# Patient Record
Sex: MALE | Race: BLACK OR AFRICAN AMERICAN | NOT HISPANIC OR LATINO | Employment: OTHER | URBAN - METROPOLITAN AREA
[De-identification: names, ages, dates, MRNs, and addresses within clinical notes are randomized per-mention and may not be internally consistent; named-entity substitution may affect disease eponyms.]

---

## 2024-01-01 ENCOUNTER — APPOINTMENT (OUTPATIENT)
Dept: GENERAL RADIOLOGY | Facility: HOSPITAL | Age: 69
DRG: 871 | End: 2024-01-01
Payer: MEDICARE

## 2024-01-01 ENCOUNTER — APPOINTMENT (OUTPATIENT)
Dept: CT IMAGING | Facility: HOSPITAL | Age: 69
DRG: 871 | End: 2024-01-01
Payer: MEDICARE

## 2024-01-01 ENCOUNTER — APPOINTMENT (OUTPATIENT)
Dept: NUCLEAR MEDICINE | Facility: HOSPITAL | Age: 69
DRG: 871 | End: 2024-01-01
Payer: MEDICARE

## 2024-01-01 ENCOUNTER — APPOINTMENT (OUTPATIENT)
Dept: CARDIOLOGY | Facility: HOSPITAL | Age: 69
DRG: 871 | End: 2024-01-01
Payer: MEDICARE

## 2024-01-01 ENCOUNTER — HOSPITAL ENCOUNTER (INPATIENT)
Facility: HOSPITAL | Age: 69
LOS: 8 days | DRG: 871 | End: 2024-06-03
Attending: EMERGENCY MEDICINE | Admitting: INTERNAL MEDICINE
Payer: MEDICARE

## 2024-01-01 ENCOUNTER — APPOINTMENT (OUTPATIENT)
Dept: ULTRASOUND IMAGING | Facility: HOSPITAL | Age: 69
DRG: 871 | End: 2024-01-01
Payer: MEDICARE

## 2024-01-01 VITALS
WEIGHT: 187.39 LBS | HEART RATE: 16 BPM | SYSTOLIC BLOOD PRESSURE: 135 MMHG | HEIGHT: 74 IN | TEMPERATURE: 99.9 F | OXYGEN SATURATION: 95 % | RESPIRATION RATE: 30 BRPM | DIASTOLIC BLOOD PRESSURE: 85 MMHG | BODY MASS INDEX: 24.05 KG/M2

## 2024-01-01 DIAGNOSIS — N28.9 ACUTE RENAL INSUFFICIENCY: ICD-10-CM

## 2024-01-01 DIAGNOSIS — R09.02 HYPOXIA: ICD-10-CM

## 2024-01-01 DIAGNOSIS — J18.9 PNEUMONIA OF LEFT LOWER LOBE DUE TO INFECTIOUS ORGANISM: Primary | ICD-10-CM

## 2024-01-01 LAB
ABO GROUP BLD: NORMAL
ALBUMIN SERPL-MCNC: 2.1 G/DL (ref 3.5–5.2)
ALBUMIN SERPL-MCNC: 2.1 G/DL (ref 3.5–5.2)
ALBUMIN SERPL-MCNC: 2.2 G/DL (ref 3.5–5.2)
ALBUMIN SERPL-MCNC: 2.3 G/DL (ref 3.5–5.2)
ALBUMIN SERPL-MCNC: 2.4 G/DL (ref 3.5–5.2)
ALBUMIN SERPL-MCNC: 2.4 G/DL (ref 3.5–5.2)
ALBUMIN SERPL-MCNC: 2.5 G/DL (ref 3.5–5.2)
ALBUMIN SERPL-MCNC: 2.6 G/DL (ref 3.5–5.2)
ALBUMIN SERPL-MCNC: 2.6 G/DL (ref 3.5–5.2)
ALBUMIN SERPL-MCNC: 2.8 G/DL (ref 3.5–5.2)
ALBUMIN SERPL-MCNC: 3.4 G/DL (ref 3.5–5.2)
ALBUMIN/GLOB SERPL: 0.5 G/DL
ALBUMIN/GLOB SERPL: 0.6 G/DL
ALBUMIN/GLOB SERPL: 0.7 G/DL
ALBUMIN/GLOB SERPL: 0.8 G/DL
ALP SERPL-CCNC: 113 U/L (ref 39–117)
ALP SERPL-CCNC: 114 U/L (ref 39–117)
ALP SERPL-CCNC: 115 U/L (ref 39–117)
ALP SERPL-CCNC: 116 U/L (ref 39–117)
ALP SERPL-CCNC: 117 U/L (ref 39–117)
ALP SERPL-CCNC: 122 U/L (ref 39–117)
ALP SERPL-CCNC: 66 U/L (ref 39–117)
ALP SERPL-CCNC: 70 U/L (ref 39–117)
ALP SERPL-CCNC: 87 U/L (ref 39–117)
ALP SERPL-CCNC: 92 U/L (ref 39–117)
ALPHA1 GLOB MFR UR ELPH: 282 MG/DL (ref 90–200)
ALT SERPL W P-5'-P-CCNC: 102 U/L (ref 1–41)
ALT SERPL W P-5'-P-CCNC: 123 U/L (ref 1–41)
ALT SERPL W P-5'-P-CCNC: 139 U/L (ref 1–41)
ALT SERPL W P-5'-P-CCNC: 155 U/L (ref 1–41)
ALT SERPL W P-5'-P-CCNC: 196 U/L (ref 1–41)
ALT SERPL W P-5'-P-CCNC: 219 U/L (ref 1–41)
ALT SERPL W P-5'-P-CCNC: 59 U/L (ref 1–41)
ALT SERPL W P-5'-P-CCNC: 60 U/L (ref 1–41)
ALT SERPL W P-5'-P-CCNC: 89 U/L (ref 1–41)
ALT SERPL W P-5'-P-CCNC: 99 U/L (ref 1–41)
AMMONIA BLD-SCNC: 26 UMOL/L (ref 16–60)
AMMONIA BLD-SCNC: 42 UMOL/L (ref 16–60)
AMPHET+METHAMPHET UR QL: NEGATIVE
AMPHET+METHAMPHET UR QL: NEGATIVE
ANA SER QL: NEGATIVE
ANION GAP SERPL CALCULATED.3IONS-SCNC: 10 MMOL/L (ref 5–15)
ANION GAP SERPL CALCULATED.3IONS-SCNC: 10.2 MMOL/L (ref 5–15)
ANION GAP SERPL CALCULATED.3IONS-SCNC: 12 MMOL/L (ref 5–15)
ANION GAP SERPL CALCULATED.3IONS-SCNC: 12 MMOL/L (ref 5–15)
ANION GAP SERPL CALCULATED.3IONS-SCNC: 12.1 MMOL/L (ref 5–15)
ANION GAP SERPL CALCULATED.3IONS-SCNC: 12.5 MMOL/L (ref 5–15)
ANION GAP SERPL CALCULATED.3IONS-SCNC: 13 MMOL/L (ref 5–15)
ANION GAP SERPL CALCULATED.3IONS-SCNC: 13.3 MMOL/L (ref 5–15)
ANION GAP SERPL CALCULATED.3IONS-SCNC: 14 MMOL/L (ref 5–15)
ANION GAP SERPL CALCULATED.3IONS-SCNC: 14.8 MMOL/L (ref 5–15)
ANION GAP SERPL CALCULATED.3IONS-SCNC: 15 MMOL/L (ref 5–15)
ANION GAP SERPL CALCULATED.3IONS-SCNC: 15.7 MMOL/L (ref 5–15)
ANION GAP SERPL CALCULATED.3IONS-SCNC: 16.6 MMOL/L (ref 5–15)
ANION GAP SERPL CALCULATED.3IONS-SCNC: 17 MMOL/L (ref 5–15)
ANION GAP SERPL CALCULATED.3IONS-SCNC: 18 MMOL/L (ref 5–15)
ANION GAP SERPL CALCULATED.3IONS-SCNC: 9.6 MMOL/L (ref 5–15)
APPEARANCE FLD: ABNORMAL
ARTERIAL PATENCY WRIST A: ABNORMAL
ARTERIAL PATENCY WRIST A: POSITIVE
AST SERPL-CCNC: 106 U/L (ref 1–40)
AST SERPL-CCNC: 146 U/L (ref 1–40)
AST SERPL-CCNC: 160 U/L (ref 1–40)
AST SERPL-CCNC: 167 U/L (ref 1–40)
AST SERPL-CCNC: 186 U/L (ref 1–40)
AST SERPL-CCNC: 192 U/L (ref 1–40)
AST SERPL-CCNC: 236 U/L (ref 1–40)
AST SERPL-CCNC: 329 U/L (ref 1–40)
AST SERPL-CCNC: 352 U/L (ref 1–40)
AST SERPL-CCNC: 93 U/L (ref 1–40)
ATMOSPHERIC PRESS: 747.1 MMHG
ATMOSPHERIC PRESS: 747.3 MMHG
ATMOSPHERIC PRESS: 747.9 MMHG
ATMOSPHERIC PRESS: 748.2 MMHG
ATMOSPHERIC PRESS: 748.5 MMHG
ATMOSPHERIC PRESS: 749.1 MMHG
ATMOSPHERIC PRESS: 749.6 MMHG
ATMOSPHERIC PRESS: 751.5 MMHG
ATMOSPHERIC PRESS: 751.7 MMHG
ATMOSPHERIC PRESS: 752.1 MMHG
ATMOSPHERIC PRESS: 752.4 MMHG
ATMOSPHERIC PRESS: 753.4 MMHG
B PARAPERT DNA SPEC QL NAA+PROBE: NOT DETECTED
B PARAPERT DNA SPEC QL NAA+PROBE: NOT DETECTED
B PERT DNA SPEC QL NAA+PROBE: NOT DETECTED
B PERT DNA SPEC QL NAA+PROBE: NOT DETECTED
BACTERIA SPEC AEROBE CULT: ABNORMAL
BACTERIA SPEC AEROBE CULT: ABNORMAL
BACTERIA SPEC AEROBE CULT: NORMAL
BACTERIA SPEC AEROBE CULT: NORMAL
BACTERIA SPEC RESP CULT: NORMAL
BACTERIA UR QL AUTO: ABNORMAL /HPF
BACTERIA UR QL AUTO: NORMAL /HPF
BARBITURATES UR QL SCN: NEGATIVE
BARBITURATES UR QL SCN: NEGATIVE
BASE EXCESS BLDA CALC-SCNC: -1.7 MMOL/L (ref 0–2)
BASE EXCESS BLDA CALC-SCNC: -2.7 MMOL/L (ref 0–2)
BASE EXCESS BLDA CALC-SCNC: -4.7 MMOL/L (ref 0–2)
BASE EXCESS BLDA CALC-SCNC: -5.5 MMOL/L (ref 0–2)
BASE EXCESS BLDA CALC-SCNC: -5.7 MMOL/L (ref 0–2)
BASE EXCESS BLDA CALC-SCNC: -5.9 MMOL/L (ref 0–2)
BASE EXCESS BLDA CALC-SCNC: -6.3 MMOL/L (ref 0–2)
BASE EXCESS BLDA CALC-SCNC: -6.4 MMOL/L (ref 0–2)
BASE EXCESS BLDA CALC-SCNC: -7.2 MMOL/L (ref 0–2)
BASE EXCESS BLDA CALC-SCNC: -7.9 MMOL/L (ref 0–2)
BASE EXCESS BLDA CALC-SCNC: 0.4 MMOL/L (ref 0–2)
BASE EXCESS BLDA CALC-SCNC: 0.9 MMOL/L (ref 0–2)
BASOPHILS # BLD AUTO: 0.01 10*3/MM3 (ref 0–0.2)
BASOPHILS NFR BLD AUTO: 0.1 % (ref 0–1.5)
BASOPHILS NFR BLD AUTO: 0.1 % (ref 0–1.5)
BASOPHILS NFR BLD AUTO: 0.2 % (ref 0–1.5)
BDY SITE: ABNORMAL
BENZODIAZ UR QL SCN: NEGATIVE
BENZODIAZ UR QL SCN: NEGATIVE
BH BB BLOOD EXPIRATION DATE: NORMAL
BH BB BLOOD EXPIRATION DATE: NORMAL
BH BB BLOOD TYPE BARCODE: 5100
BH BB BLOOD TYPE BARCODE: 5100
BH BB DISPENSE STATUS: NORMAL
BH BB DISPENSE STATUS: NORMAL
BH BB PRODUCT CODE: NORMAL
BH BB PRODUCT CODE: NORMAL
BH BB UNIT NUMBER: NORMAL
BH BB UNIT NUMBER: NORMAL
BH CV ECHO MEAS - EDV(CUBED): 123.3 ML
BH CV ECHO MEAS - IVS/LVPW: 0.98 CM
BH CV ECHO MEAS - IVSD: 1.17 CM
BH CV ECHO MEAS - LV MASS(C)D: 228.1 GRAMS
BH CV ECHO MEAS - LVIDD: 5 CM
BH CV ECHO MEAS - LVOT AREA: 3.8 CM2
BH CV ECHO MEAS - LVOT DIAM: 2.2 CM
BH CV ECHO MEAS - LVPWD: 1.2 CM
BH CV LOWER VASCULAR LEFT COMMON FEMORAL AUGMENT: NORMAL
BH CV LOWER VASCULAR LEFT COMMON FEMORAL COMPETENT: NORMAL
BH CV LOWER VASCULAR LEFT COMMON FEMORAL COMPRESS: NORMAL
BH CV LOWER VASCULAR LEFT COMMON FEMORAL PHASIC: NORMAL
BH CV LOWER VASCULAR LEFT COMMON FEMORAL SPONT: NORMAL
BH CV LOWER VASCULAR LEFT DISTAL FEMORAL COMPRESS: NORMAL
BH CV LOWER VASCULAR LEFT GASTRONEMIUS COMPRESS: NORMAL
BH CV LOWER VASCULAR LEFT GREATER SAPH AK COMPRESS: NORMAL
BH CV LOWER VASCULAR LEFT GREATER SAPH BK COMPRESS: NORMAL
BH CV LOWER VASCULAR LEFT LESSER SAPH COMPRESS: NORMAL
BH CV LOWER VASCULAR LEFT MID FEMORAL AUGMENT: NORMAL
BH CV LOWER VASCULAR LEFT MID FEMORAL COMPETENT: NORMAL
BH CV LOWER VASCULAR LEFT MID FEMORAL COMPRESS: NORMAL
BH CV LOWER VASCULAR LEFT MID FEMORAL PHASIC: NORMAL
BH CV LOWER VASCULAR LEFT MID FEMORAL SPONT: NORMAL
BH CV LOWER VASCULAR LEFT PERONEAL COMPRESS: NORMAL
BH CV LOWER VASCULAR LEFT POPLITEAL AUGMENT: NORMAL
BH CV LOWER VASCULAR LEFT POPLITEAL COMPETENT: NORMAL
BH CV LOWER VASCULAR LEFT POPLITEAL COMPRESS: NORMAL
BH CV LOWER VASCULAR LEFT POPLITEAL PHASIC: NORMAL
BH CV LOWER VASCULAR LEFT POPLITEAL SPONT: NORMAL
BH CV LOWER VASCULAR LEFT POSTERIOR TIBIAL COMPRESS: NORMAL
BH CV LOWER VASCULAR LEFT PROFUNDA FEMORAL COMPRESS: NORMAL
BH CV LOWER VASCULAR LEFT PROXIMAL FEMORAL COMPRESS: NORMAL
BH CV LOWER VASCULAR LEFT SAPHENOFEMORAL JUNCTION COMPRESS: NORMAL
BH CV LOWER VASCULAR RIGHT COMMON FEMORAL AUGMENT: NORMAL
BH CV LOWER VASCULAR RIGHT COMMON FEMORAL COMPETENT: NORMAL
BH CV LOWER VASCULAR RIGHT COMMON FEMORAL COMPRESS: NORMAL
BH CV LOWER VASCULAR RIGHT COMMON FEMORAL PHASIC: NORMAL
BH CV LOWER VASCULAR RIGHT COMMON FEMORAL SPONT: NORMAL
BH CV LOWER VASCULAR RIGHT DISTAL FEMORAL COMPRESS: NORMAL
BH CV LOWER VASCULAR RIGHT GASTRONEMIUS COMPRESS: NORMAL
BH CV LOWER VASCULAR RIGHT GREATER SAPH AK COMPRESS: NORMAL
BH CV LOWER VASCULAR RIGHT GREATER SAPH BK COMPRESS: NORMAL
BH CV LOWER VASCULAR RIGHT LESSER SAPH COMPRESS: NORMAL
BH CV LOWER VASCULAR RIGHT MID FEMORAL AUGMENT: NORMAL
BH CV LOWER VASCULAR RIGHT MID FEMORAL COMPETENT: NORMAL
BH CV LOWER VASCULAR RIGHT MID FEMORAL COMPRESS: NORMAL
BH CV LOWER VASCULAR RIGHT MID FEMORAL PHASIC: NORMAL
BH CV LOWER VASCULAR RIGHT MID FEMORAL SPONT: NORMAL
BH CV LOWER VASCULAR RIGHT PERONEAL COMPRESS: NORMAL
BH CV LOWER VASCULAR RIGHT POPLITEAL AUGMENT: NORMAL
BH CV LOWER VASCULAR RIGHT POPLITEAL COMPETENT: NORMAL
BH CV LOWER VASCULAR RIGHT POPLITEAL COMPRESS: NORMAL
BH CV LOWER VASCULAR RIGHT POPLITEAL PHASIC: NORMAL
BH CV LOWER VASCULAR RIGHT POPLITEAL SPONT: NORMAL
BH CV LOWER VASCULAR RIGHT POSTERIOR TIBIAL COMPRESS: NORMAL
BH CV LOWER VASCULAR RIGHT PROFUNDA FEMORAL COMPRESS: NORMAL
BH CV LOWER VASCULAR RIGHT PROXIMAL FEMORAL COMPRESS: NORMAL
BH CV LOWER VASCULAR RIGHT SAPHENOFEMORAL JUNCTION COMPRESS: NORMAL
BH CV VAS HEPATIC PORTAL VEIN DIAMETER: 1.06 CM
BH CV VAS HEPATOPORTAL HEPATIC V LT DIRECTION: NORMAL
BH CV VAS HEPATOPORTAL HEPATIC V MID DIRECTION: NORMAL
BH CV VAS HEPATOPORTAL HEPATIC V RT DIRECTION: NORMAL
BH CV VAS HEPATOPORTAL IVC SPONT: NORMAL
BH CV VAS HEPATOPORTAL PORTAL V EXTRAHEPATIC DIRECTION: NORMAL
BH CV VAS HEPATOPORTAL PORTAL V LT INTRA DIRECTION: NORMAL
BH CV VAS HEPATOPORTAL PORTAL V MAIN INTRA DIRECTION: NORMAL
BH CV VAS HEPATOPORTAL PORTAL V PSV: 36.7 CM/S
BH CV VAS HEPATOPORTAL PORTAL V RT INTRA DIRECTION: NORMAL
BH CV VAS HEPATOPORTAL SMV DIRECTION: NORMAL
BH CV VAS HEPATOPORTAL SPLENIC DIRECTION: NORMAL
BH CV VAS PRELIMINARY FINDINGS SCRIPTING: 1
BH CV VAS SMA HEPATIC A RI: 0.52
BH CV VAS SMA HEPATIC EDV: 86.8 CM/S
BH CV VAS SMA HEPATIC PSV: 180 CM/S
BH CV VAS SMA SPLENIC A RI: 0.81
BH CV VAS SMA SPLENIC EDV: 21.9 CM/S
BH CV VAS SMA SPLENIC PSV: 114 CM/S
BILIRUB SERPL-MCNC: 0.5 MG/DL (ref 0–1.2)
BILIRUB SERPL-MCNC: 0.7 MG/DL (ref 0–1.2)
BILIRUB SERPL-MCNC: 0.7 MG/DL (ref 0–1.2)
BILIRUB SERPL-MCNC: 0.9 MG/DL (ref 0–1.2)
BILIRUB SERPL-MCNC: 1.3 MG/DL (ref 0–1.2)
BILIRUB SERPL-MCNC: 1.8 MG/DL (ref 0–1.2)
BILIRUB SERPL-MCNC: 3.6 MG/DL (ref 0–1.2)
BILIRUB SERPL-MCNC: 3.6 MG/DL (ref 0–1.2)
BILIRUB SERPL-MCNC: 3.9 MG/DL (ref 0–1.2)
BILIRUB SERPL-MCNC: 4 MG/DL (ref 0–1.2)
BILIRUB UR QL STRIP: NEGATIVE
BLD GP AB SCN SERPL QL: NEGATIVE
BUN SERPL-MCNC: 15 MG/DL (ref 8–23)
BUN SERPL-MCNC: 16 MG/DL (ref 8–23)
BUN SERPL-MCNC: 18 MG/DL (ref 8–23)
BUN SERPL-MCNC: 19 MG/DL (ref 8–23)
BUN SERPL-MCNC: 20 MG/DL (ref 8–23)
BUN SERPL-MCNC: 22 MG/DL (ref 8–23)
BUN SERPL-MCNC: 25 MG/DL (ref 8–23)
BUN SERPL-MCNC: 26 MG/DL (ref 8–23)
BUN SERPL-MCNC: 27 MG/DL (ref 8–23)
BUN SERPL-MCNC: 35 MG/DL (ref 8–23)
BUN SERPL-MCNC: 36 MG/DL (ref 8–23)
BUN SERPL-MCNC: 39 MG/DL (ref 8–23)
BUN SERPL-MCNC: 43 MG/DL (ref 8–23)
BUN SERPL-MCNC: 53 MG/DL (ref 8–23)
BUN SERPL-MCNC: 56 MG/DL (ref 8–23)
BUN SERPL-MCNC: 57 MG/DL (ref 8–23)
BUN/CREAT SERPL: 10.5 (ref 7–25)
BUN/CREAT SERPL: 10.7 (ref 7–25)
BUN/CREAT SERPL: 10.8 (ref 7–25)
BUN/CREAT SERPL: 11 (ref 7–25)
BUN/CREAT SERPL: 11.1 (ref 7–25)
BUN/CREAT SERPL: 11.1 (ref 7–25)
BUN/CREAT SERPL: 11.3 (ref 7–25)
BUN/CREAT SERPL: 11.4 (ref 7–25)
BUN/CREAT SERPL: 11.4 (ref 7–25)
BUN/CREAT SERPL: 11.5 (ref 7–25)
BUN/CREAT SERPL: 11.5 (ref 7–25)
BUN/CREAT SERPL: 11.7 (ref 7–25)
BUN/CREAT SERPL: 11.7 (ref 7–25)
BUN/CREAT SERPL: 12.1 (ref 7–25)
BUN/CREAT SERPL: 13 (ref 7–25)
BUN/CREAT SERPL: 14.1 (ref 7–25)
BUN/CREAT SERPL: 16 (ref 7–25)
BUN/CREAT SERPL: 16.4 (ref 7–25)
C PNEUM DNA NPH QL NAA+NON-PROBE: NOT DETECTED
C PNEUM DNA NPH QL NAA+NON-PROBE: NOT DETECTED
C-ANCA TITR SER IF: NORMAL TITER
C3 SERPL-MCNC: 172 MG/DL (ref 82–167)
C4 SERPL-MCNC: 46 MG/DL (ref 12–38)
CA-I BLD-MCNC: 4.5 MG/DL (ref 4.6–5.4)
CA-I BLD-MCNC: 4.6 MG/DL (ref 4.6–5.4)
CA-I SERPL ISE-MCNC: 1.12 MMOL/L (ref 1.15–1.35)
CA-I SERPL ISE-MCNC: 1.12 MMOL/L (ref 1.15–1.35)
CA-I SERPL ISE-MCNC: 1.13 MMOL/L (ref 1.15–1.35)
CA-I SERPL ISE-MCNC: 1.13 MMOL/L (ref 1.15–1.35)
CA-I SERPL ISE-MCNC: 1.14 MMOL/L (ref 1.15–1.35)
CA-I SERPL ISE-MCNC: 1.14 MMOL/L (ref 1.15–1.35)
CA-I SERPL ISE-MCNC: 1.15 MMOL/L (ref 1.15–1.35)
CA-I SERPL ISE-MCNC: 1.16 MMOL/L (ref 1.15–1.35)
CALCIUM SPEC-SCNC: 7.6 MG/DL (ref 8.6–10.5)
CALCIUM SPEC-SCNC: 7.8 MG/DL (ref 8.6–10.5)
CALCIUM SPEC-SCNC: 7.9 MG/DL (ref 8.6–10.5)
CALCIUM SPEC-SCNC: 8 MG/DL (ref 8.6–10.5)
CALCIUM SPEC-SCNC: 8 MG/DL (ref 8.6–10.5)
CALCIUM SPEC-SCNC: 8.1 MG/DL (ref 8.6–10.5)
CALCIUM SPEC-SCNC: 8.1 MG/DL (ref 8.6–10.5)
CALCIUM SPEC-SCNC: 8.2 MG/DL (ref 8.6–10.5)
CALCIUM SPEC-SCNC: 8.2 MG/DL (ref 8.6–10.5)
CALCIUM SPEC-SCNC: 8.4 MG/DL (ref 8.6–10.5)
CALCIUM SPEC-SCNC: 8.6 MG/DL (ref 8.6–10.5)
CALCIUM SPEC-SCNC: 8.8 MG/DL (ref 8.6–10.5)
CANNABINOIDS SERPL QL: NEGATIVE
CANNABINOIDS SERPL QL: NEGATIVE
CERULOPLASMIN SERPL-MCNC: 25 MG/DL (ref 16–31)
CHLORIDE SERPL-SCNC: 101 MMOL/L (ref 98–107)
CHLORIDE SERPL-SCNC: 102 MMOL/L (ref 98–107)
CHLORIDE SERPL-SCNC: 103 MMOL/L (ref 98–107)
CHLORIDE SERPL-SCNC: 104 MMOL/L (ref 98–107)
CHLORIDE SERPL-SCNC: 104 MMOL/L (ref 98–107)
CHLORIDE SERPL-SCNC: 106 MMOL/L (ref 98–107)
CHLORIDE SERPL-SCNC: 107 MMOL/L (ref 98–107)
CHLORIDE SERPL-SCNC: 109 MMOL/L (ref 98–107)
CHLORIDE SERPL-SCNC: 111 MMOL/L (ref 98–107)
CHLORIDE SERPL-SCNC: 112 MMOL/L (ref 98–107)
CHLORIDE SERPL-SCNC: 114 MMOL/L (ref 98–107)
CHLORIDE SERPL-SCNC: 117 MMOL/L (ref 98–107)
CHLORIDE SERPL-SCNC: 118 MMOL/L (ref 98–107)
CHLORIDE SERPL-SCNC: 121 MMOL/L (ref 98–107)
CK SERPL-CCNC: 201 U/L (ref 20–200)
CK SERPL-CCNC: 254 U/L (ref 20–200)
CLARITY UR: ABNORMAL
CLARITY UR: CLEAR
CO2 BLDA-SCNC: 20.2 MMOL/L (ref 23–27)
CO2 BLDA-SCNC: 20.5 MMOL/L (ref 23–27)
CO2 BLDA-SCNC: 21.9 MMOL/L (ref 23–27)
CO2 BLDA-SCNC: 22.2 MMOL/L (ref 23–27)
CO2 BLDA-SCNC: 22.3 MMOL/L (ref 23–27)
CO2 BLDA-SCNC: 22.4 MMOL/L (ref 23–27)
CO2 BLDA-SCNC: 22.5 MMOL/L (ref 23–27)
CO2 BLDA-SCNC: 22.8 MMOL/L (ref 23–27)
CO2 BLDA-SCNC: 23.2 MMOL/L (ref 23–27)
CO2 BLDA-SCNC: 23.9 MMOL/L (ref 23–27)
CO2 BLDA-SCNC: 24 MMOL/L (ref 23–27)
CO2 BLDA-SCNC: 24.2 MMOL/L (ref 23–27)
CO2 SERPL-SCNC: 17.3 MMOL/L (ref 22–29)
CO2 SERPL-SCNC: 17.4 MMOL/L (ref 22–29)
CO2 SERPL-SCNC: 17.5 MMOL/L (ref 22–29)
CO2 SERPL-SCNC: 18 MMOL/L (ref 22–29)
CO2 SERPL-SCNC: 18.2 MMOL/L (ref 22–29)
CO2 SERPL-SCNC: 19.9 MMOL/L (ref 22–29)
CO2 SERPL-SCNC: 20 MMOL/L (ref 22–29)
CO2 SERPL-SCNC: 20 MMOL/L (ref 22–29)
CO2 SERPL-SCNC: 20.4 MMOL/L (ref 22–29)
CO2 SERPL-SCNC: 20.7 MMOL/L (ref 22–29)
CO2 SERPL-SCNC: 21 MMOL/L (ref 22–29)
CO2 SERPL-SCNC: 21.8 MMOL/L (ref 22–29)
CO2 SERPL-SCNC: 22 MMOL/L (ref 22–29)
CO2 SERPL-SCNC: 22 MMOL/L (ref 22–29)
CO2 SERPL-SCNC: 24 MMOL/L (ref 22–29)
CO2 SERPL-SCNC: 24 MMOL/L (ref 22–29)
COCAINE UR QL: NEGATIVE
COCAINE UR QL: NEGATIVE
COLOR FLD: ABNORMAL
COLOR UR: YELLOW
CREAT SERPL-MCNC: 1.24 MG/DL (ref 0.76–1.27)
CREAT SERPL-MCNC: 1.34 MG/DL (ref 0.76–1.27)
CREAT SERPL-MCNC: 1.44 MG/DL (ref 0.76–1.27)
CREAT SERPL-MCNC: 1.58 MG/DL (ref 0.76–1.27)
CREAT SERPL-MCNC: 1.63 MG/DL (ref 0.76–1.27)
CREAT SERPL-MCNC: 1.67 MG/DL (ref 0.76–1.27)
CREAT SERPL-MCNC: 1.71 MG/DL (ref 0.76–1.27)
CREAT SERPL-MCNC: 1.74 MG/DL (ref 0.76–1.27)
CREAT SERPL-MCNC: 2.07 MG/DL (ref 0.76–1.27)
CREAT SERPL-MCNC: 2.31 MG/DL (ref 0.76–1.27)
CREAT SERPL-MCNC: 2.36 MG/DL (ref 0.76–1.27)
CREAT SERPL-MCNC: 2.44 MG/DL (ref 0.76–1.27)
CREAT SERPL-MCNC: 3.27 MG/DL (ref 0.76–1.27)
CREAT SERPL-MCNC: 3.44 MG/DL (ref 0.76–1.27)
CREAT SERPL-MCNC: 3.74 MG/DL (ref 0.76–1.27)
CREAT SERPL-MCNC: 3.98 MG/DL (ref 0.76–1.27)
CREAT SERPL-MCNC: 4.69 MG/DL (ref 0.76–1.27)
CREAT SERPL-MCNC: 4.88 MG/DL (ref 0.76–1.27)
CREAT UR-MCNC: 79.6 MG/DL
CREAT UR-MCNC: 88.7 MG/DL
CREAT UR-MCNC: 93.2 MG/DL
CROSSMATCH INTERPRETATION: NORMAL
CROSSMATCH INTERPRETATION: NORMAL
CRYPTOC AG CSF QL: NEGATIVE
D DIMER PPP FEU-MCNC: 3.14 MCGFEU/ML (ref 0–0.7)
D-LACTATE SERPL-SCNC: 1.6 MMOL/L (ref 0.5–2)
DEPRECATED RDW RBC AUTO: 41.3 FL (ref 37–54)
DEPRECATED RDW RBC AUTO: 41.5 FL (ref 37–54)
DEPRECATED RDW RBC AUTO: 41.8 FL (ref 37–54)
DEPRECATED RDW RBC AUTO: 42.9 FL (ref 37–54)
DEPRECATED RDW RBC AUTO: 43.6 FL (ref 37–54)
DEPRECATED RDW RBC AUTO: 43.8 FL (ref 37–54)
DEPRECATED RDW RBC AUTO: 46.9 FL (ref 37–54)
DEPRECATED RDW RBC AUTO: 47.1 FL (ref 37–54)
DEPRECATED RDW RBC AUTO: 48.7 FL (ref 37–54)
DEPRECATED RDW RBC AUTO: 48.8 FL (ref 37–54)
DEPRECATED RDW RBC AUTO: 49.1 FL (ref 37–54)
DEVICE COMMENT: ABNORMAL
EGFRCR SERPLBLD CKD-EPI 2021: 12.1 ML/MIN/1.73
EGFRCR SERPLBLD CKD-EPI 2021: 12.7 ML/MIN/1.73
EGFRCR SERPLBLD CKD-EPI 2021: 15.4 ML/MIN/1.73
EGFRCR SERPLBLD CKD-EPI 2021: 16.6 ML/MIN/1.73
EGFRCR SERPLBLD CKD-EPI 2021: 18.4 ML/MIN/1.73
EGFRCR SERPLBLD CKD-EPI 2021: 19.5 ML/MIN/1.73
EGFRCR SERPLBLD CKD-EPI 2021: 27.8 ML/MIN/1.73
EGFRCR SERPLBLD CKD-EPI 2021: 28.9 ML/MIN/1.73
EGFRCR SERPLBLD CKD-EPI 2021: 29.6 ML/MIN/1.73
EGFRCR SERPLBLD CKD-EPI 2021: 33.8 ML/MIN/1.73
EGFRCR SERPLBLD CKD-EPI 2021: 41.7 ML/MIN/1.73
EGFRCR SERPLBLD CKD-EPI 2021: 42.5 ML/MIN/1.73
EGFRCR SERPLBLD CKD-EPI 2021: 43.8 ML/MIN/1.73
EGFRCR SERPLBLD CKD-EPI 2021: 45 ML/MIN/1.73
EGFRCR SERPLBLD CKD-EPI 2021: 46.8 ML/MIN/1.73
EGFRCR SERPLBLD CKD-EPI 2021: 52.3 ML/MIN/1.73
EGFRCR SERPLBLD CKD-EPI 2021: 57 ML/MIN/1.73
EGFRCR SERPLBLD CKD-EPI 2021: 62.5 ML/MIN/1.73
ENDOMYSIUM IGA SER QL: NEGATIVE
EOSINOPHIL # BLD AUTO: 0.01 10*3/MM3 (ref 0–0.4)
EOSINOPHIL # BLD AUTO: 0.03 10*3/MM3 (ref 0–0.4)
EOSINOPHIL # BLD AUTO: 0.07 10*3/MM3 (ref 0–0.4)
EOSINOPHIL NFR BLD AUTO: 0.1 % (ref 0.3–6.2)
EOSINOPHIL NFR BLD AUTO: 0.4 % (ref 0.3–6.2)
EOSINOPHIL NFR BLD AUTO: 1.2 % (ref 0.3–6.2)
EOSINOPHIL SPEC QL MICRO: 0 % EOS/100 CELLS (ref 0–0)
ERYTHROCYTE [DISTWIDTH] IN BLOOD BY AUTOMATED COUNT: 13.1 % (ref 12.3–15.4)
ERYTHROCYTE [DISTWIDTH] IN BLOOD BY AUTOMATED COUNT: 13.2 % (ref 12.3–15.4)
ERYTHROCYTE [DISTWIDTH] IN BLOOD BY AUTOMATED COUNT: 13.3 % (ref 12.3–15.4)
ERYTHROCYTE [DISTWIDTH] IN BLOOD BY AUTOMATED COUNT: 13.6 % (ref 12.3–15.4)
ERYTHROCYTE [DISTWIDTH] IN BLOOD BY AUTOMATED COUNT: 13.7 % (ref 12.3–15.4)
ERYTHROCYTE [DISTWIDTH] IN BLOOD BY AUTOMATED COUNT: 13.7 % (ref 12.3–15.4)
ERYTHROCYTE [DISTWIDTH] IN BLOOD BY AUTOMATED COUNT: 14.3 % (ref 12.3–15.4)
ERYTHROCYTE [DISTWIDTH] IN BLOOD BY AUTOMATED COUNT: 14.6 % (ref 12.3–15.4)
ERYTHROCYTE [DISTWIDTH] IN BLOOD BY AUTOMATED COUNT: 14.9 % (ref 12.3–15.4)
ERYTHROCYTE [DISTWIDTH] IN BLOOD BY AUTOMATED COUNT: 15 % (ref 12.3–15.4)
ERYTHROCYTE [DISTWIDTH] IN BLOOD BY AUTOMATED COUNT: 15.2 % (ref 12.3–15.4)
FENTANYL UR-MCNC: NEGATIVE NG/ML
FENTANYL UR-MCNC: NEGATIVE NG/ML
FLUAV SUBTYP SPEC NAA+PROBE: NOT DETECTED
FLUAV SUBTYP SPEC NAA+PROBE: NOT DETECTED
FLUBV RNA ISLT QL NAA+PROBE: NOT DETECTED
FLUBV RNA ISLT QL NAA+PROBE: NOT DETECTED
GAS FLOW AIRWAY: 12 LPM
GAS FLOW AIRWAY: 6 LPM
GAS FLOW AIRWAY: 8 LPM
GBM AB SER IA-ACNC: <0.2 UNITS (ref 0–0.9)
GEN 5 2HR TROPONIN T REFLEX: 35 NG/L
GLOBULIN UR ELPH-MCNC: 3.8 GM/DL
GLOBULIN UR ELPH-MCNC: 4.1 GM/DL
GLOBULIN UR ELPH-MCNC: 4.2 GM/DL
GLOBULIN UR ELPH-MCNC: 4.2 GM/DL
GLOBULIN UR ELPH-MCNC: 4.4 GM/DL
GLOBULIN UR ELPH-MCNC: 4.5 GM/DL
GLOBULIN UR ELPH-MCNC: 4.5 GM/DL
GLOBULIN UR ELPH-MCNC: 4.6 GM/DL
GLUCOSE BLDC GLUCOMTR-MCNC: 101 MG/DL (ref 70–130)
GLUCOSE BLDC GLUCOMTR-MCNC: 101 MG/DL (ref 70–130)
GLUCOSE BLDC GLUCOMTR-MCNC: 106 MG/DL (ref 70–130)
GLUCOSE BLDC GLUCOMTR-MCNC: 107 MG/DL (ref 70–130)
GLUCOSE BLDC GLUCOMTR-MCNC: 118 MG/DL (ref 70–130)
GLUCOSE BLDC GLUCOMTR-MCNC: 132 MG/DL (ref 70–130)
GLUCOSE BLDC GLUCOMTR-MCNC: 133 MG/DL (ref 70–130)
GLUCOSE BLDC GLUCOMTR-MCNC: 150 MG/DL (ref 70–130)
GLUCOSE BLDC GLUCOMTR-MCNC: 154 MG/DL (ref 70–130)
GLUCOSE BLDC GLUCOMTR-MCNC: 170 MG/DL (ref 70–130)
GLUCOSE BLDC GLUCOMTR-MCNC: 197 MG/DL (ref 70–130)
GLUCOSE BLDC GLUCOMTR-MCNC: 62 MG/DL (ref 70–130)
GLUCOSE BLDC GLUCOMTR-MCNC: 71 MG/DL (ref 70–130)
GLUCOSE BLDC GLUCOMTR-MCNC: 71 MG/DL (ref 70–130)
GLUCOSE BLDC GLUCOMTR-MCNC: 74 MG/DL (ref 70–130)
GLUCOSE BLDC GLUCOMTR-MCNC: 79 MG/DL (ref 70–130)
GLUCOSE BLDC GLUCOMTR-MCNC: 83 MG/DL (ref 70–130)
GLUCOSE BLDC GLUCOMTR-MCNC: 85 MG/DL (ref 70–130)
GLUCOSE BLDC GLUCOMTR-MCNC: 93 MG/DL (ref 70–130)
GLUCOSE SERPL-MCNC: 101 MG/DL (ref 65–99)
GLUCOSE SERPL-MCNC: 104 MG/DL (ref 65–99)
GLUCOSE SERPL-MCNC: 104 MG/DL (ref 65–99)
GLUCOSE SERPL-MCNC: 106 MG/DL (ref 65–99)
GLUCOSE SERPL-MCNC: 107 MG/DL (ref 65–99)
GLUCOSE SERPL-MCNC: 109 MG/DL (ref 65–99)
GLUCOSE SERPL-MCNC: 110 MG/DL (ref 65–99)
GLUCOSE SERPL-MCNC: 151 MG/DL (ref 65–99)
GLUCOSE SERPL-MCNC: 185 MG/DL (ref 65–99)
GLUCOSE SERPL-MCNC: 71 MG/DL (ref 65–99)
GLUCOSE SERPL-MCNC: 71 MG/DL (ref 65–99)
GLUCOSE SERPL-MCNC: 77 MG/DL (ref 65–99)
GLUCOSE SERPL-MCNC: 77 MG/DL (ref 65–99)
GLUCOSE SERPL-MCNC: 79 MG/DL (ref 65–99)
GLUCOSE SERPL-MCNC: 83 MG/DL (ref 65–99)
GLUCOSE SERPL-MCNC: 84 MG/DL (ref 65–99)
GLUCOSE SERPL-MCNC: 91 MG/DL (ref 65–99)
GLUCOSE SERPL-MCNC: 96 MG/DL (ref 65–99)
GLUCOSE UR STRIP-MCNC: NEGATIVE MG/DL
GRAM STN SPEC: ABNORMAL
GRAM STN SPEC: ABNORMAL
GRAM STN SPEC: NORMAL
HADV DNA SPEC NAA+PROBE: NOT DETECTED
HADV DNA SPEC NAA+PROBE: NOT DETECTED
HAV IGM SERPL QL IA: NORMAL
HBV CORE IGM SERPL QL IA: NORMAL
HBV SURFACE AG SERPL QL IA: NORMAL
HCO3 BLDA-SCNC: 19 MMOL/L (ref 22–28)
HCO3 BLDA-SCNC: 19.4 MMOL/L (ref 22–28)
HCO3 BLDA-SCNC: 20.5 MMOL/L (ref 22–28)
HCO3 BLDA-SCNC: 20.5 MMOL/L (ref 22–28)
HCO3 BLDA-SCNC: 20.8 MMOL/L (ref 22–28)
HCO3 BLDA-SCNC: 21.1 MMOL/L (ref 22–28)
HCO3 BLDA-SCNC: 21.4 MMOL/L (ref 22–28)
HCO3 BLDA-SCNC: 21.5 MMOL/L (ref 22–28)
HCO3 BLDA-SCNC: 22.2 MMOL/L (ref 22–28)
HCO3 BLDA-SCNC: 22.3 MMOL/L (ref 22–28)
HCO3 BLDA-SCNC: 22.9 MMOL/L (ref 22–28)
HCO3 BLDA-SCNC: 23.3 MMOL/L (ref 22–28)
HCOV 229E RNA SPEC QL NAA+PROBE: NOT DETECTED
HCOV 229E RNA SPEC QL NAA+PROBE: NOT DETECTED
HCOV HKU1 RNA SPEC QL NAA+PROBE: NOT DETECTED
HCOV HKU1 RNA SPEC QL NAA+PROBE: NOT DETECTED
HCOV NL63 RNA SPEC QL NAA+PROBE: NOT DETECTED
HCOV NL63 RNA SPEC QL NAA+PROBE: NOT DETECTED
HCOV OC43 RNA SPEC QL NAA+PROBE: NOT DETECTED
HCOV OC43 RNA SPEC QL NAA+PROBE: NOT DETECTED
HCT VFR BLD AUTO: 20.7 % (ref 37.5–51)
HCT VFR BLD AUTO: 23.8 % (ref 37.5–51)
HCT VFR BLD AUTO: 24 % (ref 37.5–51)
HCT VFR BLD AUTO: 24.6 % (ref 37.5–51)
HCT VFR BLD AUTO: 25.3 % (ref 37.5–51)
HCT VFR BLD AUTO: 26.9 % (ref 37.5–51)
HCT VFR BLD AUTO: 27.5 % (ref 37.5–51)
HCT VFR BLD AUTO: 27.6 % (ref 37.5–51)
HCT VFR BLD AUTO: 27.9 % (ref 37.5–51)
HCT VFR BLD AUTO: 28.2 % (ref 37.5–51)
HCT VFR BLD AUTO: 28.4 % (ref 37.5–51)
HCT VFR BLD AUTO: 28.5 % (ref 37.5–51)
HCT VFR BLD AUTO: 28.7 % (ref 37.5–51)
HCT VFR BLD AUTO: 28.8 % (ref 37.5–51)
HCT VFR BLD AUTO: 28.9 % (ref 37.5–51)
HCT VFR BLD AUTO: 29 % (ref 37.5–51)
HCT VFR BLD AUTO: 30.8 % (ref 37.5–51)
HCV AB SER QL: NORMAL
HEMOCCULT STL QL: NEGATIVE
HEMODILUTION: NO
HGB BLD-MCNC: 10.1 G/DL (ref 13–17.7)
HGB BLD-MCNC: 6.6 G/DL (ref 13–17.7)
HGB BLD-MCNC: 7.5 G/DL (ref 13–17.7)
HGB BLD-MCNC: 8 G/DL (ref 13–17.7)
HGB BLD-MCNC: 8 G/DL (ref 13–17.7)
HGB BLD-MCNC: 8.4 G/DL (ref 13–17.7)
HGB BLD-MCNC: 8.9 G/DL (ref 13–17.7)
HGB BLD-MCNC: 9.1 G/DL (ref 13–17.7)
HGB BLD-MCNC: 9.2 G/DL (ref 13–17.7)
HGB BLD-MCNC: 9.3 G/DL (ref 13–17.7)
HGB BLD-MCNC: 9.5 G/DL (ref 13–17.7)
HGB BLD-MCNC: 9.7 G/DL (ref 13–17.7)
HGB BLD-MCNC: 9.7 G/DL (ref 13–17.7)
HGB UR QL STRIP.AUTO: ABNORMAL
HIV1 RNA # SERPL NAA+PROBE: <20 COPIES/ML
HIV1 RNA SERPL NAA+PROBE-LOG#: NORMAL LOG10COPY/ML
HMPV RNA NPH QL NAA+NON-PROBE: NOT DETECTED
HMPV RNA NPH QL NAA+NON-PROBE: NOT DETECTED
HOLD SPECIMEN: NORMAL
HOLD SPECIMEN: NORMAL
HPIV1 RNA ISLT QL NAA+PROBE: NOT DETECTED
HPIV1 RNA ISLT QL NAA+PROBE: NOT DETECTED
HPIV2 RNA SPEC QL NAA+PROBE: NOT DETECTED
HPIV2 RNA SPEC QL NAA+PROBE: NOT DETECTED
HPIV3 RNA NPH QL NAA+PROBE: NOT DETECTED
HPIV3 RNA NPH QL NAA+PROBE: NOT DETECTED
HPIV4 P GENE NPH QL NAA+PROBE: NOT DETECTED
HPIV4 P GENE NPH QL NAA+PROBE: NOT DETECTED
HYALINE CASTS UR QL AUTO: ABNORMAL /LPF
HYALINE CASTS UR QL AUTO: NORMAL /LPF
IGA SERPL-MCNC: 537 MG/DL (ref 61–437)
IGA1 MFR SER: 514 MG/DL (ref 70–400)
IGG1 SER-MCNC: 1677 MG/DL (ref 700–1600)
IGM SERPL-MCNC: 116 MG/DL (ref 40–230)
IMM GRANULOCYTES # BLD AUTO: 0.03 10*3/MM3 (ref 0–0.05)
IMM GRANULOCYTES # BLD AUTO: 0.05 10*3/MM3 (ref 0–0.05)
IMM GRANULOCYTES # BLD AUTO: 0.06 10*3/MM3 (ref 0–0.05)
IMM GRANULOCYTES NFR BLD AUTO: 0.5 % (ref 0–0.5)
IMM GRANULOCYTES NFR BLD AUTO: 0.7 % (ref 0–0.5)
IMM GRANULOCYTES NFR BLD AUTO: 0.7 % (ref 0–0.5)
INHALED O2 CONCENTRATION: 100 %
INHALED O2 CONCENTRATION: 50 %
INHALED O2 CONCENTRATION: 50 %
INHALED O2 CONCENTRATION: 55 %
INHALED O2 CONCENTRATION: 65 %
INHALED O2 CONCENTRATION: 75 %
INHALED O2 CONCENTRATION: 80 %
INHALED O2 CONCENTRATION: 90 %
INR PPP: 1.15 (ref 0.9–1.1)
INR PPP: 1.26 (ref 0.9–1.1)
INR PPP: 1.27 (ref 0.9–1.1)
IRON 24H UR-MRATE: 27 MCG/DL (ref 59–158)
IRON SATN MFR SERPL: 24 % (ref 20–50)
KETONES UR QL STRIP: NEGATIVE
L PNEUMO1 AG UR QL IA: NEGATIVE
LEUKOCYTE ESTERASE UR QL STRIP.AUTO: NEGATIVE
LYMPHOCYTES # BLD AUTO: 0.54 10*3/MM3 (ref 0.7–3.1)
LYMPHOCYTES # BLD AUTO: 0.59 10*3/MM3 (ref 0.7–3.1)
LYMPHOCYTES # BLD AUTO: 0.84 10*3/MM3 (ref 0.7–3.1)
LYMPHOCYTES NFR BLD AUTO: 10 % (ref 19.6–45.3)
LYMPHOCYTES NFR BLD AUTO: 7.8 % (ref 19.6–45.3)
LYMPHOCYTES NFR BLD AUTO: 9.6 % (ref 19.6–45.3)
Lab: ABNORMAL
M PNEUMO IGG SER IA-ACNC: NOT DETECTED
M PNEUMO IGG SER IA-ACNC: NOT DETECTED
MAGNESIUM SERPL-MCNC: 2.4 MG/DL (ref 1.6–2.4)
MAGNESIUM SERPL-MCNC: 2.5 MG/DL (ref 1.6–2.4)
MAGNESIUM SERPL-MCNC: 2.6 MG/DL (ref 1.6–2.4)
MAGNESIUM SERPL-MCNC: 2.6 MG/DL (ref 1.6–2.4)
MAGNESIUM SERPL-MCNC: 2.7 MG/DL (ref 1.6–2.4)
MAGNESIUM SERPL-MCNC: 2.7 MG/DL (ref 1.6–2.4)
MAGNESIUM SERPL-MCNC: 2.8 MG/DL (ref 1.6–2.4)
MAGNESIUM SERPL-MCNC: 2.8 MG/DL (ref 1.6–2.4)
MCH RBC QN AUTO: 28.1 PG (ref 26.6–33)
MCH RBC QN AUTO: 28.4 PG (ref 26.6–33)
MCH RBC QN AUTO: 28.6 PG (ref 26.6–33)
MCH RBC QN AUTO: 28.7 PG (ref 26.6–33)
MCH RBC QN AUTO: 29 PG (ref 26.6–33)
MCH RBC QN AUTO: 29 PG (ref 26.6–33)
MCH RBC QN AUTO: 29.7 PG (ref 26.6–33)
MCH RBC QN AUTO: 30.1 PG (ref 26.6–33)
MCH RBC QN AUTO: 30.2 PG (ref 26.6–33)
MCHC RBC AUTO-ENTMCNC: 31.5 G/DL (ref 31.5–35.7)
MCHC RBC AUTO-ENTMCNC: 31.9 G/DL (ref 31.5–35.7)
MCHC RBC AUTO-ENTMCNC: 32.3 G/DL (ref 31.5–35.7)
MCHC RBC AUTO-ENTMCNC: 32.5 G/DL (ref 31.5–35.7)
MCHC RBC AUTO-ENTMCNC: 32.8 G/DL (ref 31.5–35.7)
MCHC RBC AUTO-ENTMCNC: 33.1 G/DL (ref 31.5–35.7)
MCHC RBC AUTO-ENTMCNC: 33.2 G/DL (ref 31.5–35.7)
MCHC RBC AUTO-ENTMCNC: 33.3 G/DL (ref 31.5–35.7)
MCHC RBC AUTO-ENTMCNC: 33.3 G/DL (ref 31.5–35.7)
MCHC RBC AUTO-ENTMCNC: 33.4 G/DL (ref 31.5–35.7)
MCHC RBC AUTO-ENTMCNC: 33.7 G/DL (ref 31.5–35.7)
MCV RBC AUTO: 85.1 FL (ref 79–97)
MCV RBC AUTO: 86.1 FL (ref 79–97)
MCV RBC AUTO: 87.6 FL (ref 79–97)
MCV RBC AUTO: 87.8 FL (ref 79–97)
MCV RBC AUTO: 88.1 FL (ref 79–97)
MCV RBC AUTO: 88.2 FL (ref 79–97)
MCV RBC AUTO: 88.2 FL (ref 79–97)
MCV RBC AUTO: 88.5 FL (ref 79–97)
MCV RBC AUTO: 90.2 FL (ref 79–97)
MCV RBC AUTO: 90.3 FL (ref 79–97)
MCV RBC AUTO: 90.3 FL (ref 79–97)
METHADONE UR QL SCN: NEGATIVE
METHADONE UR QL SCN: NEGATIVE
METHOD: ABNORMAL
MITOCHONDRIA M2 IGG SER-ACNC: <20 UNITS (ref 0–20)
MODALITY: ABNORMAL
MONOCYTES # BLD AUTO: 0.56 10*3/MM3 (ref 0.1–0.9)
MONOCYTES # BLD AUTO: 0.62 10*3/MM3 (ref 0.1–0.9)
MONOCYTES # BLD AUTO: 0.67 10*3/MM3 (ref 0.1–0.9)
MONOCYTES NFR BLD AUTO: 8 % (ref 5–12)
MONOCYTES NFR BLD AUTO: 8.1 % (ref 5–12)
MONOCYTES NFR BLD AUTO: 9.9 % (ref 5–12)
MRSA DNA SPEC QL NAA+PROBE: NORMAL
MYELOPEROXIDASE AB SER IA-ACNC: <0.2 UNITS (ref 0–0.9)
NEUTROPHILS NFR BLD AUTO: 4.44 10*3/MM3 (ref 1.7–7)
NEUTROPHILS NFR BLD AUTO: 6.31 10*3/MM3 (ref 1.7–7)
NEUTROPHILS NFR BLD AUTO: 6.8 10*3/MM3 (ref 1.7–7)
NEUTROPHILS NFR BLD AUTO: 78.6 % (ref 42.7–76)
NEUTROPHILS NFR BLD AUTO: 81.1 % (ref 42.7–76)
NEUTROPHILS NFR BLD AUTO: 82.9 % (ref 42.7–76)
NITRITE UR QL STRIP: NEGATIVE
NOTIFIED WHO: ABNORMAL
NRBC BLD AUTO-RTO: 0 /100 WBC (ref 0–0.2)
NRBC BLD AUTO-RTO: 0 /100 WBC (ref 0–0.2)
NRBC BLD AUTO-RTO: 0.4 /100 WBC (ref 0–0.2)
NT-PROBNP SERPL-MCNC: 375 PG/ML (ref 0–900)
NT-PROBNP SERPL-MCNC: 65.1 PG/ML (ref 0–900)
NUC CELL # FLD: 4350 /MM3
O2 A-A PPRESDIFF RESPIRATORY: 0.1 MMHG
O2 A-A PPRESDIFF RESPIRATORY: 0.2 MMHG
O2 A-A PPRESDIFF RESPIRATORY: 0.3 MMHG
OPIATES UR QL: NEGATIVE
OPIATES UR QL: POSITIVE
OXYCODONE UR QL SCN: NEGATIVE
OXYCODONE UR QL SCN: NEGATIVE
P-ANCA ATYPICAL TITR SER IF: NORMAL TITER
P-ANCA TITR SER IF: NORMAL TITER
PCO2 BLDA: 28 MM HG (ref 35–45)
PCO2 BLDA: 30.5 MM HG (ref 35–45)
PCO2 BLDA: 32.6 MM HG (ref 35–45)
PCO2 BLDA: 33.9 MM HG (ref 35–45)
PCO2 BLDA: 35.8 MM HG (ref 35–45)
PCO2 BLDA: 40.2 MM HG (ref 35–45)
PCO2 BLDA: 41.2 MM HG (ref 35–45)
PCO2 BLDA: 45.2 MM HG (ref 35–45)
PCO2 BLDA: 47.3 MM HG (ref 35–45)
PCO2 BLDA: 48.3 MM HG (ref 35–45)
PCO2 BLDA: 54.7 MM HG (ref 35–45)
PCO2 BLDA: 55.6 MM HG (ref 35–45)
PEEP RESPIRATORY: 10 CM[H2O]
PH BLDA: 7.18 PH UNITS (ref 7.35–7.45)
PH BLDA: 7.21 PH UNITS (ref 7.35–7.45)
PH BLDA: 7.24 PH UNITS (ref 7.35–7.45)
PH BLDA: 7.26 PH UNITS (ref 7.35–7.45)
PH BLDA: 7.26 PH UNITS (ref 7.35–7.45)
PH BLDA: 7.28 PH UNITS (ref 7.35–7.45)
PH BLDA: 7.32 PH UNITS (ref 7.35–7.45)
PH BLDA: 7.34 PH UNITS (ref 7.35–7.45)
PH BLDA: 7.41 PH UNITS (ref 7.35–7.45)
PH BLDA: 7.44 PH UNITS (ref 7.35–7.45)
PH BLDA: 7.49 PH UNITS (ref 7.35–7.45)
PH BLDA: 7.53 PH UNITS (ref 7.35–7.45)
PH UR STRIP.AUTO: 5.5 [PH] (ref 5–8)
PHOSPHATE SERPL-MCNC: 2.7 MG/DL (ref 2.5–4.5)
PHOSPHATE SERPL-MCNC: 4.8 MG/DL (ref 2.5–4.5)
PHOSPHATE SERPL-MCNC: 4.9 MG/DL (ref 2.5–4.5)
PHOSPHATE SERPL-MCNC: 5.1 MG/DL (ref 2.5–4.5)
PHOSPHATE SERPL-MCNC: 5.2 MG/DL (ref 2.5–4.5)
PHOSPHATE SERPL-MCNC: 5.4 MG/DL (ref 2.5–4.5)
PHOSPHATE SERPL-MCNC: 5.5 MG/DL (ref 2.5–4.5)
PHOSPHATE SERPL-MCNC: 5.7 MG/DL (ref 2.5–4.5)
PHOSPHATE SERPL-MCNC: 6.1 MG/DL (ref 2.5–4.5)
PHOSPHATE SERPL-MCNC: 6.2 MG/DL (ref 2.5–4.5)
PHOSPHATE SERPL-MCNC: 6.5 MG/DL (ref 2.5–4.5)
PLATELET # BLD AUTO: 334 10*3/MM3 (ref 140–450)
PLATELET # BLD AUTO: 346 10*3/MM3 (ref 140–450)
PLATELET # BLD AUTO: 348 10*3/MM3 (ref 140–450)
PLATELET # BLD AUTO: 353 10*3/MM3 (ref 140–450)
PLATELET # BLD AUTO: 362 10*3/MM3 (ref 140–450)
PLATELET # BLD AUTO: 363 10*3/MM3 (ref 140–450)
PLATELET # BLD AUTO: 369 10*3/MM3 (ref 140–450)
PLATELET # BLD AUTO: 371 10*3/MM3 (ref 140–450)
PLATELET # BLD AUTO: 390 10*3/MM3 (ref 140–450)
PLATELET # BLD AUTO: 393 10*3/MM3 (ref 140–450)
PLATELET # BLD AUTO: 410 10*3/MM3 (ref 140–450)
PMV BLD AUTO: 10 FL (ref 6–12)
PMV BLD AUTO: 10.1 FL (ref 6–12)
PMV BLD AUTO: 10.1 FL (ref 6–12)
PMV BLD AUTO: 9.3 FL (ref 6–12)
PMV BLD AUTO: 9.5 FL (ref 6–12)
PMV BLD AUTO: 9.5 FL (ref 6–12)
PMV BLD AUTO: 9.6 FL (ref 6–12)
PMV BLD AUTO: 9.7 FL (ref 6–12)
PMV BLD AUTO: 9.7 FL (ref 6–12)
PMV BLD AUTO: 9.8 FL (ref 6–12)
PMV BLD AUTO: 9.8 FL (ref 6–12)
PO2 BLDA: 119.6 MM HG (ref 80–100)
PO2 BLDA: 122.7 MM HG (ref 80–100)
PO2 BLDA: 138.7 MM HG (ref 80–100)
PO2 BLDA: 188.5 MM HG (ref 80–100)
PO2 BLDA: 47.8 MM HG (ref 80–100)
PO2 BLDA: 57.6 MM HG (ref 80–100)
PO2 BLDA: 73.5 MM HG (ref 80–100)
PO2 BLDA: 77.2 MM HG (ref 80–100)
PO2 BLDA: 79.8 MM HG (ref 80–100)
PO2 BLDA: 84.4 MM HG (ref 80–100)
PO2 BLDA: 90.2 MM HG (ref 80–100)
PO2 BLDA: 96.6 MM HG (ref 80–100)
POTASSIUM SERPL-SCNC: 3.7 MMOL/L (ref 3.5–5.2)
POTASSIUM SERPL-SCNC: 3.8 MMOL/L (ref 3.5–5.2)
POTASSIUM SERPL-SCNC: 3.9 MMOL/L (ref 3.5–5.2)
POTASSIUM SERPL-SCNC: 4 MMOL/L (ref 3.5–5.2)
POTASSIUM SERPL-SCNC: 4.2 MMOL/L (ref 3.5–5.2)
POTASSIUM SERPL-SCNC: 4.5 MMOL/L (ref 3.5–5.2)
POTASSIUM SERPL-SCNC: 4.6 MMOL/L (ref 3.5–5.2)
POTASSIUM SERPL-SCNC: 4.6 MMOL/L (ref 3.5–5.2)
POTASSIUM SERPL-SCNC: 4.7 MMOL/L (ref 3.5–5.2)
POTASSIUM SERPL-SCNC: 5.1 MMOL/L (ref 3.5–5.2)
POTASSIUM SERPL-SCNC: 5.2 MMOL/L (ref 3.5–5.2)
POTASSIUM SERPL-SCNC: 5.3 MMOL/L (ref 3.5–5.2)
POTASSIUM SERPL-SCNC: 5.4 MMOL/L (ref 3.5–5.2)
POTASSIUM SERPL-SCNC: 5.7 MMOL/L (ref 3.5–5.2)
PROCALCITONIN SERPL-MCNC: 0.49 NG/ML (ref 0–0.25)
PROCALCITONIN SERPL-MCNC: 1.85 NG/ML (ref 0–0.25)
PROCALCITONIN SERPL-MCNC: 146.8 NG/ML (ref 0–0.25)
PROCALCITONIN SERPL-MCNC: 2.09 NG/ML (ref 0–0.25)
PROT ?TM UR-MCNC: 233.4 MG/DL
PROT SERPL-MCNC: 6.2 G/DL (ref 6–8.5)
PROT SERPL-MCNC: 6.4 G/DL (ref 6–8.5)
PROT SERPL-MCNC: 6.6 G/DL (ref 6–8.5)
PROT SERPL-MCNC: 6.7 G/DL (ref 6–8.5)
PROT SERPL-MCNC: 6.9 G/DL (ref 6–8.5)
PROT SERPL-MCNC: 7 G/DL (ref 6–8.5)
PROT SERPL-MCNC: 7.9 G/DL (ref 6–8.5)
PROT UR QL STRIP: ABNORMAL
PROT/CREAT UR: 2932.2 MG/G CREA (ref 0–200)
PROTEINASE3 AB SER IA-ACNC: <0.2 UNITS (ref 0–0.9)
PROTHROMBIN TIME: 14.9 SECONDS (ref 11.7–14.2)
PROTHROMBIN TIME: 16 SECONDS (ref 11.7–14.2)
PROTHROMBIN TIME: 16.1 SECONDS (ref 11.7–14.2)
PSA SERPL-MCNC: 0.61 NG/ML (ref 0–4)
QT INTERVAL: 306 MS
QT INTERVAL: 309 MS
QTC INTERVAL: 429 MS
QTC INTERVAL: 437 MS
RBC # BLD AUTO: 2.35 10*6/MM3 (ref 4.14–5.8)
RBC # BLD AUTO: 2.64 10*6/MM3 (ref 4.14–5.8)
RBC # BLD AUTO: 2.79 10*6/MM3 (ref 4.14–5.8)
RBC # BLD AUTO: 2.82 10*6/MM3 (ref 4.14–5.8)
RBC # BLD AUTO: 2.94 10*6/MM3 (ref 4.14–5.8)
RBC # BLD AUTO: 3.07 10*6/MM3 (ref 4.14–5.8)
RBC # BLD AUTO: 3.09 10*6/MM3 (ref 4.14–5.8)
RBC # BLD AUTO: 3.13 10*6/MM3 (ref 4.14–5.8)
RBC # BLD AUTO: 3.21 10*6/MM3 (ref 4.14–5.8)
RBC # BLD AUTO: 3.28 10*6/MM3 (ref 4.14–5.8)
RBC # BLD AUTO: 3.48 10*6/MM3 (ref 4.14–5.8)
RBC # FLD AUTO: 14 /MM3
RBC # UR STRIP: ABNORMAL /HPF
RBC # UR STRIP: NORMAL /HPF
READ BACK: YES
REF LAB TEST METHOD: ABNORMAL
REF LAB TEST METHOD: NORMAL
RH BLD: POSITIVE
RHEUMATOID FACT SERPL-ACNC: 16.3 IU/ML
RHINOVIRUS RNA SPEC NAA+PROBE: NOT DETECTED
RHINOVIRUS RNA SPEC NAA+PROBE: NOT DETECTED
RSV RNA NPH QL NAA+NON-PROBE: NOT DETECTED
RSV RNA NPH QL NAA+NON-PROBE: NOT DETECTED
S PNEUM AG SPEC QL LA: NEGATIVE
SAO2 % BLDCOA: 88.4 % (ref 92–98.5)
SAO2 % BLDCOA: 92 % (ref 92–98.5)
SAO2 % BLDCOA: 93 % (ref 92–98.5)
SAO2 % BLDCOA: 94.1 % (ref 92–98.5)
SAO2 % BLDCOA: 94.2 % (ref 92–98.5)
SAO2 % BLDCOA: 95.3 % (ref 92–98.5)
SAO2 % BLDCOA: 95.6 % (ref 92–98.5)
SAO2 % BLDCOA: 97.2 % (ref 92–98.5)
SAO2 % BLDCOA: 97.4 % (ref 92–98.5)
SAO2 % BLDCOA: 98.1 % (ref 92–98.5)
SAO2 % BLDCOA: 98.9 % (ref 92–98.5)
SAO2 % BLDCOA: 99.6 % (ref 92–98.5)
SARS-COV-2 RNA NPH QL NAA+NON-PROBE: NOT DETECTED
SARS-COV-2 RNA NPH QL NAA+NON-PROBE: NOT DETECTED
SET MECH RESP RATE: 30
SET MECH RESP RATE: 61
SMA IGG SER-ACNC: 11 UNITS (ref 0–19)
SODIUM SERPL-SCNC: 134 MMOL/L (ref 136–145)
SODIUM SERPL-SCNC: 135 MMOL/L (ref 136–145)
SODIUM SERPL-SCNC: 137 MMOL/L (ref 136–145)
SODIUM SERPL-SCNC: 138 MMOL/L (ref 136–145)
SODIUM SERPL-SCNC: 139 MMOL/L (ref 136–145)
SODIUM SERPL-SCNC: 140 MMOL/L (ref 136–145)
SODIUM SERPL-SCNC: 140 MMOL/L (ref 136–145)
SODIUM SERPL-SCNC: 141 MMOL/L (ref 136–145)
SODIUM SERPL-SCNC: 141 MMOL/L (ref 136–145)
SODIUM SERPL-SCNC: 143 MMOL/L (ref 136–145)
SODIUM SERPL-SCNC: 146 MMOL/L (ref 136–145)
SODIUM SERPL-SCNC: 146 MMOL/L (ref 136–145)
SODIUM SERPL-SCNC: 148 MMOL/L (ref 136–145)
SODIUM SERPL-SCNC: 151 MMOL/L (ref 136–145)
SODIUM SERPL-SCNC: 151 MMOL/L (ref 136–145)
SODIUM UR-SCNC: 37 MMOL/L
SODIUM UR-SCNC: 38 MMOL/L
SP GR UR STRIP: 1.01 (ref 1–1.03)
SP GR UR STRIP: 1.01 (ref 1–1.03)
SP GR UR STRIP: 1.02 (ref 1–1.03)
SP GR UR STRIP: 1.02 (ref 1–1.03)
SQUAMOUS #/AREA URNS HPF: ABNORMAL /HPF
SQUAMOUS #/AREA URNS HPF: NORMAL /HPF
T&S EXPIRATION DATE: NORMAL
TIBC SERPL-MCNC: 115 MCG/DL (ref 298–536)
TOTAL RATE: 16 BREATHS/MINUTE
TOTAL RATE: 24 BREATHS/MINUTE
TOTAL RATE: 26 BREATHS/MINUTE
TOTAL RATE: 30 BREATHS/MINUTE
TRANSFERRIN SERPL-MCNC: 77 MG/DL (ref 200–360)
TROPONIN T DELTA: 5 NG/L
TROPONIN T SERPL HS-MCNC: 22 NG/L
TROPONIN T SERPL HS-MCNC: 30 NG/L
TROPONIN T SERPL HS-MCNC: 35 NG/L
TSH SERPL DL<=0.05 MIU/L-ACNC: 0.65 UIU/ML (ref 0.27–4.2)
TTG IGA SER-ACNC: <2 U/ML (ref 0–3)
UNIT  ABO: NORMAL
UNIT  ABO: NORMAL
UNIT  RH: NORMAL
UNIT  RH: NORMAL
URATE SERPL-MCNC: 6.4 MG/DL (ref 3.4–7)
UROBILINOGEN UR QL STRIP: ABNORMAL
VENTILATOR MODE: ABNORMAL
VENTILATOR MODE: AC
VT ON VENT VENT: 450 ML
VT ON VENT VENT: 480 ML
VT ON VENT VENT: 570 ML
VT ON VENT VENT: 570 ML
VT ON VENT VENT: 580 ML
VT ON VENT VENT: 580 ML
WBC # UR STRIP: ABNORMAL /HPF
WBC # UR STRIP: NORMAL /HPF
WBC NRBC COR # BLD AUTO: 18.29 10*3/MM3 (ref 3.4–10.8)
WBC NRBC COR # BLD AUTO: 19.27 10*3/MM3 (ref 3.4–10.8)
WBC NRBC COR # BLD AUTO: 19.47 10*3/MM3 (ref 3.4–10.8)
WBC NRBC COR # BLD AUTO: 19.93 10*3/MM3 (ref 3.4–10.8)
WBC NRBC COR # BLD AUTO: 5.38 10*3/MM3 (ref 3.4–10.8)
WBC NRBC COR # BLD AUTO: 5.65 10*3/MM3 (ref 3.4–10.8)
WBC NRBC COR # BLD AUTO: 6.3 10*3/MM3 (ref 3.4–10.8)
WBC NRBC COR # BLD AUTO: 6.49 10*3/MM3 (ref 3.4–10.8)
WBC NRBC COR # BLD AUTO: 7.61 10*3/MM3 (ref 3.4–10.8)
WBC NRBC COR # BLD AUTO: 8.35 10*3/MM3 (ref 3.4–10.8)
WBC NRBC COR # BLD AUTO: 8.39 10*3/MM3 (ref 3.4–10.8)
WHOLE BLOOD HOLD COAG: NORMAL
WHOLE BLOOD HOLD SPECIMEN: NORMAL

## 2024-01-01 PROCEDURE — 86235 NUCLEAR ANTIGEN ANTIBODY: CPT | Performed by: INTERNAL MEDICINE

## 2024-01-01 PROCEDURE — 25010000002 PIPERACILLIN SOD-TAZOBACTAM PER 1 G: Performed by: INTERNAL MEDICINE

## 2024-01-01 PROCEDURE — 86602 ANTINOMYCES ANTIBODY: CPT | Performed by: INTERNAL MEDICINE

## 2024-01-01 PROCEDURE — G0378 HOSPITAL OBSERVATION PER HR: HCPCS

## 2024-01-01 PROCEDURE — 87385 HISTOPLASMA CAPSUL AG IA: CPT | Performed by: INTERNAL MEDICINE

## 2024-01-01 PROCEDURE — 85027 COMPLETE CBC AUTOMATED: CPT | Performed by: INTERNAL MEDICINE

## 2024-01-01 PROCEDURE — 94799 UNLISTED PULMONARY SVC/PX: CPT

## 2024-01-01 PROCEDURE — 87147 CULTURE TYPE IMMUNOLOGIC: CPT | Performed by: INTERNAL MEDICINE

## 2024-01-01 PROCEDURE — 85025 COMPLETE CBC W/AUTO DIFF WBC: CPT | Performed by: EMERGENCY MEDICINE

## 2024-01-01 PROCEDURE — 86037 ANCA TITER EACH ANTIBODY: CPT | Performed by: INTERNAL MEDICINE

## 2024-01-01 PROCEDURE — 25010000002 HEPARIN (PORCINE) PER 1000 UNITS: Performed by: INTERNAL MEDICINE

## 2024-01-01 PROCEDURE — 25010000002 PHENYLEPHRINE 10 MG/ML SOLUTION

## 2024-01-01 PROCEDURE — 94761 N-INVAS EAR/PLS OXIMETRY MLT: CPT

## 2024-01-01 PROCEDURE — 25010000002 FENTANYL CITRATE (PF) 2500 MCG/50ML SOLUTION: Performed by: INTERNAL MEDICINE

## 2024-01-01 PROCEDURE — 94664 DEMO&/EVAL PT USE INHALER: CPT

## 2024-01-01 PROCEDURE — 87205 SMEAR GRAM STAIN: CPT | Performed by: INTERNAL MEDICINE

## 2024-01-01 PROCEDURE — 80307 DRUG TEST PRSMV CHEM ANLYZR: CPT | Performed by: INTERNAL MEDICINE

## 2024-01-01 PROCEDURE — 25010000002 PROPOFOL 10 MG/ML EMULSION: Performed by: INTERNAL MEDICINE

## 2024-01-01 PROCEDURE — 87641 MR-STAPH DNA AMP PROBE: CPT | Performed by: HOSPITALIST

## 2024-01-01 PROCEDURE — 63710000001 INSULIN REGULAR HUMAN PER 5 UNITS: Performed by: INTERNAL MEDICINE

## 2024-01-01 PROCEDURE — 87071 CULTURE AEROBIC QUANT OTHER: CPT | Performed by: INTERNAL MEDICINE

## 2024-01-01 PROCEDURE — 82803 BLOOD GASES ANY COMBINATION: CPT | Performed by: INTERNAL MEDICINE

## 2024-01-01 PROCEDURE — 85014 HEMATOCRIT: CPT | Performed by: INTERNAL MEDICINE

## 2024-01-01 PROCEDURE — 82784 ASSAY IGA/IGD/IGG/IGM EACH: CPT | Performed by: INTERNAL MEDICINE

## 2024-01-01 PROCEDURE — 94003 VENT MGMT INPAT SUBQ DAY: CPT

## 2024-01-01 PROCEDURE — 5A1945Z RESPIRATORY VENTILATION, 24-96 CONSECUTIVE HOURS: ICD-10-PCS | Performed by: INTERNAL MEDICINE

## 2024-01-01 PROCEDURE — 86235 NUCLEAR ANTIGEN ANTIBODY: CPT | Performed by: PHYSICIAN ASSISTANT

## 2024-01-01 PROCEDURE — 25010000002 PROCHLORPERAZINE 10 MG/2ML SOLUTION: Performed by: PHYSICIAN ASSISTANT

## 2024-01-01 PROCEDURE — 81001 URINALYSIS AUTO W/SCOPE: CPT | Performed by: INTERNAL MEDICINE

## 2024-01-01 PROCEDURE — 25010000002 MORPHINE PER 10 MG: Performed by: HOSPITALIST

## 2024-01-01 PROCEDURE — 84443 ASSAY THYROID STIM HORMONE: CPT | Performed by: INTERNAL MEDICINE

## 2024-01-01 PROCEDURE — 25010000002 FLUCONAZOLE PER 200 MG: Performed by: INTERNAL MEDICINE

## 2024-01-01 PROCEDURE — 86671 FUNGUS NES ANTIBODY: CPT | Performed by: INTERNAL MEDICINE

## 2024-01-01 PROCEDURE — 87899 AGENT NOS ASSAY W/OPTIC: CPT | Performed by: INTERNAL MEDICINE

## 2024-01-01 PROCEDURE — 87040 BLOOD CULTURE FOR BACTERIA: CPT | Performed by: INTERNAL MEDICINE

## 2024-01-01 PROCEDURE — 82803 BLOOD GASES ANY COMBINATION: CPT

## 2024-01-01 PROCEDURE — 25810000003 SODIUM CHLORIDE 0.9 % SOLUTION: Performed by: INTERNAL MEDICINE

## 2024-01-01 PROCEDURE — 99232 SBSQ HOSP IP/OBS MODERATE 35: CPT | Performed by: PHYSICIAN ASSISTANT

## 2024-01-01 PROCEDURE — 84100 ASSAY OF PHOSPHORUS: CPT | Performed by: HOSPITALIST

## 2024-01-01 PROCEDURE — 86900 BLOOD TYPING SEROLOGIC ABO: CPT | Performed by: INTERNAL MEDICINE

## 2024-01-01 PROCEDURE — 86225 DNA ANTIBODY NATIVE: CPT | Performed by: PHYSICIAN ASSISTANT

## 2024-01-01 PROCEDURE — 83735 ASSAY OF MAGNESIUM: CPT | Performed by: INTERNAL MEDICINE

## 2024-01-01 PROCEDURE — 99232 SBSQ HOSP IP/OBS MODERATE 35: CPT | Performed by: INTERNAL MEDICINE

## 2024-01-01 PROCEDURE — 5A09357 ASSISTANCE WITH RESPIRATORY VENTILATION, LESS THAN 24 CONSECUTIVE HOURS, CONTINUOUS POSITIVE AIRWAY PRESSURE: ICD-10-PCS | Performed by: INTERNAL MEDICINE

## 2024-01-01 PROCEDURE — 36415 COLL VENOUS BLD VENIPUNCTURE: CPT | Performed by: INTERNAL MEDICINE

## 2024-01-01 PROCEDURE — 86431 RHEUMATOID FACTOR QUANT: CPT | Performed by: INTERNAL MEDICINE

## 2024-01-01 PROCEDURE — 82570 ASSAY OF URINE CREATININE: CPT | Performed by: INTERNAL MEDICINE

## 2024-01-01 PROCEDURE — 83735 ASSAY OF MAGNESIUM: CPT

## 2024-01-01 PROCEDURE — 87449 NOS EACH ORGANISM AG IA: CPT | Performed by: INTERNAL MEDICINE

## 2024-01-01 PROCEDURE — 86606 ASPERGILLUS ANTIBODY: CPT | Performed by: INTERNAL MEDICINE

## 2024-01-01 PROCEDURE — 25010000002 ENOXAPARIN PER 10 MG: Performed by: INTERNAL MEDICINE

## 2024-01-01 PROCEDURE — 87305 ASPERGILLUS AG IA: CPT | Performed by: INTERNAL MEDICINE

## 2024-01-01 PROCEDURE — 93975 VASCULAR STUDY: CPT

## 2024-01-01 PROCEDURE — 36600 WITHDRAWAL OF ARTERIAL BLOOD: CPT

## 2024-01-01 PROCEDURE — 76705 ECHO EXAM OF ABDOMEN: CPT

## 2024-01-01 PROCEDURE — 85027 COMPLETE CBC AUTOMATED: CPT | Performed by: HOSPITALIST

## 2024-01-01 PROCEDURE — 84145 PROCALCITONIN (PCT): CPT | Performed by: INTERNAL MEDICINE

## 2024-01-01 PROCEDURE — 99233 SBSQ HOSP IP/OBS HIGH 50: CPT | Performed by: INTERNAL MEDICINE

## 2024-01-01 PROCEDURE — 93321 DOPPLER ECHO F-UP/LMTD STD: CPT

## 2024-01-01 PROCEDURE — 71045 X-RAY EXAM CHEST 1 VIEW: CPT

## 2024-01-01 PROCEDURE — 86160 COMPLEMENT ANTIGEN: CPT | Performed by: INTERNAL MEDICINE

## 2024-01-01 PROCEDURE — 87106 FUNGI IDENTIFICATION YEAST: CPT | Performed by: INTERNAL MEDICINE

## 2024-01-01 PROCEDURE — 25010000002 FENTANYL CITRATE (PF) 50 MCG/ML SOLUTION: Performed by: INTERNAL MEDICINE

## 2024-01-01 PROCEDURE — 93325 DOPPLER ECHO COLOR FLOW MAPG: CPT

## 2024-01-01 PROCEDURE — 87015 SPECIMEN INFECT AGNT CONCNTJ: CPT | Performed by: INTERNAL MEDICINE

## 2024-01-01 PROCEDURE — 93970 EXTREMITY STUDY: CPT | Performed by: SURGERY

## 2024-01-01 PROCEDURE — 25010000002 VASOPRESSIN 20 UNIT/ML SOLUTION: Performed by: HOSPITALIST

## 2024-01-01 PROCEDURE — 36600 WITHDRAWAL OF ARTERIAL BLOOD: CPT | Performed by: INTERNAL MEDICINE

## 2024-01-01 PROCEDURE — 86038 ANTINUCLEAR ANTIBODIES: CPT | Performed by: PHYSICIAN ASSISTANT

## 2024-01-01 PROCEDURE — 84153 ASSAY OF PSA TOTAL: CPT | Performed by: INTERNAL MEDICINE

## 2024-01-01 PROCEDURE — 80053 COMPREHEN METABOLIC PANEL: CPT | Performed by: INTERNAL MEDICINE

## 2024-01-01 PROCEDURE — 83516 IMMUNOASSAY NONANTIBODY: CPT | Performed by: INTERNAL MEDICINE

## 2024-01-01 PROCEDURE — 0202U NFCT DS 22 TRGT SARS-COV-2: CPT | Performed by: EMERGENCY MEDICINE

## 2024-01-01 PROCEDURE — 25010000002 MIDAZOLAM PER 1 MG: Performed by: INTERNAL MEDICINE

## 2024-01-01 PROCEDURE — 85018 HEMOGLOBIN: CPT | Performed by: HOSPITALIST

## 2024-01-01 PROCEDURE — 82550 ASSAY OF CK (CPK): CPT | Performed by: INTERNAL MEDICINE

## 2024-01-01 PROCEDURE — 83880 ASSAY OF NATRIURETIC PEPTIDE: CPT | Performed by: EMERGENCY MEDICINE

## 2024-01-01 PROCEDURE — 84156 ASSAY OF PROTEIN URINE: CPT | Performed by: INTERNAL MEDICINE

## 2024-01-01 PROCEDURE — 94760 N-INVAS EAR/PLS OXIMETRY 1: CPT

## 2024-01-01 PROCEDURE — 80307 DRUG TEST PRSMV CHEM ANLYZR: CPT | Performed by: EMERGENCY MEDICINE

## 2024-01-01 PROCEDURE — 84484 ASSAY OF TROPONIN QUANT: CPT | Performed by: HOSPITALIST

## 2024-01-01 PROCEDURE — 93010 ELECTROCARDIOGRAM REPORT: CPT | Performed by: INTERNAL MEDICINE

## 2024-01-01 PROCEDURE — 86609 BACTERIUM ANTIBODY: CPT | Performed by: INTERNAL MEDICINE

## 2024-01-01 PROCEDURE — 83540 ASSAY OF IRON: CPT | Performed by: INTERNAL MEDICINE

## 2024-01-01 PROCEDURE — 25010000002 ONDANSETRON PER 1 MG: Performed by: INTERNAL MEDICINE

## 2024-01-01 PROCEDURE — 93308 TTE F-UP OR LMTD: CPT

## 2024-01-01 PROCEDURE — 87102 FUNGUS ISOLATION CULTURE: CPT | Performed by: INTERNAL MEDICINE

## 2024-01-01 PROCEDURE — 82948 REAGENT STRIP/BLOOD GLUCOSE: CPT

## 2024-01-01 PROCEDURE — 78227 HEPATOBIL SYST IMAGE W/DRUG: CPT

## 2024-01-01 PROCEDURE — 86225 DNA ANTIBODY NATIVE: CPT | Performed by: INTERNAL MEDICINE

## 2024-01-01 PROCEDURE — 0 TECHNETIUM TC 99M MEBROFENIN KIT: Performed by: HOSPITALIST

## 2024-01-01 PROCEDURE — 82272 OCCULT BLD FECES 1-3 TESTS: CPT | Performed by: INTERNAL MEDICINE

## 2024-01-01 PROCEDURE — 82330 ASSAY OF CALCIUM: CPT | Performed by: INTERNAL MEDICINE

## 2024-01-01 PROCEDURE — 85379 FIBRIN DEGRADATION QUANT: CPT | Performed by: HOSPITALIST

## 2024-01-01 PROCEDURE — 82390 ASSAY OF CERULOPLASMIN: CPT | Performed by: INTERNAL MEDICINE

## 2024-01-01 PROCEDURE — 25810000003 LACTATED RINGERS SOLUTION: Performed by: EMERGENCY MEDICINE

## 2024-01-01 PROCEDURE — 93308 TTE F-UP OR LMTD: CPT | Performed by: INTERNAL MEDICINE

## 2024-01-01 PROCEDURE — 84145 PROCALCITONIN (PCT): CPT | Performed by: HOSPITALIST

## 2024-01-01 PROCEDURE — 84100 ASSAY OF PHOSPHORUS: CPT | Performed by: INTERNAL MEDICINE

## 2024-01-01 PROCEDURE — 82550 ASSAY OF CK (CPK): CPT | Performed by: HOSPITALIST

## 2024-01-01 PROCEDURE — 25810000003 SODIUM CHLORIDE 0.9 % SOLUTION

## 2024-01-01 PROCEDURE — 85025 COMPLETE CBC W/AUTO DIFF WBC: CPT | Performed by: HOSPITALIST

## 2024-01-01 PROCEDURE — 84145 PROCALCITONIN (PCT): CPT | Performed by: EMERGENCY MEDICINE

## 2024-01-01 PROCEDURE — 85014 HEMATOCRIT: CPT | Performed by: HOSPITALIST

## 2024-01-01 PROCEDURE — 0 DEXTROSE 5 % SOLUTION: Performed by: INTERNAL MEDICINE

## 2024-01-01 PROCEDURE — 84300 ASSAY OF URINE SODIUM: CPT | Performed by: INTERNAL MEDICINE

## 2024-01-01 PROCEDURE — 25010000002 CEFTRIAXONE PER 250 MG: Performed by: INTERNAL MEDICINE

## 2024-01-01 PROCEDURE — 87798 DETECT AGENT NOS DNA AMP: CPT | Performed by: INTERNAL MEDICINE

## 2024-01-01 PROCEDURE — 0B9J8ZX DRAINAGE OF LEFT LOWER LUNG LOBE, VIA NATURAL OR ARTIFICIAL OPENING ENDOSCOPIC, DIAGNOSTIC: ICD-10-PCS | Performed by: INTERNAL MEDICINE

## 2024-01-01 PROCEDURE — 82803 BLOOD GASES ANY COMBINATION: CPT | Performed by: HOSPITALIST

## 2024-01-01 PROCEDURE — 84484 ASSAY OF TROPONIN QUANT: CPT | Performed by: INTERNAL MEDICINE

## 2024-01-01 PROCEDURE — 93321 DOPPLER ECHO F-UP/LMTD STD: CPT | Performed by: INTERNAL MEDICINE

## 2024-01-01 PROCEDURE — 86038 ANTINUCLEAR ANTIBODIES: CPT | Performed by: INTERNAL MEDICINE

## 2024-01-01 PROCEDURE — 97166 OT EVAL MOD COMPLEX 45 MIN: CPT

## 2024-01-01 PROCEDURE — 82330 ASSAY OF CALCIUM: CPT

## 2024-01-01 PROCEDURE — 25010000002 EPINEPHRINE 1 MG/ML SOLUTION 30 ML VIAL: Performed by: HOSPITALIST

## 2024-01-01 PROCEDURE — 99222 1ST HOSP IP/OBS MODERATE 55: CPT | Performed by: INTERNAL MEDICINE

## 2024-01-01 PROCEDURE — P9016 RBC LEUKOCYTES REDUCED: HCPCS

## 2024-01-01 PROCEDURE — 76775 US EXAM ABDO BACK WALL LIM: CPT

## 2024-01-01 PROCEDURE — 87070 CULTURE OTHR SPECIMN AEROBIC: CPT | Performed by: INTERNAL MEDICINE

## 2024-01-01 PROCEDURE — 87186 SC STD MICRODIL/AGAR DIL: CPT | Performed by: INTERNAL MEDICINE

## 2024-01-01 PROCEDURE — 85610 PROTHROMBIN TIME: CPT | Performed by: HOSPITALIST

## 2024-01-01 PROCEDURE — 86331 IMMUNODIFFUSION OUCHTERLONY: CPT | Performed by: INTERNAL MEDICINE

## 2024-01-01 PROCEDURE — 25010000002 MEPERIDINE PER 100 MG: Performed by: INTERNAL MEDICINE

## 2024-01-01 PROCEDURE — 93005 ELECTROCARDIOGRAM TRACING: CPT | Performed by: HOSPITALIST

## 2024-01-01 PROCEDURE — 99223 1ST HOSP IP/OBS HIGH 75: CPT | Performed by: INTERNAL MEDICINE

## 2024-01-01 PROCEDURE — 89050 BODY FLUID CELL COUNT: CPT | Performed by: INTERNAL MEDICINE

## 2024-01-01 PROCEDURE — 93325 DOPPLER ECHO COLOR FLOW MAPG: CPT | Performed by: INTERNAL MEDICINE

## 2024-01-01 PROCEDURE — 80074 ACUTE HEPATITIS PANEL: CPT | Performed by: INTERNAL MEDICINE

## 2024-01-01 PROCEDURE — 25010000002 CALCIUM GLUCONATE-NACL 1-0.675 GM/50ML-% SOLUTION: Performed by: INTERNAL MEDICINE

## 2024-01-01 PROCEDURE — 80053 COMPREHEN METABOLIC PANEL: CPT | Performed by: EMERGENCY MEDICINE

## 2024-01-01 PROCEDURE — 82140 ASSAY OF AMMONIA: CPT | Performed by: EMERGENCY MEDICINE

## 2024-01-01 PROCEDURE — 86381 MITOCHONDRIAL ANTIBODY EACH: CPT | Performed by: INTERNAL MEDICINE

## 2024-01-01 PROCEDURE — 84484 ASSAY OF TROPONIN QUANT: CPT | Performed by: EMERGENCY MEDICINE

## 2024-01-01 PROCEDURE — 25010000002 LIDOCAINE 1 % SOLUTION: Performed by: INTERNAL MEDICINE

## 2024-01-01 PROCEDURE — 86850 RBC ANTIBODY SCREEN: CPT | Performed by: INTERNAL MEDICINE

## 2024-01-01 PROCEDURE — 86015 ACTIN ANTIBODY EACH: CPT | Performed by: INTERNAL MEDICINE

## 2024-01-01 PROCEDURE — 85018 HEMOGLOBIN: CPT | Performed by: INTERNAL MEDICINE

## 2024-01-01 PROCEDURE — 97530 THERAPEUTIC ACTIVITIES: CPT

## 2024-01-01 PROCEDURE — 5A1D90Z PERFORMANCE OF URINARY FILTRATION, CONTINUOUS, GREATER THAN 18 HOURS PER DAY: ICD-10-PCS | Performed by: INTERNAL MEDICINE

## 2024-01-01 PROCEDURE — 99285 EMERGENCY DEPT VISIT HI MDM: CPT

## 2024-01-01 PROCEDURE — 81001 URINALYSIS AUTO W/SCOPE: CPT | Performed by: HOSPITALIST

## 2024-01-01 PROCEDURE — 94660 CPAP INITIATION&MGMT: CPT

## 2024-01-01 PROCEDURE — 25010000002 CEFTRIAXONE PER 250 MG: Performed by: EMERGENCY MEDICINE

## 2024-01-01 PROCEDURE — 93005 ELECTROCARDIOGRAM TRACING: CPT | Performed by: EMERGENCY MEDICINE

## 2024-01-01 PROCEDURE — 85610 PROTHROMBIN TIME: CPT | Performed by: EMERGENCY MEDICINE

## 2024-01-01 PROCEDURE — 0BH17EZ INSERTION OF ENDOTRACHEAL AIRWAY INTO TRACHEA, VIA NATURAL OR ARTIFICIAL OPENING: ICD-10-PCS | Performed by: INTERNAL MEDICINE

## 2024-01-01 PROCEDURE — 86231 EMA EACH IG CLASS: CPT | Performed by: INTERNAL MEDICINE

## 2024-01-01 PROCEDURE — 93010 ELECTROCARDIOGRAM REPORT: CPT | Performed by: STUDENT IN AN ORGANIZED HEALTH CARE EDUCATION/TRAINING PROGRAM

## 2024-01-01 PROCEDURE — 97535 SELF CARE MNGMENT TRAINING: CPT

## 2024-01-01 PROCEDURE — 02HV33Z INSERTION OF INFUSION DEVICE INTO SUPERIOR VENA CAVA, PERCUTANEOUS APPROACH: ICD-10-PCS | Performed by: INTERNAL MEDICINE

## 2024-01-01 PROCEDURE — 82140 ASSAY OF AMMONIA: CPT | Performed by: INTERNAL MEDICINE

## 2024-01-01 PROCEDURE — 83880 ASSAY OF NATRIURETIC PEPTIDE: CPT | Performed by: HOSPITALIST

## 2024-01-01 PROCEDURE — 74018 RADEX ABDOMEN 1 VIEW: CPT

## 2024-01-01 PROCEDURE — 94002 VENT MGMT INPAT INIT DAY: CPT

## 2024-01-01 PROCEDURE — 93975 VASCULAR STUDY: CPT | Performed by: SURGERY

## 2024-01-01 PROCEDURE — 81001 URINALYSIS AUTO W/SCOPE: CPT | Performed by: EMERGENCY MEDICINE

## 2024-01-01 PROCEDURE — 99232 SBSQ HOSP IP/OBS MODERATE 35: CPT

## 2024-01-01 PROCEDURE — 86901 BLOOD TYPING SEROLOGIC RH(D): CPT

## 2024-01-01 PROCEDURE — 71250 CT THORAX DX C-: CPT

## 2024-01-01 PROCEDURE — 86900 BLOOD TYPING SEROLOGIC ABO: CPT

## 2024-01-01 PROCEDURE — 87040 BLOOD CULTURE FOR BACTERIA: CPT | Performed by: EMERGENCY MEDICINE

## 2024-01-01 PROCEDURE — 0B9G8ZX DRAINAGE OF LEFT UPPER LUNG LOBE, VIA NATURAL OR ARTIFICIAL OPENING ENDOSCOPIC, DIAGNOSTIC: ICD-10-PCS | Performed by: INTERNAL MEDICINE

## 2024-01-01 PROCEDURE — 86923 COMPATIBILITY TEST ELECTRIC: CPT

## 2024-01-01 PROCEDURE — 25810000003 SODIUM CHLORIDE 0.9 % SOLUTION 250 ML FLEX CONT: Performed by: HOSPITALIST

## 2024-01-01 PROCEDURE — 82103 ALPHA-1-ANTITRYPSIN TOTAL: CPT | Performed by: INTERNAL MEDICINE

## 2024-01-01 PROCEDURE — 97161 PT EVAL LOW COMPLEX 20 MIN: CPT

## 2024-01-01 PROCEDURE — A9537 TC99M MEBROFENIN: HCPCS | Performed by: HOSPITALIST

## 2024-01-01 PROCEDURE — 85610 PROTHROMBIN TIME: CPT | Performed by: INTERNAL MEDICINE

## 2024-01-01 PROCEDURE — 0202U NFCT DS 22 TRGT SARS-COV-2: CPT | Performed by: INTERNAL MEDICINE

## 2024-01-01 PROCEDURE — 93970 EXTREMITY STUDY: CPT

## 2024-01-01 PROCEDURE — 84466 ASSAY OF TRANSFERRIN: CPT | Performed by: INTERNAL MEDICINE

## 2024-01-01 PROCEDURE — 74220 X-RAY XM ESOPHAGUS 1CNTRST: CPT

## 2024-01-01 PROCEDURE — 25010000002 MIDAZOLAM PER 1 MG: Performed by: HOSPITALIST

## 2024-01-01 PROCEDURE — 87536 HIV-1 QUANT&REVRSE TRNSCRPJ: CPT | Performed by: INTERNAL MEDICINE

## 2024-01-01 PROCEDURE — 97110 THERAPEUTIC EXERCISES: CPT

## 2024-01-01 PROCEDURE — 25010000002 MIDAZOLAM PER 1 MG

## 2024-01-01 PROCEDURE — 83605 ASSAY OF LACTIC ACID: CPT | Performed by: EMERGENCY MEDICINE

## 2024-01-01 PROCEDURE — 93005 ELECTROCARDIOGRAM TRACING: CPT

## 2024-01-01 PROCEDURE — 86901 BLOOD TYPING SEROLOGIC RH(D): CPT | Performed by: INTERNAL MEDICINE

## 2024-01-01 PROCEDURE — 86364 TISS TRNSGLTMNASE EA IG CLAS: CPT | Performed by: INTERNAL MEDICINE

## 2024-01-01 PROCEDURE — 03HY32Z INSERTION OF MONITORING DEVICE INTO UPPER ARTERY, PERCUTANEOUS APPROACH: ICD-10-PCS | Performed by: HOSPITALIST

## 2024-01-01 PROCEDURE — 25010000002 PHENYLEPHRINE 10 MG/ML SOLUTION 5 ML VIAL: Performed by: HOSPITALIST

## 2024-01-01 PROCEDURE — 36415 COLL VENOUS BLD VENIPUNCTURE: CPT

## 2024-01-01 PROCEDURE — 84550 ASSAY OF BLOOD/URIC ACID: CPT | Performed by: INTERNAL MEDICINE

## 2024-01-01 RX ORDER — UREA 10 %
2.5 LOTION (ML) TOPICAL NIGHTLY PRN
Status: DISCONTINUED | OUTPATIENT
Start: 2024-01-01 | End: 2024-01-01

## 2024-01-01 RX ORDER — MEPERIDINE HYDROCHLORIDE 25 MG/ML
12.5 INJECTION INTRAMUSCULAR; INTRAVENOUS; SUBCUTANEOUS ONCE
Status: COMPLETED | OUTPATIENT
Start: 2024-01-01 | End: 2024-01-01

## 2024-01-01 RX ORDER — SODIUM CHLORIDE 0.9 % (FLUSH) 0.9 %
10 SYRINGE (ML) INJECTION AS NEEDED
Status: DISCONTINUED | OUTPATIENT
Start: 2024-01-01 | End: 2024-01-01

## 2024-01-01 RX ORDER — KETOROLAC TROMETHAMINE 30 MG/ML
15 INJECTION, SOLUTION INTRAMUSCULAR; INTRAVENOUS EVERY 6 HOURS PRN
Status: DISCONTINUED | OUTPATIENT
Start: 2024-01-01 | End: 2024-06-03 | Stop reason: HOSPADM

## 2024-01-01 RX ORDER — SODIUM CHLORIDE 0.9 % (FLUSH) 0.9 %
3-10 SYRINGE (ML) INJECTION AS NEEDED
Status: DISCONTINUED | OUTPATIENT
Start: 2024-01-01 | End: 2024-01-01

## 2024-01-01 RX ORDER — MIDAZOLAM HYDROCHLORIDE 1 MG/ML
4 INJECTION INTRAMUSCULAR; INTRAVENOUS
Status: DISCONTINUED | OUTPATIENT
Start: 2024-01-01 | End: 2024-06-03 | Stop reason: HOSPADM

## 2024-01-01 RX ORDER — SODIUM CHLORIDE 0.9 % (FLUSH) 0.9 %
3 SYRINGE (ML) INJECTION EVERY 12 HOURS SCHEDULED
Status: DISCONTINUED | OUTPATIENT
Start: 2024-01-01 | End: 2024-01-01

## 2024-01-01 RX ORDER — BISACODYL 5 MG/1
5 TABLET, DELAYED RELEASE ORAL DAILY PRN
Status: DISCONTINUED | OUTPATIENT
Start: 2024-01-01 | End: 2024-01-01

## 2024-01-01 RX ORDER — LORAZEPAM 1 MG/1
1 TABLET ORAL
Status: DISCONTINUED | OUTPATIENT
Start: 2024-01-01 | End: 2024-06-03 | Stop reason: HOSPADM

## 2024-01-01 RX ORDER — ACETAMINOPHEN 325 MG/1
650 TABLET ORAL EVERY 4 HOURS PRN
Status: DISCONTINUED | OUTPATIENT
Start: 2024-01-01 | End: 2024-01-01

## 2024-01-01 RX ORDER — NOREPINEPHRINE BITARTRATE 0.03 MG/ML
.02-.3 INJECTION, SOLUTION INTRAVENOUS
Status: DISCONTINUED | OUTPATIENT
Start: 2024-01-01 | End: 2024-01-01

## 2024-01-01 RX ORDER — SODIUM CHLORIDE 9 MG/ML
100 INJECTION, SOLUTION INTRAVENOUS CONTINUOUS
Status: DISCONTINUED | OUTPATIENT
Start: 2024-01-01 | End: 2024-01-01

## 2024-01-01 RX ORDER — SODIUM CHLORIDE 0.9 % (FLUSH) 0.9 %
10 SYRINGE (ML) INJECTION EVERY 12 HOURS SCHEDULED
Status: DISCONTINUED | OUTPATIENT
Start: 2024-01-01 | End: 2024-01-01

## 2024-01-01 RX ORDER — MULTIPLE VITAMINS W/ MINERALS TAB 9MG-400MCG
1 TAB ORAL DAILY
Status: DISCONTINUED | OUTPATIENT
Start: 2024-01-01 | End: 2024-01-01

## 2024-01-01 RX ORDER — SODIUM CHLORIDE 0.9 % (FLUSH) 0.9 %
20 SYRINGE (ML) INJECTION AS NEEDED
Status: DISCONTINUED | OUTPATIENT
Start: 2024-01-01 | End: 2024-01-01

## 2024-01-01 RX ORDER — MIDAZOLAM HYDROCHLORIDE 5 MG/ML
4 INJECTION INTRAMUSCULAR; INTRAVENOUS
Status: DISCONTINUED | OUTPATIENT
Start: 2024-01-01 | End: 2024-06-03 | Stop reason: HOSPADM

## 2024-01-01 RX ORDER — ASCORBIC ACID 500 MG
250 TABLET ORAL DAILY
Status: DISCONTINUED | OUTPATIENT
Start: 2024-01-01 | End: 2024-01-01

## 2024-01-01 RX ORDER — NITROGLYCERIN 0.4 MG/1
0.4 TABLET SUBLINGUAL
Status: DISCONTINUED | OUTPATIENT
Start: 2024-01-01 | End: 2024-01-01

## 2024-01-01 RX ORDER — MIDAZOLAM HYDROCHLORIDE 1 MG/ML
1 INJECTION INTRAMUSCULAR; INTRAVENOUS
Status: DISCONTINUED | OUTPATIENT
Start: 2024-01-01 | End: 2024-06-03 | Stop reason: HOSPADM

## 2024-01-01 RX ORDER — ALBUTEROL SULFATE 2.5 MG/3ML
2.5 SOLUTION RESPIRATORY (INHALATION)
Status: DISCONTINUED | OUTPATIENT
Start: 2024-01-01 | End: 2024-01-01

## 2024-01-01 RX ORDER — LORAZEPAM 2 MG/ML
1 CONCENTRATE ORAL
Status: DISCONTINUED | OUTPATIENT
Start: 2024-01-01 | End: 2024-06-03 | Stop reason: HOSPADM

## 2024-01-01 RX ORDER — MORPHINE SULFATE 20 MG/ML
10 SOLUTION ORAL
Status: DISCONTINUED | OUTPATIENT
Start: 2024-01-01 | End: 2024-06-03 | Stop reason: HOSPADM

## 2024-01-01 RX ORDER — LORAZEPAM 2 MG/ML
2 CONCENTRATE ORAL
Status: DISCONTINUED | OUTPATIENT
Start: 2024-01-01 | End: 2024-06-03 | Stop reason: HOSPADM

## 2024-01-01 RX ORDER — AMOXICILLIN 250 MG
2 CAPSULE ORAL 2 TIMES DAILY PRN
Status: DISCONTINUED | OUTPATIENT
Start: 2024-01-01 | End: 2024-01-01

## 2024-01-01 RX ORDER — SODIUM CHLORIDE 9 MG/ML
125 INJECTION, SOLUTION INTRAVENOUS CONTINUOUS
Status: DISCONTINUED | OUTPATIENT
Start: 2024-01-01 | End: 2024-01-01

## 2024-01-01 RX ORDER — MIDAZOLAM HYDROCHLORIDE 1 MG/ML
2 INJECTION INTRAMUSCULAR; INTRAVENOUS
Status: DISCONTINUED | OUTPATIENT
Start: 2024-01-01 | End: 2024-06-03 | Stop reason: HOSPADM

## 2024-01-01 RX ORDER — IBUPROFEN 600 MG/1
1 TABLET ORAL
Status: DISCONTINUED | OUTPATIENT
Start: 2024-01-01 | End: 2024-01-01

## 2024-01-01 RX ORDER — PROCHLORPERAZINE EDISYLATE 5 MG/ML
5 INJECTION INTRAMUSCULAR; INTRAVENOUS EVERY 6 HOURS PRN
Status: DISCONTINUED | OUTPATIENT
Start: 2024-01-01 | End: 2024-01-01

## 2024-01-01 RX ORDER — DEXTROSE MONOHYDRATE 50 MG/ML
100 INJECTION, SOLUTION INTRAVENOUS CONTINUOUS
Status: DISCONTINUED | OUTPATIENT
Start: 2024-01-01 | End: 2024-01-01

## 2024-01-01 RX ORDER — CALCIUM GLUCONATE 20 MG/ML
1000 INJECTION, SOLUTION INTRAVENOUS ONCE
Status: COMPLETED | OUTPATIENT
Start: 2024-01-01 | End: 2024-01-01

## 2024-01-01 RX ORDER — MORPHINE SULFATE 10 MG/ML
3.2 INJECTION INTRAMUSCULAR; INTRAVENOUS; SUBCUTANEOUS
Status: COMPLETED | OUTPATIENT
Start: 2024-01-01 | End: 2024-01-01

## 2024-01-01 RX ORDER — MIDAZOLAM HYDROCHLORIDE 1 MG/ML
INJECTION INTRAMUSCULAR; INTRAVENOUS
Status: COMPLETED
Start: 2024-01-01 | End: 2024-01-01

## 2024-01-01 RX ORDER — LORAZEPAM 2 MG/ML
0.5 CONCENTRATE ORAL
Status: DISCONTINUED | OUTPATIENT
Start: 2024-01-01 | End: 2024-06-03 | Stop reason: HOSPADM

## 2024-01-01 RX ORDER — HEPARIN SODIUM 5000 [USP'U]/ML
5000 INJECTION, SOLUTION INTRAVENOUS; SUBCUTANEOUS EVERY 8 HOURS SCHEDULED
Status: DISCONTINUED | OUTPATIENT
Start: 2024-01-01 | End: 2024-01-01

## 2024-01-01 RX ORDER — MIDAZOLAM HYDROCHLORIDE 1 MG/ML
2 INJECTION INTRAMUSCULAR; INTRAVENOUS ONCE
Status: COMPLETED | OUTPATIENT
Start: 2024-01-01 | End: 2024-01-01

## 2024-01-01 RX ORDER — MORPHINE SULFATE 2 MG/ML
4 INJECTION, SOLUTION INTRAMUSCULAR; INTRAVENOUS
Status: DISCONTINUED | OUTPATIENT
Start: 2024-01-01 | End: 2024-06-03 | Stop reason: HOSPADM

## 2024-01-01 RX ORDER — TRAMADOL HYDROCHLORIDE 50 MG/1
25 TABLET ORAL EVERY 6 HOURS PRN
Status: DISCONTINUED | OUTPATIENT
Start: 2024-01-01 | End: 2024-01-01

## 2024-01-01 RX ORDER — HYDROMORPHONE HYDROCHLORIDE 1 MG/ML
0.5 INJECTION, SOLUTION INTRAMUSCULAR; INTRAVENOUS; SUBCUTANEOUS
Status: DISCONTINUED | OUTPATIENT
Start: 2024-01-01 | End: 2024-06-03 | Stop reason: HOSPADM

## 2024-01-01 RX ORDER — FAMOTIDINE 20 MG/1
10 TABLET, FILM COATED ORAL 2 TIMES DAILY PRN
Status: DISCONTINUED | OUTPATIENT
Start: 2024-01-01 | End: 2024-01-01

## 2024-01-01 RX ORDER — NOREPINEPHRINE BITARTRATE 0.03 MG/ML
INJECTION, SOLUTION INTRAVENOUS
Status: COMPLETED
Start: 2024-01-01 | End: 2024-01-01

## 2024-01-01 RX ORDER — ONDANSETRON 4 MG/1
4 TABLET, ORALLY DISINTEGRATING ORAL EVERY 6 HOURS PRN
Status: DISCONTINUED | OUTPATIENT
Start: 2024-01-01 | End: 2024-01-01

## 2024-01-01 RX ORDER — MORPHINE SULFATE 20 MG/ML
5 SOLUTION ORAL
Status: DISCONTINUED | OUTPATIENT
Start: 2024-01-01 | End: 2024-06-03 | Stop reason: HOSPADM

## 2024-01-01 RX ORDER — FENTANYL CITRATE 50 UG/ML
100 INJECTION, SOLUTION INTRAMUSCULAR; INTRAVENOUS ONCE
Status: COMPLETED | OUTPATIENT
Start: 2024-01-01 | End: 2024-01-01

## 2024-01-01 RX ORDER — ALBUTEROL SULFATE 2.5 MG/3ML
2.5 SOLUTION RESPIRATORY (INHALATION) EVERY 6 HOURS PRN
Status: DISCONTINUED | OUTPATIENT
Start: 2024-01-01 | End: 2024-01-01

## 2024-01-01 RX ORDER — MORPHINE SULFATE 2 MG/ML
2 INJECTION, SOLUTION INTRAMUSCULAR; INTRAVENOUS
Status: DISCONTINUED | OUTPATIENT
Start: 2024-01-01 | End: 2024-06-03 | Stop reason: HOSPADM

## 2024-01-01 RX ORDER — NICOTINE POLACRILEX 4 MG
15 LOZENGE BUCCAL
Status: DISCONTINUED | OUTPATIENT
Start: 2024-01-01 | End: 2024-01-01

## 2024-01-01 RX ORDER — ACETAMINOPHEN 160 MG/5ML
650 SOLUTION ORAL EVERY 4 HOURS PRN
Status: DISCONTINUED | OUTPATIENT
Start: 2024-01-01 | End: 2024-06-03 | Stop reason: HOSPADM

## 2024-01-01 RX ORDER — DIPHENOXYLATE HYDROCHLORIDE AND ATROPINE SULFATE 2.5; .025 MG/1; MG/1
1 TABLET ORAL
Status: DISCONTINUED | OUTPATIENT
Start: 2024-01-01 | End: 2024-06-03 | Stop reason: HOSPADM

## 2024-01-01 RX ORDER — FENTANYL CITRATE 50 UG/ML
50 INJECTION, SOLUTION INTRAMUSCULAR; INTRAVENOUS
Status: DISCONTINUED | OUTPATIENT
Start: 2024-01-01 | End: 2024-01-01

## 2024-01-01 RX ORDER — BISACODYL 10 MG
10 SUPPOSITORY, RECTAL RECTAL DAILY PRN
Status: DISCONTINUED | OUTPATIENT
Start: 2024-01-01 | End: 2024-01-01

## 2024-01-01 RX ORDER — FENTANYL CITRATE-0.9 % NACL/PF 10 MCG/ML
50-300 PLASTIC BAG, INJECTION (ML) INTRAVENOUS
Status: DISCONTINUED | OUTPATIENT
Start: 2024-01-01 | End: 2024-01-01

## 2024-01-01 RX ORDER — CALCIUM CHLORIDE, MAGNESIUM CHLORIDE, SODIUM CHLORIDE, SODIUM BICARBONATE, POTASSIUM CHLORIDE AND SODIUM PHOSPHATE DIBASIC DIHYDRATE 3.68; 3.05; 6.34; 3.09; .314; .187 G/L; G/L; G/L; G/L; G/L; G/L
1500 INJECTION INTRAVENOUS CONTINUOUS
Status: DISCONTINUED | OUTPATIENT
Start: 2024-01-01 | End: 2024-01-01

## 2024-01-01 RX ORDER — LORAZEPAM 1 MG/1
2 TABLET ORAL
Status: DISCONTINUED | OUTPATIENT
Start: 2024-01-01 | End: 2024-06-03 | Stop reason: HOSPADM

## 2024-01-01 RX ORDER — MORPHINE SULFATE 20 MG/ML
20 SOLUTION ORAL
Status: DISCONTINUED | OUTPATIENT
Start: 2024-01-01 | End: 2024-06-03 | Stop reason: HOSPADM

## 2024-01-01 RX ORDER — SODIUM BICARBONATE 42 MG/ML
INJECTION, SOLUTION INTRAVENOUS ONCE
Status: CANCELLED | OUTPATIENT
Start: 2024-01-01 | End: 2024-01-01

## 2024-01-01 RX ORDER — CISATRACURIUM BESYLATE 2 MG/ML
0.1 INJECTION, SOLUTION INTRAVENOUS ONCE
Status: DISCONTINUED | OUTPATIENT
Start: 2024-01-01 | End: 2024-01-01 | Stop reason: SDUPTHER

## 2024-01-01 RX ORDER — HYDROMORPHONE HYDROCHLORIDE 2 MG/ML
1.5 INJECTION, SOLUTION INTRAMUSCULAR; INTRAVENOUS; SUBCUTANEOUS
Status: DISCONTINUED | OUTPATIENT
Start: 2024-01-01 | End: 2024-06-03 | Stop reason: HOSPADM

## 2024-01-01 RX ORDER — ENOXAPARIN SODIUM 100 MG/ML
40 INJECTION SUBCUTANEOUS DAILY
Status: DISCONTINUED | OUTPATIENT
Start: 2024-01-01 | End: 2024-01-01

## 2024-01-01 RX ORDER — MIDAZOLAM HYDROCHLORIDE 1 MG/ML
INJECTION INTRAMUSCULAR; INTRAVENOUS
Status: ACTIVE
Start: 2024-01-01 | End: 2024-01-01

## 2024-01-01 RX ORDER — SODIUM CHLORIDE 9 MG/ML
40 INJECTION, SOLUTION INTRAVENOUS AS NEEDED
Status: DISCONTINUED | OUTPATIENT
Start: 2024-01-01 | End: 2024-01-01

## 2024-01-01 RX ORDER — FLUCONAZOLE 2 MG/ML
400 INJECTION, SOLUTION INTRAVENOUS EVERY 24 HOURS
Qty: 1400 ML | Refills: 0 | Status: DISCONTINUED | OUTPATIENT
Start: 2024-01-01 | End: 2024-01-01

## 2024-01-01 RX ORDER — PANTOPRAZOLE SODIUM 40 MG/10ML
40 INJECTION, POWDER, LYOPHILIZED, FOR SOLUTION INTRAVENOUS EVERY 12 HOURS SCHEDULED
Status: DISCONTINUED | OUTPATIENT
Start: 2024-01-01 | End: 2024-01-01

## 2024-01-01 RX ORDER — DOCUSATE SODIUM 100 MG/1
100 CAPSULE, LIQUID FILLED ORAL DAILY
Status: DISCONTINUED | OUTPATIENT
Start: 2024-01-01 | End: 2024-01-01

## 2024-01-01 RX ORDER — DEXTROSE MONOHYDRATE 25 G/50ML
25 INJECTION, SOLUTION INTRAVENOUS ONCE
Status: COMPLETED | OUTPATIENT
Start: 2024-01-01 | End: 2024-01-01

## 2024-01-01 RX ORDER — FERROUS SULFATE 325(65) MG
325 TABLET ORAL
Status: DISCONTINUED | OUTPATIENT
Start: 2024-01-01 | End: 2024-01-01

## 2024-01-01 RX ORDER — ONDANSETRON 2 MG/ML
4 INJECTION INTRAMUSCULAR; INTRAVENOUS EVERY 6 HOURS PRN
Status: DISCONTINUED | OUTPATIENT
Start: 2024-01-01 | End: 2024-01-01

## 2024-01-01 RX ORDER — DEXMEDETOMIDINE HYDROCHLORIDE 4 UG/ML
.2-1.5 INJECTION, SOLUTION INTRAVENOUS
Status: DISCONTINUED | OUTPATIENT
Start: 2024-01-01 | End: 2024-01-01

## 2024-01-01 RX ORDER — MELATONIN
1000 DAILY
Status: DISCONTINUED | OUTPATIENT
Start: 2024-01-01 | End: 2024-01-01

## 2024-01-01 RX ORDER — ACETAMINOPHEN 325 MG/1
650 TABLET ORAL EVERY 4 HOURS PRN
Status: DISCONTINUED | OUTPATIENT
Start: 2024-01-01 | End: 2024-06-03 | Stop reason: HOSPADM

## 2024-01-01 RX ORDER — DEXMEDETOMIDINE HYDROCHLORIDE 4 UG/ML
INJECTION, SOLUTION INTRAVENOUS
Status: COMPLETED
Start: 2024-01-01 | End: 2024-01-01

## 2024-01-01 RX ORDER — ACETAMINOPHEN 500 MG
1000 TABLET ORAL ONCE
Status: COMPLETED | OUTPATIENT
Start: 2024-01-01 | End: 2024-01-01

## 2024-01-01 RX ORDER — PHENYLEPHRINE HCL IN 0.9% NACL 0.5 MG/5ML
.5-3 SYRINGE (ML) INTRAVENOUS
Status: DISCONTINUED | OUTPATIENT
Start: 2024-01-01 | End: 2024-01-01

## 2024-01-01 RX ORDER — HEPARIN SODIUM 1000 [USP'U]/ML
INJECTION, SOLUTION INTRAVENOUS; SUBCUTANEOUS AS NEEDED
Status: DISCONTINUED | OUTPATIENT
Start: 2024-01-01 | End: 2024-01-01

## 2024-01-01 RX ORDER — ACETAMINOPHEN 650 MG/1
650 SUPPOSITORY RECTAL EVERY 4 HOURS PRN
Status: DISCONTINUED | OUTPATIENT
Start: 2024-01-01 | End: 2024-01-01

## 2024-01-01 RX ORDER — ACETAMINOPHEN 650 MG/1
650 SUPPOSITORY RECTAL EVERY 4 HOURS PRN
Status: DISCONTINUED | OUTPATIENT
Start: 2024-01-01 | End: 2024-06-03 | Stop reason: HOSPADM

## 2024-01-01 RX ORDER — LIDOCAINE HYDROCHLORIDE 10 MG/ML
30 INJECTION, SOLUTION INFILTRATION; PERINEURAL ONCE
Status: COMPLETED | OUTPATIENT
Start: 2024-01-01 | End: 2024-01-01

## 2024-01-01 RX ORDER — ROCURONIUM BROMIDE 10 MG/ML
0.6 INJECTION, SOLUTION INTRAVENOUS ONCE
Status: COMPLETED | OUTPATIENT
Start: 2024-01-01 | End: 2024-01-01

## 2024-01-01 RX ORDER — ACETAMINOPHEN 160 MG/5ML
650 SOLUTION ORAL EVERY 4 HOURS PRN
Status: DISCONTINUED | OUTPATIENT
Start: 2024-01-01 | End: 2024-01-01

## 2024-01-01 RX ORDER — L.ACID,PARA/B.BIFIDUM/S.THERM 8B CELL
1 CAPSULE ORAL DAILY
Status: DISCONTINUED | OUTPATIENT
Start: 2024-01-01 | End: 2024-01-01

## 2024-01-01 RX ORDER — MIDAZOLAM HYDROCHLORIDE 5 MG/ML
1 INJECTION INTRAMUSCULAR; INTRAVENOUS
Status: DISCONTINUED | OUTPATIENT
Start: 2024-01-01 | End: 2024-06-03 | Stop reason: HOSPADM

## 2024-01-01 RX ORDER — LORAZEPAM 0.5 MG/1
0.5 TABLET ORAL
Status: DISCONTINUED | OUTPATIENT
Start: 2024-01-01 | End: 2024-06-03 | Stop reason: HOSPADM

## 2024-01-01 RX ORDER — KIT FOR THE PREPARATION OF TECHNETIUM TC 99M MEBROFENIN 45 MG/10ML
1 INJECTION, POWDER, LYOPHILIZED, FOR SOLUTION INTRAVENOUS
Status: COMPLETED | OUTPATIENT
Start: 2024-01-01 | End: 2024-01-01

## 2024-01-01 RX ORDER — CETIRIZINE HYDROCHLORIDE 10 MG/1
5 TABLET ORAL DAILY PRN
Status: DISCONTINUED | OUTPATIENT
Start: 2024-01-01 | End: 2024-01-01

## 2024-01-01 RX ORDER — MIDAZOLAM HYDROCHLORIDE 5 MG/ML
2 INJECTION INTRAMUSCULAR; INTRAVENOUS
Status: DISCONTINUED | OUTPATIENT
Start: 2024-01-01 | End: 2024-06-03 | Stop reason: HOSPADM

## 2024-01-01 RX ORDER — MORPHINE SULFATE 10 MG/ML
6 INJECTION INTRAMUSCULAR; INTRAVENOUS; SUBCUTANEOUS
Status: DISCONTINUED | OUTPATIENT
Start: 2024-01-01 | End: 2024-06-03 | Stop reason: HOSPADM

## 2024-01-01 RX ORDER — MORPHINE SULFATE 10 MG/ML
3.2 INJECTION INTRAMUSCULAR; INTRAVENOUS; SUBCUTANEOUS
Status: DISCONTINUED | OUTPATIENT
Start: 2024-01-01 | End: 2024-01-01

## 2024-01-01 RX ORDER — POLYETHYLENE GLYCOL 3350 17 G/17G
17 POWDER, FOR SOLUTION ORAL DAILY PRN
Status: DISCONTINUED | OUTPATIENT
Start: 2024-01-01 | End: 2024-01-01

## 2024-01-01 RX ORDER — MIDAZOLAM HYDROCHLORIDE 1 MG/ML
4 INJECTION INTRAMUSCULAR; INTRAVENOUS ONCE
Status: COMPLETED | OUTPATIENT
Start: 2024-01-01 | End: 2024-01-01

## 2024-01-01 RX ORDER — ALBUTEROL SULFATE 2.5 MG/3ML
2.5 SOLUTION RESPIRATORY (INHALATION) 2 TIMES DAILY
Status: DISCONTINUED | OUTPATIENT
Start: 2024-01-01 | End: 2024-01-01

## 2024-01-01 RX ORDER — DEXTROSE MONOHYDRATE 25 G/50ML
25 INJECTION, SOLUTION INTRAVENOUS
Status: DISCONTINUED | OUTPATIENT
Start: 2024-01-01 | End: 2024-01-01

## 2024-01-01 RX ADMIN — Medication 10 ML: at 20:27

## 2024-01-01 RX ADMIN — PANTOPRAZOLE SODIUM 8 MG/HR: 40 INJECTION, POWDER, FOR SOLUTION INTRAVENOUS at 10:09

## 2024-01-01 RX ADMIN — CALCIUM CHLORIDE, MAGNESIUM CHLORIDE, SODIUM CHLORIDE, SODIUM BICARBONATE, POTASSIUM CHLORIDE AND SODIUM PHOSPHATE DIBASIC DIHYDRATE 1500 ML/HR: 3.68; 3.05; 6.34; 3.09; .314; .187 INJECTION INTRAVENOUS at 07:56

## 2024-01-01 RX ADMIN — ACETAMINOPHEN 325MG 650 MG: 325 TABLET ORAL at 03:45

## 2024-01-01 RX ADMIN — Medication 10 ML: at 20:18

## 2024-01-01 RX ADMIN — Medication 10 ML: at 20:17

## 2024-01-01 RX ADMIN — VASOPRESSIN 0.03 UNITS/MIN: 20 INJECTION INTRAVENOUS at 13:55

## 2024-01-01 RX ADMIN — Medication 10 ML: at 09:46

## 2024-01-01 RX ADMIN — SODIUM CHLORIDE 125 ML/HR: 9 INJECTION, SOLUTION INTRAVENOUS at 06:00

## 2024-01-01 RX ADMIN — CALCIUM GLUCONATE 1000 MG: 20 INJECTION, SOLUTION INTRAVENOUS at 10:16

## 2024-01-01 RX ADMIN — MIDAZOLAM 4 MG: 1 INJECTION INTRAMUSCULAR; INTRAVENOUS at 15:14

## 2024-01-01 RX ADMIN — CALCIUM CHLORIDE, MAGNESIUM CHLORIDE, SODIUM CHLORIDE, SODIUM BICARBONATE, POTASSIUM CHLORIDE AND SODIUM PHOSPHATE DIBASIC DIHYDRATE 1500 ML/HR: 3.68; 3.05; 6.34; 3.09; .314; .187 INJECTION INTRAVENOUS at 22:30

## 2024-01-01 RX ADMIN — PHENYLEPHRINE HYDROCHLORIDE 0.5 MCG/KG/MIN: 50 INJECTION INTRAVENOUS at 08:45

## 2024-01-01 RX ADMIN — SODIUM CHLORIDE 100 ML/HR: 9 INJECTION, SOLUTION INTRAVENOUS at 23:08

## 2024-01-01 RX ADMIN — CALCIUM CHLORIDE, MAGNESIUM CHLORIDE, SODIUM CHLORIDE, SODIUM BICARBONATE, POTASSIUM CHLORIDE AND SODIUM PHOSPHATE DIBASIC DIHYDRATE 1500 ML/HR: 3.68; 3.05; 6.34; 3.09; .314; .187 INJECTION INTRAVENOUS at 19:00

## 2024-01-01 RX ADMIN — OXYCODONE HYDROCHLORIDE AND ACETAMINOPHEN 250 MG: 500 TABLET ORAL at 08:46

## 2024-01-01 RX ADMIN — ONDANSETRON 4 MG: 2 INJECTION INTRAMUSCULAR; INTRAVENOUS at 09:10

## 2024-01-01 RX ADMIN — DEXMEDETOMIDINE HYDROCHLORIDE IN SODIUM CHLORIDE 0.4 MCG/KG/HR: 4 INJECTION INTRAVENOUS at 03:17

## 2024-01-01 RX ADMIN — MINERAL OIL, AND WHITE PETROLATUM: 425; 573 OINTMENT OPHTHALMIC at 22:19

## 2024-01-01 RX ADMIN — CALCIUM CHLORIDE, MAGNESIUM CHLORIDE, SODIUM CHLORIDE, SODIUM BICARBONATE, POTASSIUM CHLORIDE AND SODIUM PHOSPHATE DIBASIC DIHYDRATE 1000 ML/HR: 3.68; 3.05; 6.34; 3.09; .314; .187 INJECTION INTRAVENOUS at 21:45

## 2024-01-01 RX ADMIN — CALCIUM CHLORIDE, MAGNESIUM CHLORIDE, SODIUM CHLORIDE, SODIUM BICARBONATE, POTASSIUM CHLORIDE AND SODIUM PHOSPHATE DIBASIC DIHYDRATE 1500 ML/HR: 3.68; 3.05; 6.34; 3.09; .314; .187 INJECTION INTRAVENOUS at 05:10

## 2024-01-01 RX ADMIN — MEPERIDINE HYDROCHLORIDE 12.5 MG: 25 INJECTION INTRAMUSCULAR; INTRAVENOUS; SUBCUTANEOUS at 18:08

## 2024-01-01 RX ADMIN — Medication 1000 UNITS: at 11:15

## 2024-01-01 RX ADMIN — ACETAMINOPHEN 650 MG: 650 SUPPOSITORY RECTAL at 17:58

## 2024-01-01 RX ADMIN — DOXYCYCLINE 100 MG: 100 INJECTION, POWDER, LYOPHILIZED, FOR SOLUTION INTRAVENOUS at 09:39

## 2024-01-01 RX ADMIN — ENOXAPARIN SODIUM 40 MG: 100 INJECTION SUBCUTANEOUS at 11:19

## 2024-01-01 RX ADMIN — ONDANSETRON 4 MG: 2 INJECTION INTRAMUSCULAR; INTRAVENOUS at 09:50

## 2024-01-01 RX ADMIN — CALCIUM CHLORIDE, MAGNESIUM CHLORIDE, SODIUM CHLORIDE, SODIUM BICARBONATE, POTASSIUM CHLORIDE AND SODIUM PHOSPHATE DIBASIC DIHYDRATE 1500 ML/HR: 3.68; 3.05; 6.34; 3.09; .314; .187 INJECTION INTRAVENOUS at 11:05

## 2024-01-01 RX ADMIN — Medication 1000 UNITS: at 08:49

## 2024-01-01 RX ADMIN — MINERAL OIL, AND WHITE PETROLATUM: 425; 573 OINTMENT OPHTHALMIC at 12:21

## 2024-01-01 RX ADMIN — FENTANYL CITRATE 100 MCG/HR: 0.05 INJECTION, SOLUTION INTRAMUSCULAR; INTRAVENOUS at 08:17

## 2024-01-01 RX ADMIN — MIDAZOLAM HYDROCHLORIDE 2 MG: 1 INJECTION INTRAMUSCULAR; INTRAVENOUS at 18:20

## 2024-01-01 RX ADMIN — PROPOFOL INJECTABLE EMULSION 35 MCG/KG/MIN: 10 INJECTION, EMULSION INTRAVENOUS at 01:42

## 2024-01-01 RX ADMIN — MINERAL OIL, AND WHITE PETROLATUM 1 APPLICATION: 425; 573 OINTMENT OPHTHALMIC at 12:45

## 2024-01-01 RX ADMIN — FENTANYL CITRATE 150 MCG/HR: 0.05 INJECTION, SOLUTION INTRAMUSCULAR; INTRAVENOUS at 17:08

## 2024-01-01 RX ADMIN — DOXYCYCLINE 100 MG: 100 INJECTION, POWDER, LYOPHILIZED, FOR SOLUTION INTRAVENOUS at 21:02

## 2024-01-01 RX ADMIN — DEXTROSE MONOHYDRATE 100 ML/HR: 50 INJECTION, SOLUTION INTRAVENOUS at 20:39

## 2024-01-01 RX ADMIN — MINERAL OIL, AND WHITE PETROLATUM: 425; 573 OINTMENT OPHTHALMIC at 04:56

## 2024-01-01 RX ADMIN — PROCHLORPERAZINE EDISYLATE 5 MG: 5 INJECTION INTRAMUSCULAR; INTRAVENOUS at 16:50

## 2024-01-01 RX ADMIN — CALCIUM CHLORIDE, MAGNESIUM CHLORIDE, SODIUM CHLORIDE, SODIUM BICARBONATE, POTASSIUM CHLORIDE AND SODIUM PHOSPHATE DIBASIC DIHYDRATE 1000 ML/HR: 3.68; 3.05; 6.34; 3.09; .314; .187 INJECTION INTRAVENOUS at 02:36

## 2024-01-01 RX ADMIN — SODIUM CHLORIDE 100 ML/HR: 9 INJECTION, SOLUTION INTRAVENOUS at 11:17

## 2024-01-01 RX ADMIN — CISATRACURIUM BESYLATE 6 MCG/KG/MIN: 10 INJECTION, SOLUTION INTRAVENOUS at 23:19

## 2024-01-01 RX ADMIN — CALCIUM CHLORIDE, MAGNESIUM CHLORIDE, SODIUM CHLORIDE, SODIUM BICARBONATE, POTASSIUM CHLORIDE AND SODIUM PHOSPHATE DIBASIC DIHYDRATE 1000 ML/HR: 3.68; 3.05; 6.34; 3.09; .314; .187 INJECTION INTRAVENOUS at 16:54

## 2024-01-01 RX ADMIN — PIPERACILLIN AND TAZOBACTAM 3.38 G: 3; .375 INJECTION, POWDER, FOR SOLUTION INTRAVENOUS at 23:28

## 2024-01-01 RX ADMIN — CALCIUM CHLORIDE, MAGNESIUM CHLORIDE, SODIUM CHLORIDE, SODIUM BICARBONATE, POTASSIUM CHLORIDE AND SODIUM PHOSPHATE DIBASIC DIHYDRATE 1500 ML/HR: 3.68; 3.05; 6.34; 3.09; .314; .187 INJECTION INTRAVENOUS at 14:30

## 2024-01-01 RX ADMIN — PIPERACILLIN AND TAZOBACTAM 3.38 G: 3; .375 INJECTION, POWDER, FOR SOLUTION INTRAVENOUS at 17:15

## 2024-01-01 RX ADMIN — ALBUTEROL SULFATE 2.5 MG: 2.5 SOLUTION RESPIRATORY (INHALATION) at 19:30

## 2024-01-01 RX ADMIN — PIPERACILLIN AND TAZOBACTAM 4.5 G: 4; .5 INJECTION, POWDER, FOR SOLUTION INTRAVENOUS at 16:52

## 2024-01-01 RX ADMIN — Medication 10 ML: at 08:20

## 2024-01-01 RX ADMIN — MINERAL OIL, AND WHITE PETROLATUM: 425; 573 OINTMENT OPHTHALMIC at 00:54

## 2024-01-01 RX ADMIN — DEXTROSE MONOHYDRATE 25 G: 25 INJECTION, SOLUTION INTRAVENOUS at 04:15

## 2024-01-01 RX ADMIN — Medication 10 ML: at 20:28

## 2024-01-01 RX ADMIN — ALBUTEROL SULFATE 2.5 MG: 2.5 SOLUTION RESPIRATORY (INHALATION) at 09:58

## 2024-01-01 RX ADMIN — Medication 0.3 MCG/KG/MIN: at 22:08

## 2024-01-01 RX ADMIN — EPINEPHRINE 0.02 MCG/KG/MIN: 30 INJECTION INTRAMUSCULAR; INTRAVENOUS; SUBCUTANEOUS at 16:32

## 2024-01-01 RX ADMIN — PIPERACILLIN AND TAZOBACTAM 4.5 G: 4; .5 INJECTION, POWDER, FOR SOLUTION INTRAVENOUS at 16:39

## 2024-01-01 RX ADMIN — Medication 0.3 MCG/KG/MIN: at 16:38

## 2024-01-01 RX ADMIN — INSULIN HUMAN 2 UNITS: 100 INJECTION, SOLUTION PARENTERAL at 12:42

## 2024-01-01 RX ADMIN — MINERAL OIL, AND WHITE PETROLATUM: 425; 573 OINTMENT OPHTHALMIC at 21:17

## 2024-01-01 RX ADMIN — Medication 1 CAPSULE: at 08:56

## 2024-01-01 RX ADMIN — PROPOFOL INJECTABLE EMULSION 50 MCG/KG/MIN: 10 INJECTION, EMULSION INTRAVENOUS at 08:14

## 2024-01-01 RX ADMIN — HEPARIN SODIUM 5000 UNITS: 5000 INJECTION INTRAVENOUS; SUBCUTANEOUS at 05:19

## 2024-01-01 RX ADMIN — PIPERACILLIN AND TAZOBACTAM 3.38 G: 3; .375 INJECTION, POWDER, FOR SOLUTION INTRAVENOUS at 10:11

## 2024-01-01 RX ADMIN — CISATRACURIUM BESYLATE 6 MCG/KG/MIN: 10 INJECTION, SOLUTION INTRAVENOUS at 05:16

## 2024-01-01 RX ADMIN — PROPOFOL INJECTABLE EMULSION 35 MCG/KG/MIN: 10 INJECTION, EMULSION INTRAVENOUS at 14:02

## 2024-01-01 RX ADMIN — MINERAL OIL, AND WHITE PETROLATUM: 425; 573 OINTMENT OPHTHALMIC at 16:54

## 2024-01-01 RX ADMIN — CISATRACURIUM BESYLATE 6 MCG/KG/MIN: 10 INJECTION, SOLUTION INTRAVENOUS at 16:57

## 2024-01-01 RX ADMIN — DEXMEDETOMIDINE HYDROCHLORIDE IN SODIUM CHLORIDE 0.4 MCG/KG/HR: 4 INJECTION INTRAVENOUS at 16:37

## 2024-01-01 RX ADMIN — Medication 0.1 MCG/KG/MIN: at 19:03

## 2024-01-01 RX ADMIN — HEPARIN SODIUM 5000 UNITS: 5000 INJECTION INTRAVENOUS; SUBCUTANEOUS at 16:44

## 2024-01-01 RX ADMIN — HEPARIN SODIUM 5000 UNITS: 5000 INJECTION INTRAVENOUS; SUBCUTANEOUS at 05:16

## 2024-01-01 RX ADMIN — FENTANYL CITRATE 50 MCG/HR: 0.05 INJECTION, SOLUTION INTRAMUSCULAR; INTRAVENOUS at 22:03

## 2024-01-01 RX ADMIN — DEXMEDETOMIDINE HYDROCHLORIDE IN SODIUM CHLORIDE 0.5 MCG/KG/HR: 4 INJECTION INTRAVENOUS at 02:35

## 2024-01-01 RX ADMIN — ALBUTEROL SULFATE 2.5 MG: 2.5 SOLUTION RESPIRATORY (INHALATION) at 00:08

## 2024-01-01 RX ADMIN — CISATRACURIUM BESYLATE 5.25 MCG/KG/MIN: 10 INJECTION, SOLUTION INTRAVENOUS at 12:42

## 2024-01-01 RX ADMIN — ALBUTEROL SULFATE 2.5 MG: 2.5 SOLUTION RESPIRATORY (INHALATION) at 07:11

## 2024-01-01 RX ADMIN — MINERAL OIL, AND WHITE PETROLATUM: 425; 573 OINTMENT OPHTHALMIC at 05:19

## 2024-01-01 RX ADMIN — DEXMEDETOMIDINE HYDROCHLORIDE IN SODIUM CHLORIDE 1.5 MCG/KG/HR: 4 INJECTION INTRAVENOUS at 20:37

## 2024-01-01 RX ADMIN — ALBUTEROL SULFATE 2.5 MG: 2.5 SOLUTION RESPIRATORY (INHALATION) at 20:16

## 2024-01-01 RX ADMIN — ACETAMINOPHEN 325MG 650 MG: 325 TABLET ORAL at 23:53

## 2024-01-01 RX ADMIN — FERROUS SULFATE TAB 325 MG (65 MG ELEMENTAL FE) 325 MG: 325 (65 FE) TAB at 08:49

## 2024-01-01 RX ADMIN — HEPARIN SODIUM 5000 UNITS: 5000 INJECTION INTRAVENOUS; SUBCUTANEOUS at 16:53

## 2024-01-01 RX ADMIN — CISATRACURIUM BESYLATE 6.5 MCG/KG/MIN: 10 INJECTION, SOLUTION INTRAVENOUS at 03:58

## 2024-01-01 RX ADMIN — INSULIN HUMAN 10 UNITS: 100 INJECTION, SOLUTION PARENTERAL at 17:20

## 2024-01-01 RX ADMIN — ACETAMINOPHEN 325MG 650 MG: 325 TABLET ORAL at 19:31

## 2024-01-01 RX ADMIN — MINERAL OIL, AND WHITE PETROLATUM: 425; 573 OINTMENT OPHTHALMIC at 02:00

## 2024-01-01 RX ADMIN — PANTOPRAZOLE SODIUM 40 MG: 40 INJECTION, POWDER, FOR SOLUTION INTRAVENOUS at 08:28

## 2024-01-01 RX ADMIN — PHENYLEPHRINE HYDROCHLORIDE 2 MCG/KG/MIN: 50 INJECTION INTRAVENOUS at 03:16

## 2024-01-01 RX ADMIN — CISATRACURIUM BESYLATE 6 MCG/KG/MIN: 10 INJECTION, SOLUTION INTRAVENOUS at 04:54

## 2024-01-01 RX ADMIN — ACETAMINOPHEN 325MG 650 MG: 325 TABLET ORAL at 00:03

## 2024-01-01 RX ADMIN — PHENYLEPHRINE HYDROCHLORIDE 3 MCG/KG/MIN: 50 INJECTION INTRAVENOUS at 08:16

## 2024-01-01 RX ADMIN — CEFTRIAXONE 1000 MG: 1 INJECTION, POWDER, FOR SOLUTION INTRAMUSCULAR; INTRAVENOUS at 08:46

## 2024-01-01 RX ADMIN — ALBUTEROL SULFATE 2.5 MG: 2.5 SOLUTION RESPIRATORY (INHALATION) at 06:54

## 2024-01-01 RX ADMIN — PROPOFOL INJECTABLE EMULSION 50 MCG/KG/MIN: 10 INJECTION, EMULSION INTRAVENOUS at 21:36

## 2024-01-01 RX ADMIN — PROPOFOL INJECTABLE EMULSION 45 MCG/KG/MIN: 10 INJECTION, EMULSION INTRAVENOUS at 02:57

## 2024-01-01 RX ADMIN — FENTANYL CITRATE 200 MCG/HR: 0.05 INJECTION, SOLUTION INTRAMUSCULAR; INTRAVENOUS at 23:52

## 2024-01-01 RX ADMIN — Medication 3 ML: at 21:42

## 2024-01-01 RX ADMIN — OXYCODONE HYDROCHLORIDE AND ACETAMINOPHEN 250 MG: 500 TABLET ORAL at 08:50

## 2024-01-01 RX ADMIN — DEXMEDETOMIDINE HYDROCHLORIDE IN SODIUM CHLORIDE 1 MCG/KG/HR: 4 INJECTION INTRAVENOUS at 00:37

## 2024-01-01 RX ADMIN — Medication 1 TABLET: at 08:46

## 2024-01-01 RX ADMIN — PIPERACILLIN AND TAZOBACTAM 4.5 G: 4; .5 INJECTION, POWDER, FOR SOLUTION INTRAVENOUS at 00:06

## 2024-01-01 RX ADMIN — DOXYCYCLINE 100 MG: 100 INJECTION, POWDER, LYOPHILIZED, FOR SOLUTION INTRAVENOUS at 08:46

## 2024-01-01 RX ADMIN — Medication 0.28 MCG/KG/MIN: at 05:19

## 2024-01-01 RX ADMIN — CISATRACURIUM BESYLATE 4 MCG/KG/MIN: 10 INJECTION, SOLUTION INTRAVENOUS at 20:14

## 2024-01-01 RX ADMIN — SODIUM CHLORIDE, POTASSIUM CHLORIDE, SODIUM LACTATE AND CALCIUM CHLORIDE 1000 ML: 600; 310; 30; 20 INJECTION, SOLUTION INTRAVENOUS at 07:46

## 2024-01-01 RX ADMIN — SODIUM CHLORIDE 125 ML/HR: 9 INJECTION, SOLUTION INTRAVENOUS at 15:34

## 2024-01-01 RX ADMIN — PIPERACILLIN AND TAZOBACTAM 4.5 G: 4; .5 INJECTION, POWDER, FOR SOLUTION INTRAVENOUS at 23:58

## 2024-01-01 RX ADMIN — MINERAL OIL, AND WHITE PETROLATUM: 425; 573 OINTMENT OPHTHALMIC at 09:21

## 2024-01-01 RX ADMIN — Medication 3 ML: at 20:29

## 2024-01-01 RX ADMIN — Medication 3 ML: at 20:13

## 2024-01-01 RX ADMIN — PROPOFOL INJECTABLE EMULSION 50 MCG/KG/MIN: 10 INJECTION, EMULSION INTRAVENOUS at 04:04

## 2024-01-01 RX ADMIN — ACETAMINOPHEN 650 MG: 325 LIQUID ORAL at 22:18

## 2024-01-01 RX ADMIN — Medication 10 ML: at 09:21

## 2024-01-01 RX ADMIN — PIPERACILLIN AND TAZOBACTAM 3.38 G: 3; .375 INJECTION, POWDER, FOR SOLUTION INTRAVENOUS at 10:37

## 2024-01-01 RX ADMIN — CALCIUM CHLORIDE, MAGNESIUM CHLORIDE, SODIUM CHLORIDE, SODIUM BICARBONATE, POTASSIUM CHLORIDE AND SODIUM PHOSPHATE DIBASIC DIHYDRATE 1500 ML/HR: 3.68; 3.05; 6.34; 3.09; .314; .187 INJECTION INTRAVENOUS at 01:53

## 2024-01-01 RX ADMIN — PIPERACILLIN AND TAZOBACTAM 4.5 G: 4; .5 INJECTION, POWDER, FOR SOLUTION INTRAVENOUS at 09:37

## 2024-01-01 RX ADMIN — ONDANSETRON 4 MG: 2 INJECTION INTRAMUSCULAR; INTRAVENOUS at 08:48

## 2024-01-01 RX ADMIN — PIPERACILLIN AND TAZOBACTAM 3.38 G: 3; .375 INJECTION, POWDER, FOR SOLUTION INTRAVENOUS at 17:06

## 2024-01-01 RX ADMIN — PIPERACILLIN AND TAZOBACTAM 3.38 G: 3; .375 INJECTION, POWDER, FOR SOLUTION INTRAVENOUS at 08:56

## 2024-01-01 RX ADMIN — CALCIUM CHLORIDE, MAGNESIUM CHLORIDE, SODIUM CHLORIDE, SODIUM BICARBONATE, POTASSIUM CHLORIDE AND SODIUM PHOSPHATE DIBASIC DIHYDRATE 1000 ML/HR: 3.68; 3.05; 6.34; 3.09; .314; .187 INJECTION INTRAVENOUS at 07:56

## 2024-01-01 RX ADMIN — PIPERACILLIN AND TAZOBACTAM 3.38 G: 3; .375 INJECTION, POWDER, FOR SOLUTION INTRAVENOUS at 08:15

## 2024-01-01 RX ADMIN — PROPOFOL INJECTABLE EMULSION 35 MCG/KG/MIN: 10 INJECTION, EMULSION INTRAVENOUS at 06:43

## 2024-01-01 RX ADMIN — FENTANYL CITRATE 100 MCG/HR: 0.05 INJECTION, SOLUTION INTRAMUSCULAR; INTRAVENOUS at 10:58

## 2024-01-01 RX ADMIN — OXYCODONE HYDROCHLORIDE AND ACETAMINOPHEN 250 MG: 500 TABLET ORAL at 08:56

## 2024-01-01 RX ADMIN — Medication 10 ML: at 09:47

## 2024-01-01 RX ADMIN — ACETAMINOPHEN 1000 MG: 500 TABLET ORAL at 07:51

## 2024-01-01 RX ADMIN — NOREPINEPHRINE BITARTRATE 0.28 MCG/KG/MIN: 1 INJECTION, SOLUTION, CONCENTRATE INTRAVENOUS at 10:43

## 2024-01-01 RX ADMIN — DEXMEDETOMIDINE HYDROCHLORIDE IN SODIUM CHLORIDE 0.2 MCG/KG/HR: 4 INJECTION INTRAVENOUS at 21:29

## 2024-01-01 RX ADMIN — Medication 1 CAPSULE: at 08:49

## 2024-01-01 RX ADMIN — MIDAZOLAM 2 MG: 1 INJECTION INTRAMUSCULAR; INTRAVENOUS at 18:20

## 2024-01-01 RX ADMIN — PROPOFOL INJECTABLE EMULSION 35 MCG/KG/MIN: 10 INJECTION, EMULSION INTRAVENOUS at 15:39

## 2024-01-01 RX ADMIN — Medication 1000 UNITS: at 08:46

## 2024-01-01 RX ADMIN — Medication 10 ML: at 20:29

## 2024-01-01 RX ADMIN — PHENYLEPHRINE HYDROCHLORIDE 3 MCG/KG/MIN: 50 INJECTION INTRAVENOUS at 17:09

## 2024-01-01 RX ADMIN — CALCIUM CHLORIDE, MAGNESIUM CHLORIDE, SODIUM CHLORIDE, SODIUM BICARBONATE, POTASSIUM CHLORIDE AND SODIUM PHOSPHATE DIBASIC DIHYDRATE 1000 ML/HR: 3.68; 3.05; 6.34; 3.09; .314; .187 INJECTION INTRAVENOUS at 02:35

## 2024-01-01 RX ADMIN — CISATRACURIUM BESYLATE 0.5 MCG/KG/MIN: 10 INJECTION, SOLUTION INTRAVENOUS at 21:57

## 2024-01-01 RX ADMIN — TRAMADOL HYDROCHLORIDE 25 MG: 50 TABLET ORAL at 03:45

## 2024-01-01 RX ADMIN — BARIUM SULFATE 183 ML: 960 POWDER, FOR SUSPENSION ORAL at 15:07

## 2024-01-01 RX ADMIN — Medication 3 ML: at 09:05

## 2024-01-01 RX ADMIN — MINERAL OIL, AND WHITE PETROLATUM: 425; 573 OINTMENT OPHTHALMIC at 17:26

## 2024-01-01 RX ADMIN — FENTANYL CITRATE 100 MCG: 50 INJECTION, SOLUTION INTRAMUSCULAR; INTRAVENOUS at 17:49

## 2024-01-01 RX ADMIN — ALBUTEROL SULFATE 2.5 MG: 2.5 SOLUTION RESPIRATORY (INHALATION) at 06:40

## 2024-01-01 RX ADMIN — CEFTRIAXONE SODIUM 1000 MG: 1 INJECTION, POWDER, FOR SOLUTION INTRAMUSCULAR; INTRAVENOUS at 09:01

## 2024-01-01 RX ADMIN — PIPERACILLIN AND TAZOBACTAM 3.38 G: 3; .375 INJECTION, POWDER, FOR SOLUTION INTRAVENOUS at 16:44

## 2024-01-01 RX ADMIN — PROPOFOL INJECTABLE EMULSION 35 MCG/KG/MIN: 10 INJECTION, EMULSION INTRAVENOUS at 19:22

## 2024-01-01 RX ADMIN — Medication 1 TABLET: at 08:56

## 2024-01-01 RX ADMIN — ACETAMINOPHEN 325MG 650 MG: 325 TABLET ORAL at 18:14

## 2024-01-01 RX ADMIN — Medication 3 ML: at 08:20

## 2024-01-01 RX ADMIN — Medication 10 ML: at 08:14

## 2024-01-01 RX ADMIN — ACETAMINOPHEN 650 MG: 650 SUPPOSITORY RECTAL at 22:48

## 2024-01-01 RX ADMIN — PROPOFOL INJECTABLE EMULSION 35 MCG/KG/MIN: 10 INJECTION, EMULSION INTRAVENOUS at 11:46

## 2024-01-01 RX ADMIN — Medication 0.1 MCG/KG/MIN: at 23:47

## 2024-01-01 RX ADMIN — ALBUTEROL SULFATE 2.5 MG: 2.5 SOLUTION RESPIRATORY (INHALATION) at 08:08

## 2024-01-01 RX ADMIN — FENTANYL CITRATE 100 MCG/HR: 0.05 INJECTION, SOLUTION INTRAMUSCULAR; INTRAVENOUS at 22:44

## 2024-01-01 RX ADMIN — CALCIUM CHLORIDE, MAGNESIUM CHLORIDE, SODIUM CHLORIDE, SODIUM BICARBONATE, POTASSIUM CHLORIDE AND SODIUM PHOSPHATE DIBASIC DIHYDRATE 1500 ML/HR: 3.68; 3.05; 6.34; 3.09; .314; .187 INJECTION INTRAVENOUS at 19:01

## 2024-01-01 RX ADMIN — PIPERACILLIN AND TAZOBACTAM 4.5 G: 4; .5 INJECTION, POWDER, FOR SOLUTION INTRAVENOUS at 08:16

## 2024-01-01 RX ADMIN — DEXTROSE MONOHYDRATE 100 ML/HR: 50 INJECTION, SOLUTION INTRAVENOUS at 10:37

## 2024-01-01 RX ADMIN — Medication 3 ML: at 09:47

## 2024-01-01 RX ADMIN — MINERAL OIL, AND WHITE PETROLATUM: 425; 573 OINTMENT OPHTHALMIC at 05:03

## 2024-01-01 RX ADMIN — DEXTROSE MONOHYDRATE 100 ML/HR: 50 INJECTION, SOLUTION INTRAVENOUS at 06:56

## 2024-01-01 RX ADMIN — PIPERACILLIN AND TAZOBACTAM 3.38 G: 3; .375 INJECTION, POWDER, FOR SOLUTION INTRAVENOUS at 00:03

## 2024-01-01 RX ADMIN — PANTOPRAZOLE SODIUM 8 MG/HR: 40 INJECTION, POWDER, FOR SOLUTION INTRAVENOUS at 04:56

## 2024-01-01 RX ADMIN — PHENYLEPHRINE HYDROCHLORIDE 1 MCG/KG/MIN: 50 INJECTION INTRAVENOUS at 20:17

## 2024-01-01 RX ADMIN — VASOPRESSIN 0.03 UNITS/MIN: 20 INJECTION INTRAVENOUS at 08:41

## 2024-01-01 RX ADMIN — CALCIUM CHLORIDE, MAGNESIUM CHLORIDE, SODIUM CHLORIDE, SODIUM BICARBONATE, POTASSIUM CHLORIDE AND SODIUM PHOSPHATE DIBASIC DIHYDRATE 1500 ML/HR: 3.68; 3.05; 6.34; 3.09; .314; .187 INJECTION INTRAVENOUS at 01:52

## 2024-01-01 RX ADMIN — ALBUTEROL SULFATE 2.5 MG: 2.5 SOLUTION RESPIRATORY (INHALATION) at 23:18

## 2024-01-01 RX ADMIN — DEXTROSE MONOHYDRATE 25 G: 25 INJECTION, SOLUTION INTRAVENOUS at 17:20

## 2024-01-01 RX ADMIN — Medication 3 ML: at 09:22

## 2024-01-01 RX ADMIN — PROPOFOL INJECTABLE EMULSION 50 MCG/KG/MIN: 10 INJECTION, EMULSION INTRAVENOUS at 00:36

## 2024-01-01 RX ADMIN — SODIUM CHLORIDE 125 ML/HR: 9 INJECTION, SOLUTION INTRAVENOUS at 23:10

## 2024-01-01 RX ADMIN — PIPERACILLIN AND TAZOBACTAM 3.38 G: 3; .375 INJECTION, POWDER, FOR SOLUTION INTRAVENOUS at 15:32

## 2024-01-01 RX ADMIN — FENTANYL CITRATE 50 MCG: 50 INJECTION, SOLUTION INTRAMUSCULAR; INTRAVENOUS at 19:55

## 2024-01-01 RX ADMIN — HEPARIN SODIUM 2000 UNITS: 1000 INJECTION INTRAVENOUS; SUBCUTANEOUS at 16:53

## 2024-01-01 RX ADMIN — ALBUTEROL SULFATE 2.5 MG: 2.5 SOLUTION RESPIRATORY (INHALATION) at 21:37

## 2024-01-01 RX ADMIN — Medication 0.3 MCG/KG/MIN: at 03:17

## 2024-01-01 RX ADMIN — MIDAZOLAM 2 MG: 1 INJECTION INTRAMUSCULAR; INTRAVENOUS at 18:34

## 2024-01-01 RX ADMIN — LIDOCAINE HYDROCHLORIDE 30 ML: 10 INJECTION, SOLUTION INFILTRATION; PERINEURAL at 15:14

## 2024-01-01 RX ADMIN — CISATRACURIUM BESYLATE 6 MCG/KG/MIN: 10 INJECTION, SOLUTION INTRAVENOUS at 11:01

## 2024-01-01 RX ADMIN — Medication 0.03 MCG/KG/MIN: at 05:19

## 2024-01-01 RX ADMIN — ALBUTEROL SULFATE 2.5 MG: 2.5 SOLUTION RESPIRATORY (INHALATION) at 12:40

## 2024-01-01 RX ADMIN — PROPOFOL INJECTABLE EMULSION 50 MCG/KG/MIN: 10 INJECTION, EMULSION INTRAVENOUS at 23:10

## 2024-01-01 RX ADMIN — SODIUM BICARBONATE 50 MEQ: 84 INJECTION INTRAVENOUS at 08:28

## 2024-01-01 RX ADMIN — ALBUTEROL SULFATE 2.5 MG: 2.5 SOLUTION RESPIRATORY (INHALATION) at 16:14

## 2024-01-01 RX ADMIN — Medication 3 ML: at 10:38

## 2024-01-01 RX ADMIN — Medication 1 TABLET: at 08:49

## 2024-01-01 RX ADMIN — ALBUTEROL SULFATE 2.5 MG: 2.5 SOLUTION RESPIRATORY (INHALATION) at 12:11

## 2024-01-01 RX ADMIN — Medication 3 ML: at 08:46

## 2024-01-01 RX ADMIN — ALBUTEROL SULFATE 2.5 MG: 2.5 SOLUTION RESPIRATORY (INHALATION) at 07:16

## 2024-01-01 RX ADMIN — VASOPRESSIN 0.03 UNITS/MIN: 20 INJECTION INTRAVENOUS at 16:39

## 2024-01-01 RX ADMIN — Medication 10 ML: at 09:04

## 2024-01-01 RX ADMIN — HEPARIN SODIUM 5000 UNITS: 5000 INJECTION INTRAVENOUS; SUBCUTANEOUS at 22:37

## 2024-01-01 RX ADMIN — PROPOFOL INJECTABLE EMULSION 50 MCG/KG/MIN: 10 INJECTION, EMULSION INTRAVENOUS at 19:44

## 2024-01-01 RX ADMIN — FENTANYL CITRATE 150 MCG/HR: 0.05 INJECTION, SOLUTION INTRAMUSCULAR; INTRAVENOUS at 19:01

## 2024-01-01 RX ADMIN — DEXMEDETOMIDINE HYDROCHLORIDE IN SODIUM CHLORIDE 1 MCG/KG/HR: 4 INJECTION INTRAVENOUS at 17:46

## 2024-01-01 RX ADMIN — PROPOFOL INJECTABLE EMULSION 35 MCG/KG/MIN: 10 INJECTION, EMULSION INTRAVENOUS at 19:18

## 2024-01-01 RX ADMIN — FENTANYL CITRATE 100 MCG: 50 INJECTION, SOLUTION INTRAMUSCULAR; INTRAVENOUS at 20:37

## 2024-01-01 RX ADMIN — FLUCONAZOLE 400 MG: 400 INJECTION, SOLUTION INTRAVENOUS at 15:13

## 2024-01-01 RX ADMIN — ALBUTEROL SULFATE 2.5 MG: 2.5 SOLUTION RESPIRATORY (INHALATION) at 11:40

## 2024-01-01 RX ADMIN — PANTOPRAZOLE SODIUM 8 MG/HR: 40 INJECTION, POWDER, FOR SOLUTION INTRAVENOUS at 23:30

## 2024-01-01 RX ADMIN — DOCUSATE SODIUM 100 MG: 100 CAPSULE, LIQUID FILLED ORAL at 08:56

## 2024-01-01 RX ADMIN — Medication 3 ML: at 20:16

## 2024-01-01 RX ADMIN — PIPERACILLIN AND TAZOBACTAM 3.38 G: 3; .375 INJECTION, POWDER, FOR SOLUTION INTRAVENOUS at 08:58

## 2024-01-01 RX ADMIN — SODIUM CHLORIDE 125 ML/HR: 9 INJECTION, SOLUTION INTRAVENOUS at 23:31

## 2024-01-01 RX ADMIN — PROPOFOL INJECTABLE EMULSION 50 MCG/KG/MIN: 10 INJECTION, EMULSION INTRAVENOUS at 12:30

## 2024-01-01 RX ADMIN — Medication 1000 UNITS: at 08:56

## 2024-01-01 RX ADMIN — Medication 1 TABLET: at 11:15

## 2024-01-01 RX ADMIN — PROPOFOL INJECTABLE EMULSION 50 MCG/KG/MIN: 10 INJECTION, EMULSION INTRAVENOUS at 16:34

## 2024-01-01 RX ADMIN — PHENYLEPHRINE HYDROCHLORIDE 3 MCG/KG/MIN: 50 INJECTION INTRAVENOUS at 10:55

## 2024-01-01 RX ADMIN — ROCURONIUM BROMIDE 50 MG: 10 INJECTION, SOLUTION INTRAVENOUS at 21:12

## 2024-01-01 RX ADMIN — MORPHINE SULFATE 3.2 MG: 10 INJECTION INTRAVENOUS at 14:07

## 2024-01-01 RX ADMIN — ALBUTEROL SULFATE 2.5 MG: 2.5 SOLUTION RESPIRATORY (INHALATION) at 07:44

## 2024-01-01 RX ADMIN — PROCHLORPERAZINE EDISYLATE 5 MG: 5 INJECTION INTRAMUSCULAR; INTRAVENOUS at 10:08

## 2024-01-01 RX ADMIN — Medication 0.3 MCG/KG/MIN: at 10:59

## 2024-01-01 RX ADMIN — PIPERACILLIN AND TAZOBACTAM 3.38 G: 3; .375 INJECTION, POWDER, FOR SOLUTION INTRAVENOUS at 23:53

## 2024-01-01 RX ADMIN — CISATRACURIUM BESYLATE 6 MCG/KG/MIN: 10 INJECTION, SOLUTION INTRAVENOUS at 13:11

## 2024-01-01 RX ADMIN — VASOPRESSIN 0.03 UNITS/MIN: 20 INJECTION INTRAVENOUS at 03:17

## 2024-01-01 RX ADMIN — PIPERACILLIN AND TAZOBACTAM 3.38 G: 3; .375 INJECTION, POWDER, FOR SOLUTION INTRAVENOUS at 23:50

## 2024-01-01 RX ADMIN — Medication 10 ML: at 23:26

## 2024-01-01 RX ADMIN — Medication 3 ML: at 20:18

## 2024-01-01 RX ADMIN — PROPOFOL INJECTABLE EMULSION 35 MCG/KG/MIN: 10 INJECTION, EMULSION INTRAVENOUS at 09:40

## 2024-01-01 RX ADMIN — MORPHINE SULFATE 4 MG: 2 INJECTION, SOLUTION INTRAMUSCULAR; INTRAVENOUS at 18:33

## 2024-01-01 RX ADMIN — DOCUSATE SODIUM 100 MG: 100 CAPSULE, LIQUID FILLED ORAL at 09:50

## 2024-01-01 RX ADMIN — PIPERACILLIN AND TAZOBACTAM 3.38 G: 3; .375 INJECTION, POWDER, FOR SOLUTION INTRAVENOUS at 16:50

## 2024-01-01 RX ADMIN — HEPARIN SODIUM 5000 UNITS: 5000 INJECTION INTRAVENOUS; SUBCUTANEOUS at 21:47

## 2024-01-01 RX ADMIN — Medication 3 ML: at 20:27

## 2024-01-01 RX ADMIN — Medication 10 ML: at 20:10

## 2024-01-01 RX ADMIN — ACETAMINOPHEN 325MG 650 MG: 325 TABLET ORAL at 07:51

## 2024-01-01 RX ADMIN — Medication 1 CAPSULE: at 10:11

## 2024-01-01 RX ADMIN — ALBUTEROL SULFATE 2.5 MG: 2.5 SOLUTION RESPIRATORY (INHALATION) at 10:41

## 2024-01-01 RX ADMIN — MINERAL OIL, AND WHITE PETROLATUM: 425; 573 OINTMENT OPHTHALMIC at 00:59

## 2024-01-01 RX ADMIN — PANTOPRAZOLE SODIUM 8 MG/HR: 40 INJECTION, POWDER, FOR SOLUTION INTRAVENOUS at 14:02

## 2024-01-01 RX ADMIN — HEPARIN SODIUM 5000 UNITS: 5000 INJECTION INTRAVENOUS; SUBCUTANEOUS at 05:56

## 2024-01-01 RX ADMIN — Medication 10 ML: at 09:22

## 2024-01-01 RX ADMIN — MINERAL OIL, AND WHITE PETROLATUM: 425; 573 OINTMENT OPHTHALMIC at 09:48

## 2024-01-01 RX ADMIN — FENTANYL CITRATE 100 MCG/HR: 0.05 INJECTION, SOLUTION INTRAMUSCULAR; INTRAVENOUS at 09:19

## 2024-01-01 RX ADMIN — PIPERACILLIN AND TAZOBACTAM 4.5 G: 4; .5 INJECTION, POWDER, LYOPHILIZED, FOR SOLUTION INTRAVENOUS; PARENTERAL at 09:37

## 2024-01-01 RX ADMIN — ALBUTEROL SULFATE 2.5 MG: 2.5 SOLUTION RESPIRATORY (INHALATION) at 07:47

## 2024-01-01 RX ADMIN — PANTOPRAZOLE SODIUM 8 MG/HR: 40 INJECTION, POWDER, FOR SOLUTION INTRAVENOUS at 19:21

## 2024-01-01 RX ADMIN — ACETAMINOPHEN 325MG 650 MG: 325 TABLET ORAL at 09:03

## 2024-01-01 RX ADMIN — MEBROFENIN 1 DOSE: 45 INJECTION, POWDER, LYOPHILIZED, FOR SOLUTION INTRAVENOUS at 12:41

## 2024-01-01 RX ADMIN — Medication 3 ML: at 20:10

## 2024-01-01 RX ADMIN — Medication 3 ML: at 21:02

## 2024-01-01 RX ADMIN — MINERAL OIL, AND WHITE PETROLATUM: 425; 573 OINTMENT OPHTHALMIC at 13:13

## 2024-01-01 RX ADMIN — SODIUM CHLORIDE 125 ML/HR: 9 INJECTION, SOLUTION INTRAVENOUS at 06:17

## 2024-01-01 RX ADMIN — HEPARIN SODIUM 5000 UNITS: 5000 INJECTION INTRAVENOUS; SUBCUTANEOUS at 21:34

## 2024-01-01 RX ADMIN — Medication 0.28 MCG/KG/MIN: at 08:46

## 2024-01-01 RX ADMIN — DOCUSATE SODIUM 100 MG: 100 CAPSULE, LIQUID FILLED ORAL at 08:49

## 2024-01-01 RX ADMIN — ALBUTEROL SULFATE 2.5 MG: 2.5 SOLUTION RESPIRATORY (INHALATION) at 23:49

## 2024-01-01 RX ADMIN — ACETAMINOPHEN 325MG 650 MG: 325 TABLET ORAL at 20:15

## 2024-01-01 RX ADMIN — ALBUTEROL SULFATE 2.5 MG: 2.5 SOLUTION RESPIRATORY (INHALATION) at 02:30

## 2024-05-17 ENCOUNTER — HOSPITAL ENCOUNTER (OUTPATIENT)
Facility: HOSPITAL | Age: 69
Setting detail: OBSERVATION
LOS: 1 days | Discharge: HOME | End: 2024-05-21
Attending: EMERGENCY MEDICINE | Admitting: STUDENT IN AN ORGANIZED HEALTH CARE EDUCATION/TRAINING PROGRAM
Payer: MEDICARE

## 2024-05-17 ENCOUNTER — APPOINTMENT (OUTPATIENT)
Dept: GENERAL RADIOLOGY | Facility: HOSPITAL | Age: 69
End: 2024-05-17
Payer: MEDICARE

## 2024-05-17 DIAGNOSIS — E43 SEVERE MALNUTRITION: ICD-10-CM

## 2024-05-17 DIAGNOSIS — M79.10 MYALGIA: ICD-10-CM

## 2024-05-17 DIAGNOSIS — I95.9 HYPOTENSION, UNSPECIFIED HYPOTENSION TYPE: ICD-10-CM

## 2024-05-17 DIAGNOSIS — J18.9 PNEUMONIA OF BOTH LOWER LOBES DUE TO INFECTIOUS ORGANISM: Primary | ICD-10-CM

## 2024-05-17 DIAGNOSIS — R68.84 JAW PAIN: ICD-10-CM

## 2024-05-17 LAB
BASOPHILS # BLD AUTO: 0.01 10*3/MM3 (ref 0–0.2)
BASOPHILS NFR BLD AUTO: 0.2 % (ref 0–1.5)
D-LACTATE SERPL-SCNC: 1.2 MMOL/L (ref 0.5–2)
DEPRECATED RDW RBC AUTO: 41.3 FL (ref 37–54)
EOSINOPHIL # BLD AUTO: 0 10*3/MM3 (ref 0–0.4)
EOSINOPHIL NFR BLD AUTO: 0 % (ref 0.3–6.2)
ERYTHROCYTE [DISTWIDTH] IN BLOOD BY AUTOMATED COUNT: 13 % (ref 12.3–15.4)
ERYTHROCYTE [SEDIMENTATION RATE] IN BLOOD: 97 MM/HR (ref 0–20)
HCT VFR BLD AUTO: 35.2 % (ref 37.5–51)
HGB BLD-MCNC: 12 G/DL (ref 13–17.7)
IMM GRANULOCYTES # BLD AUTO: 0.01 10*3/MM3 (ref 0–0.05)
IMM GRANULOCYTES NFR BLD AUTO: 0.2 % (ref 0–0.5)
LYMPHOCYTES # BLD AUTO: 0.78 10*3/MM3 (ref 0.7–3.1)
LYMPHOCYTES NFR BLD AUTO: 16.6 % (ref 19.6–45.3)
MCH RBC QN AUTO: 30 PG (ref 26.6–33)
MCHC RBC AUTO-ENTMCNC: 34.1 G/DL (ref 31.5–35.7)
MCV RBC AUTO: 88 FL (ref 79–97)
MONOCYTES # BLD AUTO: 0.58 10*3/MM3 (ref 0.1–0.9)
MONOCYTES NFR BLD AUTO: 12.4 % (ref 5–12)
NEUTROPHILS NFR BLD AUTO: 3.31 10*3/MM3 (ref 1.7–7)
NEUTROPHILS NFR BLD AUTO: 70.6 % (ref 42.7–76)
NRBC BLD AUTO-RTO: 0 /100 WBC (ref 0–0.2)
PLATELET # BLD AUTO: 270 10*3/MM3 (ref 140–450)
PMV BLD AUTO: 10.2 FL (ref 6–12)
RBC # BLD AUTO: 4 10*6/MM3 (ref 4.14–5.8)
WBC NRBC COR # BLD AUTO: 4.69 10*3/MM3 (ref 3.4–10.8)

## 2024-05-17 PROCEDURE — 25810000003 SODIUM CHLORIDE 0.9 % SOLUTION: Performed by: EMERGENCY MEDICINE

## 2024-05-17 PROCEDURE — 85730 THROMBOPLASTIN TIME PARTIAL: CPT | Performed by: EMERGENCY MEDICINE

## 2024-05-17 PROCEDURE — 84145 PROCALCITONIN (PCT): CPT | Performed by: EMERGENCY MEDICINE

## 2024-05-17 PROCEDURE — 0202U NFCT DS 22 TRGT SARS-COV-2: CPT | Performed by: EMERGENCY MEDICINE

## 2024-05-17 PROCEDURE — 80053 COMPREHEN METABOLIC PANEL: CPT | Performed by: EMERGENCY MEDICINE

## 2024-05-17 PROCEDURE — 85025 COMPLETE CBC W/AUTO DIFF WBC: CPT | Performed by: EMERGENCY MEDICINE

## 2024-05-17 PROCEDURE — 25010000002 ONDANSETRON PER 1 MG: Performed by: EMERGENCY MEDICINE

## 2024-05-17 PROCEDURE — 83605 ASSAY OF LACTIC ACID: CPT | Performed by: EMERGENCY MEDICINE

## 2024-05-17 PROCEDURE — 85652 RBC SED RATE AUTOMATED: CPT | Performed by: EMERGENCY MEDICINE

## 2024-05-17 PROCEDURE — 25010000002 MORPHINE PER 10 MG: Performed by: EMERGENCY MEDICINE

## 2024-05-17 PROCEDURE — 36415 COLL VENOUS BLD VENIPUNCTURE: CPT

## 2024-05-17 PROCEDURE — 84484 ASSAY OF TROPONIN QUANT: CPT | Performed by: EMERGENCY MEDICINE

## 2024-05-17 PROCEDURE — 87040 BLOOD CULTURE FOR BACTERIA: CPT | Performed by: EMERGENCY MEDICINE

## 2024-05-17 PROCEDURE — 86140 C-REACTIVE PROTEIN: CPT | Performed by: EMERGENCY MEDICINE

## 2024-05-17 PROCEDURE — 85610 PROTHROMBIN TIME: CPT | Performed by: EMERGENCY MEDICINE

## 2024-05-17 PROCEDURE — 93010 ELECTROCARDIOGRAM REPORT: CPT | Performed by: INTERNAL MEDICINE

## 2024-05-17 PROCEDURE — 96375 TX/PRO/DX INJ NEW DRUG ADDON: CPT

## 2024-05-17 PROCEDURE — 83735 ASSAY OF MAGNESIUM: CPT | Performed by: EMERGENCY MEDICINE

## 2024-05-17 PROCEDURE — 99285 EMERGENCY DEPT VISIT HI MDM: CPT

## 2024-05-17 PROCEDURE — 71045 X-RAY EXAM CHEST 1 VIEW: CPT

## 2024-05-17 PROCEDURE — 93005 ELECTROCARDIOGRAM TRACING: CPT | Performed by: EMERGENCY MEDICINE

## 2024-05-17 RX ORDER — SODIUM CHLORIDE 0.9 % (FLUSH) 0.9 %
10 SYRINGE (ML) INJECTION AS NEEDED
Status: DISCONTINUED | OUTPATIENT
Start: 2024-05-17 | End: 2024-05-21 | Stop reason: HOSPADM

## 2024-05-17 RX ORDER — MORPHINE SULFATE 2 MG/ML
2 INJECTION, SOLUTION INTRAMUSCULAR; INTRAVENOUS ONCE
Status: COMPLETED | OUTPATIENT
Start: 2024-05-17 | End: 2024-05-17

## 2024-05-17 RX ORDER — ONDANSETRON 2 MG/ML
4 INJECTION INTRAMUSCULAR; INTRAVENOUS ONCE
Status: COMPLETED | OUTPATIENT
Start: 2024-05-17 | End: 2024-05-17

## 2024-05-17 RX ADMIN — SODIUM CHLORIDE 1000 ML: 9 INJECTION, SOLUTION INTRAVENOUS at 23:21

## 2024-05-17 RX ADMIN — MORPHINE SULFATE 2 MG: 2 INJECTION, SOLUTION INTRAMUSCULAR; INTRAVENOUS at 23:30

## 2024-05-17 RX ADMIN — ONDANSETRON 4 MG: 2 INJECTION INTRAMUSCULAR; INTRAVENOUS at 23:30

## 2024-05-18 ENCOUNTER — APPOINTMENT (OUTPATIENT)
Dept: CT IMAGING | Facility: HOSPITAL | Age: 69
End: 2024-05-18
Payer: MEDICARE

## 2024-05-18 PROBLEM — R74.01 TRANSAMINITIS: Status: ACTIVE | Noted: 2024-01-01

## 2024-05-18 PROBLEM — R68.84 JAW PAIN: Status: ACTIVE | Noted: 2024-01-01

## 2024-05-18 PROBLEM — J18.9 PNEUMONIA: Status: ACTIVE | Noted: 2024-01-01

## 2024-05-18 PROBLEM — Z85.46 HISTORY OF PROSTATE CANCER: Status: ACTIVE | Noted: 2024-01-01

## 2024-05-18 LAB
ALBUMIN SERPL-MCNC: 3.2 G/DL (ref 3.5–5.2)
ALBUMIN SERPL-MCNC: 3.7 G/DL (ref 3.5–5.2)
ALBUMIN/GLOB SERPL: 0.8 G/DL
ALBUMIN/GLOB SERPL: 0.9 G/DL
ALP SERPL-CCNC: 66 U/L (ref 39–117)
ALP SERPL-CCNC: 77 U/L (ref 39–117)
ALT SERPL W P-5'-P-CCNC: 46 U/L (ref 1–41)
ALT SERPL W P-5'-P-CCNC: 52 U/L (ref 1–41)
AMPHET+METHAMPHET UR QL: NEGATIVE
ANION GAP SERPL CALCULATED.3IONS-SCNC: 13.3 MMOL/L (ref 5–15)
ANION GAP SERPL CALCULATED.3IONS-SCNC: 14 MMOL/L (ref 5–15)
APTT PPP: 32.7 SECONDS (ref 22.7–35.4)
AST SERPL-CCNC: 65 U/L (ref 1–40)
AST SERPL-CCNC: 73 U/L (ref 1–40)
B PARAPERT DNA SPEC QL NAA+PROBE: NOT DETECTED
B PERT DNA SPEC QL NAA+PROBE: NOT DETECTED
BACTERIA UR QL AUTO: NORMAL /HPF
BARBITURATES UR QL SCN: NEGATIVE
BENZODIAZ UR QL SCN: NEGATIVE
BILIRUB SERPL-MCNC: 1 MG/DL (ref 0–1.2)
BILIRUB SERPL-MCNC: 1.1 MG/DL (ref 0–1.2)
BILIRUB UR QL STRIP: NEGATIVE
BUN SERPL-MCNC: 13 MG/DL (ref 8–23)
BUN SERPL-MCNC: 15 MG/DL (ref 8–23)
BUN/CREAT SERPL: 11.1 (ref 7–25)
BUN/CREAT SERPL: 13.5 (ref 7–25)
C PNEUM DNA NPH QL NAA+NON-PROBE: NOT DETECTED
CALCIUM SPEC-SCNC: 8.5 MG/DL (ref 8.6–10.5)
CALCIUM SPEC-SCNC: 9 MG/DL (ref 8.6–10.5)
CANNABINOIDS SERPL QL: NEGATIVE
CHLORIDE SERPL-SCNC: 102 MMOL/L (ref 98–107)
CHLORIDE SERPL-SCNC: 98 MMOL/L (ref 98–107)
CLARITY UR: CLEAR
CO2 SERPL-SCNC: 17.7 MMOL/L (ref 22–29)
CO2 SERPL-SCNC: 21 MMOL/L (ref 22–29)
COCAINE UR QL: NEGATIVE
COLOR UR: ABNORMAL
CREAT SERPL-MCNC: 0.96 MG/DL (ref 0.76–1.27)
CREAT SERPL-MCNC: 1.35 MG/DL (ref 0.76–1.27)
CRP SERPL-MCNC: 7.07 MG/DL (ref 0–0.5)
DEPRECATED RDW RBC AUTO: 41.1 FL (ref 37–54)
EGFRCR SERPLBLD CKD-EPI 2021: 56.5 ML/MIN/1.73
EGFRCR SERPLBLD CKD-EPI 2021: 85 ML/MIN/1.73
ERYTHROCYTE [DISTWIDTH] IN BLOOD BY AUTOMATED COUNT: 12.7 % (ref 12.3–15.4)
FENTANYL UR-MCNC: NEGATIVE NG/ML
FLUAV SUBTYP SPEC NAA+PROBE: NOT DETECTED
FLUBV RNA ISLT QL NAA+PROBE: NOT DETECTED
GEN 5 2HR TROPONIN T REFLEX: 15 NG/L
GLOBULIN UR ELPH-MCNC: 4 GM/DL
GLOBULIN UR ELPH-MCNC: 4.3 GM/DL
GLUCOSE SERPL-MCNC: 122 MG/DL (ref 65–99)
GLUCOSE SERPL-MCNC: 97 MG/DL (ref 65–99)
GLUCOSE UR STRIP-MCNC: NEGATIVE MG/DL
HADV DNA SPEC NAA+PROBE: NOT DETECTED
HBA1C MFR BLD: 5.2 % (ref 4.8–5.6)
HCOV 229E RNA SPEC QL NAA+PROBE: NOT DETECTED
HCOV HKU1 RNA SPEC QL NAA+PROBE: NOT DETECTED
HCOV NL63 RNA SPEC QL NAA+PROBE: NOT DETECTED
HCOV OC43 RNA SPEC QL NAA+PROBE: NOT DETECTED
HCT VFR BLD AUTO: 33.2 % (ref 37.5–51)
HGB BLD-MCNC: 11 G/DL (ref 13–17.7)
HGB UR QL STRIP.AUTO: NEGATIVE
HMPV RNA NPH QL NAA+NON-PROBE: NOT DETECTED
HPIV1 RNA ISLT QL NAA+PROBE: NOT DETECTED
HPIV2 RNA SPEC QL NAA+PROBE: NOT DETECTED
HPIV3 RNA NPH QL NAA+PROBE: NOT DETECTED
HPIV4 P GENE NPH QL NAA+PROBE: NOT DETECTED
HYALINE CASTS UR QL AUTO: NORMAL /LPF
INR PPP: 1.12 (ref 0.9–1.1)
KETONES UR QL STRIP: NEGATIVE
L PNEUMO1 AG UR QL IA: NEGATIVE
LEUKOCYTE ESTERASE UR QL STRIP.AUTO: NEGATIVE
M PNEUMO IGG SER IA-ACNC: NOT DETECTED
MAGNESIUM SERPL-MCNC: 2.2 MG/DL (ref 1.6–2.4)
MAGNESIUM SERPL-MCNC: 2.4 MG/DL (ref 1.6–2.4)
MCH RBC QN AUTO: 29.6 PG (ref 26.6–33)
MCHC RBC AUTO-ENTMCNC: 33.1 G/DL (ref 31.5–35.7)
MCV RBC AUTO: 89.5 FL (ref 79–97)
METHADONE UR QL SCN: NEGATIVE
MRSA DNA SPEC QL NAA+PROBE: NORMAL
NITRITE UR QL STRIP: NEGATIVE
NT-PROBNP SERPL-MCNC: <36 PG/ML (ref 0–900)
OPIATES UR QL: POSITIVE
OXYCODONE UR QL SCN: NEGATIVE
PH UR STRIP.AUTO: 5.5 [PH] (ref 5–8)
PHOSPHATE SERPL-MCNC: 3.2 MG/DL (ref 2.5–4.5)
PLATELET # BLD AUTO: 230 10*3/MM3 (ref 140–450)
PMV BLD AUTO: 9.9 FL (ref 6–12)
POTASSIUM SERPL-SCNC: 4.3 MMOL/L (ref 3.5–5.2)
POTASSIUM SERPL-SCNC: 4.4 MMOL/L (ref 3.5–5.2)
PROCALCITONIN SERPL-MCNC: 0.39 NG/ML (ref 0–0.25)
PROT SERPL-MCNC: 7.2 G/DL (ref 6–8.5)
PROT SERPL-MCNC: 8 G/DL (ref 6–8.5)
PROT UR QL STRIP: ABNORMAL
PROTHROMBIN TIME: 14.6 SECONDS (ref 11.7–14.2)
PSA SERPL-MCNC: 0.67 NG/ML (ref 0–4)
QT INTERVAL: 351 MS
QTC INTERVAL: 439 MS
RBC # BLD AUTO: 3.71 10*6/MM3 (ref 4.14–5.8)
RBC # UR STRIP: NORMAL /HPF
REF LAB TEST METHOD: NORMAL
RHINOVIRUS RNA SPEC NAA+PROBE: NOT DETECTED
RSV RNA NPH QL NAA+NON-PROBE: NOT DETECTED
S PNEUM AG SPEC QL LA: NEGATIVE
SARS-COV-2 RNA NPH QL NAA+NON-PROBE: NOT DETECTED
SODIUM SERPL-SCNC: 133 MMOL/L (ref 136–145)
SODIUM SERPL-SCNC: 133 MMOL/L (ref 136–145)
SP GR UR STRIP: 1.01 (ref 1–1.03)
SQUAMOUS #/AREA URNS HPF: NORMAL /HPF
TROPONIN T DELTA: -4 NG/L
TROPONIN T SERPL HS-MCNC: 19 NG/L
TSH SERPL DL<=0.05 MIU/L-ACNC: 1.04 UIU/ML (ref 0.27–4.2)
UROBILINOGEN UR QL STRIP: ABNORMAL
WBC # UR STRIP: NORMAL /HPF
WBC NRBC COR # BLD AUTO: 3.79 10*3/MM3 (ref 3.4–10.8)

## 2024-05-18 PROCEDURE — 80053 COMPREHEN METABOLIC PANEL: CPT | Performed by: STUDENT IN AN ORGANIZED HEALTH CARE EDUCATION/TRAINING PROGRAM

## 2024-05-18 PROCEDURE — 80307 DRUG TEST PRSMV CHEM ANLYZR: CPT | Performed by: STUDENT IN AN ORGANIZED HEALTH CARE EDUCATION/TRAINING PROGRAM

## 2024-05-18 PROCEDURE — 87641 MR-STAPH DNA AMP PROBE: CPT | Performed by: NURSE PRACTITIONER

## 2024-05-18 PROCEDURE — 25010000002 CEFEPIME PER 500 MG: Performed by: NURSE PRACTITIONER

## 2024-05-18 PROCEDURE — 25010000002 CEFEPIME PER 500 MG: Performed by: EMERGENCY MEDICINE

## 2024-05-18 PROCEDURE — 87449 NOS EACH ORGANISM AG IA: CPT | Performed by: STUDENT IN AN ORGANIZED HEALTH CARE EDUCATION/TRAINING PROGRAM

## 2024-05-18 PROCEDURE — 36415 COLL VENOUS BLD VENIPUNCTURE: CPT

## 2024-05-18 PROCEDURE — 83036 HEMOGLOBIN GLYCOSYLATED A1C: CPT | Performed by: NURSE PRACTITIONER

## 2024-05-18 PROCEDURE — 81001 URINALYSIS AUTO W/SCOPE: CPT | Performed by: EMERGENCY MEDICINE

## 2024-05-18 PROCEDURE — 84100 ASSAY OF PHOSPHORUS: CPT | Performed by: STUDENT IN AN ORGANIZED HEALTH CARE EDUCATION/TRAINING PROGRAM

## 2024-05-18 PROCEDURE — 36415 COLL VENOUS BLD VENIPUNCTURE: CPT | Performed by: NURSE PRACTITIONER

## 2024-05-18 PROCEDURE — 85027 COMPLETE CBC AUTOMATED: CPT | Performed by: NURSE PRACTITIONER

## 2024-05-18 PROCEDURE — G0378 HOSPITAL OBSERVATION PER HR: HCPCS

## 2024-05-18 PROCEDURE — 84153 ASSAY OF PSA TOTAL: CPT | Performed by: NURSE PRACTITIONER

## 2024-05-18 PROCEDURE — 87899 AGENT NOS ASSAY W/OPTIC: CPT | Performed by: STUDENT IN AN ORGANIZED HEALTH CARE EDUCATION/TRAINING PROGRAM

## 2024-05-18 PROCEDURE — 84443 ASSAY THYROID STIM HORMONE: CPT | Performed by: NURSE PRACTITIONER

## 2024-05-18 PROCEDURE — 70450 CT HEAD/BRAIN W/O DYE: CPT

## 2024-05-18 PROCEDURE — 25810000003 SODIUM CHLORIDE 0.9 % SOLUTION: Performed by: NURSE PRACTITIONER

## 2024-05-18 PROCEDURE — 84484 ASSAY OF TROPONIN QUANT: CPT | Performed by: EMERGENCY MEDICINE

## 2024-05-18 PROCEDURE — 83735 ASSAY OF MAGNESIUM: CPT | Performed by: STUDENT IN AN ORGANIZED HEALTH CARE EDUCATION/TRAINING PROGRAM

## 2024-05-18 PROCEDURE — 96365 THER/PROPH/DIAG IV INF INIT: CPT

## 2024-05-18 PROCEDURE — 83880 ASSAY OF NATRIURETIC PEPTIDE: CPT | Performed by: STUDENT IN AN ORGANIZED HEALTH CARE EDUCATION/TRAINING PROGRAM

## 2024-05-18 PROCEDURE — 70491 CT SOFT TISSUE NECK W/DYE: CPT

## 2024-05-18 PROCEDURE — 92610 EVALUATE SWALLOWING FUNCTION: CPT

## 2024-05-18 PROCEDURE — 25510000001 IOPAMIDOL 61 % SOLUTION: Performed by: EMERGENCY MEDICINE

## 2024-05-18 RX ORDER — BISACODYL 5 MG/1
5 TABLET, DELAYED RELEASE ORAL DAILY PRN
Status: DISCONTINUED | OUTPATIENT
Start: 2024-05-18 | End: 2024-05-21 | Stop reason: HOSPADM

## 2024-05-18 RX ORDER — GUAIFENESIN 600 MG/1
1200 TABLET, EXTENDED RELEASE ORAL EVERY 12 HOURS SCHEDULED
Status: DISCONTINUED | OUTPATIENT
Start: 2024-05-18 | End: 2024-05-21 | Stop reason: HOSPADM

## 2024-05-18 RX ORDER — POLYETHYLENE GLYCOL 3350 17 G/17G
17 POWDER, FOR SOLUTION ORAL DAILY PRN
Status: DISCONTINUED | OUTPATIENT
Start: 2024-05-18 | End: 2024-05-21 | Stop reason: HOSPADM

## 2024-05-18 RX ORDER — HYDROCODONE BITARTRATE AND ACETAMINOPHEN 10; 325 MG/1; MG/1
1 TABLET ORAL EVERY 8 HOURS PRN
Status: ON HOLD | COMMUNITY
End: 2024-05-21

## 2024-05-18 RX ORDER — ERGOCALCIFEROL 1.25 MG/1
50000 CAPSULE ORAL WEEKLY
COMMUNITY

## 2024-05-18 RX ORDER — ATORVASTATIN CALCIUM 20 MG/1
20 TABLET, FILM COATED ORAL NIGHTLY
COMMUNITY
End: 2024-05-21 | Stop reason: HOSPADM

## 2024-05-18 RX ORDER — ACETAMINOPHEN 160 MG/5ML
650 SOLUTION ORAL EVERY 4 HOURS PRN
Status: DISCONTINUED | OUTPATIENT
Start: 2024-05-18 | End: 2024-05-21 | Stop reason: HOSPADM

## 2024-05-18 RX ORDER — SODIUM CHLORIDE 0.9 % (FLUSH) 0.9 %
10 SYRINGE (ML) INJECTION AS NEEDED
Status: DISCONTINUED | OUTPATIENT
Start: 2024-05-18 | End: 2024-05-21 | Stop reason: HOSPADM

## 2024-05-18 RX ORDER — BUSPIRONE HYDROCHLORIDE 15 MG/1
15 TABLET ORAL 2 TIMES DAILY
COMMUNITY
End: 2024-05-25

## 2024-05-18 RX ORDER — ATORVASTATIN CALCIUM 20 MG/1
20 TABLET, FILM COATED ORAL NIGHTLY
Status: DISCONTINUED | OUTPATIENT
Start: 2024-05-18 | End: 2024-05-21 | Stop reason: HOSPADM

## 2024-05-18 RX ORDER — SODIUM CHLORIDE 9 MG/ML
75 INJECTION, SOLUTION INTRAVENOUS CONTINUOUS
Status: DISCONTINUED | OUTPATIENT
Start: 2024-05-18 | End: 2024-05-19

## 2024-05-18 RX ORDER — BUSPIRONE HYDROCHLORIDE 15 MG/1
15 TABLET ORAL 2 TIMES DAILY
Status: DISCONTINUED | OUTPATIENT
Start: 2024-05-18 | End: 2024-05-21 | Stop reason: HOSPADM

## 2024-05-18 RX ORDER — GABAPENTIN 600 MG/1
600 TABLET ORAL 3 TIMES DAILY PRN
Status: ON HOLD | COMMUNITY
End: 2024-05-21

## 2024-05-18 RX ORDER — IBUPROFEN 600 MG/1
600 TABLET ORAL EVERY 8 HOURS PRN
COMMUNITY
Start: 2024-05-14 | End: 2024-05-21

## 2024-05-18 RX ORDER — LATANOPROST 50 UG/ML
1 SOLUTION/ DROPS OPHTHALMIC NIGHTLY
COMMUNITY
End: 2024-05-25

## 2024-05-18 RX ORDER — AMOXICILLIN 250 MG
2 CAPSULE ORAL 2 TIMES DAILY PRN
Status: DISCONTINUED | OUTPATIENT
Start: 2024-05-18 | End: 2024-05-21 | Stop reason: HOSPADM

## 2024-05-18 RX ORDER — CETIRIZINE HYDROCHLORIDE 10 MG/1
10 TABLET ORAL DAILY PRN
COMMUNITY

## 2024-05-18 RX ORDER — CYCLOBENZAPRINE HCL 10 MG
5 TABLET ORAL 2 TIMES DAILY PRN
Status: DISCONTINUED | OUTPATIENT
Start: 2024-05-18 | End: 2024-05-21 | Stop reason: HOSPADM

## 2024-05-18 RX ORDER — SODIUM CHLORIDE 0.9 % (FLUSH) 0.9 %
10 SYRINGE (ML) INJECTION EVERY 12 HOURS SCHEDULED
Status: DISCONTINUED | OUTPATIENT
Start: 2024-05-18 | End: 2024-05-21 | Stop reason: HOSPADM

## 2024-05-18 RX ORDER — DULOXETIN HYDROCHLORIDE 60 MG/1
60 CAPSULE, DELAYED RELEASE ORAL NIGHTLY
COMMUNITY
End: 2024-05-25

## 2024-05-18 RX ORDER — AMLODIPINE BESYLATE 5 MG/1
5 TABLET ORAL DAILY
COMMUNITY
End: 2024-05-25

## 2024-05-18 RX ORDER — SODIUM CHLORIDE 9 MG/ML
40 INJECTION, SOLUTION INTRAVENOUS AS NEEDED
Status: DISCONTINUED | OUTPATIENT
Start: 2024-05-18 | End: 2024-05-21 | Stop reason: HOSPADM

## 2024-05-18 RX ORDER — ACETAMINOPHEN 650 MG/1
650 SUPPOSITORY RECTAL EVERY 4 HOURS PRN
Status: DISCONTINUED | OUTPATIENT
Start: 2024-05-18 | End: 2024-05-21 | Stop reason: HOSPADM

## 2024-05-18 RX ORDER — DULOXETIN HYDROCHLORIDE 60 MG/1
60 CAPSULE, DELAYED RELEASE ORAL NIGHTLY
Status: DISCONTINUED | OUTPATIENT
Start: 2024-05-18 | End: 2024-05-21 | Stop reason: HOSPADM

## 2024-05-18 RX ORDER — GABAPENTIN 300 MG/1
300 CAPSULE ORAL 3 TIMES DAILY
Status: DISCONTINUED | OUTPATIENT
Start: 2024-05-18 | End: 2024-05-21 | Stop reason: HOSPADM

## 2024-05-18 RX ORDER — HYDROCODONE BITARTRATE AND ACETAMINOPHEN 5; 325 MG/1; MG/1
2 TABLET ORAL EVERY 8 HOURS PRN
Status: DISCONTINUED | OUTPATIENT
Start: 2024-05-18 | End: 2024-05-21 | Stop reason: HOSPADM

## 2024-05-18 RX ORDER — CYCLOBENZAPRINE HCL 5 MG
5 TABLET ORAL 2 TIMES DAILY PRN
COMMUNITY
End: 2024-05-25

## 2024-05-18 RX ORDER — ONDANSETRON 4 MG/1
4 TABLET, ORALLY DISINTEGRATING ORAL EVERY 8 HOURS PRN
COMMUNITY
End: 2024-05-25

## 2024-05-18 RX ORDER — HYDROCODONE BITARTRATE AND ACETAMINOPHEN 5; 325 MG/1; MG/1
1 TABLET ORAL EVERY 6 HOURS PRN
Status: DISCONTINUED | OUTPATIENT
Start: 2024-05-18 | End: 2024-05-18

## 2024-05-18 RX ORDER — BISACODYL 10 MG
10 SUPPOSITORY, RECTAL RECTAL DAILY PRN
Status: DISCONTINUED | OUTPATIENT
Start: 2024-05-18 | End: 2024-05-21 | Stop reason: HOSPADM

## 2024-05-18 RX ORDER — HYDROXYZINE PAMOATE 50 MG/1
50 CAPSULE ORAL NIGHTLY
COMMUNITY
End: 2024-05-25

## 2024-05-18 RX ORDER — ACETAMINOPHEN 500 MG
1000 TABLET ORAL 3 TIMES DAILY PRN
COMMUNITY
Start: 2024-05-14 | End: 2024-05-21

## 2024-05-18 RX ORDER — CALCIUM CARBONATE 500 MG/1
2 TABLET, CHEWABLE ORAL 2 TIMES DAILY PRN
Status: DISCONTINUED | OUTPATIENT
Start: 2024-05-18 | End: 2024-05-21 | Stop reason: HOSPADM

## 2024-05-18 RX ORDER — ACETAMINOPHEN 500 MG
1000 TABLET ORAL EVERY 8 HOURS PRN
Status: DISCONTINUED | OUTPATIENT
Start: 2024-05-18 | End: 2024-05-21

## 2024-05-18 RX ORDER — CETIRIZINE HYDROCHLORIDE 10 MG/1
10 TABLET ORAL DAILY PRN
Status: DISCONTINUED | OUTPATIENT
Start: 2024-05-18 | End: 2024-05-21 | Stop reason: HOSPADM

## 2024-05-18 RX ORDER — HYDROXYZINE PAMOATE 50 MG/1
50 CAPSULE ORAL NIGHTLY
Status: DISCONTINUED | OUTPATIENT
Start: 2024-05-18 | End: 2024-05-21 | Stop reason: HOSPADM

## 2024-05-18 RX ORDER — ACETAMINOPHEN 325 MG/1
650 TABLET ORAL EVERY 4 HOURS PRN
Status: DISCONTINUED | OUTPATIENT
Start: 2024-05-18 | End: 2024-05-18 | Stop reason: SDUPTHER

## 2024-05-18 RX ADMIN — ATORVASTATIN CALCIUM 20 MG: 20 TABLET, FILM COATED ORAL at 21:01

## 2024-05-18 RX ADMIN — Medication 10 ML: at 11:06

## 2024-05-18 RX ADMIN — HYDROCODONE BITARTRATE AND ACETAMINOPHEN 2 TABLET: 5; 325 TABLET ORAL at 18:43

## 2024-05-18 RX ADMIN — GABAPENTIN 300 MG: 300 CAPSULE ORAL at 21:01

## 2024-05-18 RX ADMIN — CEFEPIME 2000 MG: 2 INJECTION, POWDER, FOR SOLUTION INTRAVENOUS at 17:46

## 2024-05-18 RX ADMIN — SODIUM CHLORIDE 75 ML/HR: 9 INJECTION, SOLUTION INTRAVENOUS at 05:48

## 2024-05-18 RX ADMIN — GUAIFENESIN 1200 MG: 600 TABLET, EXTENDED RELEASE ORAL at 11:06

## 2024-05-18 RX ADMIN — Medication 10 ML: at 21:07

## 2024-05-18 RX ADMIN — BUSPIRONE HYDROCHLORIDE 15 MG: 15 TABLET ORAL at 21:01

## 2024-05-18 RX ADMIN — DULOXETINE HYDROCHLORIDE 60 MG: 60 CAPSULE, DELAYED RELEASE ORAL at 21:01

## 2024-05-18 RX ADMIN — CEFEPIME 2000 MG: 2 INJECTION, POWDER, FOR SOLUTION INTRAVENOUS at 01:08

## 2024-05-18 RX ADMIN — HYDROXYZINE PAMOATE 50 MG: 50 CAPSULE ORAL at 21:01

## 2024-05-18 RX ADMIN — CYCLOBENZAPRINE 5 MG: 10 TABLET, FILM COATED ORAL at 22:16

## 2024-05-18 RX ADMIN — CEFEPIME 2000 MG: 2 INJECTION, POWDER, FOR SOLUTION INTRAVENOUS at 11:06

## 2024-05-18 RX ADMIN — GUAIFENESIN 1200 MG: 600 TABLET, EXTENDED RELEASE ORAL at 21:00

## 2024-05-18 RX ADMIN — IOPAMIDOL 85 ML: 612 INJECTION, SOLUTION INTRAVENOUS at 00:23

## 2024-05-18 NOTE — PROGRESS NOTES
[facility] Clinical Pharmacy Services: Cefepime Consult    Pt Name: Jimmy Norman   : 1955  Weight: 86.2 kg (190 lb)  Antibiotic: Cefepime  Indication: Pneumonia    Relevant clinical data and objective history reviewed:    Vitals:    24 2257 24 0101 24 0301 24 0302   BP: (!) 82/58 108/77 111/79    BP Location: Right arm      Pulse:  82 74 70   Resp:       Temp:       TempSrc:       SpO2:  92% 94% 95%      Creatinine   Date Value Ref Range Status   2024 1.35 (H) 0.76 - 1.27 mg/dL Final     BUN   Date Value Ref Range Status   2024 15 8 - 23 mg/dL Final     Estimated Creatinine Clearance: 62.1 mL/min (A) (by C-G formula based on SCr of 1.35 mg/dL (H)).    Lab Results   Component Value Date    WBC 4.69 2024     Temp Readings from Last 3 Encounters:   24 97.9 °F (36.6 °C) (Tympanic)      Assessment/Plan    Ordered Cefepime 2000 mg IV every 8 hours for a total of 5 days. Will monitor and adjust if culture data or pertinent lab values indicate this is best for the patient.     Thank you for this consult and please contact pharmacy with any questions or concerns.     Colette Newton, Pharm.D., [facility]   Clinical Pharmacist

## 2024-05-18 NOTE — ED PROVIDER NOTES
EMERGENCY DEPARTMENT ENCOUNTER    Room Number:  N437/1  PCP: Provider, No Known  Patient Care Team:  Provider, No Known as PCP - General   Independent Historians: Patient, family    HPI:  Chief Complaint: Jaw pain, myalgias, nausea    A complete HPI/ROS/PMH/PSH/SH/FH are unobtainable due to: None    Chronic or social conditions impacting patient care (Social Determinants of Health): None  (Financial Resource Strain / Food Insecurity / Transportation Needs / Physical Activity / Stress / Social Connections / Intimate Partner Violence / Housing Stability)    Context: Jimmy Norman is a 69 y.o. male who presents to the ED c/o acute pain to the right side of his jaw.  Patient states his jaw was broken 2 years ago and he has metal in his jaw which has caused some nerve problems in the past.  States has had increasing pain and swelling to the right jaw.  Also reports over the last 2 weeks patient has developed myalgias and fatigue.  Patient has been tested for COVID and it was negative.  Patient reports that the jaw pain is worse.  Also has been experiencing some nausea and vomiting in the morning.  No fevers and chills.  Patient has pain when he rotates his neck but no pain with flexion extension.  Does not have any difficulty speaking breathing or swallowing.  Patient was hypotensive and triage this has resolved laying down on the stretcher.    Review of prior external notes (non-ED) -and- Review of prior external test results outside of this encounter: Reviewed home health visit from 1/25/2024    Prescription drug monitoring program review:         PAST MEDICAL HISTORY  Active Ambulatory Problems     Diagnosis Date Noted    No Active Ambulatory Problems     Resolved Ambulatory Problems     Diagnosis Date Noted    No Resolved Ambulatory Problems     Past Medical History:   Diagnosis Date    Arthritis     Cancer     Coronary artery disease          PAST SURGICAL HISTORY  Past Surgical History:   Procedure  Laterality Date    WOUND DEBRIDEMENT      pt states he had surgery on chest wall r/t infection         FAMILY HISTORY  No family history on file.      SOCIAL HISTORY  Social History     Socioeconomic History    Marital status: Single   Tobacco Use    Smoking status: Never    Smokeless tobacco: Never   Vaping Use    Vaping status: Never Used   Substance and Sexual Activity    Alcohol use: Yes     Alcohol/week: 2.0 standard drinks of alcohol     Types: 2 Shots of liquor per week    Drug use: Not Currently     Comment: hx pain pill use    Sexual activity: Defer         ALLERGIES  Patient has no known allergies.        REVIEW OF SYSTEMS  Review of Systems  Included in HPI  All systems reviewed and negative except for those discussed in HPI.      PHYSICAL EXAM    I have reviewed the triage vital signs and nursing notes.    ED Triage Vitals   Temp Heart Rate Resp BP SpO2   05/17/24 2250 05/17/24 2250 05/17/24 2250 05/17/24 2257 05/17/24 2250   97.9 °F (36.6 °C) 109 18 (!) 82/58 95 %      Temp src Heart Rate Source Patient Position BP Location FiO2 (%)   05/17/24 2250 -- 05/17/24 2257 05/17/24 2257 --   Tympanic  Sitting Right arm        Physical Exam  GENERAL: alert, no acute distress  SKIN: Warm, dry, swelling to skin overlying right angle of mandible  HENT: Normocephalic, atraumatic, floor of mouth is soft no significant intraoral swelling, no pain with flexion extension of neck no meningismus  EYES: no scleral icterus  CV: regular rhythm, regular rate  RESPIRATORY: normal effort, lungs clear  ABDOMEN: soft, nontender, nondistended  MUSCULOSKELETAL: no deformity  NEURO: alert, moves all extremities, follows commands                                                               LAB RESULTS  Recent Results (from the past 24 hour(s))   ECG 12 Lead Dyspnea    Collection Time: 05/17/24 11:17 PM   Result Value Ref Range    QT Interval 351 ms    QTC Interval 439 ms   Comprehensive Metabolic Panel    Collection Time: 05/17/24  11:20 PM    Specimen: Blood   Result Value Ref Range    Glucose 122 (H) 65 - 99 mg/dL    BUN 15 8 - 23 mg/dL    Creatinine 1.35 (H) 0.76 - 1.27 mg/dL    Sodium 133 (L) 136 - 145 mmol/L    Potassium 4.3 3.5 - 5.2 mmol/L    Chloride 98 98 - 107 mmol/L    CO2 21.0 (L) 22.0 - 29.0 mmol/L    Calcium 9.0 8.6 - 10.5 mg/dL    Total Protein 8.0 6.0 - 8.5 g/dL    Albumin 3.7 3.5 - 5.2 g/dL    ALT (SGPT) 52 (H) 1 - 41 U/L    AST (SGOT) 73 (H) 1 - 40 U/L    Alkaline Phosphatase 77 39 - 117 U/L    Total Bilirubin 1.1 0.0 - 1.2 mg/dL    Globulin 4.3 gm/dL    A/G Ratio 0.9 g/dL    BUN/Creatinine Ratio 11.1 7.0 - 25.0    Anion Gap 14.0 5.0 - 15.0 mmol/L    eGFR 56.5 (L) >60.0 mL/min/1.73   Protime-INR    Collection Time: 05/17/24 11:20 PM    Specimen: Blood   Result Value Ref Range    Protime 14.6 (H) 11.7 - 14.2 Seconds    INR 1.12 (H) 0.90 - 1.10   aPTT    Collection Time: 05/17/24 11:20 PM    Specimen: Blood   Result Value Ref Range    PTT 32.7 22.7 - 35.4 seconds   High Sensitivity Troponin T    Collection Time: 05/17/24 11:20 PM    Specimen: Blood   Result Value Ref Range    HS Troponin T 19 <22 ng/L   Lactic Acid, Plasma    Collection Time: 05/17/24 11:20 PM    Specimen: Blood   Result Value Ref Range    Lactate 1.2 0.5 - 2.0 mmol/L   Procalcitonin    Collection Time: 05/17/24 11:20 PM    Specimen: Blood   Result Value Ref Range    Procalcitonin 0.39 (H) 0.00 - 0.25 ng/mL   Sedimentation Rate    Collection Time: 05/17/24 11:20 PM    Specimen: Blood   Result Value Ref Range    Sed Rate 97 (H) 0 - 20 mm/hr   C-reactive Protein    Collection Time: 05/17/24 11:20 PM    Specimen: Blood   Result Value Ref Range    C-Reactive Protein 7.07 (H) 0.00 - 0.50 mg/dL   Magnesium    Collection Time: 05/17/24 11:20 PM    Specimen: Blood   Result Value Ref Range    Magnesium 2.2 1.6 - 2.4 mg/dL   CBC Auto Differential    Collection Time: 05/17/24 11:20 PM    Specimen: Blood   Result Value Ref Range    WBC 4.69 3.40 - 10.80 10*3/mm3    RBC  4.00 (L) 4.14 - 5.80 10*6/mm3    Hemoglobin 12.0 (L) 13.0 - 17.7 g/dL    Hematocrit 35.2 (L) 37.5 - 51.0 %    MCV 88.0 79.0 - 97.0 fL    MCH 30.0 26.6 - 33.0 pg    MCHC 34.1 31.5 - 35.7 g/dL    RDW 13.0 12.3 - 15.4 %    RDW-SD 41.3 37.0 - 54.0 fl    MPV 10.2 6.0 - 12.0 fL    Platelets 270 140 - 450 10*3/mm3    Neutrophil % 70.6 42.7 - 76.0 %    Lymphocyte % 16.6 (L) 19.6 - 45.3 %    Monocyte % 12.4 (H) 5.0 - 12.0 %    Eosinophil % 0.0 (L) 0.3 - 6.2 %    Basophil % 0.2 0.0 - 1.5 %    Immature Grans % 0.2 0.0 - 0.5 %    Neutrophils, Absolute 3.31 1.70 - 7.00 10*3/mm3    Lymphocytes, Absolute 0.78 0.70 - 3.10 10*3/mm3    Monocytes, Absolute 0.58 0.10 - 0.90 10*3/mm3    Eosinophils, Absolute 0.00 0.00 - 0.40 10*3/mm3    Basophils, Absolute 0.01 0.00 - 0.20 10*3/mm3    Immature Grans, Absolute 0.01 0.00 - 0.05 10*3/mm3    nRBC 0.0 0.0 - 0.2 /100 WBC   Respiratory Panel PCR w/COVID-19(SARS-CoV-2) SCARLETT/DWIGHT/EVELYN/PAD/COR/BRITT In-House, NP Swab in [facility], 2 HR TAT - Swab, Nasopharynx    Collection Time: 05/17/24 11:21 PM    Specimen: Nasopharynx; Swab   Result Value Ref Range    ADENOVIRUS, PCR Not Detected Not Detected    Coronavirus 229E Not Detected Not Detected    Coronavirus HKU1 Not Detected Not Detected    Coronavirus NL63 Not Detected Not Detected    Coronavirus OC43 Not Detected Not Detected    COVID19 Not Detected Not Detected - Ref. Range    Human Metapneumovirus Not Detected Not Detected    Human Rhinovirus/Enterovirus Not Detected Not Detected    Influenza A PCR Not Detected Not Detected    Influenza B PCR Not Detected Not Detected    Parainfluenza Virus 1 Not Detected Not Detected    Parainfluenza Virus 2 Not Detected Not Detected    Parainfluenza Virus 3 Not Detected Not Detected    Parainfluenza Virus 4 Not Detected Not Detected    RSV, PCR Not Detected Not Detected    Bordetella pertussis pcr Not Detected Not Detected    Bordetella parapertussis PCR Not Detected Not Detected    Chlamydophila pneumoniae PCR Not  Detected Not Detected    Mycoplasma pneumo by PCR Not Detected Not Detected   High Sensitivity Troponin T 2Hr    Collection Time: 05/18/24  1:02 AM    Specimen: Blood   Result Value Ref Range    HS Troponin T 15 <22 ng/L    Troponin T Delta -4 (L) >=-4 - <+4 ng/L   Urinalysis With Microscopic If Indicated (No Culture) - Urine, Clean Catch    Collection Time: 05/18/24  1:04 AM    Specimen: Urine, Clean Catch   Result Value Ref Range    Color, UA Dark Yellow (A) Yellow, Straw    Appearance, UA Clear Clear    pH, UA 5.5 5.0 - 8.0    Specific Gravity, UA 1.014 1.005 - 1.030    Glucose, UA Negative Negative    Ketones, UA Negative Negative    Bilirubin, UA Negative Negative    Blood, UA Negative Negative    Protein,  mg/dL (2+) (A) Negative    Leuk Esterase, UA Negative Negative    Nitrite, UA Negative Negative    Urobilinogen, UA 1.0 E.U./dL 0.2 - 1.0 E.U./dL   Urinalysis, Microscopic Only - Urine, Clean Catch    Collection Time: 05/18/24  1:04 AM    Specimen: Urine, Clean Catch   Result Value Ref Range    RBC, UA 0-2 None Seen, 0-2 /HPF    WBC, UA 0-2 None Seen, 0-2 /HPF    Bacteria, UA None Seen None Seen /HPF    Squamous Epithelial Cells, UA 0-2 None Seen, 0-2 /HPF    Hyaline Casts, UA 13-20 None Seen /LPF    Methodology Automated Microscopy        I ordered the above labs and independently reviewed the results.        RADIOLOGY  CT Head Without Contrast    Result Date: 5/18/2024  Patient: VALERIA MUIR  Time Out: 01:57 Exam(s): CT HEAD Without Contrast EXAM:   CT Head Without Intravenous Contrast CLINICAL HISTORY:    Reason for exam: ha. TECHNIQUE:   Axial computed tomography images of the head brain without intravenous contrast.  CTDI is 55.7 mGy and DLP is 939.9 mGy-cm.  This CT exam was performed according to the principle of ALARA (As Low As Reasonably Achievable) by using one or more of the following dose reduction techniques: automated exposure control, adjustment of the mA and or kV according to  patient size, and or use of iterative reconstruction technique. COMPARISON:   No relevant prior studies available. FINDINGS:   Brain:  Unremarkable.  No hemorrhage.  Mild nonspecific white matter changes.  No edema.   Ventricles:  Unremarkable.  No ventriculomegaly.   Bones joints:  Unremarkable.  No acute fracture.   Soft tissues:  Unremarkable.   Sinuses:  Unremarkable as visualized.  No acute sinusitis.   Mastoid air cells:  Unremarkable as visualized.  No mastoid effusion. IMPRESSION:     No evidence of acute intracranial pathology.     Electronically signed by Luz Maria Wild MD on 05-18-24 at 0157    CT Soft Tissue Neck With Contrast    Result Date: 5/18/2024  Patient: VALERIA MUIR  Time Out: 01:49 Exam(s): CT NECK With Contrast EXAM:   CT Neck With Intravenous Contrast CLINICAL HISTORY:    Reason for exam: right sided jaw swelling, pain worsening - hx of frx'ed jaw 2 years ago. TECHNIQUE:   Axial computed tomography images of the neck with intravenous contrast.  CTDI is 13.23 mGy and DLP is 412.9 mGy-cm.  This CT exam was performed according to the principle of ALARA (As Low As Reasonably Achievable) by using one or more of the following dose reduction techniques: automated exposure control, adjustment of the mA and or kV according to patient size, and or use of iterative reconstruction technique. COMPARISON:   No relevant prior studies available. FINDINGS:   Oropharynx:  Unremarkable.  No significant tonsillar enlargement.  No peritonsillar abscess.   Hypopharynx:  Unremarkable.   Larynx:  Unremarkable.  Normal epiglottis.   Trachea:  Unremarkable.   Retropharyngeal space:  Unremarkable.   Submandibular parotid glands:  Unremarkable.  Glands are normal in size.   Thyroid: Tiny left thyroid nodule.  Hypoplastic left thyroid gland.  No enlarged or calcified nodules.   Bones joints:  No acute fracture.  Status post ORIF of the mandible bilaterally.   Soft tissues:  Unremarkable.   Vasculature:  No acute  findings.  Finding is concerning for bilateral carotid endarterectomies with surgical staples in place.   Lymph nodes:  Unremarkable.  No lymphadenopathy.   Lung apices: Bronchitis with patchy opacities in the upper and visualized lower lobes. IMPRESSION:     No evidence of acute cervical soft tissue pathology. Findings concerning for bronchitis with infiltrates in the upper and lower lobes. Status post ORIF of the mandible and carotid endarterectomies with expected postsurgical changes.     Electronically signed by Luz Maria Wild MD on 05-18-24 at 0149    XR Chest 1 View    Result Date: 5/18/2024  Patient: VALERIA MUIR  Time Out: 01:21 Exam(s): XR CXR 1 VIEW EXAM:   XR Chest, 1 View CLINICAL HISTORY:    Reason for exam: cough, myalgias. TECHNIQUE:   Frontal view of the chest. COMPARISON: Comparison made to prior CT scan of the cervical soft tissues from May 18,  2024. FINDINGS:   Lungs: Moderate to heavy peribronchial thickening of the central and lower lobe bronchi with bilateral lower lobe patchy opacities.  No consolidation.   Pleural space:  Unremarkable.  No pneumothorax.   Heart:  Unremarkable.  No cardiomegaly.   Mediastinum:  Unremarkable.  Normal mediastinal contour.   Bones joints:  Unremarkable.  No acute fracture. IMPRESSION:     Findings concerning for bilateral lower lobe infiltrates.  No evidence of consolidation or pleural effusion.     Electronically signed by Luz Maria Wild MD on 05-18-24 at 0121     I ordered the above noted radiological studies. Reviewed by me and discussed with radiologist.  See dictation for official radiology interpretation.      PROCEDURES    Procedures      MEDICATIONS GIVEN IN ER    Medications   sodium chloride 0.9 % flush 10 mL (has no administration in time range)   sodium chloride 0.9 % flush 10 mL (has no administration in time range)   sodium chloride 0.9 % flush 10 mL (has no administration in time range)   sodium chloride 0.9 % infusion 40 mL (has no  administration in time range)   acetaminophen (TYLENOL) tablet 650 mg (has no administration in time range)     Or   acetaminophen (TYLENOL) 160 MG/5ML oral solution 650 mg (has no administration in time range)     Or   acetaminophen (TYLENOL) suppository 650 mg (has no administration in time range)   sennosides-docusate (PERICOLACE) 8.6-50 MG per tablet 2 tablet (has no administration in time range)     And   polyethylene glycol (MIRALAX) packet 17 g (has no administration in time range)     And   bisacodyl (DULCOLAX) EC tablet 5 mg (has no administration in time range)     And   bisacodyl (DULCOLAX) suppository 10 mg (has no administration in time range)   calcium carbonate (TUMS) chewable tablet 500 mg (200 mg elemental) (has no administration in time range)   sodium chloride 0.9 % infusion (has no administration in time range)   cefepime 2000 mg IVPB in 100 mL NS (MBP) (has no administration in time range)   sodium chloride 0.9 % bolus 1,000 mL (0 mL Intravenous Stopped 5/18/24 0008)   morphine injection 2 mg (2 mg Intravenous Given 5/17/24 2330)   ondansetron (ZOFRAN) injection 4 mg (4 mg Intravenous Given 5/17/24 2330)   cefepime 2000 mg IVPB in 100 mL NS (MBP) (0 mg Intravenous Stopped 5/18/24 0144)   iopamidol (ISOVUE-300) 61 % injection 100 mL (85 mL Intravenous Given 5/18/24 0023)         ORDERS PLACED DURING THIS VISIT:  Orders Placed This Encounter   Procedures    Respiratory Panel PCR w/COVID-19(SARS-CoV-2) SCARLETT/DWIGHT/EVELYN/PAD/COR/BRITT In-House, NP Swab in UTM/VTM, 2 HR TAT - Swab, Nasopharynx    Blood Culture - Blood,    Blood Culture - Blood,    XR Chest 1 View    CT Soft Tissue Neck With Contrast    CT Head Without Contrast    Comprehensive Metabolic Panel    Protime-INR    aPTT    Urinalysis With Microscopic If Indicated (No Culture) - Urine, Clean Catch    High Sensitivity Troponin T    Lactic Acid, Plasma    Procalcitonin    Sedimentation Rate    C-reactive Protein    Magnesium    CBC Auto Differential     High Sensitivity Troponin T 2Hr    Urinalysis, Microscopic Only - Urine, Clean Catch    Basic Metabolic Panel    CBC (No Diff)    Hemoglobin A1c    Diet: Regular/House; Fluid Consistency: Thin (IDDSI 0)    Monitor Blood Pressure    Continuous Pulse Oximetry    Vital Signs    Intake & Output    Weigh Patient    Oral Care    Saline Lock & Maintain IV Access    Place Sequential Compression Device    Maintain Sequential Compression Device    Code Status and Medical Interventions:    SAMPSON (on-call MD unless specified) Details    Inpatient Case Management  Consult    Inpatient Nutrition Consult    ECG 12 Lead Dyspnea    Insert Peripheral IV    Insert Peripheral IV    Inpatient Admission    CBC & Differential         PROGRESS, DATA ANALYSIS, CONSULTS, AND MEDICAL DECISION MAKING    All labs have been independently interpreted by me.  All radiology studies have been reviewed by me and discussed with radiologist dictating the report.   EKG's independently viewed and interpreted by me.  Discussion below represents my analysis of pertinent findings related to patient's condition, differential diagnosis, treatment plan and final disposition.    Differential diagnosis includes but is not limited to viral illness, sepsis, meningitis.    Clinical Scores:              ED Course as of 05/18/24 0523   Fri May 17, 2024   2322 EKG          EKG time: 2317  Rhythm/Rate: Sinus rhythm rate 94  P waves and IA: Normal  QRS, axis: Narrow regular  ST and T waves: Normal    Interpreted Contemporaneously by me, independently viewed [TJ]   2353 WBC: 4.69 [TJ]   2353 Hemoglobin(!): 12.0 [TJ]   2353 Platelets: 270 [TJ]   2353 Sed Rate(!): 97 [TJ]   2353 Lactate: 1.2 [TJ]   Sat May 18, 2024   0009 Procalcitonin(!): 0.39 [TJ]   0009 C-Reactive Protein(!): 7.07 [TJ]   0009 Sed Rate(!): 97 [TJ]   0009 Creatinine(!): 1.35 [TJ]      ED Course User Index  [TJ] Seb Nick MD               PPE: The patient wore a mask and I wore  an N95 mask throughout the entire patient encounter.       AS OF 05:23 EDT VITALS:    BP - 131/92  HR - 81  TEMP - 97.7 °F (36.5 °C) (Oral)  O2 SATS - 97%        DIAGNOSIS  Final diagnoses:   Pneumonia of both lower lobes due to infectious organism   Hypotension, unspecified hypotension type   Myalgia         DISPOSITION  ED Disposition       ED Disposition   Decision to Admit    Condition   --    Comment   Level of Care: Telemetry [5]   Diagnosis: Pneumonia [400770]   Admitting Physician: SANTY BOOGIE [[id number]]   Certification: I Certify That Inpatient Hospital Services Are Medically Necessary For Greater Than 2 Midnights                    Note Disclaimer: At [facility], we believe that sharing information builds trust and better relationships. You are receiving this note because you recently visited [facility]. It is possible you will see health information before a provider has talked with you about it. This kind of information can be easy to misunderstand. To help you fully understand what it means for your health, we urge you to discuss this note with your provider.         Seb Nick MD  05/18/24 [id number]

## 2024-05-18 NOTE — ED NOTES
Patient provided with warm blanket. Patient is aware of need for urine sample. Call light within reach.

## 2024-05-18 NOTE — H&P
Patient Name:  Jimmy Norman  YOB: 1955  MRN:  [id number]  Admit Date:  5/17/2024  Patient Care Team:  Provider, No Known as PCP - General      Subjective   History Present Illness     Chief Complaint   Patient presents with    Neck Pain    Generalized Body Aches       Mr. Norman is a 69 y.o. male with a history of CAD, prostate cancer that presents to [facility] complaining of jaw pain, body aches, fatigue for approximately 2 weeks.  Jaw pain has been ongoing since a previous fracture but worsened over the past 2 weeks.  He has decreased appetite and has lost about 25 pounds in 15 days.  Sore throat.  Occasional nonproductive cough.  Shortness of breath at night, waking up with trouble breathing.  Also has some chills and shivers, night sweats, waking up with a soaked bed.  He has been weak.  Nausea without vomiting.  Was worried his prostate cancer had come back.    He does not smoke or use drugs.  No alcohol.  He is a poor historian to his medical history, unsure of medications but thinks he has some kind of heart problem.      Review of Systems   Constitutional:  Positive for activity change, appetite change, chills, diaphoresis, fatigue and unexpected weight change.   HENT:  Negative for congestion and rhinorrhea.    Eyes:  Negative for visual disturbance.   Respiratory:  Positive for cough and shortness of breath. Negative for chest tightness.    Cardiovascular:  Negative for chest pain, palpitations and leg swelling.   Gastrointestinal:  Positive for nausea. Negative for abdominal pain, constipation, diarrhea and vomiting.   Genitourinary:  Negative for difficulty urinating and dysuria.   Musculoskeletal:  Positive for arthralgias and myalgias.   Neurological:  Positive for dizziness, weakness and light-headedness.        Personal History     Past Medical History:   Diagnosis Date    Arthritis     Cancer     Coronary artery disease      Past Surgical History:    Procedure Laterality Date    WOUND DEBRIDEMENT      pt states he had surgery on chest wall r/t infection     History reviewed. No pertinent family history.  Social History     Tobacco Use    Smoking status: Never     Passive exposure: Never    Smokeless tobacco: Never   Vaping Use    Vaping status: Never Used   Substance Use Topics    Alcohol use: Yes     Alcohol/week: 2.0 standard drinks of alcohol     Types: 2 Shots of liquor per week     Comment: drinks maybe once a month    Drug use: Not Currently     Comment: hx pain pill use     No current facility-administered medications on file prior to encounter.     No current outpatient medications on file prior to encounter.     No Known Allergies    Objective    Objective     Vital Signs  Temp:  [97.7 °F (36.5 °C)-97.9 °F (36.6 °C)] 97.7 °F (36.5 °C)  Heart Rate:  [] 80  Resp:  [18] 18  BP: ()/(58-92) 125/87  SpO2:  [92 %-98 %] 98 %  on   ;   Device (Oxygen Therapy): room air  Body mass index is 24.2 kg/m².    Physical Exam  Constitutional:       General: He is not in acute distress.     Appearance: He is ill-appearing. He is not toxic-appearing or diaphoretic.      Comments: Appears weak and fatigued.  Weak low-volume speech   HENT:      Head: Normocephalic and atraumatic.      Nose: Nose normal.      Mouth/Throat:      Mouth: Mucous membranes are dry.   Eyes:      Extraocular Movements: Extraocular movements intact.      Conjunctiva/sclera: Conjunctivae normal.      Pupils: Pupils are equal, round, and reactive to light.   Cardiovascular:      Rate and Rhythm: Normal rate and regular rhythm.      Pulses: Normal pulses.      Heart sounds: Normal heart sounds. No murmur heard.  Pulmonary:      Effort: Pulmonary effort is normal. No respiratory distress.      Comments: Breath sounds diminished at bases.  Respirations even, unlabored.  No distress.  On room air.  Wet nonproductive cough  Abdominal:      General: Bowel sounds are normal. There is no  distension.      Palpations: Abdomen is soft.      Tenderness: There is no abdominal tenderness.   Musculoskeletal:         General: No swelling or tenderness. Normal range of motion.   Skin:     General: Skin is warm and dry.      Coloration: Skin is not jaundiced.   Neurological:      General: No focal deficit present.      Mental Status: He is alert and oriented to person, place, and time.         Results Review:  I reviewed the patient's new clinical results.      Lab Results (last 24 hours)       Procedure Component Value Units Date/Time    CBC & Differential [[id number]]  (Abnormal) Collected: 05/17/24 2320    Specimen: Blood Updated: 05/17/24 2340    Narrative:      The following orders were created for panel order CBC & Differential.  Procedure                               Abnormality         Status                     ---------                               -----------         ------                     CBC Auto Differential[[id number]]        Abnormal            Final result                 Please view results for these tests on the individual orders.    Comprehensive Metabolic Panel [[id number]]  (Abnormal) Collected: 05/17/24 2320    Specimen: Blood Updated: 05/18/24 0001     Glucose 122 mg/dL      BUN 15 mg/dL      Creatinine 1.35 mg/dL      Sodium 133 mmol/L      Potassium 4.3 mmol/L      Chloride 98 mmol/L      CO2 21.0 mmol/L      Calcium 9.0 mg/dL      Total Protein 8.0 g/dL      Albumin 3.7 g/dL      ALT (SGPT) 52 U/L      AST (SGOT) 73 U/L      Alkaline Phosphatase 77 U/L      Total Bilirubin 1.1 mg/dL      Globulin 4.3 gm/dL      A/G Ratio 0.9 g/dL      BUN/Creatinine Ratio 11.1     Anion Gap 14.0 mmol/L      eGFR 56.5 mL/min/1.73     Narrative:      GFR Normal >60  Chronic Kidney Disease <60  Kidney Failure <15      Protime-INR [[id number]]  (Abnormal) Collected: 05/17/24 2320    Specimen: Blood Updated: 05/18/24 0008     Protime 14.6 Seconds      INR 1.12    aPTT [[id number]]  (Normal) Collected:  "05/17/24 2320    Specimen: Blood Updated: 05/18/24 0008     PTT 32.7 seconds     High Sensitivity Troponin T [[id number]]  (Normal) Collected: 05/17/24 2320    Specimen: Blood Updated: 05/18/24 0007     HS Troponin T 19 ng/L     Narrative:      High Sensitive Troponin T Reference Range:  <14.0 ng/L- Negative Female for AMI  <22.0 ng/L- Negative Male for AMI  >=14 - Abnormal Female indicating possible myocardial injury.  >=22 - Abnormal Male indicating possible myocardial injury.   Clinicians would have to utilize clinical acumen, EKG, Troponin, and serial changes to determine if it is an Acute Myocardial Infarction or myocardial injury due to an underlying chronic condition.         Lactic Acid, Plasma [[id number]]  (Normal) Collected: 05/17/24 2320    Specimen: Blood Updated: 05/17/24 2352     Lactate 1.2 mmol/L     Procalcitonin [[id number]]  (Abnormal) Collected: 05/17/24 2320    Specimen: Blood Updated: 05/18/24 0007     Procalcitonin 0.39 ng/mL     Narrative:      As a Marker for Sepsis (Non-Neonates):    1. <0.5 ng/mL represents a low risk of severe sepsis and/or septic shock.  2. >2 ng/mL represents a high risk of severe sepsis and/or septic shock.    As a Marker for Lower Respiratory Tract Infections that require antibiotic therapy:    PCT on Admission    Antibiotic Therapy       6-12 Hrs later    >0.5                Strongly Recommended  >0.25 - <0.5        Recommended   0.1 - 0.25          Discouraged              Remeasure/reassess PCT  <0.1                Strongly Discouraged     Remeasure/reassess PCT    As 28 day mortality risk marker: \"Change in Procalcitonin Result\" (>80% or <=80%) if Day 0 (or Day 1) and Day 4 values are available. Refer to http://www.[facility]-pct-calculator.com    Change in PCT <=80%  A decrease of PCT levels below or equal to 80% defines a positive change in PCT test result representing a higher risk for 28-day all-cause mortality of patients diagnosed with severe sepsis for septic " shock.    Change in PCT >80%  A decrease of PCT levels of more than 80% defines a negative change in PCT result representing a lower risk for 28-day all-cause mortality of patients diagnosed with severe sepsis or septic shock.       Sedimentation Rate [[id number]]  (Abnormal) Collected: 05/17/24 2320    Specimen: Blood Updated: 05/17/24 2350     Sed Rate 97 mm/hr     C-reactive Protein [[id number]]  (Abnormal) Collected: 05/17/24 2320    Specimen: Blood Updated: 05/18/24 0001     C-Reactive Protein 7.07 mg/dL     Magnesium [[id number]]  (Normal) Collected: 05/17/24 2320    Specimen: Blood Updated: 05/18/24 0001     Magnesium 2.2 mg/dL     CBC Auto Differential [[id number]]  (Abnormal) Collected: 05/17/24 2320    Specimen: Blood Updated: 05/17/24 2340     WBC 4.69 10*3/mm3      RBC 4.00 10*6/mm3      Hemoglobin 12.0 g/dL      Hematocrit 35.2 %      MCV 88.0 fL      MCH 30.0 pg      MCHC 34.1 g/dL      RDW 13.0 %      RDW-SD 41.3 fl      MPV 10.2 fL      Platelets 270 10*3/mm3      Neutrophil % 70.6 %      Lymphocyte % 16.6 %      Monocyte % 12.4 %      Eosinophil % 0.0 %      Basophil % 0.2 %      Immature Grans % 0.2 %      Neutrophils, Absolute 3.31 10*3/mm3      Lymphocytes, Absolute 0.78 10*3/mm3      Monocytes, Absolute 0.58 10*3/mm3      Eosinophils, Absolute 0.00 10*3/mm3      Basophils, Absolute 0.01 10*3/mm3      Immature Grans, Absolute 0.01 10*3/mm3      nRBC 0.0 /100 WBC     Respiratory Panel PCR w/COVID-19(SARS-CoV-2) SCARLETT/DWIGHT/EVELYN/PAD/COR/BRITT In-House, NP Swab in UTM/VTM, 2 HR TAT - Swab, Nasopharynx [[id number]]  (Normal) Collected: 05/17/24 2321    Specimen: Swab from Nasopharynx Updated: 05/18/24 0022     ADENOVIRUS, PCR Not Detected     Coronavirus 229E Not Detected     Coronavirus HKU1 Not Detected     Coronavirus NL63 Not Detected     Coronavirus OC43 Not Detected     COVID19 Not Detected     Human Metapneumovirus Not Detected     Human Rhinovirus/Enterovirus Not Detected     Influenza A PCR Not Detected      Influenza B PCR Not Detected     Parainfluenza Virus 1 Not Detected     Parainfluenza Virus 2 Not Detected     Parainfluenza Virus 3 Not Detected     Parainfluenza Virus 4 Not Detected     RSV, PCR Not Detected     Bordetella pertussis pcr Not Detected     Bordetella parapertussis PCR Not Detected     Chlamydophila pneumoniae PCR Not Detected     Mycoplasma pneumo by PCR Not Detected    Narrative:      In the setting of a positive respiratory panel with a viral infection PLUS a negative procalcitonin without other underlying concern for bacterial infection, consider observing off antibiotics or discontinuation of antibiotics and continue supportive care. If the respiratory panel is positive for atypical bacterial infection (Bordetella pertussis, Chlamydophila pneumoniae, or Mycoplasma pneumoniae), consider antibiotic de-escalation to target atypical bacterial infection.    Blood Culture - Blood, Arm, Right [[id number]] Collected: 05/17/24 2341    Specimen: Blood from Arm, Right Updated: 05/18/24 0012    Blood Culture - Blood, Arm, Left [[id number]] Collected: 05/17/24 2341    Specimen: Blood from Arm, Left Updated: 05/18/24 0012    High Sensitivity Troponin T 2Hr [[id number]]  (Abnormal) Collected: 05/18/24 0102    Specimen: Blood Updated: 05/18/24 0131     HS Troponin T 15 ng/L      Troponin T Delta -4 ng/L     Narrative:      High Sensitive Troponin T Reference Range:  <14.0 ng/L- Negative Female for AMI  <22.0 ng/L- Negative Male for AMI  >=14 - Abnormal Female indicating possible myocardial injury.  >=22 - Abnormal Male indicating possible myocardial injury.   Clinicians would have to utilize clinical acumen, EKG, Troponin, and serial changes to determine if it is an Acute Myocardial Infarction or myocardial injury due to an underlying chronic condition.         Urinalysis With Microscopic If Indicated (No Culture) - Urine, Clean Catch [[id number]]  (Abnormal) Collected: 05/18/24 0104    Specimen: Urine, Clean  Catch Updated: 05/18/24 0133     Color, UA Dark Yellow     Appearance, UA Clear     pH, UA 5.5     Specific Gravity, UA 1.014     Glucose, UA Negative     Ketones, UA Negative     Bilirubin, UA Negative     Blood, UA Negative     Protein,  mg/dL (2+)     Leuk Esterase, UA Negative     Nitrite, UA Negative     Urobilinogen, UA 1.0 E.U./dL    Urinalysis, Microscopic Only - Urine, Clean Catch [[id number]] Collected: 05/18/24 0104    Specimen: Urine, Clean Catch Updated: 05/18/24 0133     RBC, UA 0-2 /HPF      WBC, UA 0-2 /HPF      Bacteria, UA None Seen /HPF      Squamous Epithelial Cells, UA 0-2 /HPF      Hyaline Casts, UA 13-20 /LPF      Methodology Automated Microscopy    S. Pneumo Ag Urine or CSF - Urine, Urine, Clean Catch [[id number]]  (Normal) Collected: 05/18/24 0104    Specimen: Urine, Clean Catch Updated: 05/18/24 0849     Strep Pneumo Ag Negative    Legionella Antigen, Urine - Urine, Urine, Clean Catch [[id number]]  (Normal) Collected: 05/18/24 0104    Specimen: Urine, Clean Catch Updated: 05/18/24 0849     LEGIONELLA ANTIGEN, URINE Negative    CBC (No Diff) [[id number]]  (Abnormal) Collected: 05/18/24 0706    Specimen: Blood Updated: 05/18/24 0733     WBC 3.79 10*3/mm3      RBC 3.71 10*6/mm3      Hemoglobin 11.0 g/dL      Hematocrit 33.2 %      MCV 89.5 fL      MCH 29.6 pg      MCHC 33.1 g/dL      RDW 12.7 %      RDW-SD 41.1 fl      MPV 9.9 fL      Platelets 230 10*3/mm3     Hemoglobin A1c [[id number]]  (Normal) Collected: 05/18/24 0706    Specimen: Blood Updated: 05/18/24 0746     Hemoglobin A1C 5.20 %     Narrative:      Hemoglobin A1C Ranges:    Increased Risk for Diabetes  5.7% to 6.4%  Diabetes                     >= 6.5%  Diabetic Goal                < 7.0%    BNP [[id number]]  (Normal) Collected: 05/18/24 0706    Specimen: Blood Updated: 05/18/24 0826     proBNP <36.0 pg/mL     Narrative:      This assay is used as an aid in the diagnosis of individuals suspected of having heart failure. It can be  used as an aid in the diagnosis of acute decompensated heart failure (ADHF) in patients presenting with signs and symptoms of ADHF to the emergency department (ED). In addition, NT-proBNP of <300 pg/mL indicates ADHF is not likely.    Age Range Result Interpretation  NT-proBNP Concentration (pg/mL:      <50             Positive            >450                   Gray                 300-450                    Negative             <300    50-75           Positive            >900                  Gray                300-900                  Negative            <300      >75             Positive            >1800                  Gray                300-1800                  Negative            <300    Comprehensive Metabolic Panel [[id number]]  (Abnormal) Collected: 05/18/24 0706    Specimen: Blood Updated: 05/18/24 0826     Glucose 97 mg/dL      BUN 13 mg/dL      Creatinine 0.96 mg/dL      Sodium 133 mmol/L      Potassium 4.4 mmol/L      Comment: Slight hemolysis detected by analyzer. Result may be falsely elevated.        Chloride 102 mmol/L      CO2 17.7 mmol/L      Calcium 8.5 mg/dL      Total Protein 7.2 g/dL      Albumin 3.2 g/dL      ALT (SGPT) 46 U/L      AST (SGOT) 65 U/L      Alkaline Phosphatase 66 U/L      Total Bilirubin 1.0 mg/dL      Globulin 4.0 gm/dL      A/G Ratio 0.8 g/dL      BUN/Creatinine Ratio 13.5     Anion Gap 13.3 mmol/L      eGFR 85.0 mL/min/1.73     Narrative:      GFR Normal >60  Chronic Kidney Disease <60  Kidney Failure <15      Magnesium [[id number]]  (Normal) Collected: 05/18/24 0706    Specimen: Blood Updated: 05/18/24 0826     Magnesium 2.4 mg/dL     Phosphorus [[id number]]  (Normal) Collected: 05/18/24 0706    Specimen: Blood Updated: 05/18/24 0826     Phosphorus 3.2 mg/dL             Imaging Results (Last 24 Hours)       Procedure Component Value Units Date/Time    CT Head Without Contrast [[id number]] Collected: 05/18/24 0157     Updated: 05/18/24 0157    Narrative:        Patient:  TONI MUIRIOTT  Time Out: 01:57  Exam(s): CT HEAD Without Contrast     EXAM:    CT Head Without Intravenous Contrast    CLINICAL HISTORY:     Reason for exam: ha.    TECHNIQUE:    Axial computed tomography images of the head brain without intravenous   contrast.  CTDI is 55.7 mGy and DLP is 939.9 mGy-cm.  This CT exam was   performed according to the principle of ALARA (As Low As Reasonably   Achievable) by using one or more of the following dose reduction   techniques: automated exposure control, adjustment of the mA and or kV   according to patient size, and or use of iterative reconstruction   technique.    COMPARISON:    No relevant prior studies available.    FINDINGS:    Brain:  Unremarkable.  No hemorrhage.  Mild nonspecific white matter   changes.  No edema.    Ventricles:  Unremarkable.  No ventriculomegaly.    Bones joints:  Unremarkable.  No acute fracture.    Soft tissues:  Unremarkable.    Sinuses:  Unremarkable as visualized.  No acute sinusitis.    Mastoid air cells:  Unremarkable as visualized.  No mastoid effusion.    IMPRESSION:       No evidence of acute intracranial pathology.      Impression:          Electronically signed by Luz Maria Wild MD on 05-18-24 at 0157    CT Soft Tissue Neck With Contrast [[id number]] Collected: 05/18/24 0150     Updated: 05/18/24 0150    Narrative:        Patient: VALERIA MUIR  Time Out: 01:49  Exam(s): CT NECK With Contrast     EXAM:    CT Neck With Intravenous Contrast    CLINICAL HISTORY:     Reason for exam: right sided jaw swelling, pain worsening - hx of   frx'ed jaw 2 years ago.    TECHNIQUE:    Axial computed tomography images of the neck with intravenous contrast.    CTDI is 13.23 mGy and DLP is 412.9 mGy-cm.  This CT exam was performed   according to the principle of ALARA (As Low As Reasonably Achievable) by   using one or more of the following dose reduction techniques: automated   exposure control, adjustment of the mA and or kV according to patient    size, and or use of iterative reconstruction technique.    COMPARISON:    No relevant prior studies available.    FINDINGS:    Oropharynx:  Unremarkable.  No significant tonsillar enlargement.  No   peritonsillar abscess.    Hypopharynx:  Unremarkable.    Larynx:  Unremarkable.  Normal epiglottis.    Trachea:  Unremarkable.    Retropharyngeal space:  Unremarkable.    Submandibular parotid glands:  Unremarkable.  Glands are normal in size.      Thyroid: Tiny left thyroid nodule.  Hypoplastic left thyroid gland.  No   enlarged or calcified nodules.    Bones joints:  No acute fracture.  Status post ORIF of the mandible   bilaterally.    Soft tissues:  Unremarkable.    Vasculature:  No acute findings.  Finding is concerning for bilateral   carotid endarterectomies with surgical staples in place.    Lymph nodes:  Unremarkable.  No lymphadenopathy.    Lung apices: Bronchitis with patchy opacities in the upper and   visualized lower lobes.    IMPRESSION:       No evidence of acute cervical soft tissue pathology.    Findings concerning for bronchitis with infiltrates in the upper and   lower lobes.    Status post ORIF of the mandible and carotid endarterectomies with   expected postsurgical changes.      Impression:          Electronically signed by Luz Maria Wild MD on 05-18-24 at 0149    XR Chest 1 View [[id number]] Collected: 05/18/24 0121     Updated: 05/18/24 0121    Narrative:        Patient: VALERIA MUIR  Time Out: 01:21  Exam(s): XR CXR 1 VIEW     EXAM:    XR Chest, 1 View    CLINICAL HISTORY:     Reason for exam: cough, myalgias.    TECHNIQUE:    Frontal view of the chest.    COMPARISON:  Comparison made to prior CT scan of the cervical soft tissues from May 18,   2024.    FINDINGS:    Lungs: Moderate to heavy peribronchial thickening of the central and   lower lobe bronchi with bilateral lower lobe patchy opacities.  No   consolidation.    Pleural space:  Unremarkable.  No pneumothorax.    Heart:   Unremarkable.  No cardiomegaly.    Mediastinum:  Unremarkable.  Normal mediastinal contour.    Bones joints:  Unremarkable.  No acute fracture.    IMPRESSION:       Findings concerning for bilateral lower lobe infiltrates.  No evidence of   consolidation or pleural effusion.      Impression:          Electronically signed by Luz Maria Wild MD on 05-18-24 at 0121                ECG 12 Lead Dyspnea   Preliminary Result   HEART RATE= 94  bpm   RR Interval= 638  ms   NE Interval= 154  ms   P Horizontal Axis= 10  deg   P Front Axis= 38  deg   QRSD Interval= 88  ms   QT Interval= 351  ms   QTcB= 439  ms   QRS Axis= -76  deg   T Wave Axis= 0  deg   - ABNORMAL ECG -   Sinus rhythm   Inferior infarct, old   Electronically Signed By:    Date and Time of Study: 2024-05-17 23:17:16      Telemetry Scan   Final Result           Assessment/Plan     Active Hospital Problems    Diagnosis  POA    **Pneumonia [J18.9]  Yes      Resolved Hospital Problems   No resolved problems to display.     Bilateral lower lobe pneumonia  Normal lactate and WBC count.  Procalcitonin is elevated.  Lung sounds diminished at bases.  He does appear sick.  Strep and Legionella negative.  RVP negative.  Check sputum culture, MRSA screen.  Start incentive spirometry.  Mucinex.  Cepacol lozenges.  Continue cefepime 2 g IV every 8 hours.  Follow blood cultures.  Normal saline at 75 mL an hour.  Consult speech therapy.  Consult PT/OT.  Acetaminophen for discomfort.  Weight loss  25 pound weight loss in 15 days obviously concerning.  He has a history of prostate cancer.  Consult nutrition.   Jaw pain  CT of the head is negative acute CT of neck noting previous ORIF of mandible   Coronary disease  History of endarterectomies noted on CT of the neck.  Unsure of his medication list.  Will ask nursing to contact family or his outpatient pharmacy to obtain list.  Hx prostate cancer s/p surgical and radiation tx  Patient worried his cancer has come back. Check  PSA    I discussed the patient's findings and my recommendations with patient.    VTE Prophylaxis - SCDs.  Code Status - Full code.       MEÑO Leiva  [facility]  05/18/24  10:12 EDT

## 2024-05-18 NOTE — PLAN OF CARE
Problem: Adult Inpatient Plan of Care  Goal: Plan of Care Review  Outcome: Ongoing, Progressing   Goal Outcome Evaluation:                 Bedside swallow evaluation performed this date. Pt seen at lunch time and complained that the meatloaf was hard and difficult to eat.diffixcult to eat. Pt has a hx of previous ORIF of Mandible. Pt exhibited decreased range oif motion o the right Decreased rotary chew also noted on the right. No difficulty with mechanical soft or puree. SLP recommends a soft to chew diet with thin liquids.    Oral care before/after meal. Pt upright at 90 degrees for all meals plus 30 minutes after. VFSS to r/o silent aspiration.

## 2024-05-18 NOTE — PLAN OF CARE
Goal Outcome Evaluation:              Outcome Evaluation: pt c/o jaw pain from old injury. red warm area on outer right jaw, area of red ?cellulitis on lower abd. pt states he's been week and with aches for several weeks. states he has lost 20+ pounds. orders noted. pt states he will have niece bring med list, he doesnt know his meds. cont to laura

## 2024-05-18 NOTE — ED TRIAGE NOTES
"Pt to ED via PV w/ daughter.   Pt appears unwell in triage. Pt is diaphoretic and was placed in wheel chair.   Pt states he has \"neck pain and body aches for 1 week.\"  "

## 2024-05-18 NOTE — THERAPY EVALUATION
Acute Care - Speech Language Pathology   Swallow Initial Evaluation [facility]     Patient Name: Jimmy Norman  : 1955  MRN: [id number]  Today's Date: 2024               Admit Date: 2024    Visit Dx:     ICD-10-CM ICD-9-CM   1. Pneumonia of both lower lobes due to infectious organism  J18.9 486   2. Hypotension, unspecified hypotension type  I95.9 458.9   3. Myalgia  M79.10 729.1     Patient Active Problem List   Diagnosis    Pneumonia     Past Medical History:   Diagnosis Date    Arthritis     Cancer     Coronary artery disease      Past Surgical History:   Procedure Laterality Date    WOUND DEBRIDEMENT      pt states he had surgery on chest wall r/t infection       SLP Recommendation and Plan     SLP Diet Recommendation: soft to chew textures, thin liquids (24 1300)  Recommended Precautions and Strategies: upright posture during/after eating, small bites of food and sips of liquid (24 1300)  SLP Rec. for Method of Medication Administration: meds crushed, with puree (24 1300)     Monitor for Signs of Aspiration: yes, notify SLP if any concerns (24 1300)  Recommended Diagnostics: VFSS (MBS) (24 1300)           Therapy Frequency (Swallow): PRN (24 1300)  Predicted Duration Therapy Intervention (Days): until discharge (24 1300)  Oral Care Recommendations: Oral Care BID/PRN (24 1300)                                               SWALLOW EVALUATION (Last 72 Hours)       SLP Adult Swallow Evaluation       Row Name 24                   Rehab Evaluation    Document Type evaluation  -LS        Patient Effort good  -LS           General Information    Patient Profile Reviewed yes  -LS        Pertinent History Of Current Problem Pt admitted with pneumonia.  -LS        Current Method of Nutrition soft to chew textures  RN changed just before eval.  -LS        Precautions/Limitations, Vision WFL;for purposes of eval  -LS         Precautions/Limitations, Hearing WFL;for purposes of eval  -LS        Prior Level of Function-Communication WFL  -LS        Prior Level of Function-Swallowing safe, efficient swallowing in all situations  -LS        Plans/Goals Discussed with patient and family  -LS        Barriers to Rehab medically complex  -LS        Patient's Goals for Discharge take care of myself at home  -LS           Oral Motor Structure and Function    Dentition Assessment natural, present and adequate  -LS        Secretion Management WNL/WFL  -LS           General Eating/Swallowing Observations    Eating/Swallowing Skills self-fed  -LS        Positioning During Eating upright 90 degree;upright in bed  -LS        Utensils Used spoon;cup  -LS        Consistencies Trialed regular textures;soft to chew textures;thin liquids  -LS           Respiratory    Respiratory Status room air  -LS           Clinical Swallow Eval    Oral Prep Phase impaired  -LS        Oral Transit WFL  -LS        Oral Residue WFL  -LS        Pharyngeal Phase no overt signs/symptoms of pharyngeal impairment  -LS        Clinical Swallow Evaluation Summary Bedside swallow evaluation performed this date. Pt seen at lunch time and complained that the meatloaf was  hard and difficult to eat.diffixcult to eat. Pt has a hx of previous ORIF of Mand[place]. Pt exhibited decreased range oif motion o the right Decreased rotary chew also noted on the right.  No difficulty with mechanical soft  or puree.  SLP recommends a soft to chew diet with thin liquids. Oral care before/after meal.  Pt upright at 90 degrees for all meals plus 30 minutes after. VFSS to r/o silent aspiration.  -LS           Oral Prep Concerns    Oral Prep Concerns inefficient mastication;poor rotary chew  -LS        Inefficient Mastication regular consistencies  -LS        Poor Rotary Chew regular consistencies  -LS           Recommendations    Therapy Frequency (Swallow) PRN  -LS        Predicted Duration Therapy  Intervention (Days) until discharge  -LS        SLP Diet Recommendation soft to chew textures;thin liquids  -LS        Recommended Diagnostics VFSS (MBS)  -LS        Recommended Precautions and Strategies upright posture during/after eating;small bites of food and sips of liquid  -LS        Oral Care Recommendations Oral Care BID/PRN  -LS        SLP Rec. for Method of Medication Administration meds crushed;with puree  -LS        Monitor for Signs of Aspiration yes;notify SLP if any concerns  -LS                  User Key  (r) = Recorded By, (t) = Taken By, (c) = Cosigned By      Initials Name Effective Dates    LS Rin Santamaria SLP 01/05/24 -                     EDUCATION  The patient has been educated in the following areas:   Dysphagia (Swallowing Impairment).                Time Calculation:       Therapy Charges for Today       Code Description Service Date Service Provider Modifiers Qty    [id number]  ST EVAL ORAL PHARYNG SWALLOW 5 5/18/2024 Rin Santamaria SLP GN 1                 EMILIE Peoples  5/18/2024

## 2024-05-18 NOTE — PLAN OF CARE
Goal Outcome Evaluation:  Plan of Care Reviewed With: patient        Progress: no change  Outcome Evaluation: Pt tolerated antibiotics today and ivf's. Diet changed to soft to chew and will monitor. Aisha just sent the medication list from home and will be inserted into pt's record now.

## 2024-05-19 PROBLEM — E43 SEVERE MALNUTRITION: Status: ACTIVE | Noted: 2024-01-01

## 2024-05-19 LAB
ALBUMIN SERPL-MCNC: 3.4 G/DL (ref 3.5–5.2)
ALBUMIN/GLOB SERPL: 0.9 G/DL
ALP SERPL-CCNC: 64 U/L (ref 39–117)
ALT SERPL W P-5'-P-CCNC: 45 U/L (ref 1–41)
ANION GAP SERPL CALCULATED.3IONS-SCNC: 11 MMOL/L (ref 5–15)
AST SERPL-CCNC: 64 U/L (ref 1–40)
BILIRUB SERPL-MCNC: 0.7 MG/DL (ref 0–1.2)
BUN SERPL-MCNC: 12 MG/DL (ref 8–23)
BUN/CREAT SERPL: 12.2 (ref 7–25)
CALCIUM SPEC-SCNC: 8.3 MG/DL (ref 8.6–10.5)
CHLORIDE SERPL-SCNC: 103 MMOL/L (ref 98–107)
CO2 SERPL-SCNC: 22 MMOL/L (ref 22–29)
CREAT SERPL-MCNC: 0.98 MG/DL (ref 0.76–1.27)
DEPRECATED RDW RBC AUTO: 41.2 FL (ref 37–54)
EGFRCR SERPLBLD CKD-EPI 2021: 83 ML/MIN/1.73
ERYTHROCYTE [DISTWIDTH] IN BLOOD BY AUTOMATED COUNT: 12.9 % (ref 12.3–15.4)
FERRITIN SERPL-MCNC: 5954 NG/ML (ref 30–400)
GLOBULIN UR ELPH-MCNC: 3.9 GM/DL
GLUCOSE SERPL-MCNC: 102 MG/DL (ref 65–99)
HAV IGM SERPL QL IA: NORMAL
HBV CORE IGM SERPL QL IA: NORMAL
HBV SURFACE AG SERPL QL IA: NORMAL
HCT VFR BLD AUTO: 30.7 % (ref 37.5–51)
HCT VFR BLD AUTO: 30.8 % (ref 37.5–51)
HCV AB SER QL: NORMAL
HGB BLD-MCNC: 10.3 G/DL (ref 13–17.7)
HGB BLD-MCNC: 10.3 G/DL (ref 13–17.7)
HIV 1+2 AB+HIV1 P24 AG SERPL QL IA: NORMAL
IRON 24H UR-MRATE: 30 MCG/DL (ref 59–158)
IRON SATN MFR SERPL: 17 % (ref 20–50)
MAGNESIUM SERPL-MCNC: 2 MG/DL (ref 1.6–2.4)
MCH RBC QN AUTO: 29.7 PG (ref 26.6–33)
MCHC RBC AUTO-ENTMCNC: 33.6 G/DL (ref 31.5–35.7)
MCV RBC AUTO: 88.5 FL (ref 79–97)
PHOSPHATE SERPL-MCNC: 2.1 MG/DL (ref 2.5–4.5)
PLATELET # BLD AUTO: 240 10*3/MM3 (ref 140–450)
PMV BLD AUTO: 10 FL (ref 6–12)
POTASSIUM SERPL-SCNC: 4.2 MMOL/L (ref 3.5–5.2)
PROT SERPL-MCNC: 7.3 G/DL (ref 6–8.5)
RBC # BLD AUTO: 3.47 10*6/MM3 (ref 4.14–5.8)
SODIUM SERPL-SCNC: 136 MMOL/L (ref 136–145)
TIBC SERPL-MCNC: 176 MCG/DL (ref 298–536)
TRANSFERRIN SERPL-MCNC: 118 MG/DL (ref 200–360)
VIT B12 BLD-MCNC: 642 PG/ML (ref 211–946)
WBC NRBC COR # BLD AUTO: 4.71 10*3/MM3 (ref 3.4–10.8)

## 2024-05-19 PROCEDURE — 80074 ACUTE HEPATITIS PANEL: CPT | Performed by: STUDENT IN AN ORGANIZED HEALTH CARE EDUCATION/TRAINING PROGRAM

## 2024-05-19 PROCEDURE — 84100 ASSAY OF PHOSPHORUS: CPT | Performed by: STUDENT IN AN ORGANIZED HEALTH CARE EDUCATION/TRAINING PROGRAM

## 2024-05-19 PROCEDURE — 82607 VITAMIN B-12: CPT | Performed by: NURSE PRACTITIONER

## 2024-05-19 PROCEDURE — 83540 ASSAY OF IRON: CPT | Performed by: STUDENT IN AN ORGANIZED HEALTH CARE EDUCATION/TRAINING PROGRAM

## 2024-05-19 PROCEDURE — 25010000002 CEFTRIAXONE PER 250 MG: Performed by: STUDENT IN AN ORGANIZED HEALTH CARE EDUCATION/TRAINING PROGRAM

## 2024-05-19 PROCEDURE — 97165 OT EVAL LOW COMPLEX 30 MIN: CPT

## 2024-05-19 PROCEDURE — 83735 ASSAY OF MAGNESIUM: CPT | Performed by: STUDENT IN AN ORGANIZED HEALTH CARE EDUCATION/TRAINING PROGRAM

## 2024-05-19 PROCEDURE — 82728 ASSAY OF FERRITIN: CPT | Performed by: STUDENT IN AN ORGANIZED HEALTH CARE EDUCATION/TRAINING PROGRAM

## 2024-05-19 PROCEDURE — 85027 COMPLETE CBC AUTOMATED: CPT | Performed by: NURSE PRACTITIONER

## 2024-05-19 PROCEDURE — 85014 HEMATOCRIT: CPT | Performed by: STUDENT IN AN ORGANIZED HEALTH CARE EDUCATION/TRAINING PROGRAM

## 2024-05-19 PROCEDURE — 25010000002 CEFEPIME PER 500 MG: Performed by: NURSE PRACTITIONER

## 2024-05-19 PROCEDURE — 84466 ASSAY OF TRANSFERRIN: CPT | Performed by: STUDENT IN AN ORGANIZED HEALTH CARE EDUCATION/TRAINING PROGRAM

## 2024-05-19 PROCEDURE — 85018 HEMOGLOBIN: CPT | Performed by: STUDENT IN AN ORGANIZED HEALTH CARE EDUCATION/TRAINING PROGRAM

## 2024-05-19 PROCEDURE — 96366 THER/PROPH/DIAG IV INF ADDON: CPT

## 2024-05-19 PROCEDURE — G0432 EIA HIV-1/HIV-2 SCREEN: HCPCS | Performed by: STUDENT IN AN ORGANIZED HEALTH CARE EDUCATION/TRAINING PROGRAM

## 2024-05-19 PROCEDURE — G0378 HOSPITAL OBSERVATION PER HR: HCPCS

## 2024-05-19 PROCEDURE — 80053 COMPREHEN METABOLIC PANEL: CPT | Performed by: STUDENT IN AN ORGANIZED HEALTH CARE EDUCATION/TRAINING PROGRAM

## 2024-05-19 RX ORDER — L.ACID,PARA/B.BIFIDUM/S.THERM 8B CELL
1 CAPSULE ORAL DAILY
Status: DISCONTINUED | OUTPATIENT
Start: 2024-05-19 | End: 2024-05-21 | Stop reason: HOSPADM

## 2024-05-19 RX ADMIN — Medication 10 ML: at 20:51

## 2024-05-19 RX ADMIN — DULOXETINE HYDROCHLORIDE 60 MG: 60 CAPSULE, DELAYED RELEASE ORAL at 20:51

## 2024-05-19 RX ADMIN — BUSPIRONE HYDROCHLORIDE 15 MG: 15 TABLET ORAL at 20:48

## 2024-05-19 RX ADMIN — CEFEPIME 2000 MG: 2 INJECTION, POWDER, FOR SOLUTION INTRAVENOUS at 00:30

## 2024-05-19 RX ADMIN — DIBASIC SODIUM PHOSPHATE, MONOBASIC POTASSIUM PHOSPHATE AND MONOBASIC SODIUM PHOSPHATE 2 TABLET: 852; 155; 130 TABLET ORAL at 20:47

## 2024-05-19 RX ADMIN — Medication 10 ML: at 10:45

## 2024-05-19 RX ADMIN — GUAIFENESIN 1200 MG: 600 TABLET, EXTENDED RELEASE ORAL at 10:45

## 2024-05-19 RX ADMIN — HYDROCODONE BITARTRATE AND ACETAMINOPHEN 2 TABLET: 5; 325 TABLET ORAL at 10:45

## 2024-05-19 RX ADMIN — GUAIFENESIN 1200 MG: 600 TABLET, EXTENDED RELEASE ORAL at 20:47

## 2024-05-19 RX ADMIN — CEFEPIME 2000 MG: 2 INJECTION, POWDER, FOR SOLUTION INTRAVENOUS at 10:44

## 2024-05-19 RX ADMIN — BUSPIRONE HYDROCHLORIDE 15 MG: 15 TABLET ORAL at 10:45

## 2024-05-19 RX ADMIN — HYDROXYZINE PAMOATE 50 MG: 50 CAPSULE ORAL at 20:48

## 2024-05-19 RX ADMIN — Medication 1 CAPSULE: at 20:47

## 2024-05-19 RX ADMIN — GABAPENTIN 300 MG: 300 CAPSULE ORAL at 20:48

## 2024-05-19 RX ADMIN — DIBASIC SODIUM PHOSPHATE, MONOBASIC POTASSIUM PHOSPHATE AND MONOBASIC SODIUM PHOSPHATE 2 TABLET: 852; 155; 130 TABLET ORAL at 10:45

## 2024-05-19 RX ADMIN — GABAPENTIN 300 MG: 300 CAPSULE ORAL at 12:41

## 2024-05-19 RX ADMIN — CEFTRIAXONE SODIUM 1000 MG: 1 INJECTION, POWDER, FOR SOLUTION INTRAMUSCULAR; INTRAVENOUS at 17:29

## 2024-05-19 RX ADMIN — ATORVASTATIN CALCIUM 20 MG: 20 TABLET, FILM COATED ORAL at 20:47

## 2024-05-19 NOTE — THERAPY DISCHARGE NOTE
Acute Care - Occupational Therapy Discharge  [facility]    Patient Name: Jimmy Norman  : 1955    MRN: [id number]                              Today's Date: 2024       Admit Date: 2024    Visit Dx:     ICD-10-CM ICD-9-CM   1. Pneumonia of both lower lobes due to infectious organism  J18.9 486   2. Hypotension, unspecified hypotension type  I95.9 458.9   3. Myalgia  M79.10 729.1     Patient Active Problem List   Diagnosis    Pneumonia    Jaw pain    Transaminitis    History of prostate cancer    Severe malnutrition     Past Medical History:   Diagnosis Date    Arthritis     Cancer     Coronary artery disease      Past Surgical History:   Procedure Laterality Date    WOUND DEBRIDEMENT      pt states he had surgery on chest wall r/t infection      General Information       Row Name 24 1327          OT Time and Intention    Document Type discharge evaluation/summary  -RD     Mode of Treatment occupational therapy  -RD       Row Name 24 1327          General Information    Patient Profile Reviewed yes  -RD     Prior Level of Function independent:;ADL's  -RD     Existing Precautions/Restrictions no known precautions/restrictions  -RD       Row Name 24 1327          Living Environment    People in Home significant other  -RD       Row Name 24 1327          Cognition    Orientation Status (Cognition) oriented x 4  -RD               User Key  (r) = Recorded By, (t) = Taken By, (c) = Cosigned By      Initials Name Provider Type    RD Malena Quezada, OT Occupational Therapist                   Mobility/ADL's       Row Name 24 1328          Bed Mobility    Bed Mobility supine-sit;sit-supine  -RD     Supine-Sit [place] (Bed Mobility) modified independence  -RD     Sit-Supine [place] (Bed Mobility) modified independence  -RD     Assistive Device (Bed Mobility) bed rails  -RD       Row Name 24 1328          Transfers    Transfers sit-stand transfer  -RD        Row Name 05/19/24 1328          Sit-Stand Transfer    Sit-Stand [place] (Transfers) modified independence  -RD       Row Name 05/19/24 1328          Functional Mobility    Functional Mobility- Ind. Level conditional independence  -RD     Functional Mobility- Comment pt able to ambulate in room w/ mod I- no unsteadiness noted  -RD       Row Name 05/19/24 1328          Activities of Daily Living    BADL Assessment/Intervention lower body dressing;grooming  -RD       Row Name 05/19/24 1328          Lower Body Dressing Assessment/Training    [place] Level (Lower Body Dressing) lower body dressing skills;doff;don;socks;independent  -RD     Position (Lower Body Dressing) edge of bed sitting  -RD       Row Name 05/19/24 1328          Grooming Assessment/Training    [place] Level (Grooming) grooming skills;independent  -RD     Position (Grooming) edge of bed sitting  -RD               User Key  (r) = Recorded By, (t) = Taken By, (c) = Cosigned By      Initials Name Provider Type    Malena Marcelino OT Occupational Therapist                   Obj/Interventions       Row Name 05/19/24 1329          Sensory Assessment (Somatosensory)    Sensory Assessment (Somatosensory) UE sensation intact  -RD       [facility] Name 05/19/24 1329          Range of Motion Comprehensive    General Range of Motion bilateral upper extremity ROM WFL  -RD       [facility] Name 05/19/24 1329          Strength Comprehensive (MMT)    General Manual Muscle Testing (MMT) Assessment no strength deficits identified  -RD       [facility] Name 05/19/24 1329          Balance    Balance Assessment sitting static balance;standing static balance  -RD     Static Sitting Balance independent  -RD     Position, Sitting Balance unsupported;sitting edge of bed  -RD     Static Standing Balance modified independence  -RD     Position/Device Used, Standing Balance unsupported  -RD               User Key  (r) = Recorded By, (t) = Taken By, (c) = Cosigned By       Initials Name Provider Type    Malena Marcelino, OT Occupational Therapist                   Goals/Plan       Row Name 05/19/24 1320          Problem Specific Goal 1 (OT)    Problem Specific Goal 1 (OT) Pt educ on safety techniques during ADLs for increased safety at home.  -RD     Time Frame (Problem Specific Goal 1, OT) short term goal (STG);1 day  -RD     Progress/Outcome (Problem Specific Goal 1, OT) goal met  -RD               User Key  (r) = Recorded By, (t) = Taken By, (c) = Cosigned By      Initials Name Provider Type    Malena Marcelino, OT Occupational Therapist                   Clinical Impression       Row Name 05/19/24 1331          Pain Assessment    Pre/Posttreatment Pain Comment pt c/o headache but does not rate it; RN present to administer meds to help  -RD       Row Name 05/19/24 1333          Plan of Care Review    Plan of Care Reviewed With patient  -RD     Progress improving  -RD     Outcome Evaluation Pt is a 69 year old male admitted w/ c/o jaw pain, body aches, fatigue for past 2 weeks. Pt presents to OT eval doing pretty well. Pt able to complete bed mobility and transfers independently. Pt able to complete LB dressing and grooming w/ Indep and performs functional ambulation in room independently w/ no unsteadiness noted. OT educ on safety precautions during ADLs for increased safety at home. No further skilled OT services warranted as pt appears to be at his baseline. OT to sign off. Please reconult if change in functional status.  -RD       Row Name 05/19/24 1336          Therapy Assessment/Plan (OT)    Criteria for Skilled Therapeutic Interventions Met (OT) no problems identified which require skilled intervention  -RD       Row Name 05/19/24 1332          Therapy Plan Review/Discharge Plan (OT)    Anticipated Discharge Disposition (OT) home  -RD       Row Name 05/19/24 1332          Vital Signs    O2 Delivery Pre Treatment room air  -RD     O2 Delivery Intra Treatment room  air  -RD     O2 Delivery Post Treatment room air  -RD       Row Name 05/19/24 1332          Positioning and Restraints    Pre-Treatment Position in bed  -RD     Post Treatment Position bed  -RD     In Bed fowlers;call light within reach;encouraged to call for assist;with nsg  no alarm on upon OT entry; RN present in room when OT left  -RD               User Key  (r) = Recorded By, (t) = Taken By, (c) = Cosigned By      Initials Name Provider Type    Malena Marcelino OT Occupational Therapist                   Outcome Measures       Row Name 05/19/24 1330          How much help from another is currently needed...    Putting on and taking off regular lower body clothing? 4  -RD     Bathing (including washing, rinsing, and drying) 4  -RD     Toileting (which includes using toilet bed pan or urinal) 4  -RD     Putting on and taking off regular upper body clothing 4  -RD     Taking care of personal grooming (such as brushing teeth) 4  -RD     Eating meals 4  -RD     AM-PAC 6 Clicks Score (OT) 24  -RD       Row Name 05/19/24 1330          Functional Assessment    Outcome Measure Options AM-PAC 6 Clicks Daily Activity (OT)  -RD               User Key  (r) = Recorded By, (t) = Taken By, (c) = Cosigned By      Initials Name Provider Type    Malena Marcelino OT Occupational Therapist                  Occupational Therapy Education       Title: PT OT SLP Therapies (Resolved)       Topic: Occupational Therapy (Resolved)       Point: ADL training (Resolved)       Description:   Instruct learner(s) on proper safety adaptation and remediation techniques during self care or transfers.   Instruct in proper use of assistive devices.                  Learning Progress Summary             Patient Acceptance, E, VU by CHICO at 5/19/2024 1330    Comment: OT educ on OT role in therapeutic process and pt's POC. OT also educ on safety techniques during ADLs for increased safety at home.                         Point: Precautions  (Resolved)       Description:   Instruct learner(s) on prescribed precautions during self-care and functional transfers.                  Learning Progress Summary             Patient Acceptance, E, VU by RD at 5/19/2024 1330    Comment: OT educ on OT role in therapeutic process and pt's POC. OT also educ on safety techniques during ADLs for increased safety at home.                         Point: Body mechanics (Resolved)       Description:   Instruct learner(s) on proper positioning and spine alignment during self-care, functional mobility activities and/or exercises.                  Learning Progress Summary             Patient Acceptance, E, VU by RD at 5/19/2024 1330    Comment: OT educ on OT role in therapeutic process and pt's POC. OT also educ on safety techniques during ADLs for increased safety at home.                                         User Key       Initials Effective Dates Name Provider Type Discipline    [facility] 06/16/21 -  Malena Quezada, OT Occupational Therapist OT                  OT Recommendation and Plan     Plan of Care Review  Plan of Care Reviewed With: patient  Progress: improving  Outcome Evaluation: Pt is a 69 year old male admitted w/ c/o jaw pain, body aches, fatigue for past 2 weeks. Pt presents to OT eval doing pretty well. Pt able to complete bed mobility and transfers independently. Pt able to complete LB dressing and grooming w/ Indep and performs functional ambulation in room independently w/ no unsteadiness noted. OT educ on safety precautions during ADLs for increased safety at home. No further skilled OT services warranted as pt appears to be at his baseline. OT to sign off. Please reconult if change in functional status.  Plan of Care Reviewed With: patient  Outcome Evaluation: Pt is a 69 year old male admitted w/ c/o jaw pain, body aches, fatigue for past 2 weeks. Pt presents to OT eval doing pretty well. Pt able to complete bed mobility and transfers independently. Pt  able to complete LB dressing and grooming w/ Indep and performs functional ambulation in room independently w/ no unsteadiness noted. OT educ on safety precautions during ADLs for increased safety at home. No further skilled OT services warranted as pt appears to be at his baseline. OT to sign off. Please reconult if change in functional status.     Time Calculation:   Evaluation Complexity (OT)  Review Occupational Profile/Medical/Therapy History Complexity: brief/low complexity  Assessment, Occupational Performance/Identification of Deficit Complexity: 1-3 performance deficits  Clinical Decision Making Complexity (OT): problem focused assessment/low complexity  Overall Complexity of Evaluation (OT): low complexity     Time Calculation- OT       Row Name 05/19/24 1337             Time Calculation- OT    OT Start Time 1036  -RD      OT Stop Time 1045  -RD      OT Time Calculation (min) 9 min  -RD      OT Received On 05/19/24  -RD         Untimed Charges    OT Eval/Re-eval Minutes 9  -RD         Total Minutes    Untimed Charges Total Minutes 9  -RD       Total Minutes 9  -RD                User Key  (r) = Recorded By, (t) = Taken By, (c) = Cosigned By      Initials Name Provider Type    Malena Marcelino OT Occupational Therapist                  Therapy Charges for Today       Code Description Service Date Service Provider Modifiers Qty    38299557154 HC OT EVAL LOW COMPLEXITY 2 5/19/2024 Malena Quezada OT GO 1               OT Discharge Summary  Anticipated Discharge Disposition (OT): home    Malena Quezada OT  5/19/2024

## 2024-05-19 NOTE — PLAN OF CARE
Goal Outcome Evaluation:              Outcome Evaluation: pt states he still isn't feeling well. up to BR and states he felt dizzy and lightheaded. assisted back to bed. ivf's as ordered. cont to monitor

## 2024-05-19 NOTE — CONSULTS
"Nutrition Services    Patient Name:  Jimmy Norman  YOB: 1955  MRN: [id number]  Admit Date:  5/17/2024    Assessment Date:  05/19/24    Summary: APRN consult for unintentional wt loss    Pt is a 69 y.o. male adm for PNA. H/o CAD, prostate cancer. Nutrition assessment completed. Pt has been experiencing weakness/fatigue, jaw pain, decreased appetite, nausea. Reportedly lost 25 lbs in 15 days. No wt hx available per EMR. Pt endorsed he had one large episode of diarrhea. Pt is hopeful his appetite will improve soon. Pt was drinking a Boost at time of visit and was agreeable to oral nutrition supplements. Pt meets ASPEN/AND criteria for acute malnutrition based on wt loss and energy intake.   Labs: Glu 102, Phos 2.1, ALT 45  Meds: atorvastatin, buspar, cefepime, cymbalta, gabapentin, mucinex, phosphorus    Pt meets ASPEN/AND criteria for nutrition dx of severe malnutrition of acute disease r/t energy intake and wt loss.    REC:  Continue soft to chew diet with whole meats   Boost Plus strawberry BID for additional calories/protein and to support PO intake  Will continue to monitor PO/oral nutrition supplement intake and wt   Monitor and replace electrolytes PRN    RD to follow.    CLINICAL NUTRITION ASSESSMENT      Reason for Assessment Nurse or Nurse Practitioner Consult, Unintentional Weight Loss     Diagnosis/Problem   PNA   Medical/Surgical History Past Medical History:   Diagnosis Date    Arthritis     Cancer     Coronary artery disease        Past Surgical History:   Procedure Laterality Date    WOUND DEBRIDEMENT      pt states he had surgery on chest wall r/t infection        Anthropometrics        Current Height  Current Weight  BMI kg/m2 Height: 188 cm (74\")  Weight: 85.5 kg (188 lb 8 oz) (05/18/24 0428)  Body mass index is 24.2 kg/m².   Adjusted BMI (if applicable)    BMI Category Normal/Healthy (18.4 - 24.9)   Ideal Body Weight (IBW) 82.2 kg    Usual Body Weight (UBW) FRANCA   Weight Trend " Unknown but reportedly 25 lbs in past 15 days    Weight History Wt Readings from Last 30 Encounters:   05/18/24 0428 85.5 kg (188 lb 8 oz)   05/17/24 2250 86.2 kg (190 lb)        Estimated Requirements         Weight used  85.5 kg     Calories  1621-9067 (25-30 kcal/kg)    Protein  103 (1.2 gm/kg)    Fluid   (1 mL/kcal)     Labs       Pertinent Labs    Results from last 7 days   Lab Units 05/19/24 0440 05/18/24 0706 05/17/24  2320   SODIUM mmol/L 136 133* 133*   POTASSIUM mmol/L 4.2 4.4 4.3   CHLORIDE mmol/L 103 102 98   CO2 mmol/L 22.0 17.7* 21.0*   BUN mg/dL 12 13 15   CREATININE mg/dL 0.98 0.96 1.35*   CALCIUM mg/dL 8.3* 8.5* 9.0   BILIRUBIN mg/dL 0.7 1.0 1.1   ALK PHOS U/L 64 66 77   ALT (SGPT) U/L 45* 46* 52*   AST (SGOT) U/L 64* 65* 73*   GLUCOSE mg/dL 102* 97 122*     Results from last 7 days   Lab Units 05/19/24 0440 05/18/24 0706 05/17/24 2320 05/17/24  2320   MAGNESIUM mg/dL 2.0 2.4  --  2.2   PHOSPHORUS mg/dL 2.1* 3.2   < >  --    HEMOGLOBIN g/dL 10.3* 11.0*  --  12.0*   HEMATOCRIT % 30.7* 33.2*  --  35.2*   WBC 10*3/mm3 4.71 3.79  --  4.69   ALBUMIN g/dL 3.4* 3.2*  --  3.7    < > = values in this interval not displayed.     Results from last 7 days   Lab Units 05/19/24 0440 05/18/24 0706 05/17/24 2320   INR   --   --  1.12*   APTT seconds  --   --  32.7   PLATELETS 10*3/mm3 240 230 270     COVID19   Date Value Ref Range Status   05/17/2024 Not Detected Not Detected - Ref. Range Final     Lab Results   Component Value Date    HGBA1C 5.20 05/18/2024          Medications           Scheduled Medications atorvastatin, 20 mg, Oral, Nightly  busPIRone, 15 mg, Oral, BID  cefepime, 2,000 mg, Intravenous, Q8H  DULoxetine, 60 mg, Oral, Nightly  gabapentin, 300 mg, Oral, TID  guaiFENesin, 1,200 mg, Oral, Q12H  hydrOXYzine pamoate, 50 mg, Oral, Nightly  phosphorus, 500 mg, Oral, BID  sodium chloride, 10 mL, Intravenous, Q12H       Infusions     PRN Medications   [DISCONTINUED] acetaminophen **OR**  acetaminophen **OR** acetaminophen    acetaminophen    benzocaine-menthol    senna-docusate sodium **AND** polyethylene glycol **AND** bisacodyl **AND** bisacodyl    calcium carbonate    cetirizine    cyclobenzaprine    HYDROcodone-acetaminophen    [COMPLETED] Insert Peripheral IV **AND** sodium chloride    sodium chloride    sodium chloride     Physical Findings          General Findings room air   Oral/Mouth Cavity WDL   Edema  no edema   Gastrointestinal WDL, last bowel movement: 5/17   Skin  skin intact   Tubes/Drains/Lines none   NFPE See Malnutrition Severity Assessment, Date Completed: 5/19     Malnutrition Severity Assessment      Patient meets criteria for : Severe Malnutrition (Pt meets ASPEN/AND criteria for nutrition dx of severe malnutrition of acute disease r/t energy intake and wt loss.)  Malnutrition Type (Last 8 Hours)       Malnutrition Severity Assessment       Row Name 05/19/24 1102       Malnutrition Severity Assessment    Malnutrition Type Acute Disease or Injury - Related Malnutrition      Row Name 05/19/24 1102       Insufficient Energy Intake     Insufficient Energy Intake Findings Severe    Insufficient Energy Intake  < or equal to 50% of est. energy requirement for > or equal to 5d)      Row Name 05/19/24 1102       Unintentional Weight Loss     Unintentional Weight Loss Findings Severe  25 lbs in past 15 days per pt report    Unintentional Weight Loss  Weight loss greater than 2% in one week      Row Name 05/19/24 1102       Criteria Met (Must meet criteria for severity in at least 2 of these categories: M Wasting, Fat Loss, Fluid, Secondary Signs, Wt. Status, Intake)    Patient meets criteria for  Severe Malnutrition  Pt meets ASPEN/AND criteria for nutrition dx of severe malnutrition of acute disease r/t energy intake and wt loss.                     Current Nutrition Orders & Evaluation of Intake       Oral Nutrition     Food Allergies NKFA   Current PO Diet Diet: Regular/House;  Texture: Soft to Chew (NDD 3); Soft to Chew: Whole Meat; Fluid Consistency: Thin (IDDSI 0)   Supplement n/a   PO Evaluation     % PO Intake 25%     Factors Affecting Intake: decreased appetite, fatigue, nausea, weakness, jaw pain   --  PES STATEMENT / NUTRITION DIAGNOSIS      Nutrition Dx Problem  Problem: Malnutrition (severe)  Etiology: Medical Diagnosis - PNA and Factors Affecting Nutrition - decreased appetite, fatigue, nausea, weakness, jaw pain    Signs/Symptoms: Report of Minimal PO Intake and Unintended Weight Change     NUTRITION INTERVENTION / PLAN OF CARE      Intervention Goal(s) Maintain nutrition status, Improved nutrition related labs, Reduce/improve symptoms, Meet estimated needs, Disease management/therapy, Increase intake, Accepts oral nutrition supplement, No significant weight loss, and PO intake goal %: 75%         RD Intervention/Action Encourage intake, Continue to monitor, Care plan reviewed, and Recommend/order: oral nutrition supplement    --      Prescription/Orders:       PO Diet Soft to chew with whole meats      Supplements Boost Plus BID       Enteral Nutrition       Parenteral Nutrition    New Prescription Ordered? Yes   --      Monitor/Evaluation Per protocol, PO intake, Supplement intake, Pertinent labs, Weight, Symptoms   Discharge Plan/Needs Pending clinical course   --    RD to follow per protocol.      Electronically signed by:  Pati Agrawal RDN, CHANEL  05/19/24 09:56 EDT

## 2024-05-19 NOTE — PLAN OF CARE
Problem: Malnutrition  Goal: Improved Nutritional Intake  Outcome: Ongoing, Progressing   Goal Outcome Evaluation:    Severe acute malnutrition based on energy intake and wt loss. Adding Boost Plus strawberry BID.     RD to follow.

## 2024-05-19 NOTE — SIGNIFICANT NOTE
Patient resting in bed with c/o 10/10 pain at his jaw. He reports having walked in hallway with nsg; denies PT needs at this time. Will sign off.

## 2024-05-19 NOTE — PLAN OF CARE
Goal Outcome Evaluation:  Plan of Care Reviewed With: patient        Progress: improving  Outcome Evaluation: Pt is a 69 year old male admitted w/ c/o jaw pain, body aches, fatigue for past 2 weeks. Pt presents to OT eval doing pretty well. Pt able to complete bed mobility and transfers independently. Pt able to complete LB dressing and grooming w/ Indep and performs functional ambulation in room independently w/ no unsteadiness noted. OT educ on safety precautions during ADLs for increased safety at home. No further skilled OT services warranted as pt appears to be at his baseline. OT to sign off. Please reconult if change in functional status.      Anticipated Discharge Disposition (OT): home

## 2024-05-19 NOTE — PROGRESS NOTES
Name: Jimmy Norman ADMIT: 2024   : 1955  PCP: Provider, No Known    MRN: [id number] LOS: 1 days   AGE/SEX: 69 y.o. male  ROOM: [facility]     Subjective   Subjective   Patient seen this morning, no changes, continues to complain of jaw pain, primarily on the right side.  Denies shortness of breath, fevers, had some chills.  No chest pain or palpitations.    Review of Systems  As above     Objective   Objective   Vital Signs  Temp:  [98.6 °F (37 °C)-99.5 °F (37.5 °C)] 99.1 °F (37.3 °C)  Heart Rate:  [87-99] 91  Resp:  [18] 18  BP: (109-124)/(70-87) 124/83  SpO2:  [92 %-95 %] 94 %  on   ;   Device (Oxygen Therapy): room air  Body mass index is 24.2 kg/m².  Physical Exam    General: Alert and oriented x3, no acute distress  HEENT: Normocephalic, atraumatic  CV: Regular rate and rhythm, no murmurs rubs or gallops  Lungs: Clear to auscultation bilaterally, no crackles or wheezes  Abdomen: Soft, nontender, nondistended  Extremities: No significant peripheral edema , no cyanosis     Results Review     I reviewed the patient's new clinical results.  Results from last 7 days   Lab Units 24  1254 24  0440 24  0706 24  2320   WBC 10*3/mm3  --  4.71 3.79 4.69   HEMOGLOBIN g/dL 10.3* 10.3* 11.0* 12.0*   PLATELETS 10*3/mm3  --  240 230 270     Results from last 7 days   Lab Units 24  0440 24  0706 24  2320   SODIUM mmol/L 136 133* 133*   POTASSIUM mmol/L 4.2 4.4 4.3   CHLORIDE mmol/L 103 102 98   CO2 mmol/L 22.0 17.7* 21.0*   BUN mg/dL 12 13 15   CREATININE mg/dL 0.98 0.96 1.35*   GLUCOSE mg/dL 102* 97 122*   Estimated Creatinine Clearance: 84.8 mL/min (by C-G formula based on SCr of 0.98 mg/dL).  Results from last 7 days   Lab Units 24  0440 24  0706 24  2320   ALBUMIN g/dL 3.4* 3.2* 3.7   BILIRUBIN mg/dL 0.7 1.0 1.1   ALK PHOS U/L 64 66 77   AST (SGOT) U/L 64* 65* 73*   ALT (SGPT) U/L 45* 46* 52*     Results from last 7 days   Lab Units  05/19/24  0440 05/18/24  0706 05/17/24  2320   CALCIUM mg/dL 8.3* 8.5* 9.0   ALBUMIN g/dL 3.4* 3.2* 3.7   MAGNESIUM mg/dL 2.0 2.4 2.2   PHOSPHORUS mg/dL 2.1* 3.2  --      Results from last 7 days   Lab Units 05/17/24  2320   PROCALCITONIN ng/mL 0.39*   LACTATE mmol/L 1.2     COVID19   Date Value Ref Range Status   05/17/2024 Not Detected Not Detected - Ref. Range Final     SARS-CoV-2, DOMINGO   Date Value Ref Range Status   05/14/2024 NEGATIVE Negative Final     Comment:     The 2019-CoV rRT-PCR Assay is only for use under a Food and Drug Administration Emergency Use Authorization. The performance characteristics of the assay were verified by the Clinical Microbiology Laboratory at the [facility].   Results should be used in conjunction with the patient's clinical symptoms, medical history, and other clinical/laboratory findings to determine an overall clinical diagnosis. Negative results do not preclude infection with SARS-CoV-2 (COVID-19).    Test parameters have not been validated for screening asymptomatic patients.     Hemoglobin A1C   Date/Time Value Ref Range Status   05/18/2024 0706 5.20 4.80 - 5.60 % Final           CT Head Without Contrast  Narrative: Patient: VALERIA MUIR  Time Out: 01:57  Exam(s): CT HEAD Without Contrast     EXAM:    CT Head Without Intravenous Contrast    CLINICAL HISTORY:     Reason for exam: ha.    TECHNIQUE:    Axial computed tomography images of the head brain without intravenous   contrast.  CTDI is 55.7 mGy and DLP is 939.9 mGy-cm.  This CT exam was   performed according to the principle of ALARA (As Low As Reasonably   Achievable) by using one or more of the following dose reduction   techniques: automated exposure control, adjustment of the mA and or kV   according to patient size, and or use of iterative reconstruction   technique.    COMPARISON:    No relevant prior studies available.    FINDINGS:    Brain:  Unremarkable.  No hemorrhage.  Mild nonspecific  white matter   changes.  No edema.    Ventricles:  Unremarkable.  No ventriculomegaly.    Bones joints:  Unremarkable.  No acute fracture.    Soft tissues:  Unremarkable.    Sinuses:  Unremarkable as visualized.  No acute sinusitis.    Mastoid air cells:  Unremarkable as visualized.  No mastoid effusion.    IMPRESSION:       No evidence of acute intracranial pathology.  Impression: Electronically signed by Luz Maria Wild MD on 05-18-24 at 0157  CT Soft Tissue Neck With Contrast  Narrative: Patient: VALERIA MUIR  Time Out: 01:49  Exam(s): CT NECK With Contrast     EXAM:    CT Neck With Intravenous Contrast    CLINICAL HISTORY:     Reason for exam: right sided jaw swelling, pain worsening - hx of   frx'ed jaw 2 years ago.    TECHNIQUE:    Axial computed tomography images of the neck with intravenous contrast.    CTDI is 13.23 mGy and DLP is 412.9 mGy-cm.  This CT exam was performed   according to the principle of ALARA (As Low As Reasonably Achievable) by   using one or more of the following dose reduction techniques: automated   exposure control, adjustment of the mA and or kV according to patient   size, and or use of iterative reconstruction technique.    COMPARISON:    No relevant prior studies available.    FINDINGS:    Oropharynx:  Unremarkable.  No significant tonsillar enlargement.  No   peritonsillar abscess.    Hypopharynx:  Unremarkable.    Larynx:  Unremarkable.  Normal epiglottis.    Trachea:  Unremarkable.    Retropharyngeal space:  Unremarkable.    Submandibular parotid glands:  Unremarkable.  Glands are normal in size.      Thyroid: Tiny left thyroid nodule.  Hypoplastic left thyroid gland.  No   enlarged or calcified nodules.    Bones joints:  No acute fracture.  Status post ORIF of the mandible   bilaterally.    Soft tissues:  Unremarkable.    Vasculature:  No acute findings.  Finding is concerning for bilateral   carotid endarterectomies with surgical staples in place.    Lymph nodes:   Unremarkable.  No lymphadenopathy.    Lung apices: Bronchitis with patchy opacities in the upper and   visualized lower lobes.    IMPRESSION:       No evidence of acute cervical soft tissue pathology.    Findings concerning for bronchitis with infiltrates in the upper and   lower lobes.    Status post ORIF of the mandible and carotid endarterectomies with   expected postsurgical changes.  Impression: Electronically signed by Luz Maria Wild MD on 05-18-24 at 0149  XR Chest 1 View  Narrative: Patient: VALERIA MUIR  Time Out: 01:21  Exam(s): XR CXR 1 VIEW     EXAM:    XR Chest, 1 View    CLINICAL HISTORY:     Reason for exam: cough, myalgias.    TECHNIQUE:    Frontal view of the chest.    COMPARISON:  Comparison made to prior CT scan of the cervical soft tissues from May 18,   2024.    FINDINGS:    Lungs: Moderate to heavy peribronchial thickening of the central and   lower lobe bronchi with bilateral lower lobe patchy opacities.  No   consolidation.    Pleural space:  Unremarkable.  No pneumothorax.    Heart:  Unremarkable.  No cardiomegaly.    Mediastinum:  Unremarkable.  Normal mediastinal contour.    Bones joints:  Unremarkable.  No acute fracture.    IMPRESSION:       Findings concerning for bilateral lower lobe infiltrates.  No evidence of   consolidation or pleural effusion.  Impression: Electronically signed by Luz Maria Wild MD on 05-18-24 at 0121    Scheduled Medications  atorvastatin, 20 mg, Oral, Nightly  busPIRone, 15 mg, Oral, BID  cefTRIAXone, 1,000 mg, Intravenous, Q24H  DULoxetine, 60 mg, Oral, Nightly  gabapentin, 300 mg, Oral, TID  guaiFENesin, 1,200 mg, Oral, Q12H  hydrOXYzine pamoate, 50 mg, Oral, Nightly  phosphorus, 500 mg, Oral, BID  sodium chloride, 10 mL, Intravenous, Q12H    Infusions   Diet  Diet: Regular/House; Texture: Soft to Chew (NDD 3); Soft to Chew: Whole Meat; Fluid Consistency: Thin (IDDSI 0)    I have personally reviewed     [x]  Laboratory   [x]  Microbiology   [x]  Radiology    []  EKG/Telemetry  []  Cardiology/Vascular   []  Pathology    []  Records       Assessment/Plan     Active Hospital Problems    Diagnosis  POA    **Pneumonia [J18.9]  Yes    Severe malnutrition [E43]  Yes    Jaw pain [R68.84]  Unknown    Transaminitis [R74.01]  Unknown    History of prostate cancer [Z85.46]  Not Applicable      Resolved Hospital Problems   No resolved problems to display.       Mr. Norman is a 69 y.o. male with a history of CAD, prostate cancer that presents to [facility] complaining of jaw pain, body aches, fatigue for approximately 2 weeks.         Pneumonia  -x-ray chest -Findings concerning for bilateral lower lobe infiltrates.  No evidence of   consolidation or pleural effusion.  -WBC normal, procalcitonin mildly elevated at 0.39, lactate was normal, afebrile  -MRSA nares, strep and Legionella antigen were negative  -Respiratory panel was negative  -Blood cultures negative to date  -On IV cefepime, transition to IV ceftriaxone 0 5/19  -Remains on room air        Chronic jaw pain   H/o of bilateral mandible fractures and post ORIF via risdon approach.  Chart reviewed in [facility] everywhere, patient has been complaining persistent pain and swelling.   He has been referred to pain management but refused per chart review.   CT of soft tissue on admission of the neck on 05/18/2024 no acute findings  Recent CT maxillofacial area with contrast at [facility] on 05/07/2024 also with no acute findings  Patient reports he is on as needed Norco 10 mg outpatient, however per chart review last time he was prescribed Norco 11/24/2023 for 30  day supply  -When discussed with patient but he reported taking Norco outpatient however there was no prescription refilled December 2023, he reported that physician stopped prescribing it because he ran out early he took fentanyl pill but was not prescribed.  Reported did buy pain medication from the streets because of severe pain in his jaw.  -Previously refused  pain management referral, now agreeable.  Will refer to pain management after discharge.  -Continue Norco 10 mg every 8 hours as needed, continue gabapentin    Hx prostate cancer s/p surgical and radiation tx  Patient worried his cancer has come back.  PSA was normal 05/18  Nuclear whole-body scan 11/2022  with  no evidence of skeletal metastatic disease.   Discussed to follow-up with primary urologist        Weight loss  -Patient reported 25 pound weight loss in 15 days to , Debora Larsen, to me he reported that he lost 25 ponds in the last 2-3 months.  Also notes that he has not been eating much because does not have appetite and not feeling well due to pain.  PSA as above normal indicating low likelihood of recurrent prostate cancer.  -Chart reviewed, last weight available in the system on 02/05/2024 with a weight of  97.1 Kg (214 lbs), weight on admission 85.5 kg (188 lbs), which is 26 pounds weight loss in the last 3 months and 13 days.  -Chest x-ray with no findings concerning for malignancy  -TSH is normal 05/18  -HIV antibody 05/19 was negative  -PSA normal as above  -Order CT abdomen  chest and pelvis to rule out underlying malignancy         Transaminitis  -Hepatitis panel negative  -LFTs stable  -CT abdomen/pelvis pending as above    Anemia  -Hemoglobin 10.3, down from 11.0 yesterday,  -Suspect primarily dilutional due to IV fluids, denies any signs of bleeding  -Iron panel consistent with anemia of chronic disease    Significantly elevated ferritin  -Ferritin elevated at 5954  Differential include infectious, chronic inflammatory disorders such as RA, infection, malignancies, hemochromatosis,  liver disease  -CT abdomen chest and pelvis pending as above to rule out underlying malignancy in the setting of weight loss.      Discussed with patient  Discussed with family  Discussed with RN    Disposition, to be determined pending CT abdomen chest and pelvis      Copied text in this note has been reviewed  and is accurate as of 05/19/24.         Dictated utilizing Dragon dictation        Acosta Candelario MD  [facility]  05/19/24  16:27 EDT

## 2024-05-20 ENCOUNTER — APPOINTMENT (OUTPATIENT)
Dept: CT IMAGING | Facility: HOSPITAL | Age: 69
End: 2024-05-20
Payer: MEDICARE

## 2024-05-20 LAB
ALBUMIN SERPL-MCNC: 3.4 G/DL (ref 3.5–5.2)
ALBUMIN/GLOB SERPL: 0.9 G/DL
ALP SERPL-CCNC: 64 U/L (ref 39–117)
ALT SERPL W P-5'-P-CCNC: 46 U/L (ref 1–41)
ANION GAP SERPL CALCULATED.3IONS-SCNC: 10 MMOL/L (ref 5–15)
AST SERPL-CCNC: 69 U/L (ref 1–40)
BILIRUB SERPL-MCNC: 0.6 MG/DL (ref 0–1.2)
BUN SERPL-MCNC: 11 MG/DL (ref 8–23)
BUN/CREAT SERPL: 12.2 (ref 7–25)
CALCIUM SPEC-SCNC: 8.4 MG/DL (ref 8.6–10.5)
CHLORIDE SERPL-SCNC: 106 MMOL/L (ref 98–107)
CO2 SERPL-SCNC: 22 MMOL/L (ref 22–29)
CREAT SERPL-MCNC: 0.9 MG/DL (ref 0.76–1.27)
DEPRECATED RDW RBC AUTO: 42.5 FL (ref 37–54)
EGFRCR SERPLBLD CKD-EPI 2021: 91.9 ML/MIN/1.73
ERYTHROCYTE [DISTWIDTH] IN BLOOD BY AUTOMATED COUNT: 13.1 % (ref 12.3–15.4)
FOLATE SERPL-MCNC: 14.3 NG/ML (ref 4.78–24.2)
GLOBULIN UR ELPH-MCNC: 3.9 GM/DL
GLUCOSE SERPL-MCNC: 95 MG/DL (ref 65–99)
HCT VFR BLD AUTO: 31.2 % (ref 37.5–51)
HGB BLD-MCNC: 10.2 G/DL (ref 13–17.7)
MAGNESIUM SERPL-MCNC: 2.3 MG/DL (ref 1.6–2.4)
MCH RBC QN AUTO: 29.1 PG (ref 26.6–33)
MCHC RBC AUTO-ENTMCNC: 32.7 G/DL (ref 31.5–35.7)
MCV RBC AUTO: 89.1 FL (ref 79–97)
PHOSPHATE SERPL-MCNC: 2.6 MG/DL (ref 2.5–4.5)
PLATELET # BLD AUTO: 263 10*3/MM3 (ref 140–450)
PMV BLD AUTO: 10.1 FL (ref 6–12)
POTASSIUM SERPL-SCNC: 4.1 MMOL/L (ref 3.5–5.2)
PROT SERPL-MCNC: 7.3 G/DL (ref 6–8.5)
RBC # BLD AUTO: 3.5 10*6/MM3 (ref 4.14–5.8)
SODIUM SERPL-SCNC: 138 MMOL/L (ref 136–145)
WBC NRBC COR # BLD AUTO: 4.57 10*3/MM3 (ref 3.4–10.8)

## 2024-05-20 PROCEDURE — 25510000001 IOPAMIDOL 61 % SOLUTION: Performed by: STUDENT IN AN ORGANIZED HEALTH CARE EDUCATION/TRAINING PROGRAM

## 2024-05-20 PROCEDURE — 85027 COMPLETE CBC AUTOMATED: CPT | Performed by: STUDENT IN AN ORGANIZED HEALTH CARE EDUCATION/TRAINING PROGRAM

## 2024-05-20 PROCEDURE — 83735 ASSAY OF MAGNESIUM: CPT | Performed by: STUDENT IN AN ORGANIZED HEALTH CARE EDUCATION/TRAINING PROGRAM

## 2024-05-20 PROCEDURE — G0378 HOSPITAL OBSERVATION PER HR: HCPCS

## 2024-05-20 PROCEDURE — 84100 ASSAY OF PHOSPHORUS: CPT | Performed by: STUDENT IN AN ORGANIZED HEALTH CARE EDUCATION/TRAINING PROGRAM

## 2024-05-20 PROCEDURE — 25010000002 CEFTRIAXONE PER 250 MG: Performed by: STUDENT IN AN ORGANIZED HEALTH CARE EDUCATION/TRAINING PROGRAM

## 2024-05-20 PROCEDURE — 74177 CT ABD & PELVIS W/CONTRAST: CPT

## 2024-05-20 PROCEDURE — 80053 COMPREHEN METABOLIC PANEL: CPT | Performed by: STUDENT IN AN ORGANIZED HEALTH CARE EDUCATION/TRAINING PROGRAM

## 2024-05-20 PROCEDURE — 82746 ASSAY OF FOLIC ACID SERUM: CPT | Performed by: STUDENT IN AN ORGANIZED HEALTH CARE EDUCATION/TRAINING PROGRAM

## 2024-05-20 PROCEDURE — 71260 CT THORAX DX C+: CPT

## 2024-05-20 RX ADMIN — GABAPENTIN 300 MG: 300 CAPSULE ORAL at 21:36

## 2024-05-20 RX ADMIN — DIBASIC SODIUM PHOSPHATE, MONOBASIC POTASSIUM PHOSPHATE AND MONOBASIC SODIUM PHOSPHATE 2 TABLET: 852; 155; 130 TABLET ORAL at 10:03

## 2024-05-20 RX ADMIN — ATORVASTATIN CALCIUM 20 MG: 20 TABLET, FILM COATED ORAL at 21:36

## 2024-05-20 RX ADMIN — HYDROXYZINE PAMOATE 50 MG: 50 CAPSULE ORAL at 21:36

## 2024-05-20 RX ADMIN — HYDROCODONE BITARTRATE AND ACETAMINOPHEN 2 TABLET: 5; 325 TABLET ORAL at 10:28

## 2024-05-20 RX ADMIN — GABAPENTIN 300 MG: 300 CAPSULE ORAL at 15:36

## 2024-05-20 RX ADMIN — GUAIFENESIN 1200 MG: 600 TABLET, EXTENDED RELEASE ORAL at 21:36

## 2024-05-20 RX ADMIN — BUSPIRONE HYDROCHLORIDE 15 MG: 15 TABLET ORAL at 10:08

## 2024-05-20 RX ADMIN — CYCLOBENZAPRINE 5 MG: 10 TABLET, FILM COATED ORAL at 21:36

## 2024-05-20 RX ADMIN — Medication 10 ML: at 21:38

## 2024-05-20 RX ADMIN — Medication 10 ML: at 15:37

## 2024-05-20 RX ADMIN — BUSPIRONE HYDROCHLORIDE 15 MG: 15 TABLET ORAL at 21:36

## 2024-05-20 RX ADMIN — GUAIFENESIN 1200 MG: 600 TABLET, EXTENDED RELEASE ORAL at 15:36

## 2024-05-20 RX ADMIN — CEFTRIAXONE SODIUM 1000 MG: 1 INJECTION, POWDER, FOR SOLUTION INTRAMUSCULAR; INTRAVENOUS at 16:28

## 2024-05-20 RX ADMIN — GABAPENTIN 300 MG: 300 CAPSULE ORAL at 10:04

## 2024-05-20 RX ADMIN — IOPAMIDOL 100 ML: 612 INJECTION, SOLUTION INTRAVENOUS at 11:15

## 2024-05-20 RX ADMIN — DULOXETINE HYDROCHLORIDE 60 MG: 60 CAPSULE, DELAYED RELEASE ORAL at 21:36

## 2024-05-20 RX ADMIN — HYDROCODONE BITARTRATE AND ACETAMINOPHEN 2 TABLET: 5; 325 TABLET ORAL at 18:20

## 2024-05-20 NOTE — CASE MANAGEMENT/SOCIAL WORK
Discharge Planning Assessment  [facility]     Patient Name: Jimmy Norman  MRN: [id number]  Today's Date: 5/20/2024    Admit Date: 5/17/2024    Plan: Home, family to transport home   Discharge Needs Assessment       Row Name 05/20/24 1242       Living Environment    People in Home alone    Current Living Arrangements apartment    Potentially Unsafe Housing Conditions none    In the past 12 months has the electric, gas, oil, or water company threatened to shut off services in your home? No    Primary Care Provided by self    Provides Primary Care For no one    Family Caregiver if Needed child(lois), adult    Family Caregiver Names Valeria Mcmahon  [phone]    Quality of Family Relationships unable to assess    Able to Return to Prior Arrangements yes       Resource/Environmental Concerns    Resource/Environmental Concerns none    Transportation Concerns none       Transportation Needs    In the past 12 months, has lack of transportation kept you from medical appointments or from getting medications? no    In the past 12 months, has lack of transportation kept you from meetings, work, or from getting things needed for daily living? No       Food Insecurity    Within the past 12 months, you worried that your food would run out before you got the money to buy more. Never true    Within the past 12 months, the food you bought just didn't last and you didn't have money to get more. Never true       Transition Planning    Patient/Family Anticipates Transition to home    Patient/Family Anticipated Services at Transition none    Transportation Anticipated family or friend will provide       Discharge Needs Assessment    Readmission Within the Last 30 Days no previous admission in last 30 days    Equipment Currently Used at Home none    Concerns to be Addressed no discharge needs identified;denies needs/concerns at this time    Anticipated Changes Related to Illness none    Equipment Needed After Discharge none                    Discharge Plan       Row Name 05/20/24 1243       Plan    Plan Home, family to transport home    Patient/Family in Agreement with Plan yes    Provided Post Acute Provider List? N/A    Provided Post Acute Provider Quality & Resource List? N/A    Plan Comments Met with pt at bedside. Permission obtained to speak to pt in front of daughter. Face sheet verified, pt has no PCP , and declined CCP's assistiance in obtaining a PCP, refused clinic list. Pt denies any difficulty paying for medications, and he obtains his medications from [facility]. Pt lives alone, daughter stated that he has a lot of people in and out of his apartment, but there was no one available ot assist with any care needs. Pt is independent in ADL's and he does not use, nor require any  assistive devices. Pt has never had home  health nor been to rehab and he does not anticipate requiring those services upon discharge. Upon discharge, pt stated that family would transport home. Explained that CCP would follow to assess for discharge needs.                  Continued Care and Services - Admitted Since 5/17/2024    No active coordination exists for this encounter.       Expected Discharge Date and Time       Expected Discharge Date Expected Discharge Time    May 21, 2024            Demographic Summary    No documentation.                  Functional Status    No documentation.                  Psychosocial    No documentation.                  Abuse/Neglect    No documentation.                  Legal    No documentation.                  Substance Abuse    No documentation.                  Patient Forms    No documentation.                     Ashley Darling RN

## 2024-05-20 NOTE — PLAN OF CARE
Goal Outcome Evaluation:  VS as noted  Pt very weak -c/o pain in bilateral lower jaws-verbalized it hurts to try to eat and move his mouth  Medicated x 1 with good relief  Patient has slept majority of afternoon after he came back from CT chest  Family at side part of shift  No progress this shift  Safety maintained this shift

## 2024-05-20 NOTE — PROGRESS NOTES
Name: Jimmy Norman ADMIT: 2024   : 1955  PCP: Provider, No Known    MRN: [id number] LOS: 1 days   AGE/SEX: 69 y.o. male  ROOM: [facility]     Subjective   Subjective   Continues overnight, reports he is not feeling well.  Continues to complain of jaw pain.  Denies shortness of breath, fevers or chills.  Remains on room air.      Review of Systems  As above     Objective   Objective   Vital Signs  Temp:  [96 °F (35.6 °C)-99.5 °F (37.5 °C)] 99.5 °F (37.5 °C)  Heart Rate:  [] 90  Resp:  [20] 20  BP: (135-159)/(85-93) 135/85  SpO2:  [93 %-94 %] 93 %  on   ;   Device (Oxygen Therapy): room air  Body mass index is 24.2 kg/m².  Physical Exam    General: Alert, laying in bed, not in distress, clinical appearing  HEENT: Normocephalic, atraumatic  CV: Regular rate and rhythm, no murmurs rubs or gallops  Lungs: Clear to auscultation bilaterally, no crackles or wheezes  Abdomen: Soft, nontender, nondistended  Extremities: No significant peripheral edema , no cyanosis     Results Review     I reviewed the patient's new clinical results.  Results from last 7 days   Lab Units 24  0358 24  1254 24  0440 24  0706 24  2320   WBC 10*3/mm3 4.57  --  4.71 3.79 4.69   HEMOGLOBIN g/dL 10.2* 10.3* 10.3* 11.0* 12.0*   PLATELETS 10*3/mm3 263  --  240 230 270     Results from last 7 days   Lab Units 24  0357 24  0440 24  0706 24  2320   SODIUM mmol/L 138 136 133* 133*   POTASSIUM mmol/L 4.1 4.2 4.4 4.3   CHLORIDE mmol/L 106 103 102 98   CO2 mmol/L 22.0 22.0 17.7* 21.0*   BUN mg/dL 11 12 13 15   CREATININE mg/dL 0.90 0.98 0.96 1.35*   GLUCOSE mg/dL 95 102* 97 122*   Estimated Creatinine Clearance: 92.4 mL/min (by C-G formula based on SCr of 0.9 mg/dL).  Results from last 7 days   Lab Units 24  0357 24  0440 24  0706 24  2320   ALBUMIN g/dL 3.4* 3.4* 3.2* 3.7   BILIRUBIN mg/dL 0.6 0.7 1.0 1.1   ALK PHOS U/L 64 64 66 77   AST (SGOT) U/L 69*  64* 65* 73*   ALT (SGPT) U/L 46* 45* 46* 52*     Results from last 7 days   Lab Units 05/20/24  0357 05/19/24  0440 05/18/24  0706 05/17/24  2320   CALCIUM mg/dL 8.4* 8.3* 8.5* 9.0   ALBUMIN g/dL 3.4* 3.4* 3.2* 3.7   MAGNESIUM mg/dL 2.3 2.0 2.4 2.2   PHOSPHORUS mg/dL 2.6 2.1* 3.2  --      Results from last 7 days   Lab Units 05/17/24  2320   PROCALCITONIN ng/mL 0.39*   LACTATE mmol/L 1.2     COVID19   Date Value Ref Range Status   05/17/2024 Not Detected Not Detected - Ref. Range Final     SARS-CoV-2, DOMINGO   Date Value Ref Range Status   05/14/2024 NEGATIVE Negative Final     Comment:     The 2019-CoV rRT-PCR Assay is only for use under a Food and Drug Administration Emergency Use Authorization. The performance characteristics of the assay were verified by the Clinical Microbiology Laboratory at the [facility].   Results should be used in conjunction with the patient's clinical symptoms, medical history, and other clinical/laboratory findings to determine an overall clinical diagnosis. Negative results do not preclude infection with SARS-CoV-2 (COVID-19).    Test parameters have not been validated for screening asymptomatic patients.     Hemoglobin A1C   Date/Time Value Ref Range Status   05/18/2024 0706 5.20 4.80 - 5.60 % Final           CT Abdomen Pelvis With Contrast, CT Chest With Contrast Diagnostic  Narrative: CT CHEST W CONTRAST DIAGNOSTIC-, CT ABDOMEN PELVIS W CONTRAST-     DATE OF EXAM: 5/20/2024 10:54 AM     INDICATION: Abnormal weight loss. History of prostate cancer.     COMPARISON: CT neck 5/18/2024. Chest radiograph 5/17/2024.     TECHNIQUE: Multiple contiguous axial images were acquired through the  chest, abdomen, and pelvis following the intravenous administration of  100 mL of Isovue-300. Reformatted coronal and sagittal sequences were  also reviewed. Radiation dose reduction techniques were utilized,  including automated exposure control and exposure modulation based on  body  size.     FINDINGS:  CT chest:  Low lung volumes with bilateral dependent atelectasis and multifocal  bibasilar subsegmental atelectasis and/or scarring. Multifocal patchy  and nodular consolidation and patchy groundglass opacities with  interstitial thickening in both lungs, greatest posteriorly and greatest  in the left lower lobe, likely multifocal pneumonia. A partially  obscured subpleural nodule in the posterior left lower lobe measures 7  mm in average diameter (axial series 3 image 72). Additional sub-6 mm  pulmonary nodules in both lungs. An index sub-6 mm subpleural nodule is  seen in the anterior right upper lobe (axial series 3 image 54). An  index mixed solid and groundglass sub-6 mm nodule is seen in the right  lower lobe (axial series 3 image 67). The central airways are widely  patent. No pneumothorax or pleural effusion.     The heart is normal in size. Moderate to severe calcified coronary  artery disease. Trace pericardial fluid. Mild fusiform 4 cm ectasia of  the ascending thoracic aorta. Normal variant arch anatomy with the left  vertebral artery takes its origin directly from the aortic arch.  Enlarged right lobe of the thyroid gland and similar-appearing small  low-attenuation nodule in the small left lobe of the thyroid gland.     Mild multilevel thoracic spondylosis. Mild chronic degenerative changes  in both shoulders. Nonspecific small sclerotic foci in the posterior  right third rib (axial series 3 image 27) and the posterior left fifth  rib (axial series 3 image 45). No acute osseous abnormality.     CT abdomen and pelvis:  Incompletely evaluated small 1 cm low-attenuation lesion in the  posterior right lobe of the liver, statistically representing a hepatic  cyst or hemangioma. The gallbladder, spleen, pancreas, and adrenal  glands are unremarkable. Small low-density lesions in both kidneys  measuring up to 1.5 cm in the interpolar left kidney, probably benign  cysts. Enlarged prostate  gland, measuring 5.2 cm in transverse diameter,  with mild mass effect on the floor of the urinary bladder. The urinary  bladder is otherwise unremarkable.     Mild colonic stool. No bowel obstruction or significant bowel wall  thickening. The appendix is normal.     No free fluid in the abdomen or pelvis. No free intraperitoneal air. No  pathologically enlarged lymph nodes in the abdomen or pelvis. Mild  calcified and noncalcified atherosclerotic disease in the abdominal  aorta and its distal branches with a small fusiform 3.1 cm infrarenal  abdominal aortic aneurysm and a small 2.5 cm right common iliac artery  aneurysm.     Tiny fat-containing umbilical hernia. Mild to moderate multilevel lumbar  spondylosis. Moderate bilateral hip joint DJD. Old healed fracture  deformity of the left inferior pubic ramus. No acute osseous abnormality  or concerning osseous lesion.     Impression:    1. Multifocal patchy and nodular consolidation and patchy groundglass  opacities with interstitial thickening in both lungs, likely multifocal  pneumonia.  2. Multiple pulmonary nodules in both lungs measuring up to 7 mm in  diameter, which may also be infectious/inflammatory. Could consider  follow-up CT chest in 3 months after treatment.  3. Small sclerotic foci in the posterior right third rib and the  posterior left fifth rib, possible benign bone islands. Given the  history of prostate cancer, recommend correlating with PSA. Could  consider further evaluation with nuclear medicine bone scan if  clinically indicated.  4. Moderate to severe calcified coronary artery disease.  5. Mild fusiform 4 cm ectasia of the ascending thoracic aorta, small 3.1  cm fusiform infrarenal abdominal aortic aneurysm, and small 2.5 cm right  common iliac artery aneurysm.     This report was finalized on 5/20/2024 12:38 PM by Akash Alarcon MD on  Workstation: [id number]       Scheduled Medications  atorvastatin, 20 mg, Oral, Nightly  busPIRone, 15  mg, Oral, BID  cefTRIAXone, 1,000 mg, Intravenous, Q24H  DULoxetine, 60 mg, Oral, Nightly  gabapentin, 300 mg, Oral, TID  guaiFENesin, 1,200 mg, Oral, Q12H  hydrOXYzine pamoate, 50 mg, Oral, Nightly  lactobacillus acidophilus, 1 capsule, Oral, Daily  sodium chloride, 10 mL, Intravenous, Q12H    Infusions   Diet  Diet: Regular/House; Texture: Soft to Chew (NDD 3); Soft to Chew: Whole Meat; Fluid Consistency: Thin (IDDSI 0)    I have personally reviewed     [x]  Laboratory   [x]  Microbiology   [x]  Radiology   []  EKG/Telemetry  []  Cardiology/Vascular   []  Pathology    []  Records       Assessment/Plan     Active Hospital Problems    Diagnosis  POA    **Pneumonia [J18.9]  Yes    Severe malnutrition [E43]  Yes    Jaw pain [R68.84]  Unknown    Transaminitis [R74.01]  Unknown    History of prostate cancer [Z85.46]  Not Applicable      Resolved Hospital Problems   No resolved problems to display.       Mr. Norman is a 69 y.o. male with a history of CAD, prostate cancer that presents to [facility] complaining of jaw pain, body aches, fatigue for approximately 2 weeks.         Pneumonia  -x-ray chest -Findings concerning for bilateral lower lobe infiltrates.  No evidence of   consolidation or pleural effusion.  -WBC normal, procalcitonin mildly elevated at 0.39, lactate was normal, afebrile  -MRSA nares, strep and Legionella antigen were negative  -Respiratory panel was negative  -CT scan 05/20/2024 showed -multifocal patchy and nodular consolidation and patchy groundglass  opacities with interstitial thickening in both lungs, likely multifocal pneumonia.  -Blood cultures negative to date  -Patient has been afebrile since admission, WBC normal, on room air.  But CT again shows multifocal pneumonia.  Will consult ID for evaluation.        Chronic jaw pain  H/o of bilateral mandible fractures and post ORIF via risdon approach.  -Chart reviewed in [facility] everywhere, patient has been complaining persistent pain  and swelling since surgery.  - He has been referred to pain management but refused per chart review.   -CT of soft tissue on admission of the neck on 05/18/2024 no acute findings Recent CT maxillofacial area with contrast at [facility] on 05/07/2024 also with no acute findings  P-atient reports he is on as needed Norco 10 mg outpatient, however per chart review last time he was prescribed Norco 11/24/2023 for 30  day supply  -When discussed with patient but he reported taking Norco outpatient however there was no prescription refilled December 2023, he reported that physician stopped prescribing it because he ran out early he took fentanyl pill but was not prescribed.  Reported did buy pain medication from the streets because of severe pain in his jaw.  -Previously refused pain management referral, now agreeable.  Will refer to pain management after discharge.  -Continue Norco 10 mg every 8 hours as needed, continue gabapentin    Hx prostate cancer s/p surgical and radiation tx  Patient worried his cancer has come back.  PSA was normal 05/18  Nuclear whole-body scan 11/2022  with  no evidence of skeletal metastatic disease.   Discussed to follow-up with primary urologist          Weight loss  -Patient reported 25 pound weight loss in 15 days to , Debora Larsen, to me he reported that he lost 25 ponds in the last 2-3 months.  Also notes that he has not been eating much because does not have appetite and not feeling well due to pain.  PSA as above normal indicating low likelihood of recurrent prostate cancer.  -Chart reviewed, last weight available in the system on 02/05/2024 with a weight of  97.1 Kg (214 lbs), weight on admission 85.5 kg (188 lbs), which is 26 pounds weight loss in the last 3 months and 13 days.  -Chest x-ray with no findings concerning for malignancy  -TSH is normal 05/18  -HIV antibody 05/19 was negative  -PSA normal as above  -CT abdomen pelvis 05/24-.. Multiple pulmonary nodules in both lungs  measuring up to 7 mm in  diameter, which may also be infectious/inflammatory. Could considerollow-up CT chest in 3 months after treatment.3. Small sclerotic foci in the posterior right third rib and the posterior left fifth rib, possible benign bone islands.   -Will need outpatient bone nuclear scan    Mild fusiform 4 cm ectasia of the ascending thoracic aorta, small 3.1  cm fusiform infrarenal abdominal aortic aneurysm, and small 2.5 cm right  common iliac artery aneurysm.  Seen on CT, with outpatient surveillance         Transaminitis  -Hepatitis panel negative  -LFTs stable  -CT abdomen/pelvis pending as above    Anemia  -Hemoglobin 10.3, down from 11.0 yesterday,  -Suspect primarily dilutional due to IV fluids, denies any signs of bleeding  -Iron panel consistent with anemia of chronic disease    Significantly elevated ferritin  -Ferritin elevated at 5954  Differential include infectious, chronic inflammatory disorders such as RA, infection, malignancies, hemochromatosis,  liver disease  -CT abdomen chest and pelvis pending as above to rule out underlying malignancy in the setting of weight loss.      Discussed with patient  Discussed in multidisciplinary rounds    Disposition, to be determined pending CT abdomen chest and pelvis      Copied text in this note has been reviewed and is accurate as of 05/20/24.         Dictated utilizing Dragon dictation        Acosta Candelario MD  [facility]  05/20/24  14:02 EDT

## 2024-05-20 NOTE — PLAN OF CARE
"Goal Outcome Evaluation:  Plan of Care Reviewed With: patient        Progress: no change  Outcome Evaluation: Pt reports soft to chew diet is fine today. Pt didn't eat much today. His daughter was going to bring him a restaurant's food at shift change after he refused the hospital meatloaf dinner tray. Pt reported loose \"diarrhea\" stools twice today which he said was from eating the smoothie this am. Notified MD and probiotics ordered.                               "

## 2024-05-21 ENCOUNTER — APPOINTMENT (OUTPATIENT)
Dept: GENERAL RADIOLOGY | Facility: HOSPITAL | Age: 69
End: 2024-05-21
Payer: MEDICARE

## 2024-05-21 VITALS
RESPIRATION RATE: 20 BRPM | HEART RATE: 89 BPM | WEIGHT: 188.5 LBS | HEIGHT: 74 IN | OXYGEN SATURATION: 94 % | DIASTOLIC BLOOD PRESSURE: 82 MMHG | BODY MASS INDEX: 24.19 KG/M2 | TEMPERATURE: 98.1 F | SYSTOLIC BLOOD PRESSURE: 116 MMHG

## 2024-05-21 LAB
ALBUMIN SERPL-MCNC: 3.1 G/DL (ref 3.5–5.2)
ALBUMIN/GLOB SERPL: 0.8 G/DL
ALP SERPL-CCNC: 66 U/L (ref 39–117)
ALT SERPL W P-5'-P-CCNC: 57 U/L (ref 1–41)
ANION GAP SERPL CALCULATED.3IONS-SCNC: 11.9 MMOL/L (ref 5–15)
AST SERPL-CCNC: 89 U/L (ref 1–40)
BILIRUB SERPL-MCNC: 0.6 MG/DL (ref 0–1.2)
BUN SERPL-MCNC: 11 MG/DL (ref 8–23)
BUN/CREAT SERPL: 12.5 (ref 7–25)
CALCIUM SPEC-SCNC: 8.6 MG/DL (ref 8.6–10.5)
CHLORIDE SERPL-SCNC: 105 MMOL/L (ref 98–107)
CO2 SERPL-SCNC: 21.1 MMOL/L (ref 22–29)
CREAT SERPL-MCNC: 0.88 MG/DL (ref 0.76–1.27)
DEPRECATED RDW RBC AUTO: 41 FL (ref 37–54)
EGFRCR SERPLBLD CKD-EPI 2021: 92.5 ML/MIN/1.73
ERYTHROCYTE [DISTWIDTH] IN BLOOD BY AUTOMATED COUNT: 12.8 % (ref 12.3–15.4)
GLOBULIN UR ELPH-MCNC: 4 GM/DL
GLUCOSE SERPL-MCNC: 101 MG/DL (ref 65–99)
HCT VFR BLD AUTO: 31.2 % (ref 37.5–51)
HGB BLD-MCNC: 10.4 G/DL (ref 13–17.7)
MAGNESIUM SERPL-MCNC: 2.1 MG/DL (ref 1.6–2.4)
MCH RBC QN AUTO: 29.2 PG (ref 26.6–33)
MCHC RBC AUTO-ENTMCNC: 33.3 G/DL (ref 31.5–35.7)
MCV RBC AUTO: 87.6 FL (ref 79–97)
PHOSPHATE SERPL-MCNC: 2.5 MG/DL (ref 2.5–4.5)
PLATELET # BLD AUTO: 294 10*3/MM3 (ref 140–450)
PMV BLD AUTO: 10 FL (ref 6–12)
POTASSIUM SERPL-SCNC: 3.9 MMOL/L (ref 3.5–5.2)
PROT SERPL-MCNC: 7.1 G/DL (ref 6–8.5)
RBC # BLD AUTO: 3.56 10*6/MM3 (ref 4.14–5.8)
SODIUM SERPL-SCNC: 138 MMOL/L (ref 136–145)
WBC NRBC COR # BLD AUTO: 5.11 10*3/MM3 (ref 3.4–10.8)

## 2024-05-21 PROCEDURE — 25010000002 CEFTRIAXONE PER 250 MG: Performed by: STUDENT IN AN ORGANIZED HEALTH CARE EDUCATION/TRAINING PROGRAM

## 2024-05-21 PROCEDURE — 74230 X-RAY XM SWLNG FUNCJ C+: CPT

## 2024-05-21 PROCEDURE — 80053 COMPREHEN METABOLIC PANEL: CPT | Performed by: STUDENT IN AN ORGANIZED HEALTH CARE EDUCATION/TRAINING PROGRAM

## 2024-05-21 PROCEDURE — 85027 COMPLETE CBC AUTOMATED: CPT | Performed by: STUDENT IN AN ORGANIZED HEALTH CARE EDUCATION/TRAINING PROGRAM

## 2024-05-21 PROCEDURE — G0378 HOSPITAL OBSERVATION PER HR: HCPCS

## 2024-05-21 PROCEDURE — 92611 MOTION FLUOROSCOPY/SWALLOW: CPT

## 2024-05-21 PROCEDURE — 83735 ASSAY OF MAGNESIUM: CPT | Performed by: STUDENT IN AN ORGANIZED HEALTH CARE EDUCATION/TRAINING PROGRAM

## 2024-05-21 PROCEDURE — 84100 ASSAY OF PHOSPHORUS: CPT | Performed by: STUDENT IN AN ORGANIZED HEALTH CARE EDUCATION/TRAINING PROGRAM

## 2024-05-21 RX ORDER — GABAPENTIN 600 MG/1
600 TABLET ORAL 3 TIMES DAILY
Qty: 9 TABLET | Refills: 0 | Status: SHIPPED | OUTPATIENT
Start: 2024-05-21 | End: 2024-05-25

## 2024-05-21 RX ORDER — NALOXONE HYDROCHLORIDE 4 MG/.1ML
SPRAY NASAL
Qty: 2 EACH | Refills: 0 | Status: SHIPPED | OUTPATIENT
Start: 2024-05-21 | End: 2024-05-25

## 2024-05-21 RX ORDER — HYDROCODONE BITARTRATE AND ACETAMINOPHEN 10; 325 MG/1; MG/1
1 TABLET ORAL EVERY 8 HOURS PRN
Qty: 9 TABLET | Refills: 0 | Status: SHIPPED | OUTPATIENT
Start: 2024-05-21 | End: 2024-05-25

## 2024-05-21 RX ORDER — AMOXICILLIN AND CLAVULANATE POTASSIUM 875; 125 MG/1; MG/1
1 TABLET, FILM COATED ORAL 2 TIMES DAILY
Qty: 2 TABLET | Refills: 0 | Status: SHIPPED | OUTPATIENT
Start: 2024-05-22 | End: 2024-05-23

## 2024-05-21 RX ADMIN — Medication 10 ML: at 09:35

## 2024-05-21 RX ADMIN — CEFTRIAXONE SODIUM 1000 MG: 1 INJECTION, POWDER, FOR SOLUTION INTRAMUSCULAR; INTRAVENOUS at 15:09

## 2024-05-21 RX ADMIN — GABAPENTIN 300 MG: 300 CAPSULE ORAL at 09:35

## 2024-05-21 RX ADMIN — BARIUM SULFATE 1 TEASPOON(S): 0.6 CREAM ORAL at 08:48

## 2024-05-21 RX ADMIN — BARIUM SULFATE 4 ML: 980 POWDER, FOR SUSPENSION ORAL at 08:48

## 2024-05-21 RX ADMIN — GUAIFENESIN 1200 MG: 600 TABLET, EXTENDED RELEASE ORAL at 09:35

## 2024-05-21 RX ADMIN — BUSPIRONE HYDROCHLORIDE 15 MG: 15 TABLET ORAL at 09:35

## 2024-05-21 RX ADMIN — BARIUM SULFATE 50 ML: 400 SUSPENSION ORAL at 08:49

## 2024-05-21 RX ADMIN — HYDROCODONE BITARTRATE AND ACETAMINOPHEN 2 TABLET: 5; 325 TABLET ORAL at 09:35

## 2024-05-21 RX ADMIN — GABAPENTIN 300 MG: 300 CAPSULE ORAL at 15:09

## 2024-05-21 RX ADMIN — Medication 1 CAPSULE: at 09:35

## 2024-05-21 RX ADMIN — BARIUM SULFATE 55 ML: 0.81 POWDER, FOR SUSPENSION ORAL at 08:48

## 2024-05-21 NOTE — DISCHARGE INSTRUCTIONS
Notify your primary care provider if you experience chest pain, difficulty breathing, fevers/chills, nausea or vomiting, bleeding in stool, excessive diarrhea, numbness or weakness or tingling in any part of your body.        Follow-up as primary care and urology.  Will need nuclear whole-body scan to evaluate for cancer.

## 2024-05-21 NOTE — MBS/VFSS/FEES
Acute Care - Speech Language Pathology   Swallow Re-Evaluation [facility]     Patient Name: Jimmy Norman  : 1955  MRN: [id number]  Today's Date: 2024               Admit Date: 2024    Visit Dx:     ICD-10-CM ICD-9-CM   1. Pneumonia of both lower lobes due to infectious organism  J18.9 486   2. Hypotension, unspecified hypotension type  I95.9 458.9   3. Myalgia  M79.10 729.1     Patient Active Problem List   Diagnosis    Pneumonia    Jaw pain    Transaminitis    History of prostate cancer    Severe malnutrition     Past Medical History:   Diagnosis Date    Arthritis     Cancer     Coronary artery disease      Past Surgical History:   Procedure Laterality Date    WOUND DEBRIDEMENT      pt states he had surgery on chest wall r/t infection       SLP Recommendation and Plan  SLP Swallowing Diagnosis: mild, oral dysphagia, suspected esophageal dysphagia (24)  SLP Diet Recommendation: soft to chew textures, thin liquids, other (see comments) (advance as tolerated) (24)  Recommended Precautions and Strategies: upright posture during/after eating, small bites of food and sips of liquid, multiple swallows per bite of food, alternate between small bites of food and sips of liquid, general aspiration precautions (24)  SLP Rec. for Method of Medication Administration: meds whole, with thin liquids, with puree, as tolerated (24)     Monitor for Signs of Aspiration: yes, notify SLP if any concerns (24)  Recommended Diagnostics: reassess via clinical swallow evaluation (24)  Swallow Criteria for Skilled Therapeutic Interventions Met: demonstrates skilled criteria (24)  Anticipated Discharge Disposition (SLP): unknown (24)  Rehab Potential/Prognosis, Swallowing: good, to achieve stated therapy goals (24)  Therapy Frequency (Swallow): PRN (24)  Predicted Duration Therapy Intervention (Days): until  discharge (05/21/24 0830)  Oral Care Recommendations: Oral Care BID/PRN (05/21/24 0830)  Demonstrates Need for Referral to Another Service: dedicated esophageal assessment, other (see comments) (with further concern of aspiration) (05/21/24 0830)                                     Outcome Evaluation: SLP recommends soft (whole meats) and thin liquids. Advanced to regular solids as tolerated by patient. Medication whole with water or applesauce. Precautions: upright during meals and 20-30 minutes after, small bites/sips, double swallow, alternate solids and liquids. With further concerns of aspiration, suggest dedicated esophageal study. SLP to follow for diet tolerance and review of strategies.      SWALLOW EVALUATION (Last 72 Hours)       SLP Adult Swallow Evaluation       Row Name 05/21/24 0830 05/18/24 1300       Rehab Evaluation    Document Type re-evaluation  -SR evaluation  -LS    Subjective Information no complaints  -SR --    Patient Observations alert;cooperative;agree to therapy  -SR --    Patient Effort good  -SR good  -LS    Symptoms Noted During/After Treatment none  -SR --       General Information    Patient Profile Reviewed yes  -SR yes  -LS    Pertinent History Of Current Problem -- Pt admitted with pneumonia.  -LS    Current Method of Nutrition soft to chew textures;thin liquids  -SR soft to chew textures  RN changed just before eval.  -LS    Precautions/Limitations, Vision WFL;for purposes of eval  -SR WFL;for purposes of eval  -LS    Precautions/Limitations, Hearing WFL;for purposes of eval  -SR WFL;for purposes of eval  -LS    Prior Level of Function-Communication WFL  -SR WFL  -LS    Prior Level of Function-Swallowing safe, efficient swallowing in all situations  -SR safe, efficient swallowing in all situations  -LS    Plans/Goals Discussed with patient;agreed upon  -SR patient and family  -LS    Barriers to Rehab -- medically complex  -LS    Patient's Goals for Discharge -- take care of  myself at home  -LS       Pain    Additional Documentation Pain Scale: Numbers Pre/Post-Treatment (Group)  -SR --       Pain Scale: Numbers Pre/Post-Treatment    Pretreatment Pain Rating 0/10 - no pain  -SR --    Posttreatment Pain Rating 0/10 - no pain  -SR --    Pre/Posttreatment Pain Comment Patient voiced no complaints of pain  -SR --       Oral Motor Structure and Function    Dentition Assessment natural, present and adequate  -SR natural, present and adequate  -LS    Secretion Management WNL/WFL  -SR WNL/WFL  -LS    Mucosal Quality moist, healthy  -SR --       Oral Musculature and Cranial Nerve Assessment    Oral Motor General Assessment mandibular impairment  -SR --    Mandibular Impairment Detail, Cranial Nerve V (Trigeminal) reduced mandibular ROM  -SR --       General Eating/Swallowing Observations    Respiratory Support Currently in Use room air  -SR --    Eating/Swallowing Skills self-fed  -SR self-fed  -LS    Positioning During Eating upright 90 degree;upright in bed  -SR upright 90 degree;upright in bed  -LS    Utensils Used -- spoon;cup  -LS    Consistencies Trialed -- regular textures;soft to chew textures;nectar/syrup-thick liquids;thin liquids  -LS       Respiratory    Respiratory Status -- room air  -LS       Clinical Swallow Eval    Oral Prep Phase -- impaired  -LS    Oral Transit -- WFL  -LS    Oral Residue -- WFL  -LS    Pharyngeal Phase -- no overt signs/symptoms of pharyngeal impairment  -LS    Clinical Swallow Evaluation Summary -- Bedside swallow evaluation performed this date. Pt seen at lunch time and complained that the meatloaf was  hard and difficult to eat.diffixcult to eat. Pt has a hx of previous ORIF of Mandible. Pt exhibited decreased range oif motion o the right Decreased rotary chew also noted on the right.  No difficulty with mechanical soft  or puree.  SLP recommends a soft to chew diet with thin liquids. Oral care before/after meal.  Pt upright at 90 degrees for all meals  plus 30 minutes after. VFSS to r/o silent aspiration.  -LS       Oral Prep Concerns    Oral Prep Concerns -- inefficient mastication;poor rotary chew  -LS    Inefficient Mastication -- regular consistencies  -LS    Poor Rotary Chew -- regular consistencies  -LS       MBS/VFSS    Utensils Used spoon;cup;straw  -SR --    Consistencies Trialed thin liquids;nectar/syrup-thick liquids;pureed;mechanical ground textures;mixed consistency;regular textures  -SR --       MBS/VFSS Interpretation    VFSS Summary VFSS completed. MEÑO Kirby present during study. Patient presents with mild oropharyngeal dysphagia secondary to decreased range of motion and retrograde movement in upper esophagus with trace-mild stasis across all consistencies. Mandible hardware visualized prior to study. No penetration or aspiration visualized with thin, nectar, puree, soft, or regular solids. Patient demonstrated piecemeal deglutition with soft and regular solids. Utilized thin liquid wash to aid in bolus formation and clearance of base of tongue residue with regular (dry) cracker. Limited trials accepted by patient due to disinterest in barium. SLP recommends soft (whole meats) and thin liquids. Advanced to regular solids as tolerated by patient. Medication whole with water or applesauce. Precautions: upright during meals and 20-30 minutes after, small bites/sips, double swallow, alternate solids and liquids. With further concerns of aspiration, suggest dedicated esophageal study. SLP to follow for diet tolerance and review of strategies.  -SR --       SLP Communication to Radiology    Summary Statement VFSS completed. MEÑO Kirby present during study. Patient presents with mild oropharyngeal dysphagia secondary to decreased range of motion and retrograde movement in upper esophagus with trace-mild stasis across all consistencies. Mandible hardware visualized prior to study. No penetration or aspiration visualized with thin, nectar,  puree, soft, or regular solids. Patient demonstrated piecemeal deglutition with soft and regular solids. Utilized thin liquid wash to aid in bolus formation and clearance of base of tongue residue with regular (dry) cracker. Limited trials accepted by patient due to disinterest in barium.  -SR --       SLP Evaluation Clinical Impression    SLP Swallowing Diagnosis mild;oral dysphagia;suspected esophageal dysphagia  -SR --    Functional Impact risk of aspiration/pneumonia  -SR --    Rehab Potential/Prognosis, Swallowing good, to achieve stated therapy goals  -SR --    Swallow Criteria for Skilled Therapeutic Interventions Met demonstrates skilled criteria  -SR --       Recommendations    Therapy Frequency (Swallow) PRN  -SR PRN  -LS    Predicted Duration Therapy Intervention (Days) until discharge  -SR until discharge  -LS    SLP Diet Recommendation soft to chew textures;thin liquids;other (see comments)  advance as tolerated  -SR soft to chew textures;thin liquids  -LS    Recommended Diagnostics reassess via clinical swallow evaluation  -SR VFSS (MBS)  -LS    Recommended Precautions and Strategies upright posture during/after eating;small bites of food and sips of liquid;multiple swallows per bite of food;alternate between small bites of food and sips of liquid;general aspiration precautions  -SR upright posture during/after eating;small bites of food and sips of liquid  -LS    Oral Care Recommendations Oral Care BID/PRN  -SR Oral Care BID/PRN  -LS    SLP Rec. for Method of Medication Administration meds whole;with thin liquids;with puree;as tolerated  -SR meds crushed;with puree  -LS    Monitor for Signs of Aspiration yes;notify SLP if any concerns  -SR yes;notify SLP if any concerns  -LS    Anticipated Discharge Disposition (SLP) unknown  -SR --    Demonstrates Need for Referral to Another Service dedicated esophageal assessment;other (see comments)  with further concern of aspiration  -SR --       Swallow Goals  (SLP)    Swallow LTGs Patient will demonstrate functional swallow for  -SR --       (LTG) Patient will demonstrate functional swallow for    Diet Texture (Demonstrate functional swallow) soft to chew (whole) textures  -SR --    Liquid viscosity (Demonstrate functional swallow) thin liquids  -SR --    [place] (Demonstrate functional swallow) independently (over 90% accuracy)  -SR --    Time Frame (Demonstrate functional swallow) 1 week  -SR --              User Key  (r) = Recorded By, (t) = Taken By, (c) = Cosigned By      Initials Name Effective Dates    LS Rin Santamaria, EMILIE 01/05/24 -     SR Kiley Hurtado SLP 01/05/24 -                     EDUCATION  The patient has been educated in the following areas:   Dysphagia (Swallowing Impairment) Oral Care/Hydration.        SLP GOALS       Row Name 05/21/24 0830             (LTG) Patient will demonstrate functional swallow for    Diet Texture (Demonstrate functional swallow) soft to chew (whole) textures  -SR      Liquid viscosity (Demonstrate functional swallow) thin liquids  -SR      [place] (Demonstrate functional swallow) independently (over 90% accuracy)  -SR      Time Frame (Demonstrate functional swallow) 1 week  -SR                User Key  (r) = Recorded By, (t) = Taken By, (c) = Cosigned By      Initials Name Provider Type    SR Kiley Hurtado, SLP Speech and Language Pathologist                         Time Calculation:    Time Calculation- SLP       Row Name 05/21/24 1107             Time Calculation- SLP    SLP Start Time 0835  -SR      SLP Received On 05/21/24  -SR         Untimed Charges    SLP Eval/Re-eval  [facility] Fluoro Eval Swallow - 49896  -SR      [id number] [employer] Fluoro Eval Swallow Minutes 75  -SR         Total Minutes    Untimed Charges Total Minutes 75  -SR       Total Minutes 75  -SR                User Key  (r) = Recorded By, (t) = Taken By, (c) = Cosigned By      Initials Name Provider Type    SR Kiley Hurtado, EMILIE Speech and  Language Pathologist                    Therapy Charges for Today       Code Description Service Date Service Provider Modifiers Qty    [id number] HC ST MOTION FLUORO EVAL SWALLOW 5 5/21/2024 Kiley Hurtado, SLP GN 1                 EMILIE Pfeiffer  5/21/2024

## 2024-05-21 NOTE — PLAN OF CARE
Goal Outcome Evaluation:              Outcome Evaluation: VFSS completed. See full report for details. SLP recommends soft (whole meats) and thin liquids. Advanced to regular solids as tolerated by patient. Medication whole with water or applesauce. Precautions: upright during meals and 20-30 minutes after, small bites/sips, double swallow, alternate solids and liquids. With further concerns of aspiration, suggest dedicated esophageal study. SLP to follow for diet tolerance and review of strategies.

## 2024-05-21 NOTE — PLAN OF CARE
Goal Outcome Evaluation:   Vss. Pt c/o 10/10 pain after norco administration. Refused pain medicine this morning. Updated daughter Valeria on the phone who expressed concerns. Using urinal at the bedside, urine is dark. Pt did not eat his dinner tray last night. Refused non pharm pain treatments as well.

## 2024-05-21 NOTE — DISCHARGE SUMMARY
Patient Name: Jimmy Norman  : 1955  MRN: [id number]    Date of Admission: 2024  Date of Discharge:  2024  Primary Care Physician: Provider, No Known      Chief Complaint:   Neck Pain and Generalized Body Aches      Discharge Diagnoses     Active Hospital Problems    Diagnosis  POA    **Pneumonia [J18.9]  Yes    Severe malnutrition [E43]  Yes    Jaw pain [R68.84]  Unknown    Transaminitis [R74.01]  Unknown    History of prostate cancer [Z85.46]  Not Applicable      Resolved Hospital Problems   No resolved problems to display.        Hospital Course     Patient is a 69 y.o. male with a history of CAD, prostate cancer that presents to [facility] complaining of jaw pain, body aches, fatigue for approximately 2 weeks.      Chronic jaw pain  H/o of bilateral mandible fractures and post ORIF via risdon approach.  -Chart reviewed in [facility] everywhere, patient has been complaining persistent pain and swelling since surgery.He has been referred to pain management but refused per chart review.-CT of soft tissue on admission of the neck on 2024 no acute findings .Recent CT maxillofacial area with contrast at [facility] on 2024 also with no acute findings  Patient reported that  he is on as needed Norco 10 mg outpatient, however per chart review last time he was prescribed Norco 2023 for 30  day supply, When discussed with patient but he reported taking Norco outpatient however there was no prescription refilled  since 2023, he reported that physician stopped prescribing it because he ran out early he took fentanyl pill but was not prescribed.  Reported  he did buy pain medication from the streets because of severe pain in his jaw.  Discussed with patient that he will likely have lifelong chronic pain but needs long-term management of his jaw pain and his to be compliant with pain management so pain medications can be prescribed continuously.  In addition encourage  patient to follow-up with pain management doctor as recommended and patient agreed, referral placed to pain management.  Prescribed 3 days of gabapentin and Norco at discharge.    Pneumonia  -x-ray chest -Findings concerning for bilateral lower lobe infiltrates.  No evidence of   consolidation or pleural effusion. WBC normal, procalcitonin mildly elevated at 0.39, lactate was normal, afebrile nares, strep and Legionella antigen were negative.  Blood cultures remain negative to date.  CT scan 05/20/2024 showed -multifocal patchy and nodular consolidation and patchy ground glass opacities with interstitial thickening in both lungs, likely multifocal pneumonia.  Patient received 4-day course of IV antibiotics, discharged on Augmentin for 1 more day to complete 5-day course of antibiotics.  Will need follow-up imaging, if persistent infiltrates will need further evaluation for noninfectious etiology including possible metastatic cancer.  Discussed with patient.                  Chronic jaw pain  H/o of bilateral mandible fractures and post ORIF via risdon approach.  -Chart reviewed in [facility] everywhere, patient has been complaining persistent pain and swelling since surgery.  - He has been referred to pain management but refused per chart review.   -CT of soft tissue on admission of the neck on 05/18/2024 no acute findings Recent CT maxillofacial area with contrast at [facility] on 05/07/2024 also with no acute findings  P-atient reports he is on as needed Norco 10 mg outpatient, however per chart review last time he was prescribed Norco 11/24/2023 for 30  day supply  -When discussed with patient but he reported taking Norco outpatient however there was no prescription refilled December 2023, he reported that physician stopped prescribing it because he ran out early he took fentanyl pill but was not prescribed.  Reported did buy pain medication from the streets because of severe pain in his jaw.  -Previously refused pain  management referral, now agreeable.  Will refer to pain management after discharge.  -Continue Norco 10 mg every 8 hours as needed, continue gabapentin     Hx prostate cancer s/p surgical and radiation tx/pulmonary nodules  Patient worried his cancer has come back. PSA was normal 05/18 Nuclear whole-body scan 11/2022  with  no evidence of skeletal metastatic disease.  CT abdomen pelvis 05/24-.. Multiple pulmonary nodules in both lungs measuring up to 7 mm in diameter, which may also be infectious/inflammatory. Could considerollow-up CT chest in 3 months after treatment.3. Small sclerotic foci in the posterior right third rib and the posterior left fifth rib, possible benign bone islands. Discussed to follow-up with primary urologist/PCP and he will need outpatient repeat nuclear whole-body scan to evaluate for abnormal findings seen on CT including pulmonary nodules.  Patient reported she will make an appointment with neurologist as soon as possible.           Weight loss  -Patient reported 25 pound weight loss in 15 days to , Debora Larsen, to me he reported that he lost 25 ponds in the last 2-3 months.  Also notes that he has not been eating much because does not have appetite and not feeling well due to pain.  PSA as above normal indicating low likelihood of recurrent prostate cancer.Chart reviewed, last weight available in the system on 02/05/2024 with a weight of  97.1 Kg (214 lbs), weight on admission 85.5 kg (188 lbs), which is 26 pounds weight loss in the last 3 months and 13 days.TSH is normal 05/18HIV antibody 05/19 was negative.-PSA normal as above.-Nutrition evaluated.  Suspect weight loss primarily due to poor p.o. intake in the setting of significant pain.  Again encourage patient to follow-up with PCP/pain management pain can be better controlled.  Malignancy workup as above.       Mild fusiform 4 cm ectasia of the ascending thoracic aorta, small 3.1  cm fusiform infrarenal abdominal aortic  aneurysm, and small 2.5 cm right  common iliac artery aneurysm. Seen on CT, with need outpatient surveillance imaging, follow-up with PCP.           Transaminitis  Hepatitis panel negative.  CT abdomen pelvis with no significant liver findings.  Held simvastatin at discharge, will need repeat CMP to monitor liver enzymes.  If persistently elevated recommended GI consult, if improve can resume simvastatin.     Anemia-iron panel was consistent with anemia of chronic disease, hemoglobin remained stable around 10     Significantly elevated ferritin  -Ferritin elevated at 5954.  Differential include infectious, chronic inflammatory disorders such as RA, infection, malignancies, hemochromatosis,  liver disease.  Workup for malignancy as above.  Will need repeat outpatient ferritin level, if persistently elevated despite treatment for pneumonia and if malignancy workup negative, will need further evaluation.  Discussed with patient.      At the time of discharge patient was told to take all medications as prescribed, keep all follow-up appointments, and call their doctor or return to the hospital with any worsening or concerning symptoms.              Day of Discharge     Subjective:  Patient seen this morning, no changes, continues to complain of jaw pain.  Reports he is going to follow-up with pain management, and was asking whether he can follow-up with pain management continues to feel well.  Discussed with patient that I will place referral and he can follow-up with any pain management as he prefers and that is available and that sooner than later would be better.  Denies respiratory symptoms, remains on room air.    Physical Exam:  Temp:  [98.1 °F (36.7 °C)-99 °F (37.2 °C)] 98.1 °F (36.7 °C)  Heart Rate:  [89-98] 89  Resp:  [20] 20  BP: (116-125)/(82-91) 116/82  Body mass index is 24.2 kg/m².  Physical Exam      General: Alert alert, laying in bed, not in distress,  HEENT: Normocephalic, atraumatic  CV: Regular rate  and rhythm, no murmurs rubs or gallops  Lungs: Clear to auscultation bilaterally, no crackles or wheezes  Abdomen: Soft, nontender, nondistended  Extremities: No significant peripheral edema , no cyanosis       Consultants     Consult Orders (all) (From admission, onward)       Start     Ordered    05/20/24 1343  Inpatient Infectious Diseases Consult  Once        Specialty:  Infectious Diseases  Provider:  Indu Blair MD    05/20/24 1342    05/18/24 1003  Inpatient Nutrition Consult  Once        Provider:  (Not yet assigned)    05/18/24 1002    05/18/24 0512  Inpatient Case Management  Consult  Once        Provider:  (Not yet assigned)    05/18/24 0511    05/18/24 0512  Inpatient Nutrition Consult  Once        Provider:  (Not yet assigned)    05/18/24 0511    05/18/24 0158  [facility] (on-call MD unless specified) Details  Once        Specialty:  Hospitalist  Provider:  (Not yet assigned)    05/18/24 0157                  Procedures     * Surgery not found *      Imaging Results (All)       Procedure Component Value Units Date/Time    FL Video Swallow Single Contrast [[id number]] Collected: 05/21/24 1523     Updated: 05/21/24 1523    Narrative:      VIDEO SWALLOWING EXAMINATION BY SPEECH PATHOLOGY     Clinical: Dysphasia     Reference Air Kerma: 6.20 mGy     Video swallowing examination performed under the direction of speech  pathology. Imaging reviewed by radiologist who concurs with the  findings.     Speech pathology summary: VFSS completed. MEÑO Kirby present  during study. Patient presents with mild oropharyngeal dysphagia  secondary to decreased range of motion and retrograde movement in upper  esophagus with trace-mild stasis across all consistencies. Mandible  hardware visualized prior to study. No penetration or aspiration  visualized with thin, nectar, puree, soft, or regular solids. Patient  demonstrated piecemeal deglutition with soft and regular solids.  Utilized thin liquid wash to aid  in bolus formation and clearance of  base of tongue residue with regular (dry) cracker. Limited trials  accepted by patient due to disinterest in barium.            CT Abdomen Pelvis With Contrast [[id number]] Collected: 05/20/24 1217     Updated: 05/20/24 1241    Narrative:      CT CHEST W CONTRAST DIAGNOSTIC-, CT ABDOMEN PELVIS W CONTRAST-     DATE OF EXAM: 5/20/2024 10:54 AM     INDICATION: Abnormal weight loss. History of prostate cancer.     COMPARISON: CT neck 5/18/2024. Chest radiograph 5/17/2024.     TECHNIQUE: Multiple contiguous axial images were acquired through the  chest, abdomen, and pelvis following the intravenous administration of  100 mL of Isovue-300. Reformatted coronal and sagittal sequences were  also reviewed. Radiation dose reduction techniques were utilized,  including automated exposure control and exposure modulation based on  body size.     FINDINGS:  CT chest:  Low lung volumes with bilateral dependent atelectasis and multifocal  bibasilar subsegmental atelectasis and/or scarring. Multifocal patchy  and nodular consolidation and patchy groundglass opacities with  interstitial thickening in both lungs, greatest posteriorly and greatest  in the left lower lobe, likely multifocal pneumonia. A partially  obscured subpleural nodule in the posterior left lower lobe measures 7  mm in average diameter (axial series 3 image 72). Additional sub-6 mm  pulmonary nodules in both lungs. An index sub-6 mm subpleural nodule is  seen in the anterior right upper lobe (axial series 3 image 54). An  index mixed solid and groundglass sub-6 mm nodule is seen in the right  lower lobe (axial series 3 image 67). The central airways are widely  patent. No pneumothorax or pleural effusion.     The heart is normal in size. Moderate to severe calcified coronary  artery disease. Trace pericardial fluid. Mild fusiform 4 cm ectasia of  the ascending thoracic aorta. Normal variant arch anatomy with the left  vertebral  artery takes its origin directly from the aortic arch.  Enlarged right lobe of the thyroid gland and similar-appearing small  low-attenuation nodule in the small left lobe of the thyroid gland.     Mild multilevel thoracic spondylosis. Mild chronic degenerative changes  in both shoulders. Nonspecific small sclerotic foci in the posterior  right third rib (axial series 3 image 27) and the posterior left fifth  rib (axial series 3 image 45). No acute osseous abnormality.     CT abdomen and pelvis:  Incompletely evaluated small 1 cm low-attenuation lesion in the  posterior right lobe of the liver, statistically representing a hepatic  cyst or hemangioma. The gallbladder, spleen, pancreas, and adrenal  glands are unremarkable. Small low-density lesions in both kidneys  measuring up to 1.5 cm in the interpolar left kidney, probably benign  cysts. Enlarged prostate gland, measuring 5.2 cm in transverse diameter,  with mild mass effect on the floor of the urinary bladder. The urinary  bladder is otherwise unremarkable.     Mild colonic stool. No bowel obstruction or significant bowel wall  thickening. The appendix is normal.     No free fluid in the abdomen or pelvis. No free intraperitoneal air. No  pathologically enlarged lymph nodes in the abdomen or pelvis. Mild  calcified and noncalcified atherosclerotic disease in the abdominal  aorta and its distal branches with a small fusiform 3.1 cm infrarenal  abdominal aortic aneurysm and a small 2.5 cm right common iliac artery  aneurysm.     Tiny fat-containing umbilical hernia. Mild to moderate multilevel lumbar  spondylosis. Moderate bilateral hip joint DJD. Old healed fracture  deformity of the left inferior pubic ramus. No acute osseous abnormality  or concerning osseous lesion.       Impression:         1. Multifocal patchy and nodular consolidation and patchy groundglass  opacities with interstitial thickening in both lungs, likely multifocal  pneumonia.  2. Multiple  pulmonary nodules in both lungs measuring up to 7 mm in  diameter, which may also be infectious/inflammatory. Could consider  follow-up CT chest in 3 months after treatment.  3. Small sclerotic foci in the posterior right third rib and the  posterior left fifth rib, possible benign bone islands. Given the  history of prostate cancer, recommend correlating with PSA. Could  consider further evaluation with nuclear medicine bone scan if  clinically indicated.  4. Moderate to severe calcified coronary artery disease.  5. Mild fusiform 4 cm ectasia of the ascending thoracic aorta, small 3.1  cm fusiform infrarenal abdominal aortic aneurysm, and small 2.5 cm right  common iliac artery aneurysm.     This report was finalized on 5/20/2024 12:38 PM by Akash Alarcon MD on  Workstation: [id number]       CT Chest With Contrast Diagnostic [[id number]] Collected: 05/20/24 1217     Updated: 05/20/24 1241    Narrative:      CT CHEST W CONTRAST DIAGNOSTIC-, CT ABDOMEN PELVIS W CONTRAST-     DATE OF EXAM: 5/20/2024 10:54 AM     INDICATION: Abnormal weight loss. History of prostate cancer.     COMPARISON: CT neck 5/18/2024. Chest radiograph 5/17/2024.     TECHNIQUE: Multiple contiguous axial images were acquired through the  chest, abdomen, and pelvis following the intravenous administration of  100 mL of Isovue-300. Reformatted coronal and sagittal sequences were  also reviewed. Radiation dose reduction techniques were utilized,  including automated exposure control and exposure modulation based on  body size.     FINDINGS:  CT chest:  Low lung volumes with bilateral dependent atelectasis and multifocal  bibasilar subsegmental atelectasis and/or scarring. Multifocal patchy  and nodular consolidation and patchy groundglass opacities with  interstitial thickening in both lungs, greatest posteriorly and greatest  in the left lower lobe, likely multifocal pneumonia. A partially  obscured subpleural nodule in the posterior left lower  lobe measures 7  mm in average diameter (axial series 3 image 72). Additional sub-6 mm  pulmonary nodules in both lungs. An index sub-6 mm subpleural nodule is  seen in the anterior right upper lobe (axial series 3 image 54). An  index mixed solid and groundglass sub-6 mm nodule is seen in the right  lower lobe (axial series 3 image 67). The central airways are widely  patent. No pneumothorax or pleural effusion.     The heart is normal in size. Moderate to severe calcified coronary  artery disease. Trace pericardial fluid. Mild fusiform 4 cm ectasia of  the ascending thoracic aorta. Normal variant arch anatomy with the left  vertebral artery takes its origin directly from the aortic arch.  Enlarged right lobe of the thyroid gland and similar-appearing small  low-attenuation nodule in the small left lobe of the thyroid gland.     Mild multilevel thoracic spondylosis. Mild chronic degenerative changes  in both shoulders. Nonspecific small sclerotic foci in the posterior  right third rib (axial series 3 image 27) and the posterior left fifth  rib (axial series 3 image 45). No acute osseous abnormality.     CT abdomen and pelvis:  Incompletely evaluated small 1 cm low-attenuation lesion in the  posterior right lobe of the liver, statistically representing a hepatic  cyst or hemangioma. The gallbladder, spleen, pancreas, and adrenal  glands are unremarkable. Small low-density lesions in both kidneys  measuring up to 1.5 cm in the interpolar left kidney, probably benign  cysts. Enlarged prostate gland, measuring 5.2 cm in transverse diameter,  with mild mass effect on the floor of the urinary bladder. The urinary  bladder is otherwise unremarkable.     Mild colonic stool. No bowel obstruction or significant bowel wall  thickening. The appendix is normal.     No free fluid in the abdomen or pelvis. No free intraperitoneal air. No  pathologically enlarged lymph nodes in the abdomen or pelvis. Mild  calcified and  noncalcified atherosclerotic disease in the abdominal  aorta and its distal branches with a small fusiform 3.1 cm infrarenal  abdominal aortic aneurysm and a small 2.5 cm right common iliac artery  aneurysm.     Tiny fat-containing umbilical hernia. Mild to moderate multilevel lumbar  spondylosis. Moderate bilateral hip joint DJD. Old healed fracture  deformity of the left inferior pubic ramus. No acute osseous abnormality  or concerning osseous lesion.       Impression:         1. Multifocal patchy and nodular consolidation and patchy groundglass  opacities with interstitial thickening in both lungs, likely multifocal  pneumonia.  2. Multiple pulmonary nodules in both lungs measuring up to 7 mm in  diameter, which may also be infectious/inflammatory. Could consider  follow-up CT chest in 3 months after treatment.  3. Small sclerotic foci in the posterior right third rib and the  posterior left fifth rib, possible benign bone islands. Given the  history of prostate cancer, recommend correlating with PSA. Could  consider further evaluation with nuclear medicine bone scan if  clinically indicated.  4. Moderate to severe calcified coronary artery disease.  5. Mild fusiform 4 cm ectasia of the ascending thoracic aorta, small 3.1  cm fusiform infrarenal abdominal aortic aneurysm, and small 2.5 cm right  common iliac artery aneurysm.     This report was finalized on 5/20/2024 12:38 PM by Akash Alarcon MD on  Workstation: [id number]       CT Head Without Contrast [[id number]] Collected: 05/18/24 0157     Updated: 05/18/24 0157    Narrative:        Patient: VALERIA MUIR  Time Out: 01:57  Exam(s): CT HEAD Without Contrast     EXAM:    CT Head Without Intravenous Contrast    CLINICAL HISTORY:     Reason for exam: ha.    TECHNIQUE:    Axial computed tomography images of the head brain without intravenous   contrast.  CTDI is 55.7 mGy and DLP is 939.9 mGy-cm.  This CT exam was   performed according to the principle of NEETA  (As Low As Reasonably   Achievable) by using one or more of the following dose reduction   techniques: automated exposure control, adjustment of the mA and or kV   according to patient size, and or use of iterative reconstruction   technique.    COMPARISON:    No relevant prior studies available.    FINDINGS:    Brain:  Unremarkable.  No hemorrhage.  Mild nonspecific white matter   changes.  No edema.    Ventricles:  Unremarkable.  No ventriculomegaly.    Bones joints:  Unremarkable.  No acute fracture.    Soft tissues:  Unremarkable.    Sinuses:  Unremarkable as visualized.  No acute sinusitis.    Mastoid air cells:  Unremarkable as visualized.  No mastoid effusion.    IMPRESSION:       No evidence of acute intracranial pathology.      Impression:          Electronically signed by Luz Maria Wild MD on 05-18-24 at 0157    CT Soft Tissue Neck With Contrast [[id number]] Collected: 05/18/24 0150     Updated: 05/18/24 0150    Narrative:        Patient: VALERIA MUIR  Time Out: 01:49  Exam(s): CT NECK With Contrast     EXAM:    CT Neck With Intravenous Contrast    CLINICAL HISTORY:     Reason for exam: right sided jaw swelling, pain worsening - hx of   frx'ed jaw 2 years ago.    TECHNIQUE:    Axial computed tomography images of the neck with intravenous contrast.    CTDI is 13.23 mGy and DLP is 412.9 mGy-cm.  This CT exam was performed   according to the principle of ALARA (As Low As Reasonably Achievable) by   using one or more of the following dose reduction techniques: automated   exposure control, adjustment of the mA and or kV according to patient   size, and or use of iterative reconstruction technique.    COMPARISON:    No relevant prior studies available.    FINDINGS:    Oropharynx:  Unremarkable.  No significant tonsillar enlargement.  No   peritonsillar abscess.    Hypopharynx:  Unremarkable.    Larynx:  Unremarkable.  Normal epiglottis.    Trachea:  Unremarkable.    Retropharyngeal space:  Unremarkable.     Submandibular parotid glands:  Unremarkable.  Glands are normal in size.      Thyroid: Tiny left thyroid nodule.  Hypoplastic left thyroid gland.  No   enlarged or calcified nodules.    Bones joints:  No acute fracture.  Status post ORIF of the mandible   bilaterally.    Soft tissues:  Unremarkable.    Vasculature:  No acute findings.  Finding is concerning for bilateral   carotid endarterectomies with surgical staples in place.    Lymph nodes:  Unremarkable.  No lymphadenopathy.    Lung apices: Bronchitis with patchy opacities in the upper and   visualized lower lobes.    IMPRESSION:       No evidence of acute cervical soft tissue pathology.    Findings concerning for bronchitis with infiltrates in the upper and   lower lobes.    Status post ORIF of the mandible and carotid endarterectomies with   expected postsurgical changes.      Impression:          Electronically signed by Luz Maria Wild MD on 05-18-24 at 0149    XR Chest 1 View [[id number]] Collected: 05/18/24 0121     Updated: 05/18/24 0121    Narrative:        Patient: VALERIA MUIR  Time Out: 01:21  Exam(s): XR CXR 1 VIEW     EXAM:    XR Chest, 1 View    CLINICAL HISTORY:     Reason for exam: cough, myalgias.    TECHNIQUE:    Frontal view of the chest.    COMPARISON:  Comparison made to prior CT scan of the cervical soft tissues from May 18,   2024.    FINDINGS:    Lungs: Moderate to heavy peribronchial thickening of the central and   lower lobe bronchi with bilateral lower lobe patchy opacities.  No   consolidation.    Pleural space:  Unremarkable.  No pneumothorax.    Heart:  Unremarkable.  No cardiomegaly.    Mediastinum:  Unremarkable.  Normal mediastinal contour.    Bones joints:  Unremarkable.  No acute fracture.    IMPRESSION:       Findings concerning for bilateral lower lobe infiltrates.  No evidence of   consolidation or pleural effusion.      Impression:          Electronically signed by Luz Maria Wild MD on 05-18-24 at 0121       "        Pertinent Labs     Results from last 7 days   Lab Units 05/21/24 0448 05/20/24  0358 05/19/24  1254 05/19/24  0440 05/18/24  0706   WBC 10*3/mm3 5.11 4.57  --  4.71 3.79   HEMOGLOBIN g/dL 10.4* 10.2* 10.3* 10.3* 11.0*   PLATELETS 10*3/mm3 294 263  --  240 230     Results from last 7 days   Lab Units 05/21/24 0448 05/20/24  0357 05/19/24  0440 05/18/24  0706   SODIUM mmol/L 138 138 136 133*   POTASSIUM mmol/L 3.9 4.1 4.2 4.4   CHLORIDE mmol/L 105 106 103 102   CO2 mmol/L 21.1* 22.0 22.0 17.7*   BUN mg/dL 11 11 12 13   CREATININE mg/dL 0.88 0.90 0.98 0.96   GLUCOSE mg/dL 101* 95 102* 97   Estimated Creatinine Clearance: 94.5 mL/min (by C-G formula based on SCr of 0.88 mg/dL).  Results from last 7 days   Lab Units 05/21/24 0448 05/20/24  0357 05/19/24 0440 05/18/24  0706   ALBUMIN g/dL 3.1* 3.4* 3.4* 3.2*   BILIRUBIN mg/dL 0.6 0.6 0.7 1.0   ALK PHOS U/L 66 64 64 66   AST (SGOT) U/L 89* 69* 64* 65*   ALT (SGPT) U/L 57* 46* 45* 46*     Results from last 7 days   Lab Units 05/21/24 0448 05/20/24 0357 05/19/24  0440 05/18/24  0706   CALCIUM mg/dL 8.6 8.4* 8.3* 8.5*   ALBUMIN g/dL 3.1* 3.4* 3.4* 3.2*   MAGNESIUM mg/dL 2.1 2.3 2.0 2.4   PHOSPHORUS mg/dL 2.5 2.6 2.1* 3.2       Results from last 7 days   Lab Units 05/18/24  0706 05/18/24  0102 05/17/24  2320   HSTROP T ng/L  --  15 19   PROBNP pg/mL <36.0  --   --            Invalid input(s): \"LDLCALC\"  Results from last 7 days   Lab Units 05/18/24  1305 05/17/24  2341   BLOODCX   --  No growth at 3 days  No growth at 3 days   MRSAPCR  No MRSA Detected  --      Results from last 7 days   Lab Units 05/17/24  2321   COVID19  Not Detected       Test Results Pending at Discharge     Pending Labs       Order Current Status    Blood Culture - Blood, Arm, Left Preliminary result    Blood Culture - Blood, Arm, Right Preliminary result            Discharge Details        Discharge Medications        New Medications        Instructions Start Date   amoxicillin-clavulanate " 875-125 MG per tablet  Commonly known as: AUGMENTIN   1 tablet, Oral, 2 Times Daily   Start Date: May 22, 2024     naloxone 4 MG/0.1ML nasal spray  Commonly known as: NARCAN   Call 911. Don't prime. [place] in 1 nostril for overdose. Repeat in 2-3 minutes in other nostril if no or minimal breathing/responsiveness.             Changes to Medications        Instructions Start Date   gabapentin 600 MG tablet  Commonly known as: NEURONTIN  What changed:   when to take this  reasons to take this   600 mg, Oral, 3 Times Daily             Continue These Medications        Instructions Start Date   acetaminophen 500 MG tablet  Commonly known as: TYLENOL   1,000 mg, Oral, 3 Times Daily PRN      amLODIPine 5 MG tablet  Commonly known as: NORVASC   5 mg, Oral, Daily      busPIRone 15 MG tablet  Commonly known as: BUSPAR   15 mg, Oral, 2 Times Daily      cetirizine 10 MG tablet  Commonly known as: zyrTEC   10 mg, Oral, Daily PRN      cyclobenzaprine 5 MG tablet  Commonly known as: FLEXERIL   5 mg, Oral, 2 Times Daily PRN      DULoxetine 60 MG capsule  Commonly known as: CYMBALTA   60 mg, Oral, Nightly      HYDROcodone-acetaminophen  MG per tablet  Commonly known as: NORCO   1 tablet, Oral, Every 8 Hours PRN      hydrOXYzine pamoate 50 MG capsule  Commonly known as: VISTARIL   50 mg, Oral, Nightly      ibuprofen 600 MG tablet  Commonly known as: ADVIL,MOTRIN   600 mg, Oral, Every 8 Hours PRN      latanoprost 0.005 % ophthalmic solution  Commonly known as: XALATAN   1 drop, Both Eyes, Nightly      ondansetron ODT 4 MG disintegrating tablet  Commonly known as: ZOFRAN-ODT   4 mg, Translingual, Every 8 Hours PRN      vitamin D 1.25 MG (45921 UT) capsule capsule  Commonly known as: ERGOCALCIFEROL   50,000 Units, Oral, Weekly             Stop These Medications      atorvastatin 20 MG tablet  Commonly known as: LIPITOR              No Known Allergies    Discharge Disposition:  Home or Self Care      Discharge Diet:  Diet Order    Procedures    Diet: Regular/House; Texture: Soft to Chew (NDD 3); Soft to Chew: Whole Meat; Fluid Consistency: Thin (IDDSI 0)       Discharge Activity:       CODE STATUS:    Code Status and Medical Interventions:   Ordered at: 05/18/24 0308     Code Status (Patient has no pulse and is not breathing):    CPR (Attempt to Resuscitate)     Medical Interventions (Patient has pulse or is breathing):    Full Support       No future appointments.   Follow-up Information       Provider, No Known. Schedule an appointment as soon as possible for a visit in 1 week(s).    Why: CMP in 1 week to monitor liver enzymes.  Contact information:  [facility] [place] [address] [phone]                             Time Spent on Discharge:  Greater than 30 minutes      Acosta Candelario MD  [facility]  05/21/24  16:49 EDT

## 2024-05-22 NOTE — CASE MANAGEMENT/SOCIAL WORK
Case Management Discharge Note      Final Note: DC'd home. Sandor BARON RN    Provided Post Acute Provider List?: N/A  Provided Post Acute Provider Quality & Resource List?: N/A    Selected Continued Care - Discharged on 5/21/2024 Admission date: 5/17/2024 - Discharge disposition: Home or Self Care      Destination    No services have been selected for the patient.                Durable Medical Equipment    No services have been selected for the patient.                Dialysis/Infusion    No services have been selected for the patient.                Home Medical Care    No services have been selected for the patient.                Therapy    No services have been selected for the patient.                Community Resources    No services have been selected for the patient.                Community & DME    No services have been selected for the patient.                    Transportation Services  Private: Car    Final Discharge Disposition Code: 01 - home or self-care

## 2024-05-23 LAB
BACTERIA SPEC AEROBE CULT: NORMAL
BACTERIA SPEC AEROBE CULT: NORMAL

## 2024-05-25 PROBLEM — R09.02 HYPOXIA: Status: ACTIVE | Noted: 2024-01-01

## 2024-05-25 PROBLEM — A41.9 SEPSIS: Status: ACTIVE | Noted: 2024-01-01

## 2024-05-25 PROBLEM — E86.0 DEHYDRATION: Status: ACTIVE | Noted: 2024-05-25

## 2024-05-25 PROBLEM — N17.9 AKI (ACUTE KIDNEY INJURY): Status: ACTIVE | Noted: 2024-05-25

## 2024-05-25 PROBLEM — J69.0 ASPIRATION PNEUMONIA DUE TO VOMITUS: Status: ACTIVE | Noted: 2024-05-25

## 2024-05-25 NOTE — PLAN OF CARE
"Goal Outcome Evaluation:                 New orders received d/t \"recurrent PNA\". Patient with recent VFSS completed 5/21/24 during previous hospitalization. Results as follows: \"SLP recommends soft (whole meats) and thin liquids. Advanced to regular solids as tolerated by patient. Medication whole with water or applesauce. Precautions: upright during meals and 20-30 minutes after, small bites/sips, double swallow, alternate solids and liquids. With further concerns of aspiration, suggest dedicated esophageal study. SLP to follow for diet tolerance and review of strategies.\"     SLP to modify diet to soft/whole with thins at this time. Spoke to ED RN who is in agreement for speech to follow once dedicated esophageal assessment completed.                              "

## 2024-05-25 NOTE — ED PROVIDER NOTES
EMERGENCY DEPARTMENT ENCOUNTER    CHIEF COMPLAINT  Chief Complaint: Generalized weakness  History given by: Patient and family  History limited by: Patient status  Room Number: 23/23  PMD: Provider, No Known      HPI:  Pt is a 69 y.o. male presents with daughter at bedside complaining of generalized weakness, poor p.o. intake worsening at home over the past 2 days.  Daughter reports mild cough, denies known fever, patient and daughter report no complaints of chest pain, shortness of air, abdominal pain, vomiting, changes in urinary or bowel habits, patient and daughter report of rash to patient's abdomen and face at prior admission, now much improved, denies any wounds.  Daughter reports patient finished antibiotics he was sent home on from the hospital.  Patient has been using walker at home, patient and family deny falls.    Patient and family report he is not eating or drinking well at home.        Aggravating Factors: Recent admission with pneumonia, taking antibiotics  Alleviating Factors: Nothing  Treatment before arrival: Antibiotics at home  Chronic or social conditions impacting care: None known    Additional sources:  Discussed/ obtained information from independent historians: Daughter at bedside    External (non-ED) record review:   Patient admitted 5/17 through 5/21/2024 with pneumonia, noted to have severe malnutrition, transaminitis hepatitis panel negative, simvastatin held at discharge, complains of jaw pain with history of bilateral mandibular fractures post ORIF, history of prostate cancer at that time  Patient with CT neck 5/18 with no acute findings, CT maxillofacial area with contrast 5/7 at [facility] with no acute findings.  Patient encouraged to follow-up with pain management with compliance with pain medicine regimen and discouraged from buying drugs such as fentanyl off the street as he has done previously.  Patient given prescription for 3 days of gabapentin and Norco 10 at  discharge        PAST MEDICAL HISTORY  Active Ambulatory Problems     Diagnosis Date Noted    Jaw pain 05/18/2024    Transaminitis 05/18/2024    History of prostate cancer 05/18/2024    Severe malnutrition 05/19/2024     Resolved Ambulatory Problems     Diagnosis Date Noted    No Resolved Ambulatory Problems     Past Medical History:   Diagnosis Date    Arthritis     Cancer     Coronary artery disease        PAST SURGICAL HISTORY  Past Surgical History:   Procedure Laterality Date    WOUND DEBRIDEMENT      pt states he had surgery on chest wall r/t infection       FAMILY HISTORY  History reviewed. No pertinent family history.    SOCIAL HISTORY  Social History     Socioeconomic History    Marital status: Single   Tobacco Use    Smoking status: Never     Passive exposure: Never    Smokeless tobacco: Never   Vaping Use    Vaping status: Never Used   Substance and Sexual Activity    Alcohol use: Yes     Alcohol/week: 2.0 standard drinks of alcohol     Types: 2 Shots of liquor per week     Comment: drinks maybe once a month    Drug use: Not Currently     Comment: hx pain pill use    Sexual activity: Defer       ALLERGIES  Patient has no known allergies.    REVIEW OF SYSTEMS  Review of Systems    PHYSICAL EXAM  ED Triage Vitals [05/25/24 0713]   Temp Heart Rate Resp BP SpO2   (!) 101.9 °F (38.8 °C) (!) 121 16 132/93 95 %      Temp src Heart Rate Source Patient Position BP Location FiO2 (%)   Tympanic -- -- -- --       Physical Exam  Vitals and nursing note reviewed.   Constitutional:       General: He is in acute distress.      Comments: Listless, eyes closed, will open eyes to verbal request and immediately closes them again, follows commands, moves all extremities, answers questions appropriately   HENT:      Head: Normocephalic and atraumatic.      Mouth/Throat:      Mouth: Mucous membranes are dry.   Eyes:      Pupils: Pupils are equal, round, and reactive to light.   Cardiovascular:      Rate and Rhythm: Normal rate  and regular rhythm.      Pulses:           Posterior tibial pulses are 2+ on the right side and 2+ on the left side.      Heart sounds: Normal heart sounds. No murmur heard.  Pulmonary:      Effort: Pulmonary effort is normal. No respiratory distress.      Breath sounds: Decreased air movement (Bilateral bases) present. Stridor: Vital basis.No wheezing.   Abdominal:      General: Bowel sounds are normal.      Palpations: Abdomen is soft.      Tenderness: There is no abdominal tenderness. There is no guarding or rebound.   Musculoskeletal:         General: Normal range of motion.      Cervical back: Normal range of motion.   Skin:     General: Skin is warm and dry.   Neurological:      Mental Status: He is oriented to person, place, and time.   Psychiatric:         Mood and Affect: Affect normal.         LAB RESULTS  Recent Results (from the past 24 hour(s))   ECG 12 Lead Dyspnea    Collection Time: 05/25/24  7:40 AM   Result Value Ref Range    QT Interval 306 ms    QTC Interval 429 ms   Comprehensive Metabolic Panel    Collection Time: 05/25/24  7:43 AM    Specimen: Blood   Result Value Ref Range    Glucose 109 (H) 65 - 99 mg/dL    BUN 22 8 - 23 mg/dL    Creatinine 1.34 (H) 0.76 - 1.27 mg/dL    Sodium 140 136 - 145 mmol/L    Potassium 4.0 3.5 - 5.2 mmol/L    Chloride 104 98 - 107 mmol/L    CO2 24.0 22.0 - 29.0 mmol/L    Calcium 8.8 8.6 - 10.5 mg/dL    Total Protein 7.9 6.0 - 8.5 g/dL    Albumin 3.4 (L) 3.5 - 5.2 g/dL    ALT (SGPT) 60 (H) 1 - 41 U/L    AST (SGOT) 93 (H) 1 - 40 U/L    Alkaline Phosphatase 70 39 - 117 U/L    Total Bilirubin 0.7 0.0 - 1.2 mg/dL    Globulin 4.5 gm/dL    A/G Ratio 0.8 g/dL    BUN/Creatinine Ratio 16.4 7.0 - 25.0    Anion Gap 12.0 5.0 - 15.0 mmol/L    eGFR 57.0 (L) >60.0 mL/min/1.73   BNP    Collection Time: 05/25/24  7:43 AM    Specimen: Blood   Result Value Ref Range    proBNP 65.1 0.0 - 900.0 pg/mL   Single High Sensitivity Troponin T    Collection Time: 05/25/24  7:43 AM    Specimen:  Blood   Result Value Ref Range    HS Troponin T 22 (H) <22 ng/L   Protime-INR    Collection Time: 05/25/24  7:43 AM    Specimen: Blood   Result Value Ref Range    Protime 14.9 (H) 11.7 - 14.2 Seconds    INR 1.15 (H) 0.90 - 1.10   Urinalysis With Microscopic If Indicated (No Culture) - Urine, Clean Catch    Collection Time: 05/25/24  7:43 AM    Specimen: Urine, Clean Catch   Result Value Ref Range    Color, UA Yellow Yellow, Straw    Appearance, UA Clear Clear    pH, UA 5.5 5.0 - 8.0    Specific Gravity, UA 1.013 1.005 - 1.030    Glucose, UA Negative Negative    Ketones, UA Negative Negative    Bilirubin, UA Negative Negative    Blood, UA Trace (A) Negative    Protein,  mg/dL (2+) (A) Negative    Leuk Esterase, UA Negative Negative    Nitrite, UA Negative Negative    Urobilinogen, UA 1.0 E.U./dL 0.2 - 1.0 E.U./dL   Lactic Acid, Plasma    Collection Time: 05/25/24  7:43 AM    Specimen: Blood   Result Value Ref Range    Lactate 1.6 0.5 - 2.0 mmol/L   Procalcitonin    Collection Time: 05/25/24  7:43 AM    Specimen: Blood   Result Value Ref Range    Procalcitonin 0.49 (H) 0.00 - 0.25 ng/mL   Ammonia    Collection Time: 05/25/24  7:43 AM    Specimen: Blood   Result Value Ref Range    Ammonia 26 16 - 60 umol/L   Urine Drug Screen - Urine, Clean Catch    Collection Time: 05/25/24  7:43 AM    Specimen: Urine, Clean Catch   Result Value Ref Range    Amphet/Methamphet, Screen Negative Negative    Barbiturates Screen, Urine Negative Negative    Benzodiazepine Screen, Urine Negative Negative    Cocaine Screen, Urine Negative Negative    Opiate Screen Positive (A) Negative    THC, Screen, Urine Negative Negative    Methadone Screen, Urine Negative Negative    Oxycodone Screen, Urine Negative Negative    Fentanyl, Urine Negative Negative   Green Top (Gel)    Collection Time: 05/25/24  7:43 AM   Result Value Ref Range    Extra Tube Hold for add-ons.    Lavender Top    Collection Time: 05/25/24  7:43 AM   Result Value Ref  Range    Extra Tube hold for add-on    Gold Top - SST    Collection Time: 05/25/24  7:43 AM   Result Value Ref Range    Extra Tube Hold for add-ons.    Light Blue Top    Collection Time: 05/25/24  7:43 AM   Result Value Ref Range    Extra Tube Hold for add-ons.    CBC Auto Differential    Collection Time: 05/25/24  7:43 AM    Specimen: Blood   Result Value Ref Range    WBC 5.65 3.40 - 10.80 10*3/mm3    RBC 3.48 (L) 4.14 - 5.80 10*6/mm3    Hemoglobin 10.1 (L) 13.0 - 17.7 g/dL    Hematocrit 30.8 (L) 37.5 - 51.0 %    MCV 88.5 79.0 - 97.0 fL    MCH 29.0 26.6 - 33.0 pg    MCHC 32.8 31.5 - 35.7 g/dL    RDW 13.6 12.3 - 15.4 %    RDW-SD 43.8 37.0 - 54.0 fl    MPV 10.1 6.0 - 12.0 fL    Platelets 348 140 - 450 10*3/mm3    Neutrophil % 78.6 (H) 42.7 - 76.0 %    Lymphocyte % 9.6 (L) 19.6 - 45.3 %    Monocyte % 9.9 5.0 - 12.0 %    Eosinophil % 1.2 0.3 - 6.2 %    Basophil % 0.2 0.0 - 1.5 %    Immature Grans % 0.5 0.0 - 0.5 %    Neutrophils, Absolute 4.44 1.70 - 7.00 10*3/mm3    Lymphocytes, Absolute 0.54 (L) 0.70 - 3.10 10*3/mm3    Monocytes, Absolute 0.56 0.10 - 0.90 10*3/mm3    Eosinophils, Absolute 0.07 0.00 - 0.40 10*3/mm3    Basophils, Absolute 0.01 0.00 - 0.20 10*3/mm3    Immature Grans, Absolute 0.03 0.00 - 0.05 10*3/mm3    nRBC 0.4 (H) 0.0 - 0.2 /100 WBC   Urinalysis, Microscopic Only - Urine, Clean Catch    Collection Time: 05/25/24  7:43 AM    Specimen: Urine, Clean Catch   Result Value Ref Range    RBC, UA 0-2 None Seen, 0-2 /HPF    WBC, UA None Seen None Seen, 0-2 /HPF    Bacteria, UA 1+ (A) None Seen /HPF    Squamous Epithelial Cells, UA 0-2 None Seen, 0-2 /HPF    Hyaline Casts, UA 0-2 None Seen /LPF    Methodology Manual Light Microscopy    Respiratory Panel PCR w/COVID-19(SARS-CoV-2) SCARLETT/DWIGHT/EVELYN/PAD/COR/BRITT In-House, NP Swab in UTM/VTM, 2 HR TAT - Swab, Nasopharynx    Collection Time: 05/25/24  7:45 AM    Specimen: Nasopharynx; Swab   Result Value Ref Range    ADENOVIRUS, PCR Not Detected Not Detected     Coronavirus 229E Not Detected Not Detected    Coronavirus HKU1 Not Detected Not Detected    Coronavirus NL63 Not Detected Not Detected    Coronavirus OC43 Not Detected Not Detected    COVID19 Not Detected Not Detected - Ref. Range    Human Metapneumovirus Not Detected Not Detected    Human Rhinovirus/Enterovirus Not Detected Not Detected    Influenza A PCR Not Detected Not Detected    Influenza B PCR Not Detected Not Detected    Parainfluenza Virus 1 Not Detected Not Detected    Parainfluenza Virus 2 Not Detected Not Detected    Parainfluenza Virus 3 Not Detected Not Detected    Parainfluenza Virus 4 Not Detected Not Detected    RSV, PCR Not Detected Not Detected    Bordetella pertussis pcr Not Detected Not Detected    Bordetella parapertussis PCR Not Detected Not Detected    Chlamydophila pneumoniae PCR Not Detected Not Detected    Mycoplasma pneumo by PCR Not Detected Not Detected       I ordered the above labs and reviewed the results    RADIOLOGY  XR Chest 1 View    Result Date: 5/25/2024  XR CHEST 1 VW-   INDICATION: CHF/COPD  COMPARISON: Chest radiograph May 17, 2024  TECHNIQUE: 1 view chest  FINDINGS:  Heterogeneous multifocal left greater than right lung opacities. No effusions. Stable mediastinum. Heart is normal in size. Intraluminal contrast seen in the colon.      Heterogeneous multifocal left greater than right lung opacities, increased in the interval. Suspect multifocal pneumonia. Hemorrhage in the appropriate clinical setting.  This report was finalized on 5/25/2024 7:58 AM by Dr. Carlito Chand M.D on Workstation: [id number]       I ordered the above noted radiological studies. Interpreted by radiologist. Viewed and interpreted by me in PACS -no large pneumothorax      PROCEDURES  Procedures      MEDICATIONS GIVEN IN THE EMERGENCY DEPARTMENT  Medications   sodium chloride 0.9 % flush 10 mL (has no administration in time range)   doxycycline (VIBRAMYCIN) 100 mg in sodium chloride 0.9 % 100 mL MBP  (has no administration in time range)                                                                                                      lactated ringers bolus 1,000 mL (1,000 mL Intravenous New Bag 5/25/24 0746)   acetaminophen (TYLENOL) tablet 1,000 mg (1,000 mg Oral Given 5/25/24 0751)   cefTRIAXone (ROCEPHIN) 1,000 mg in sodium chloride 0.9 % 100 mL MBP (1,000 mg Intravenous New Bag 5/25/24 0901)       EKG          EKG time: 07 40  Rhythm/Rate: Sinus tachycardia, rate in the 110s  P waves and OR: Normal P waves, normal SARAH's  QRS, axis: Q waves in the inferior leads  ST and T waves: Nonspecific ST/T wave findings diffuse    Interpreted Contemporaneously by me, independently viewed  Mildly changed compared to prior 5/17/2024, heart rate mildly increased, otherwise similar to previous      MEDICAL DECISION MAKING  Results were reviewed/discussed with the patient and they were also made aware of online access. Pt also made aware that some labs, such as cultures, will not be resulted during ER visit and followup with PMD is necessary.   EKGs and labs independently viewed and interpreted by me.  Discussion below represents my analysis of pertinent findings related to patient's condition, differential diagnosis, treatment plan and final disposition.    Differential diagnosis includes but is not limited to urinary tract infection, dehydration, anemia, upper respiratory tract infection, pneumonia, pneumothorax, hepatic encephalopathy.    Independent interpretation of labs, radiology studies, and discussions with consultants:  ED Course as of 05/25/24 0938   Sat May 25, 2024   0744 Patient's oxygen saturations 85% on room air upon arrival, satting 92% on 2 L at time of exam [TO]   0921 Discussed with Dr. Romario Hinojosa, aware of patient's presentation, recent admission, agrees to admit for further testing, treatment as needed.  He request observation telemetry at this time. [TO]      ED Course User Index  [TO] Rosa Piña  MD ALYSSA         Shared decision making: Discussed worsening signs of pneumonia with family who report no diet restrictions/accommodations such as thickened liquids or change in consistency of solids advised at home.  Daughter denies any choking with eating or drinking at home      MDM         DIAGNOSIS  Final diagnoses:   Pneumonia of left lower lobe due to infectious organism   Hypoxia   Acute renal insufficiency       DISPOSITION  ADMISSION    Discussed treatment plan and reason for admission with pt/family and admitting physician.  Pt/family voiced understanding of the plan for admission for further testing/treatment as needed.       Latest Documented Vital Signs:  As of 09:38 EDT  BP- 123/83 HR- 109 Temp- (!) 100.6 °F (38.1 °C) (Tympanic) O2 sat- 95%    --      Please note that portions of this note were completed with a voice recognition program.       Note Disclaimer: At [facility], we believe that sharing information builds trust and better relationships. You are receiving this note because you are receiving care at [facility] or recently visited. It is possible you will see health information before a provider has talked with you about it. This kind of information can be easy to misunderstand. To help you fully understand what it means for your health, we urge you to discuss this note with your provider.     Rosa Piña MD  05/25/24 6826

## 2024-05-25 NOTE — H&P
[facility] Medicine Services  HISTORY AND PHYSICAL    Patient Name: Jimmy Norman  : 1955  MRN: [id number]  Primary Care Physician: Jad, No Known  Date of admission: 2024    Subjective   Subjective   Chief Complaint:   weakness    HPI:  Jimmy Norman is a 69 y.o. male with past medical history as listed below presents to the hospital after recent hospitalization.  Patient reports he has had progressive 2-day history of severe weakness that is worse with exertion and improved with rest.  Patient notes cough and chills and some generalized shortness of breath.  Patient reports he has been sick to his stomach and reports to me that he vomited.  He thinks he choked on his vomit.  He is a very poor historian and does reports he finished recent antibiotics.  He was found to be hypoxic at 85% on arrival and was placed on 2 L nasal cannula at 92%.  He is being hospitalized for further medical management.      Review of Systems   As per above  No current headache or lightheadedness  No current chest pain or palpitations    All other systems reviewed and are negative.     Personal History     Past Medical History:   Diagnosis Date    Arthritis     Cancer     Coronary artery disease        Past Surgical History:   Procedure Laterality Date    WOUND DEBRIDEMENT      pt states he had surgery on chest wall r/t infection       Family History: family history is not on file. Other pertinent FHx was reviewed and unremarkable.     Social History:  reports that he has never smoked. He has never been exposed to tobacco smoke. He has never used smokeless tobacco. He reports current alcohol use of about 2.0 standard drinks of alcohol per week. He reports that he does not currently use drugs.        Medications:  Available home medication information reviewed.    No Known Allergies    Objective   Objective   Vital Signs:   Temp:  [100.6 °F (38.1 °C)-101.9 °F (38.8 °C)] 100.6 °F (38.1 °C)  Heart Rate:   [109-136] 109  Resp:  [16] 16  BP: (123-132)/(83-93) 123/83        Physical Exam   Constitutional:Awake, alert, elderly in appearance  HENT: NCAT, mucous membranes moist, neck supple  Respiratory: Cough and coarse sounds, moderate inspiration, currently on nasal cannula oxygen  Cardiovascular: Pulse rate is tachycardic, palpable radial pulses  Gastrointestinal:  soft, nontender, nondistended  Musculoskeletal: Thin with muscle wasting noted, chronically debilitated in appearance, BMI is 23, no lower extremity edema  Psychiatric: Appropriate affect, cooperative, conversational  Neurologic: No slurred speech or facial droop, follows commands  Skin: No rashes or jaundice, warm      Results from last 7 days   Lab Units 05/25/24  0743   WBC 10*3/mm3 5.65   HEMOGLOBIN g/dL 10.1*   HEMATOCRIT % 30.8*   PLATELETS 10*3/mm3 348   INR  1.15*     Results from last 7 days   Lab Units 05/25/24  0743   SODIUM mmol/L 140   POTASSIUM mmol/L 4.0   CHLORIDE mmol/L 104   CO2 mmol/L 24.0   BUN mg/dL 22   CREATININE mg/dL 1.34*   GLUCOSE mg/dL 109*   CALCIUM mg/dL 8.8   ALK PHOS U/L 70   ALT (SGPT) U/L 60*   AST (SGOT) U/L 93*   HSTROP T ng/L 22*   PROBNP pg/mL 65.1   LACTATE mmol/L 1.6   PROCALCITONIN ng/mL 0.49*     Estimated Creatinine Clearance: 60.1 mL/min (A) (by C-G formula based on SCr of 1.34 mg/dL (H)).  Brief Urine Lab Results  (Last result in the past 365 days)        Color   Clarity   Blood   Leuk Est   Nitrite   Protein   CREAT   Urine HCG        05/25/24 0743 Yellow   Clear   Trace   Negative   Negative   100 mg/dL (2+)                 Imaging Results (Last 24 Hours)       Procedure Component Value Units Date/Time    XR Chest 1 View [[id number]] Collected: 05/25/24 0756     Updated: 05/25/24 0802    Narrative:      XR CHEST 1 VW-        INDICATION: CHF/COPD     COMPARISON: Chest radiograph May 17, 2024     TECHNIQUE: 1 view chest     FINDINGS:      Heterogeneous multifocal left greater than right lung opacities.  No  effusions. Stable mediastinum. Heart is normal in size. Intraluminal  contrast seen in the colon.       Impression:      Heterogeneous multifocal left greater than right lung opacities,  increased in the interval. Suspect multifocal pneumonia. Hemorrhage in  the appropriate clinical setting.     This report was finalized on 5/25/2024 7:58 AM by Dr. Carlito Chand M.D on Workstation: [id number]                 Assessment & Plan   Assessment & Plan     Active Hospital Problems    Diagnosis  POA    **Aspiration pneumonia due to vomitus [J69.0]  Yes    MARTÍNEZ (acute kidney injury) [N17.9]  Yes    Sepsis [A41.9]  Yes    Hypoxia [R09.02]  Yes    Dehydration [E86.0]  Yes    Severe malnutrition [E43]  Yes    Jaw pain [R68.84]  Yes    History of prostate cancer [Z85.46]  Not Applicable     69-year-old male presents to the hospital with sepsis due to aspiration pneumonia from emesis.    Discussion/plan for today: IV fluids, IV antibiotics, broad-spectrum's, check urine antigens, supportive care and symptom treatment.  Titrate supplemental oxygen as needed.  Trend renal function.  Consult speech therapy due to concern for recurrent aspiration.  Chest x-ray images reviewed and multifocal pneumonia noted and appears suspicious for aspiration.  PT for debility.  Nutritional supplements to maximize nutrition.  Recent PSA reviewed and was negative.  Treatment plan discussed with the patient at length who is in agreement.    Sepsis due to aspiration pneumonia: Broad-spectrum treatment with ceftriaxone and doxycycline.  Check urine antigen.  Supplemental oxygen as needed for hypoxia.  Speech therapy.  IV fluid    Acute kidney injury, prerenal due to dehydration:  Patient given IV bolus.  IV fluid initially.  Trend renal function.  Previous baseline reviewed and records 0.8    Malnutrition: Nutritional supplements.    Chronic jaw pain: Patient reports he is fallen out with his pain management team and is no longer receiving  prescriptions for gabapentin.  With recurrent aspiration I would recommend he stay off gabapentin for now.  Tylenol would be the preferable outpatient mainstay of treatment    History of prostate cancer: Noted.  Most recent PSA is negative.    Debility: PT eval    DVT prophylaxis: Lovenox    CODE STATUS:    Code Status and Medical Interventions:   Ordered at: 05/25/24 0924     Level Of Support Discussed With:    Patient     Code Status (Patient has no pulse and is not breathing):    CPR (Attempt to Resuscitate)     Medical Interventions (Patient has pulse or is breathing):    Full Support         Juan Ramon Hinojosa MD  05/25/24

## 2024-05-25 NOTE — ED NOTES
Nursing report ED to floor  Jimmy Norman  69 y.o.  male    HPI :  HPI (Adult)  Stated Reason for Visit: from home with c/o weakness, fever. dx with pna recently  History Obtained From: EMS, patient    Chief Complaint  Chief Complaint   Patient presents with    Weakness - Generalized       Admitting doctor:   Juan Ramon Hinojosa MD    Admitting diagnosis:   The primary encounter diagnosis was Pneumonia of left lower lobe due to infectious organism. Diagnoses of Hypoxia and Acute renal insufficiency were also pertinent to this visit.    Code status:   Current Code Status       Date Active Code Status Order ID Comments User Context       5/25/2024 0924 CPR (Attempt to Resuscitate) [id number]  Juan Ramon Hinojosa MD ED        Question Answer    Code Status (Patient has no pulse and is not breathing) CPR (Attempt to Resuscitate)    Medical Interventions (Patient has pulse or is breathing) Full Support    Level Of Support Discussed With Patient                    Allergies:   Patient has no known allergies.    Isolation:   No active isolations    Intake and Output    Intake/Output Summary (Last 24 hours) at 5/25/2024 1126  Last data filed at 5/25/2024 1048  Gross per 24 hour   Intake 1200 ml   Output --   Net 1200 ml       Weight:       05/25/24  0729   Weight: 82.9 kg (182 lb 12.2 oz)       Most recent vitals:   Vitals:    05/25/24 0831 05/25/24 0901 05/25/24 0935 05/25/24 1033   BP: 123/83   118/88   Pulse: 109   90   Resp:       Temp:  (!) 100.6 °F (38.1 °C)     TempSrc:  Tympanic     SpO2: 93%  95% 94%   Weight:           Active LDAs/IV Access:   Lines, Drains & Airways       Active LDAs       Name Placement date Placement time Site Days    Peripheral IV 05/25/24 0727 Right Antecubital 05/25/24  0727  Antecubital  less than 1                    Labs (abnormal labs have a star):   Labs Reviewed   COMPREHENSIVE METABOLIC PANEL - Abnormal; Notable for the following components:       Result Value    Glucose  109 (*)     Creatinine 1.34 (*)     Albumin 3.4 (*)     ALT (SGPT) 60 (*)     AST (SGOT) 93 (*)     eGFR 57.0 (*)     All other components within normal limits    Narrative:     GFR Normal >60  Chronic Kidney Disease <60  Kidney Failure <15     SINGLE HS TROPONIN T - Abnormal; Notable for the following components:    HS Troponin T 22 (*)     All other components within normal limits    Narrative:     High Sensitive Troponin T Reference Range:  <14.0 ng/L- Negative Female for AMI  <22.0 ng/L- Negative Male for AMI  >=14 - Abnormal Female indicating possible myocardial injury.  >=22 - Abnormal Male indicating possible myocardial injury.   Clinicians would have to utilize clinical acumen, EKG, Troponin, and serial changes to determine if it is an Acute Myocardial Infarction or myocardial injury due to an underlying chronic condition.        PROTIME-INR - Abnormal; Notable for the following components:    Protime 14.9 (*)     INR 1.15 (*)     All other components within normal limits   URINALYSIS W/ MICROSCOPIC IF INDICATED (NO CULTURE) - Abnormal; Notable for the following components:    Blood, UA Trace (*)     Protein,  mg/dL (2+) (*)     All other components within normal limits   PROCALCITONIN - Abnormal; Notable for the following components:    Procalcitonin 0.49 (*)     All other components within normal limits    Narrative:     As a Marker for Sepsis (Non-Neonates):    1. <0.5 ng/mL represents a low risk of severe sepsis and/or septic shock.  2. >2 ng/mL represents a high risk of severe sepsis and/or septic shock.    As a Marker for Lower Respiratory Tract Infections that require antibiotic therapy:    PCT on Admission    Antibiotic Therapy       6-12 Hrs later    >0.5                Strongly Recommended  >0.25 - <0.5        Recommended   0.1 - 0.25          Discouraged              Remeasure/reassess PCT  <0.1                Strongly Discouraged     Remeasure/reassess PCT    As 28 day mortality risk  "marker: \"Change in Procalcitonin Result\" (>80% or <=80%) if Day 0 (or Day 1) and Day 4 values are available. Refer to http://www.[facility]-pct-calculator.com    Change in PCT <=80%  A decrease of PCT levels below or equal to 80% defines a positive change in PCT test result representing a higher risk for 28-day all-cause mortality of patients diagnosed with severe sepsis for septic shock.    Change in PCT >80%  A decrease of PCT levels of more than 80% defines a negative change in PCT result representing a lower risk for 28-day all-cause mortality of patients diagnosed with severe sepsis or septic shock.      URINE DRUG SCREEN - Abnormal; Notable for the following components:    Opiate Screen Positive (*)     All other components within normal limits    Narrative:     Negative Thresholds Per Drugs Screened:    Amphetamines                 500 ng/ml  Barbiturates                 200 ng/ml  Benzodiazepines              100 ng/ml  Cocaine                      300 ng/ml  Methadone                    300 ng/ml  Opiates                      300 ng/ml  Oxycodone                    100 ng/ml  THC                           50 ng/ml  Fentanyl                       5 ng/ml      The Normal Value for all drugs tested is negative. This report includes final unconfirmed screening results to be used for medical treatment purposes only. Unconfirmed results must not be used for non-medical purposes such as employment or legal testing. Clinical consideration should be applied to any drug of abuse test, particularly when unconfirmed results are used.           CBC WITH AUTO DIFFERENTIAL - Abnormal; Notable for the following components:    RBC 3.48 (*)     Hemoglobin 10.1 (*)     Hematocrit 30.8 (*)     Neutrophil % 78.6 (*)     Lymphocyte % 9.6 (*)     Lymphocytes, Absolute 0.54 (*)     nRBC 0.4 (*)     All other components within normal limits   URINALYSIS, MICROSCOPIC ONLY - Abnormal; Notable for the following components:    Bacteria, UA " 1+ (*)     All other components within normal limits   RESPIRATORY PANEL PCR W/ COVID-19 (SARS-COV-2), NP SWAB IN UTM/VTP, 2 HR TAT - Normal    Narrative:     In the setting of a positive respiratory panel with a viral infection PLUS a negative procalcitonin without other underlying concern for bacterial infection, consider observing off antibiotics or discontinuation of antibiotics and continue supportive care. If the respiratory panel is positive for atypical bacterial infection (Bordetella pertussis, Chlamydophila pneumoniae, or Mycoplasma pneumoniae), consider antibiotic de-escalation to target atypical bacterial infection.   LEGIONELLA ANTIGEN, URINE - Normal   STREP PNEUMO AG, URINE OR CSF - Normal   BNP (IN-HOUSE) - Normal    Narrative:     This assay is used as an aid in the diagnosis of individuals suspected of having heart failure. It can be used as an aid in the diagnosis of acute decompensated heart failure (ADHF) in patients presenting with signs and symptoms of ADHF to the emergency department (ED). In addition, NT-proBNP of <300 pg/mL indicates ADHF is not likely.    Age Range Result Interpretation  NT-proBNP Concentration (pg/mL:      <50             Positive            >450                   Gray                 300-450                    Negative             <300    50-75           Positive            >900                  Gray                300-900                  Negative            <300      >75             Positive            >1800                  Gray                300-1800                  Negative            <300   LACTIC ACID, PLASMA - Normal   AMMONIA - Normal   PSA DIAGNOSTIC - Normal    Narrative:     Results may be falsely decreased if patient taking Biotin.    Testing Method: [employer] Electrochemiluminescence Immunoassay(ECLIA)  Values obtained with different assay methods or kits cannot be used interchangeably.   BLOOD CULTURE   BLOOD CULTURE   RAINBOW DRAW    Narrative:      The following orders were created for panel order [place] Draw.  Procedure                               Abnormality         Status                     ---------                               -----------         ------                     Green Top (Gel)[[id number]]                                  Final result               Lavender Top[[id number]]                                     Final result               Gold Top - SST[[id number]]                                   Final result               Light Blue Top[[id number]]                                   Final result                 Please view results for these tests on the individual orders.   CBC AND DIFFERENTIAL    Narrative:     The following orders were created for panel order CBC & Differential.  Procedure                               Abnormality         Status                     ---------                               -----------         ------                     CBC Auto Differential[[id number]]        Abnormal            Final result                 Please view results for these tests on the individual orders.   GREEN TOP   LAVENDER TOP   GOLD TOP - SST   LIGHT BLUE TOP       EKG:   ECG 12 Lead Dyspnea   Preliminary Result   HEART RATE= 118  bpm   RR Interval= 508  ms   VT Interval= 142  ms   P Horizontal Axis= 12  deg   P Front Axis= 35  deg   QRSD Interval= 83  ms   QT Interval= 306  ms   QTcB= 429  ms   QRS Axis= 257  deg   T Wave Axis= -2  deg   - ABNORMAL ECG -   Sinus tachycardia   Abnormal R-wave progression, late transition   Inferior infarct, old   Baseline wander in lead(s) V1   Electronically Signed By:    Date and Time of Study: 2024-05-25 07:40:01      Telemetry Scan   Final Result      Telemetry Scan   Final Result      ECG 12 Lead Tachycardia    (Results Pending)       Meds given in ED:   Medications   sodium chloride 0.9 % flush 10 mL (has no administration in time range)   sodium chloride 0.9 % flush 3 mL (has no administration in time  range)   sodium chloride 0.9 % flush 3-10 mL (has no administration in time range)   sodium chloride 0.9 % infusion 40 mL (has no administration in time range)   acetaminophen (TYLENOL) tablet 650 mg (has no administration in time range)     Or   acetaminophen (TYLENOL) 160 MG/5ML oral solution 650 mg (has no administration in time range)     Or   acetaminophen (TYLENOL) suppository 650 mg (has no administration in time range)   famotidine (PEPCID) tablet 10 mg (has no administration in time range)   ondansetron ODT (ZOFRAN-ODT) disintegrating tablet 4 mg (has no administration in time range)     Or   ondansetron (ZOFRAN) injection 4 mg (has no administration in time range)   melatonin tablet 2.5 mg (has no administration in time range)   Enoxaparin Sodium (LOVENOX) syringe 40 mg (40 mg Subcutaneous Given 5/25/24 1119)   Potassium Replacement - Follow Nurse / BPA Driven Protocol (has no administration in time range)   Magnesium Low Dose Replacement - Follow Nurse / BPA Driven Protocol (has no administration in time range)   Phosphorus Replacement - Follow Nurse / BPA Driven Protocol (has no administration in time range)   Calcium Replacement - Follow Nurse / BPA Driven Protocol (has no administration in time range)   sennosides-docusate (PERICOLACE) 8.6-50 MG per tablet 2 tablet (has no administration in time range)     And   polyethylene glycol (MIRALAX) packet 17 g (has no administration in time range)     And   bisacodyl (DULCOLAX) EC tablet 5 mg (has no administration in time range)     And   bisacodyl (DULCOLAX) suppository 10 mg (has no administration in time range)   cefTRIAXone (ROCEPHIN) 1,000 mg in sodium chloride 0.9 % 100 mL MBP (has no administration in time range)   doxycycline (VIBRAMYCIN) 100 mg in sodium chloride 0.9 % 100 mL MBP (has no administration in time range)   ascorbic acid (VITAMIN C) tablet 250 mg (has no administration in time range)   cetirizine (zyrTEC) tablet 5 mg (has no  administration in time range)   multivitamin with minerals 1 tablet (1 tablet Oral Given 5/25/24 1115)   cholecalciferol (VITAMIN D3) tablet 1,000 Units (1,000 Units Oral Given 5/25/24 1115)   sodium chloride 0.9 % infusion (100 mL/hr Intravenous New Bag 5/25/24 1117)   traMADol (ULTRAM) tablet 25 mg (has no administration in time range)   lactated ringers bolus 1,000 mL (0 mL Intravenous Stopped 5/25/24 0940)   acetaminophen (TYLENOL) tablet 1,000 mg (1,000 mg Oral Given 5/25/24 0751)   cefTRIAXone (ROCEPHIN) 1,000 mg in sodium chloride 0.9 % 100 mL MBP (0 mg Intravenous Stopped 5/25/24 0937)   doxycycline (VIBRAMYCIN) 100 mg in sodium chloride 0.9 % 100 mL MBP (0 mg Intravenous Stopped 5/25/24 1048)       Imaging results:  XR Chest 1 View    Result Date: 5/25/2024  Heterogeneous multifocal left greater than right lung opacities, increased in the interval. Suspect multifocal pneumonia. Hemorrhage in the appropriate clinical setting.  This report was finalized on 5/25/2024 7:58 AM by Dr. Carlito Chand M.D on Workstation: [id number]       Ambulatory status:   - with assist     Social issues:   Social History     Socioeconomic History    Marital status: Single   Tobacco Use    Smoking status: Never     Passive exposure: Never    Smokeless tobacco: Never   Vaping Use    Vaping status: Never Used   Substance and Sexual Activity    Alcohol use: Yes     Alcohol/week: 2.0 standard drinks of alcohol     Types: 2 Shots of liquor per week     Comment: drinks maybe once a month    Drug use: Not Currently     Comment: hx pain pill use    Sexual activity: Defer       Peripheral Neurovascular  Peripheral Neurovascular (Adult)  Peripheral Neurovascular WDL: WDL    Neuro Cognitive  Neuro Cognitive (Adult)  Cognitive/Neuro/Behavioral WDL: WDL  Pupils  Pupil PERRLA: yes    Learning  Learning Assessment (Adult)  Learning Readiness and Ability: physical limitation noted    Respiratory  Respiratory WDL  Respiratory WDL:  rhythm/pattern  Rhythm/Pattern, Respiratory: shortness of breath, tachypneic  Breath Sounds  Breath Sounds: LLL, RLL, BERNADETTE  BERNADETTE Breath Sounds: Posterior:, diminished  LLL Breath Sounds: Posterior:, diminished  RLL Breath Sounds: diminished, Posterior:    Abdominal Pain       Pain Assessments  Pain (Adult)  (0-10) Pain Rating: Rest: 0  Pain Location:  (jaw)  Response to Pain Interventions: interventions effective per patient    NIH Stroke Scale       Carlito Tovar RN  05/25/24 11:26 EDT

## 2024-05-25 NOTE — PROGRESS NOTES
Clinical Pharmacy Services: Medication History    Jimmy Norman is a 69 y.o. male presenting to [facility] for   Chief Complaint   Patient presents with    Weakness - Generalized       He  has a past medical history of Arthritis, Cancer, and Coronary artery disease.    Allergies as of 05/25/2024    (No Known Allergies)       Medication information was obtained from: Family  Pharmacy and Phone Number:     Prior to Admission Medications       Prescriptions Last Dose Informant Patient Reported? Taking?    Ascorbic Acid (Vitamin C) 500 MG chewable tablet Past Week Self Yes Yes    Chew 1 tablet Daily.    cetirizine (zyrTEC) 10 MG tablet Past Month Child Yes Yes    Take 1 tablet by mouth Daily As Needed for Allergies.    gabapentin (NEURONTIN) 600 MG tablet Past Week Pharmacy No Yes    Take 1 tablet by mouth 3 (Three) Times a Day for 3 days.    Multiple Vitamins-Minerals (ZINC PO) Past Week Self Yes Yes    Take 1 tablet by mouth Daily.    vitamin D (ERGOCALCIFEROL) 1.25 MG (57915 UT) capsule capsule Past Week Self Yes Yes    Take 1 capsule by mouth 1 (One) Time Per Week. Thursdays    amLODIPine (NORVASC) 5 MG tablet  Pharmacy Yes No    Take 1 tablet by mouth Daily.              Medication notes:     This medication list is complete to the best of my knowledge as of 5/25/2024    Please call if questions.    Domenico Burroughs  Medication History Technician  [phone]    5/25/2024 09:23 EDT

## 2024-05-26 PROBLEM — R13.11 ORAL PHASE DYSPHAGIA: Status: ACTIVE | Noted: 2024-01-01

## 2024-05-26 PROBLEM — K02.9 DENTAL CARIES: Status: ACTIVE | Noted: 2024-01-01

## 2024-05-26 NOTE — THERAPY EVALUATION
Patient Name: Jimmy Norman  : 1955    MRN: [id number]                              Today's Date: 2024       Admit Date: 2024    Visit Dx:     ICD-10-CM ICD-9-CM   1. Pneumonia of left lower lobe due to infectious organism  J18.9 486   2. Hypoxia  R09.02 799.02   3. Acute renal insufficiency  N28.9 593.9     Patient Active Problem List   Diagnosis    Jaw pain    Transaminitis    History of prostate cancer    Severe malnutrition    MARTÍNEZ (acute kidney injury)    Aspiration pneumonia due to vomitus    Sepsis    Hypoxia    Dehydration    Oral phase dysphagia    Dental caries     Past Medical History:   Diagnosis Date    Arthritis     Cancer     Coronary artery disease      Past Surgical History:   Procedure Laterality Date    WOUND DEBRIDEMENT      pt states he had surgery on chest wall r/t infection      General Information       Row Name 24 1219          Physical Therapy Time and Intention    Document Type evaluation  -CS     Mode of Treatment individual therapy;physical therapy  -CS       Row Name 24 1219          General Information    Patient Profile Reviewed yes  -CS     Prior Level of Function independent:;gait;transfer;bed mobility  -CS     Existing Precautions/Restrictions fall;oxygen therapy device and L/min  -CS     Barriers to Rehab none identified  -CS       Row Name 24 1219          Living Environment    People in Home alone  -CS       Row Name 24 1219          Home Main Entrance    Number of Stairs, Main Entrance none;other (see comments)  elevator  -CS       Row Name 24 1219          Stairs Within Home, Primary    Number of Stairs, Within Home, Primary none  -CS       Row Name 24 1219          Cognition    Orientation Status (Cognition) oriented x 3  -CS       Row Name 24 1219          Safety Issues, Functional Mobility    Impairments Affecting Function (Mobility) balance;endurance/activity tolerance;strength  -CS               User Key   (r) = Recorded By, (t) = Taken By, (c) = Cosigned By      Initials Name Provider Type    CS Brooke Cabrales, PT Physical Therapist                   Mobility       Row Name 05/26/24 1220          Bed Mobility    Bed Mobility supine-sit;sit-supine  -CS     Supine-Sit [place] (Bed Mobility) standby assist  -CS     Sit-Supine [place] (Bed Mobility) standby assist  -CS       Row Name 05/26/24 1220          Sit-Stand Transfer    Sit-Stand [place] (Transfers) standby assist  -CS     Assistive Device (Sit-Stand Transfers) other (see comments)  no AD  -CS       Row Name 05/26/24 1220          Gait/Stairs (Locomotion)    [place] Level (Gait) contact guard  -CS     Assistive Device (Gait) other (see comments)  no AD  -CS     Distance in Feet (Gait) 40  20' + 20'  -CS     Deviations/Abnormal Patterns (Gait) miriam decreased;stride length decreased  -CS     [place] Level (Stairs) not tested  -CS     Comment, (Gait/Stairs) mildly unsteady but no overt LOB  -CS               User Key  (r) = Recorded By, (t) = Taken By, (c) = Cosigned By      Initials Name Provider Type    CS Brooke Cabrales, PT Physical Therapist                   Obj/Interventions       Row Name 05/26/24 1221          Range of Motion Comprehensive    General Range of Motion bilateral lower extremity ROM WFL  -CS       Row Name 05/26/24 1221          Strength Comprehensive (MMT)    General Manual Muscle Testing (MMT) Assessment other (see comments)  -CS     Comment, General Manual Muscle Testing (MMT) Assessment B LE grossly 4/5  -CS       Row Name 05/26/24 1221          Balance    Balance Assessment standing static balance;standing dynamic balance  -CS     Static Standing Balance standby assist  -CS     Dynamic Standing Balance contact guard  -CS     Position/Device Used, Standing Balance unsupported  -CS               User Key  (r) = Recorded By, (t) = Taken By, (c) = Cosigned By      Initials Name Provider Type    CS Brooke Cabrales,  PT Physical Therapist                   Goals/Plan       Row Name 05/26/24 1224          Bed Mobility Goal 1 (PT)    Activity/Assistive Device (Bed Mobility Goal 1, PT) bed mobility activities, all  -CS     [place] Level/Cues Needed (Bed Mobility Goal 1, PT) independent  -CS     Time Frame (Bed Mobility Goal 1, PT) 2 weeks  -CS       Row Name 05/26/24 1224          Transfer Goal 1 (PT)    Activity/Assistive Device (Transfer Goal 1, PT) sit-to-stand/stand-to-sit;bed-to-chair/chair-to-bed  -CS     [place] Level/Cues Needed (Transfer Goal 1, PT) independent  -CS     Time Frame (Transfer Goal 1, PT) 2 weeks  -CS       Row Name 05/26/24 1224          Gait Training Goal 1 (PT)    Activity/Assistive Device (Gait Training Goal 1, PT) gait (walking locomotion);improve balance and speed  -CS     [place] Level (Gait Training Goal 1, PT) independent  -CS     Distance (Gait Training Goal 1, PT) 75'  -CS     Time Frame (Gait Training Goal 1, PT) 2 weeks  -CS               User Key  (r) = Recorded By, (t) = Taken By, (c) = Cosigned By      Initials Name Provider Type    CS Brooke Cabrales, PT Physical Therapist                   Clinical Impression       Row Name 05/26/24 1221          Pain    Pretreatment Pain Rating 0/10 - no pain  -CS     Posttreatment Pain Rating 0/10 - no pain  -CS       Row Name 05/26/24 1221          Plan of Care Review    Plan of Care Reviewed With patient;daughter  -CS     Outcome Evaluation Pt is a 70 y/o M admitted to [facility] with sepsis due to aspiration pneumonia from emesis. Pt reports he lives alone with a elevator to enter his apartment and he is normally (I) with mobility at baseline. Pt completed supine to sit, STS txf, and ambulated 40' c no AD requiring SBA/CGA. Pt demo's a slow pace and mildly unsteady gait but no overt LOB. Pt completed activity on RA with O2 sats at 83% following activity. Pt placed on 2L O2 and recovered to 91% with cues for PLB and rest. RN notified. PT  recommends home with assist and HHPT at D/C.  -CS       Row Name 05/26/24 1221          Therapy Assessment/Plan (PT)    Rehab Potential (PT) good, to achieve stated therapy goals  -CS     Criteria for Skilled Interventions Met (PT) yes;meets criteria  -CS     Therapy Frequency (PT) 5 times/wk  -CS       Row Name 05/26/24 1221          Vital Signs    Pre SpO2 (%) 96  -CS     O2 Delivery Pre Treatment room air  -CS     Intra SpO2 (%) 83  -CS     O2 Delivery Intra Treatment room air  -CS     Post SpO2 (%) 91  -CS     O2 Delivery Post Treatment supplemental O2  -CS     Pre Patient Position Supine  -CS     Intra Patient Position Sitting  -CS     Post Patient Position Supine  -CS       Row Name 05/26/24 1221          Positioning and Restraints    Pre-Treatment Position in bed  -CS     Post Treatment Position bed  -CS     In Bed notified nsg;supine;call light within reach;encouraged to call for assist;exit alarm on;with family/caregiver  -CS               User Key  (r) = Recorded By, (t) = Taken By, (c) = Cosigned By      Initials Name Provider Type    CS Brooke Cabrales, PT Physical Therapist                   Outcome Measures       Row Name 05/26/24 1224          How much help from another person do you currently need...    Turning from your back to your side while in flat bed without using bedrails? 4  -CS     Moving from lying on back to sitting on the side of a flat bed without bedrails? 4  -CS     Moving to and from a bed to a chair (including a wheelchair)? 4  -CS     Standing up from a chair using your arms (e.g., wheelchair, bedside chair)? 4  -CS     Climbing 3-5 steps with a railing? 3  -CS     To walk in hospital room? 3  -CS     AM-PAC 6 Clicks Score (PT) 22  -CS     Highest Level of Mobility Goal 7 --> Walk 25 feet or more  -CS       Row Name 05/26/24 1224          Functional Assessment    Outcome Measure Options AM-PAC 6 Clicks Basic Mobility (PT)  -CS               User Key  (r) = Recorded By, (t) = Taken  By, (c) = Cosigned By      Initials Name Provider Type    CS Brooke Cabrales PT Physical Therapist                                 Physical Therapy Education       Title: PT OT SLP Therapies (In Progress)       Topic: Physical Therapy (In Progress)       Point: Mobility training (Done)       Learning Progress Summary             Patient Acceptance, E,TB, VU,DU,NR by CS at 5/26/2024 1224   Family Acceptance, E,TB, VU,DU,NR by CS at 5/26/2024 1224                         Point: Home exercise program (Not Started)       Learner Progress:  Not documented in this visit.              Point: Body mechanics (Done)       Learning Progress Summary             Patient Acceptance, E,TB, VU,DU,NR by CS at 5/26/2024 1224   Family Acceptance, E,TB, VU,DU,NR by CS at 5/26/2024 1224                         Point: Precautions (Done)       Learning Progress Summary             Patient Acceptance, E,TB, VU,DU,NR by CS at 5/26/2024 1224   Family Acceptance, E,TB, VU,DU,NR by CS at 5/26/2024 1224                                         User Key       Initials Effective Dates Name Provider Type Discipline     09/22/22 -  Brooke Cabrales, PT Physical Therapist PT                  PT Recommendation and Plan     Plan of Care Reviewed With: patient, daughter  Outcome Evaluation: Pt is a 70 y/o M admitted to [facility] with sepsis due to aspiration pneumonia from emesis. Pt reports he lives alone with a elevator to enter his apartment and he is normally (I) with mobility at baseline. Pt completed supine to sit, STS txf, and ambulated 40' c no AD requiring SBA/CGA. Pt demo's a slow pace and mildly unsteady gait but no overt LOB. Pt completed activity on RA with O2 sats at 83% following activity. Pt placed on 2L O2 and recovered to 91% with cues for PLB and rest. RN notified. PT recommends home with assist and HHPT at D/C.     Time Calculation:         PT Charges       Row Name 05/26/24 1224             Time Calculation    Start Time 1130  -       Stop Time 1143  -CS      Time Calculation (min) 13 min  -CS      PT Received On 05/26/24  -CS      PT - Next Appointment 05/28/24  -CS      PT Goal Re-Cert Due Date 06/09/24  -CS         Time Calculation- PT    Total Timed Code Minutes- PT 10 minute(s)  -CS         Timed Charges    46606 - PT Therapeutic Activity Minutes 10  -CS         Total Minutes    Timed Charges Total Minutes 10  -CS       Total Minutes 10  -CS                User Key  (r) = Recorded By, (t) = Taken By, (c) = Cosigned By      Initials Name Provider Type    CS Brooke Cabrales, PT Physical Therapist                  Therapy Charges for Today       Code Description Service Date Service Provider Modifiers Qty    91789358376 HC PT THERAPEUTIC ACT EA 15 MIN 5/26/2024 Brooke Cabrales, PT GP 1    23543658624 HC PT EVAL LOW COMPLEXITY 3 5/26/2024 Brooke Cabrales, PT GP 1            PT G-Codes  Outcome Measure Options: AM-PAC 6 Clicks Basic Mobility (PT)  AM-PAC 6 Clicks Score (PT): 22  PT Discharge Summary  Anticipated Discharge Disposition (PT): home with assist, home with home health    Brooke Cabrales PT  5/26/2024

## 2024-05-26 NOTE — PROGRESS NOTES
[facility]  PROGRESS NOTE    Patient Name: Jimmy Norman  : 1955  MRN: [id number]    Date of Admission: 2024  Primary Care Physician: Provider, No Known    Subjective   Subjective     CC:  Follow-up weakness    Subjective:  Patient says he feels a little better today.  He is completely oriented and a somewhat better historian.  His son is at the bedside and is supporting his father through his illness.  We discussed findings so far.  Family state he is much weaker than his typical baseline since his recent infections.  He remains on 2 L nasal cannula currently at 94%.    Review of Systems  No current fevers or chills  No current shortness of breath or cough  No current nausea, vomiting, or diarrhea  No current chest pain or palpitations       Objective   Objective     Vital Signs:   Temp:  [97.2 °F (36.2 °C)-101.2 °F (38.4 °C)] 98.8 °F (37.1 °C)  Heart Rate:  [90-98] 95  Resp:  [16-20] 20  BP: (118-150)/(83-95) 124/87        Physical Exam:  Constitutional:Awake, alert but still somewhat lethargic however improved since , elderly in appearance  HENT: NCAT, mucous membranes moist, neck supple  Respiratory: Improving cough and coarse sounds, moderate inspiration, currently on nasal cannula oxygen  Cardiovascular: Pulse rate is now normal, palpable radial pulses  Gastrointestinal:  soft, nontender, nondistended  Musculoskeletal: Thin with muscle wasting noted, chronically debilitated in appearance, BMI is 23, no lower extremity edema  Psychiatric: Appropriate affect, cooperative, conversational  Neurologic: Oriented, no slurred speech or facial droop, follows commands  Skin: No rashes or jaundice, warm      Results Reviewed:  Results from last 7 days   Lab Units 24  0506 24  0743 24  0448   WBC 10*3/mm3 5.38 5.65 5.11   HEMOGLOBIN g/dL 8.4* 10.1* 10.4*   HEMATOCRIT % 25.3* 30.8* 31.2*   PLATELETS 10*3/mm3 334 348 294   INR   --  1.15*  --    PROCALCITONIN  ng/mL  --  0.49*  --      Results from last 7 days   Lab Units 05/26/24  0506 05/25/24  0743 05/21/24  0448   SODIUM mmol/L 138 140 138   POTASSIUM mmol/L 3.7 4.0 3.9   CHLORIDE mmol/L 106 104 105   CO2 mmol/L 21.8* 24.0 21.1*   BUN mg/dL 15 22 11   CREATININE mg/dL 1.24 1.34* 0.88   GLUCOSE mg/dL 110* 109* 101*   CALCIUM mg/dL 8.4* 8.8 8.6   ALK PHOS U/L 66 70 66   ALT (SGPT) U/L 59* 60* 57*   AST (SGOT) U/L 106* 93* 89*   HSTROP T ng/L  --  22*  --    PROBNP pg/mL  --  65.1  --      Estimated Creatinine Clearance: 65 mL/min (by C-G formula based on SCr of 1.24 mg/dL).    Microbiology Results Abnormal       Procedure Component Value - Date/Time    Blood Culture - Blood, Arm, Left [[id number]]  (Normal) Collected: 05/25/24 0809    Lab Status: Preliminary result Specimen: Blood from Arm, Left Updated: 05/26/24 0816     Blood Culture No growth at 24 hours    Blood Culture - Blood, Arm, Right [[id number]]  (Normal) Collected: 05/25/24 0809    Lab Status: Preliminary result Specimen: Blood from Arm, Right Updated: 05/26/24 0816     Blood Culture No growth at 24 hours    S. Pneumo Ag Urine or CSF - Urine, Urine, Clean Catch [[id number]]  (Normal) Collected: 05/25/24 0743    Lab Status: Final result Specimen: Urine, Clean Catch Updated: 05/25/24 1118     Strep Pneumo Ag Negative    Legionella Antigen, Urine - Urine, Urine, Clean Catch [[id number]]  (Normal) Collected: 05/25/24 0743    Lab Status: Final result Specimen: Urine, Clean Catch Updated: 05/25/24 1118     LEGIONELLA ANTIGEN, URINE Negative    Respiratory Panel PCR w/COVID-19(SARS-CoV-2) SCARLETT/DWIGHT/EVELYN/PAD/COR/BRITT In-House, NP Swab in UTM/VTM, 2 HR TAT - Swab, Nasopharynx [[id number]]  (Normal) Collected: 05/25/24 0745    Lab Status: Final result Specimen: Swab from Nasopharynx Updated: 05/25/24 0922     ADENOVIRUS, PCR Not Detected     Coronavirus 229E Not Detected     Coronavirus HKU1 Not Detected     Coronavirus NL63 Not Detected     Coronavirus OC43 Not Detected      COVID19 Not Detected     Human Metapneumovirus Not Detected     Human Rhinovirus/Enterovirus Not Detected     Influenza A PCR Not Detected     Influenza B PCR Not Detected     Parainfluenza Virus 1 Not Detected     Parainfluenza Virus 2 Not Detected     Parainfluenza Virus 3 Not Detected     Parainfluenza Virus 4 Not Detected     RSV, PCR Not Detected     Bordetella pertussis pcr Not Detected     Bordetella parapertussis PCR Not Detected     Chlamydophila pneumoniae PCR Not Detected     Mycoplasma pneumo by PCR Not Detected    Narrative:      In the setting of a positive respiratory panel with a viral infection PLUS a negative procalcitonin without other underlying concern for bacterial infection, consider observing off antibiotics or discontinuation of antibiotics and continue supportive care. If the respiratory panel is positive for atypical bacterial infection (Bordetella pertussis, Chlamydophila pneumoniae, or Mycoplasma pneumoniae), consider antibiotic de-escalation to target atypical bacterial infection.            Imaging Results (Last 24 Hours)       ** No results found for the last 24 hours. **                I have reviewed the medications:  Scheduled Meds:vitamin C, 250 mg, Oral, Daily  cefTRIAXone, 1,000 mg, Intravenous, Q24H  cholecalciferol, 1,000 Units, Oral, Daily  doxycycline, 100 mg, Intravenous, Q12H  enoxaparin, 40 mg, Subcutaneous, Daily  multivitamin with minerals, 1 tablet, Oral, Daily  sodium chloride, 3 mL, Intravenous, Q12H      Continuous Infusions:   PRN Meds:.  acetaminophen **OR** acetaminophen **OR** acetaminophen    senna-docusate sodium **AND** polyethylene glycol **AND** bisacodyl **AND** bisacodyl    Calcium Replacement - Follow Nurse / BPA Driven Protocol    cetirizine    famotidine    Magnesium Low Dose Replacement - Follow Nurse / BPA Driven Protocol    melatonin    ondansetron ODT **OR** ondansetron    Phosphorus Replacement - Follow Nurse / BPA Driven Protocol    Potassium  Replacement - Follow Nurse / BPA Driven Protocol    sodium chloride    sodium chloride    sodium chloride    Assessment & Plan   Assessment & Plan     Active Hospital Problems    Diagnosis  POA    **Aspiration pneumonia due to vomitus [J69.0]  Yes    MARTÍNEZ (acute kidney injury) [N17.9]  Yes    Sepsis [A41.9]  Yes    Hypoxia [R09.02]  Yes    Dehydration [E86.0]  Yes    Severe malnutrition [E43]  Yes    Jaw pain [R68.84]  Yes    History of prostate cancer [Z85.46]  Not Applicable      Resolved Hospital Problems   No resolved problems to display.        Brief Hospital Course to date:  Jimmy Norman is a 69 y.o. male presents to the hospital with sepsis due to aspiration pneumonia from emesis.     Discussion/plan for today: Patient appears adequately hydrated.  Stop IV fluid and monitor clinically.  Could restart fluids if needed.  Continue doxycycline and ceftriaxone for broad-spectrum coverage.  Clinically patient appears significantly improved compared to yesterday but still quite ill.  I discussed with his daughter over the phone reportedly when he was discharged he had significant vomiting on Thursday and had choked on his emesis.  Esophagram ordered per recommendation from speech therapy.  Wean supplemental oxygen as possible.  PT OT for debility.  Continued speech therapy for dysphagia.  Hemoglobin significant lower partially likely dilution from IV fluids however check occult stool.  No other bleeding reported.  Treatment plan discussed with the patient, son at length who is in agreement.  Plan discussed with nursing.     Sepsis due to aspiration pneumonitis from emesis: Initially received broad-spectrum treatment with ceftriaxone and doxycycline, transition to Zosyn with persistent fevers for aspiration coverage.  Negative urine antigen.  Supplemental oxygen as needed for hypoxia.  Speech therapy recommends esophagram to evaluate which has been ordered.  Supportive care and symptom treatment.  Following  blood cultures which are negative thus far.  Add albuterol nebulizers.  Most recent CT scan reviewed from last hospitalization.  I discussed with patient and his son the radiologist recommendation for repeat CT scan in 3 months which can be performed with primary care provider at follow-up.  They both understand and agree to make sure he has repeat CT scan.    Anemia of chronic disease:  Recent iron studies reviewed.  Consistent with anemia of chronic disease and likely some degree of dilution from IV fluids.  No bleeding reported.  Check occult stool.  Discontinue Lovenox prophylaxis.  Recheck hemoglobin later today.     Acute kidney injury, prerenal due to dehydration:  Patient given IV bolus.  IV fluid initially provided.  Previous baseline reviewed and records 0.8.  Patient was 1.34 at time of admission.  Improved with IV fluid and now appears euvolemic and stop fluid today.  Encourage oral intake and recheck tomorrow.     Oral dysphagia: Diet per speech therapy.  Speech therapy also recommending esophagram which is ordered and pending.    Dental caries: Family is working on outpatient dental appointment to have cavities addressed.    Malnutrition: Nutritional supplements added and strongly encouraged     Chronic jaw pain: Patient reports he is fallen out with his pain management team and is no longer receiving prescriptions for gabapentin.  With recurrent aspiration I would recommend he stay off gabapentin for now while acutely ill.  Tylenol would be the preferable outpatient mainstay of treatment     History of prostate cancer: Noted.  Most recent PSA is negative.     Debility: PT OT and monitor clinically     DVT prophylaxis: SCDs      Disposition: Pending    CODE STATUS:   Code Status and Medical Interventions:   Ordered at: 05/25/24 0944     Level Of Support Discussed With:    Patient     Code Status (Patient has no pulse and is not breathing):    CPR (Attempt to Resuscitate)     Medical Interventions  (Patient has pulse or is breathing):    Full Support       Juan Ramon Hinojosa MD  05/26/24

## 2024-05-26 NOTE — PLAN OF CARE
Goal Outcome Evaluation:  Plan of Care Reviewed With: patient, daughter           Outcome Evaluation: Pt is a 68 y/o M admitted to [facility] with sepsis due to aspiration pneumonia from emesis. Pt reports he lives alone with a elevator to enter his apartment and he is normally (I) with mobility at baseline. Pt completed supine to sit, STS txf, and ambulated 40' c no AD requiring SBA/CGA. Pt demo's a slow pace and mildly unsteady gait but no overt LOB. Pt completed activity on RA with O2 sats at 83% following activity. Pt placed on 2L O2 and recovered to 91% with cues for PLB and rest. RN notified. PT recommends home with assist and HHPT at D/C.      Anticipated Discharge Disposition (PT): home with assist, home with home health

## 2024-05-26 NOTE — PLAN OF CARE
Goal Outcome Evaluation:  Plan of Care Reviewed With: patient, family        Progress: no change  Outcome Evaluation: a/o x4. VSS. Pt febrile with chills, however. Treated with prn tylenol. Pt also c/o tooth pain as well as generalized pain. Treated with prn tramadol with good effect. Maintaining SpO2 on 2L N/C. Respirations appear unlabored at rest. Pt's family stated pt has made statements at home recently about his teeth hurting and saying he needs to get his teeth pulled. Pt's daughter, Valeria, concerned this could be a source of infection and would like an oral culture swab to rule this out. NS infusing @ 100 ml/hr.

## 2024-05-27 NOTE — THERAPY EVALUATION
Patient Name: Jimmy Norman  : 1955    MRN: [id number]                              Today's Date: 2024       Admit Date: 2024    Visit Dx:     ICD-10-CM ICD-9-CM   1. Pneumonia of left lower lobe due to infectious organism  J18.9 486   2. Hypoxia  R09.02 799.02   3. Acute renal insufficiency  N28.9 593.9     Patient Active Problem List   Diagnosis    Jaw pain    Transaminitis    History of prostate cancer    Severe malnutrition    MARTÍNEZ (acute kidney injury)    Aspiration pneumonia due to vomitus    Sepsis    Hypoxia    Dehydration    Oral phase dysphagia    Dental caries     Past Medical History:   Diagnosis Date    Arthritis     Cancer     Coronary artery disease      Past Surgical History:   Procedure Laterality Date    WOUND DEBRIDEMENT      pt states he had surgery on chest wall r/t infection      General Information       Row Name 24 1450          OT Time and Intention    Document Type evaluation  -AG     Mode of Treatment individual therapy;occupational therapy  -AG       Row Name 24 1450          General Information    Patient Profile Reviewed yes  -AG     Prior Level of Function independent:;ADL's;driving;work  -AG     Existing Precautions/Restrictions fall;oxygen therapy device and L/min  -AG     Barriers to Rehab none identified  -AG       Row Name 24 1450          Living Environment    People in Home alone  -AG       Row Name 24 1450          Home Main Entrance    Number of Stairs, Main Entrance none  4th floor apartment with elevator  -AG       Row Name 24 1450          Stairs Within Home, Primary    Number of Stairs, Within Home, Primary none  -AG       Row Name 24 1450          Cognition    Orientation Status (Cognition) oriented x 3  -AG       Row Name 24 1450          Safety Issues, Functional Mobility    Safety Issues Affecting Function (Mobility) safety precaution awareness  -AG     Impairments Affecting Function (Mobility)  balance;endurance/activity tolerance;strength  -AG               User Key  (r) = Recorded By, (t) = Taken By, (c) = Cosigned By      Initials Name Provider Type    AG Juan Carlos Mayo OT Occupational Therapist                     Mobility/ADL's       Row Name 05/27/24 1451          Bed Mobility    Bed Mobility supine-sit;sit-supine  -AG     Supine-Sit Mathews (Bed Mobility) standby assist  -AG     Sit-Supine Mathews (Bed Mobility) standby assist  -AG       Row Name 05/27/24 1451          Transfers    Transfers sit-stand transfer  -AG       Row Name 05/27/24 1451          Sit-Stand Transfer    Sit-Stand Mathews (Transfers) standby assist  -AG     Assistive Device (Sit-Stand Transfers) other (see comments)  -AG     Comment, (Sit-Stand Transfer) 1 UE HHA  -AG       Row Name 05/27/24 1451          Functional Mobility    Functional Mobility- Comment side stepping bedside HHA  -AG       Row Name 05/27/24 1451          Activities of Daily Living    BADL Assessment/Intervention lower body dressing;toileting;grooming  -AG       Row Name 05/27/24 1451          Lower Body Dressing Assessment/Training    M[place]ws Level (Lower Body Dressing) lower body dressing skills;don;shoes/slippers;set up  -AG     Position (Lower Body Dressing) edge of bed sitting  -AG       Row Name 05/27/24 1451          Toileting Assessment/Training    [place] Level (Toileting) toileting skills;set up  -AG     Position (Toileting) supine  -AG     Comment, (Toileting) urinal use  -AG       Row Name 05/27/24 1451          Grooming Assessment/Training    M[place]ws Level (Grooming) grooming skills;wash face, hands;set up  -AG     Position (Grooming) edge of bed sitting  -AG               User Key  (r) = Recorded By, (t) = Taken By, (c) = Cosigned By      Initials Name Provider Type    AG Juan Carlos Mayo OT Occupational Therapist                   Obj/Interventions       Row Name 05/27/24 1452          Sensory Assessment  (Somatosensory)    Sensory Assessment (Somatosensory) sensation intact  -AG       [facility] Name 05/27/24 1452          Vision Assessment/Intervention    Visual Impairment/Limitations WFL  -AG       Row Name 05/27/24 1452          Range of Motion Comprehensive    General Range of Motion no range of motion deficits identified  -AG       Row Name 05/27/24 1452          Strength Comprehensive (MMT)    Comment, General Manual Muscle Testing (MMT) Assessment BUE MMT 4/5  -AG       Row Name 05/27/24 1452          Motor Skills    Motor Skills functional endurance  -AG     Functional Endurance poor  -AG       Row Name 05/27/24 1452          Balance    Balance Assessment sitting static balance;sitting dynamic balance;sit to stand dynamic balance;standing static balance;standing dynamic balance  -AG     Static Sitting Balance standby assist  -AG     Dynamic Sitting Balance standby assist  -AG     Position, Sitting Balance unsupported;sitting edge of bed  -AG     Static Standing Balance standby assist  -AG     Dynamic Standing Balance contact guard  -AG     Position/Device Used, Standing Balance --  HHA  -AG     Balance Interventions sitting;standing;occupation based/functional task  -AG     Comment, Balance no LOB  -AG               User Key  (r) = Recorded By, (t) = Taken By, (c) = Cosigned By      Initials Name Provider Type    AG Juan Carlos Mayo, OT Occupational Therapist                   Goals/Plan       Row Name 05/27/24 1457          Bed Mobility Goal 1 (OT)    Activity/Assistive Device (Bed Mobility Goal 1, OT) bed mobility activities, all  -AG     [place] Level/Cues Needed (Bed Mobility Goal 1, OT) modified independence  -AG     Time Frame (Bed Mobility Goal 1, OT) short term goal (STG);2 weeks  -AG     Progress/Outcomes (Bed Mobility Goal 1, OT) goal ongoing  -AG       Row Name 05/27/24 1457          Transfer Goal 1 (OT)    Activity/Assistive Device (Transfer Goal 1, OT) transfers, all  -AG     [place]  Level/Cues Needed (Transfer Goal 1, OT) modified independence  -AG     Time Frame (Transfer Goal 1, OT) short term goal (STG);2 weeks  -AG     Progress/Outcome (Transfer Goal 1, OT) goal ongoing  -[facility] Name 05/27/24 1457          Dressing Goal 1 (OT)    Activity/Device (Dressing Goal 1, OT) dressing skills, all  -AG     [place]/Cues Needed (Dressing Goal 1, OT) modified independence  -AG     Time Frame (Dressing Goal 1, OT) short term goal (STG);2 weeks  -AG     Progress/Outcome (Dressing Goal 1, OT) goal ongoing  -[facility] Name 05/27/24 1457          Toileting Goal 1 (OT)    Activity/Device (Toileting Goal 1, OT) toileting skills, all  -AG     [place] Level/Cues Needed (Toileting Goal 1, OT) modified independence  -AG     Time Frame (Toileting Goal 1, OT) short term goal (STG);2 weeks  -AG     Progress/Outcome (Toileting Goal 1, OT) goal ongoing  -[facility] Name 05/27/24 1457          Grooming Goal 1 (OT)    Activity/Device (Grooming Goal 1, OT) grooming skills, all  -AG     [place] (Grooming Goal 1, OT) modified independence  -AG     Time Frame (Grooming Goal 1, OT) short term goal (STG);2 weeks  -AG     Progress/Outcome (Grooming Goal 1, OT) goal ongoing  -AG       Row Name 05/27/24 1459          Therapy Assessment/Plan (OT)    Planned Therapy Interventions (OT) activity tolerance training;functional balance retraining;ROM/therapeutic exercise;occupation/activity based interventions;IADL retraining;adaptive equipment training;BADL retraining;neuromuscular control/coordination retraining;patient/caregiver education/training;transfer/mobility retraining;strengthening exercise  -AG               User Key  (r) = Recorded By, (t) = Taken By, (c) = Cosigned By      Initials Name Provider Type    AG Juan Carlos Myao OT Occupational Therapist                   Clinical Impression       Row Name 05/27/24 1452          Pain Assessment    Pretreatment Pain Rating 0/10 - no pain  -AG      Posttreatment Pain Rating 0/10 - no pain  -AG       Row Name 05/27/24 1453          Plan of Care Review    Plan of Care Reviewed With patient;daughter  -AG     Progress no change  -AG     Outcome Evaluation 69 y;o M admitted to [facility] with sepsis 2/2 aspiration pneumonia from emesis. Prior to admission pt was independent with ADLS, IADLS and living alone in a 4th story apartment with elevator entrance and no use of AD. Currently pt presents with decreased act donna, strength, and balance impacting his independence with ADLS. Pt. was SBA for bed mob and STS however CGA with HHA for side stepping bedside unable to tolerate further mobility 2/2 fatigue. Pt. will benefit from skilled OT services to address deficits mentioned above and recommend DC to SNF to maximize rehab potential.  -AG       Row Name 05/27/24 145          Therapy Assessment/Plan (OT)    Rehab Potential (OT) good, to achieve stated therapy goals  -     Criteria for Skilled Therapeutic Interventions Met (OT) yes;meets criteria;skilled treatment is necessary  -AG     Therapy Frequency (OT) 5 times/wk  -AG       Row Name 05/27/24 1459          Therapy Plan Review/Discharge Plan (OT)    Anticipated Discharge Disposition (OT) [facility] nursing facility  -       Row Name 05/27/24 1453          Vital Signs    Pre SpO2 (%) 95  -AG     O2 Delivery Pre Treatment supplemental O2  -AG     Intra SpO2 (%) 88  -AG     O2 Delivery Intra Treatment supplemental O2  -AG     Post SpO2 (%) 92  -AG     O2 Delivery Post Treatment supplemental O2  -AG     Pre Patient Position Supine  -AG     Intra Patient Position Standing  -AG     Post Patient Position Supine  -AG       Row Name 05/27/24 1456          Positioning and Restraints    Pre-Treatment Position in bed  -AG     Post Treatment Position bed  -AG     In Bed notified nsg;encouraged to call for assist;exit alarm on;call light within reach;supine;with family/caregiver  -               User Key  (r) = Recorded By, (t) =  Taken By, (c) = Cosigned By      Initials Name Provider Type    AG Juan Carlos Mayo OT Occupational Therapist                   Outcome Measures       Row Name 05/27/24 1457          How much help from another is currently needed...    Putting on and taking off regular lower body clothing? 2  -AG     Bathing (including washing, rinsing, and drying) 2  -AG     Toileting (which includes using toilet bed pan or urinal) 3  -AG     Putting on and taking off regular upper body clothing 3  -AG     Taking care of personal grooming (such as brushing teeth) 3  -AG     Eating meals 4  -AG     AM-PAC 6 Clicks Score (OT) 17  -AG       Row Name 05/27/24 0903          How much help from another person do you currently need...    Turning from your back to your side while in flat bed without using bedrails? 3  -CT     Moving from lying on back to sitting on the side of a flat bed without bedrails? 3  -CT     Moving to and from a bed to a chair (including a wheelchair)? 3  -CT     Standing up from a chair using your arms (e.g., wheelchair, bedside chair)? 3  -CT     Climbing 3-5 steps with a railing? 3  -CT     To walk in hospital room? 3  -CT     AM-PAC 6 Clicks Score (PT) 18  -CT     Highest Level of Mobility Goal 6 --> Walk 10 steps or more  -CT       Row Name 05/27/24 1457          Functional Assessment    Outcome Measure Options AM-PAC 6 Clicks Daily Activity (OT)  -AG               User Key  (r) = Recorded By, (t) = Taken By, (c) = Cosigned By      Initials Name Provider Type    CT Angelina Esquivel, RN Registered Nurse     Juan Carlos Mayo OT Occupational Therapist                    Occupational Therapy Education       Title: PT OT SLP Therapies (In Progress)       Topic: Occupational Therapy (Not Started)       Point: ADL training (Not Started)       Description:   Instruct learner(s) on proper safety adaptation and remediation techniques during self care or transfers.   Instruct in proper use of assistive devices.                   Learner Progress:  Not documented in this visit.              Point: Home exercise program (Not Started)       Description:   Instruct learner(s) on appropriate technique for monitoring, assisting and/or progressing therapeutic exercises/activities.                  Learner Progress:  Not documented in this visit.              Point: Precautions (Not Started)       Description:   Instruct learner(s) on prescribed precautions during self-care and functional transfers.                  Learner Progress:  Not documented in this visit.              Point: Body mechanics (Not Started)       Description:   Instruct learner(s) on proper positioning and spine alignment during self-care, functional mobility activities and/or exercises.                  Learner Progress:  Not documented in this visit.                                  OT Recommendation and Plan  Planned Therapy Interventions (OT): activity tolerance training, functional balance retraining, ROM/therapeutic exercise, occupation/activity based interventions, IADL retraining, adaptive equipment training, BADL retraining, neuromuscular control/coordination retraining, patient/caregiver education/training, transfer/mobility retraining, strengthening exercise  Therapy Frequency (OT): 5 times/wk  Plan of Care Review  Plan of Care Reviewed With: patient, daughter  Progress: no change  Outcome Evaluation: 69 y;o M admitted to [facility] with sepsis 2/2 aspiration pneumonia from emesis. Prior to admission pt was independent with ADLS, IADLS and living alone in a 4th story apartment with elevator entrance and no use of AD. Currently pt presents with decreased act donna, strength, and balance impacting his independence with ADLS. Pt. was SBA for bed mob and STS however CGA with HHA for side stepping bedside unable to tolerate further mobility 2/2 fatigue. Pt. will benefit from skilled OT services to address deficits mentioned above and recommend DC to SNF to maximize  rehab potential.     Time Calculation:   Evaluation Complexity (OT)  Review Occupational Profile/Medical/Therapy History Complexity: expanded/moderate complexity  Assessment, Occupational Performance/Identification of Deficit Complexity: 3-5 performance deficits  Clinical Decision Making Complexity (OT): detailed assessment/moderate complexity  Overall Complexity of Evaluation (OT): moderate complexity     Time Calculation- OT       Row Name 05/27/24 1457             Time Calculation- OT    OT Start Time 1358  -AG      OT Stop Time 1422  -AG      OT Time Calculation (min) 24 min  -AG      Total Timed Code Minutes- OT 18 minute(s)  -AG      OT Received On 05/27/24  -AG      OT - Next Appointment 05/28/24  -AG      OT Goal Re-Cert Due Date 06/10/24  -AG         Timed Charges    20262 - OT Self Care/Mgmt Minutes 18  -AG         Untimed Charges    OT Eval/Re-eval Minutes 6  -AG         Total Minutes    Timed Charges Total Minutes 18  -AG      Untimed Charges Total Minutes 6  -AG       Total Minutes 24  -AG                User Key  (r) = Recorded By, (t) = Taken By, (c) = Cosigned By      Initials Name Provider Type    AG Juan Carlos Mayo OT Occupational Therapist                  Therapy Charges for Today       Code Description Service Date Service Provider Modifiers Qty    62346788190  OT SELF CARE/MGMT/TRAIN EA 15 MIN 5/27/2024 Juan Carlos Mayo OT GO 1    18241947681 HC OT EVAL MOD COMPLEXITY 3 5/27/2024 Jaun Carlos Mayo OT GO 1                 Juan Carlos Mayo OT  5/27/2024

## 2024-05-27 NOTE — PROGRESS NOTES
"DAILY PROGRESS NOTE  [facility]    Patient Identification:  Name: Jimmy Norman  Age: 69 y.o.  Sex: male  :  1955  MRN: [id number]         Primary Care Physician: Provider, No Known      Subjective  No new or acute complaints.    Objective:  General Appearance:  Comfortable, well-appearing, in no acute distress and not in pain.    Vital signs: (most recent): Blood pressure 134/87, pulse 98, temperature 98.4 °F (36.9 °C), temperature source Oral, resp. rate 16, height 188 cm (74\"), weight 82.9 kg (182 lb 12.2 oz), SpO2 94%.    Lungs:  Normal effort and normal respiratory rate.  He is not in respiratory distress.  No stridor.  There are rales.  No decreased breath sounds, wheezes or rhonchi.  (Scattered fine rales.)  Heart: Normal rate.  Regular rhythm.    Abdomen: Abdomen is soft and non-distended.    Extremities: There is no dependent edema.    Neurological: Patient is alert and oriented to person, place and time.    Skin:  Warm and dry.                  Vital signs in last 24 hours:  Temp:  [97.3 °F (36.3 °C)-102.4 °F (39.1 °C)] 98.4 °F (36.9 °C)  Heart Rate:  [] 98  Resp:  [16-18] 16  BP: (130-146)/(87-90) 134/87    Intake/Output:    Intake/Output Summary (Last 24 hours) at 2024 1851  Last data filed at 2024 1532  Gross per 24 hour   Intake 790 ml   Output 580 ml   Net 210 ml         Results from last 7 days   Lab Units 24  0630 24  1624 24  0506 24  0743 24  0448   WBC 10*3/mm3 6.49 6.30 5.38 5.65 5.11   HEMOGLOBIN g/dL 9.3* 8.9* 8.4* 10.1* 10.4*   PLATELETS 10*3/mm3 346 353 334 348 294     Results from last 7 days   Lab Units 24  0629 24  0506 24  0743 24  0448   SODIUM mmol/L 140 138 140 138   POTASSIUM mmol/L 4.2 3.7 4.0 3.9   CHLORIDE mmol/L 107 106 104 105   CO2 mmol/L 17.3* 21.8* 24.0 21.1*   BUN mg/dL 16 15 22 11   CREATININE mg/dL 1.44* 1.24 1.34* 0.88   GLUCOSE mg/dL 91 110* 109* 101*   Estimated " Creatinine Clearance: 56 mL/min (A) (by C-G formula based on SCr of 1.44 mg/dL (H)).  Results from last 7 days   Lab Units 05/27/24  0629 05/26/24  0506 05/25/24  0743 05/21/24  0448   CALCIUM mg/dL 8.6 8.4* 8.8 8.6   ALBUMIN g/dL 2.5* 2.8* 3.4* 3.1*   MAGNESIUM mg/dL  --   --   --  2.1   PHOSPHORUS mg/dL  --   --   --  2.5     Results from last 7 days   Lab Units 05/27/24  0629 05/26/24  0506 05/25/24  0743 05/21/24  0448   ALBUMIN g/dL 2.5* 2.8* 3.4* 3.1*   BILIRUBIN mg/dL 0.7 0.5 0.7 0.6   ALK PHOS U/L 87 66 70 66   AST (SGOT) U/L 160* 106* 93* 89*   ALT (SGPT) U/L 89* 59* 60* 57*       Assessment:  Aspiration pneumonia/pneumonitis: Clinically improved.  Continue antibiotics.  Recheck procalcitonin level.  Acute hypoxic respiratory failure: Secondary to above.  Improving.  Wean oxygen as tolerated.  MARTÍNEZ: Initially improved but now creatinine back up again.  Check urinalysis, postvoid residual.  Nephrology consultation.  Transaminitis: Slowly trending up.  Etiology uncertain.  Viral studies negative so far.  Potential for medication induced.  Monitor closely.  Gastroenterology opinion.  Check CK.  Oropharyngeal dysphagia: Modified diet.  Speech therapy input appreciated.  History of mandibular fracture with jaw pain: Likely contributing to dysphagia.  Modified diet.  Anemia: Labs consistent with iron deficiency as well as anemia of chronic disease.  Iron supplements.  Monitor.  Dental caries: Contributing to aspiration pneumonia.  Outpatient dental evaluation.  Malnutrition: Encourage p.o. intake.  Food supplements.    All problems new to this examiner.    Plan:  Please see above.  Discussed with patient.    Chad Wilkinson MD  5/27/2024  18:51 EDT    Addendum:  Called and discussed with patient's daughter over the phone.  Electronically signed by Chad Wilkinson MD, 05/27/24, 7:35 PM EDT.

## 2024-05-27 NOTE — PLAN OF CARE
"  Problem: Adult Inpatient Plan of Care  Goal: Plan of Care Review  Description: New orders received d/t \"recurrent PNA\". Patient with recent VFSS completed 5/21/24 during previous hospitalization. Results as follows: \"SLP recommends soft (whole meats) and thin liquids. Advanced to regular solids as tolerated by patient. Medication whole with water or applesauce. Precautions: upright during meals and 20-30 minutes after, small bites/sips, double swallow, alternate solids and liquids. With further concerns of aspiration, suggest dedicated esophageal study. SLP to follow for diet tolerance and review of strategies.\" SLP to modify diet to soft/whole with thins at this time. Spoke to ED RN who is in agreement for speech to follow once dedicated esophageal assessment completed.   Outcome: Ongoing, Not Progressing  Flowsheets (Taken 5/27/2024 2799)  Progress: declining  Plan of Care Reviewed With:   patient   daughter   son   Goal Outcome Evaluation:  Plan of Care Reviewed With: patient, daughter, son        Progress: declining                                  "

## 2024-05-27 NOTE — PLAN OF CARE
Goal Outcome Evaluation:  Plan of Care Reviewed With: patient, daughter        Progress: no change  Outcome Evaluation: 69 y;o M admitted to [facility] with sepsis 2/2 aspiration pneumonia from emesis. Prior to admission pt was independent with ADLS, IADLS and living alone in a 4th story apartment with elevator entrance and no use of AD. Currently pt presents with decreased act donna, strength, and balance impacting his independence with ADLS. Pt. was SBA for bed mob and STS however CGA with HHA for side stepping bedside unable to tolerate further mobility 2/2 fatigue. Pt. will benefit from skilled OT services to address deficits mentioned above and recommend DC to SNF to maximize rehab potential.      Anticipated Discharge Disposition (OT): skilled nursing facility

## 2024-05-27 NOTE — PAYOR COMM NOTE
"Jimmy Norman (69 y.o. Male)      SEE FOR INPATIENT: ID#[id number]    UR DEPT: [phone]    [facility]:  NPI [id number] [facility] ([facility]# [id number]    CHAD FUENTES MD: NPI [id number],  [phone],  [phone]    J69.0, A41.9, R09.02, E86.0    JESSICA ISIDRO RN,CCP: [phone]        Date of Birth   1955    Social Security Number       Address   [facility] [place] [address]    Home Phone   [phone]    MRN   [id number]       [facility]    Marital Status   Single                            Admission Date   5/25/24    Admission Type   Emergency    Admitting Provider   Juan Ramon Hinojosa MD    Attending Provider   Chad Wilkinson MD    Department, Room/Bed   [address], S423/1       Discharge Date       Discharge Disposition       Discharge Destination                                 Attending Provider: Chad Wilkinson MD    Allergies: No Known Allergies    Isolation: None   Infection: None   Code Status: CPR    Ht: 188 cm (74\")   Wt: 82.9 kg (182 lb 12.2 oz)    Admission Cmt: None   Principal Problem: Aspiration pneumonia due to vomitus [J69.0]                   Active Insurance as of 5/25/2024       Primary Coverage       Payor Plan Insurance Group Employer/Plan Group    PASSPORT MEDICARE ADVANTAGE PASSPORT ADVANTAGE [id number]       Payor Plan Address Payor Plan Phone Number Payor Plan Fax Number Effective Dates    [address]   5/17/2024 - None Entered    [place] [id number]         Subscriber Name Subscriber Birth Date Member ID       JIMYM NORMAN 1955 [id number]                     Emergency Contacts        (Rel.) Home Phone Work Phone Mobile Phone    caroline montalvo (Daughter) [phone] -- --    george norman (Brother) [phone] -- --                 History & Physical        Juan Rmaon Hinojosa MD at 05/25/24 0937        "       [facility] Medicine Services  HISTORY AND PHYSICAL    Patient Name: Jimmy Norman  : 1955  MRN: [id number]  Primary Care Physician: Jad, No Known  Date of admission: 2024    Subjective  Subjective   Chief Complaint:   weakness    HPI:  Jimmy Norman is a 69 y.o. male with past medical history as listed below presents to the hospital after recent hospitalization.  Patient reports he has had progressive 2-day history of severe weakness that is worse with exertion and improved with rest.  Patient notes cough and chills and some generalized shortness of breath.  Patient reports he has been sick to his stomach and reports to me that he vomited.  He thinks he choked on his vomit.  He is a very poor historian and does reports he finished recent antibiotics.  He was found to be hypoxic at 85% on arrival and was placed on 2 L nasal cannula at 92%.  He is being hospitalized for further medical management.      Review of Systems   As per above  No current headache or lightheadedness  No current chest pain or palpitations    All other systems reviewed and are negative.     Personal History     Past Medical History:   Diagnosis Date    Arthritis     Cancer     Coronary artery disease        Past Surgical History:   Procedure Laterality Date    WOUND DEBRIDEMENT      pt states he had surgery on chest wall r/t infection       Family History: family history is not on file. Other pertinent FHx was reviewed and unremarkable.     Social History:  reports that he has never smoked. He has never been exposed to tobacco smoke. He has never used smokeless tobacco. He reports current alcohol use of about 2.0 standard drinks of alcohol per week. He reports that he does not currently use drugs.        Medications:  Available home medication information reviewed.    No Known Allergies    Objective  Objective   Vital Signs:   Temp:  [100.6 °F (38.1 °C)-101.9 °F (38.8 °C)] 100.6 °F (38.1 °C)  Heart Rate:   [109-136] 109  Resp:  [16] 16  BP: (123-132)/(83-93) 123/83        Physical Exam   Constitutional:Awake, alert, elderly in appearance  HENT: NCAT, mucous membranes moist, neck supple  Respiratory: Cough and coarse sounds, moderate inspiration, currently on nasal cannula oxygen  Cardiovascular: Pulse rate is tachycardic, palpable radial pulses  Gastrointestinal:  soft, nontender, nondistended  Musculoskeletal: Thin with muscle wasting noted, chronically debilitated in appearance, BMI is 23, no lower extremity edema  Psychiatric: Appropriate affect, cooperative, conversational  Neurologic: No slurred speech or facial droop, follows commands  Skin: No rashes or jaundice, warm      Results from last 7 days   Lab Units 05/25/24  0743   WBC 10*3/mm3 5.65   HEMOGLOBIN g/dL 10.1*   HEMATOCRIT % 30.8*   PLATELETS 10*3/mm3 348   INR  1.15*     Results from last 7 days   Lab Units 05/25/24  0743   SODIUM mmol/L 140   POTASSIUM mmol/L 4.0   CHLORIDE mmol/L 104   CO2 mmol/L 24.0   BUN mg/dL 22   CREATININE mg/dL 1.34*   GLUCOSE mg/dL 109*   CALCIUM mg/dL 8.8   ALK PHOS U/L 70   ALT (SGPT) U/L 60*   AST (SGOT) U/L 93*   HSTROP T ng/L 22*   PROBNP pg/mL 65.1   LACTATE mmol/L 1.6   PROCALCITONIN ng/mL 0.49*     Estimated Creatinine Clearance: 60.1 mL/min (A) (by C-G formula based on SCr of 1.34 mg/dL (H)).  Brief Urine Lab Results  (Last result in the past 365 days)        Color   Clarity   Blood   Leuk Est   Nitrite   Protein   CREAT   Urine HCG        05/25/24 0743 Yellow   Clear   Trace   Negative   Negative   100 mg/dL (2+)                 Imaging Results (Last 24 Hours)       Procedure Component Value Units Date/Time    XR Chest 1 View [[id number]] Collected: 05/25/24 0756     Updated: 05/25/24 0802    Narrative:      XR CHEST 1 VW-        INDICATION: CHF/COPD     COMPARISON: Chest radiograph May 17, 2024     TECHNIQUE: 1 view chest     FINDINGS:      Heterogeneous multifocal left greater than right lung opacities.  No  effusions. Stable mediastinum. Heart is normal in size. Intraluminal  contrast seen in the colon.       Impression:      Heterogeneous multifocal left greater than right lung opacities,  increased in the interval. Suspect multifocal pneumonia. Hemorrhage in  the appropriate clinical setting.     This report was finalized on 5/25/2024 7:58 AM by Dr. Carlito Chand M.D on Workstation: [id number]                 Assessment & Plan  Assessment & Plan     Active Hospital Problems    Diagnosis  POA    **Aspiration pneumonia due to vomitus [J69.0]  Yes    MARTÍNEZ (acute kidney injury) [N17.9]  Yes    Sepsis [A41.9]  Yes    Hypoxia [R09.02]  Yes    Dehydration [E86.0]  Yes    Severe malnutrition [E43]  Yes    Jaw pain [R68.84]  Yes    History of prostate cancer [Z85.46]  Not Applicable     69-year-old male presents to the hospital with sepsis due to aspiration pneumonia from emesis.    Discussion/plan for today: IV fluids, IV antibiotics, broad-spectrum's, check urine antigens, supportive care and symptom treatment.  Titrate supplemental oxygen as needed.  Trend renal function.  Consult speech therapy due to concern for recurrent aspiration.  Chest x-ray images reviewed and multifocal pneumonia noted and appears suspicious for aspiration.  PT for debility.  Nutritional supplements to maximize nutrition.  Recent PSA reviewed and was negative.  Treatment plan discussed with the patient at length who is in agreement.    Sepsis due to aspiration pneumonia: Broad-spectrum treatment with ceftriaxone and doxycycline.  Check urine antigen.  Supplemental oxygen as needed for hypoxia.  Speech therapy.  IV fluid    Acute kidney injury, prerenal due to dehydration:  Patient given IV bolus.  IV fluid initially.  Trend renal function.  Previous baseline reviewed and records 0.8    Malnutrition: Nutritional supplements.    Chronic jaw pain: Patient reports he is fallen out with his pain management team and is no longer receiving  prescriptions for gabapentin.  With recurrent aspiration I would recommend he stay off gabapentin for now.  Tylenol would be the preferable outpatient mainstay of treatment    History of prostate cancer: Noted.  Most recent PSA is negative.    Debility: PT eval    DVT prophylaxis: Lovenox    CODE STATUS:    Code Status and Medical Interventions:   Ordered at: 05/25/24 0924     Level Of Support Discussed With:    Patient     Code Status (Patient has no pulse and is not breathing):    CPR (Attempt to Resuscitate)     Medical Interventions (Patient has pulse or is breathing):    Full Support         Juan Ramon Hinojosa MD  05/25/24      Electronically signed by Juan Ramon Hinojosa MD at 05/25/24 0905          Emergency Department Notes        Carlito Tovar, RN at 05/25/24 1126          Nursing report ED to floor  Jimmy Norman  69 y.o.  male    HPI :  HPI (Adult)  Stated Reason for Visit: from home with c/o weakness, fever. dx with pna recently  History Obtained From: EMS, patient    Chief Complaint  Chief Complaint   Patient presents with    Weakness - Generalized       Admitting doctor:   Juan Ramon Hinojosa MD    Admitting diagnosis:   The primary encounter diagnosis was Pneumonia of left lower lobe due to infectious organism. Diagnoses of Hypoxia and Acute renal insufficiency were also pertinent to this visit.    Code status:   Current Code Status       Date Active Code Status Order ID Comments User Context       5/25/2024 0924 CPR (Attempt to Resuscitate) [id number]  Juan Ramon Hinojosa MD ED        Question Answer    Code Status (Patient has no pulse and is not breathing) CPR (Attempt to Resuscitate)    Medical Interventions (Patient has pulse or is breathing) Full Support    Level Of Support Discussed With Patient                    Allergies:   Patient has no known allergies.    Isolation:   No active isolations    Intake and Output    Intake/Output Summary (Last 24 hours) at 5/25/2024  1126  Last data filed at 5/25/2024 1048  Gross per 24 hour   Intake 1200 ml   Output --   Net 1200 ml       Weight:       05/25/24  0729   Weight: 82.9 kg (182 lb 12.2 oz)       Most recent vitals:   Vitals:    05/25/24 0831 05/25/24 0901 05/25/24 0935 05/25/24 1033   BP: 123/83   118/88   Pulse: 109   90   Resp:       Temp:  (!) 100.6 °F (38.1 °C)     TempSrc:  Tympanic     SpO2: 93%  95% 94%   Weight:           Active LDAs/IV Access:   Lines, Drains & Airways       Active LDAs       Name Placement date Placement time Site Days    Peripheral IV 05/25/24 0727 Right Antecubital 05/25/24 0727  Antecubital  less than 1                    Labs (abnormal labs have a star):   Labs Reviewed   COMPREHENSIVE METABOLIC PANEL - Abnormal; Notable for the following components:       Result Value    Glucose 109 (*)     Creatinine 1.34 (*)     Albumin 3.4 (*)     ALT (SGPT) 60 (*)     AST (SGOT) 93 (*)     eGFR 57.0 (*)     All other components within normal limits    Narrative:     GFR Normal >60  Chronic Kidney Disease <60  Kidney Failure <15     SINGLE HS TROPONIN T - Abnormal; Notable for the following components:    HS Troponin T 22 (*)     All other components within normal limits    Narrative:     High Sensitive Troponin T Reference Range:  <14.0 ng/L- Negative Female for AMI  <22.0 ng/L- Negative Male for AMI  >=14 - Abnormal Female indicating possible myocardial injury.  >=22 - Abnormal Male indicating possible myocardial injury.   Clinicians would have to utilize clinical acumen, EKG, Troponin, and serial changes to determine if it is an Acute Myocardial Infarction or myocardial injury due to an underlying chronic condition.        PROTIME-INR - Abnormal; Notable for the following components:    Protime 14.9 (*)     INR 1.15 (*)     All other components within normal limits   URINALYSIS W/ MICROSCOPIC IF INDICATED (NO CULTURE) - Abnormal; Notable for the following components:    Blood, UA Trace (*)     Protein,   "mg/dL (2+) (*)     All other components within normal limits   PROCALCITONIN - Abnormal; Notable for the following components:    Procalcitonin 0.49 (*)     All other components within normal limits    Narrative:     As a Marker for Sepsis (Non-Neonates):    1. <0.5 ng/mL represents a low risk of severe sepsis and/or septic shock.  2. >2 ng/mL represents a high risk of severe sepsis and/or septic shock.    As a Marker for Lower Respiratory Tract Infections that require antibiotic therapy:    PCT on Admission    Antibiotic Therapy       6-12 Hrs later    >0.5                Strongly Recommended  >0.25 - <0.5        Recommended   0.1 - 0.25          Discouraged              Remeasure/reassess PCT  <0.1                Strongly Discouraged     Remeasure/reassess PCT    As 28 day mortality risk marker: \"Change in Procalcitonin Result\" (>80% or <=80%) if Day 0 (or Day 1) and Day 4 values are available. Refer to http://www.[facility]-pct-calculator.com    Change in PCT <=80%  A decrease of PCT levels below or equal to 80% defines a positive change in PCT test result representing a higher risk for 28-day all-cause mortality of patients diagnosed with severe sepsis for septic shock.    Change in PCT >80%  A decrease of PCT levels of more than 80% defines a negative change in PCT result representing a lower risk for 28-day all-cause mortality of patients diagnosed with severe sepsis or septic shock.      URINE DRUG SCREEN - Abnormal; Notable for the following components:    Opiate Screen Positive (*)     All other components within normal limits    Narrative:     Negative Thresholds Per Drugs Screened:    Amphetamines                 500 ng/ml  Barbiturates                 200 ng/ml  Benzodiazepines              100 ng/ml  Cocaine                      300 ng/ml  Methadone                    300 ng/ml  Opiates                      300 ng/ml  Oxycodone                    100 ng/ml  THC                           50 ng/ml  Fentanyl   "                     5 ng/ml      The Normal Value for all drugs tested is negative. This report includes final unconfirmed screening results to be used for medical treatment purposes only. Unconfirmed results must not be used for non-medical purposes such as employment or legal testing. Clinical consideration should be applied to any drug of abuse test, particularly when unconfirmed results are used.           CBC WITH AUTO DIFFERENTIAL - Abnormal; Notable for the following components:    RBC 3.48 (*)     Hemoglobin 10.1 (*)     Hematocrit 30.8 (*)     Neutrophil % 78.6 (*)     Lymphocyte % 9.6 (*)     Lymphocytes, Absolute 0.54 (*)     nRBC 0.4 (*)     All other components within normal limits   URINALYSIS, MICROSCOPIC ONLY - Abnormal; Notable for the following components:    Bacteria, UA 1+ (*)     All other components within normal limits   RESPIRATORY PANEL PCR W/ COVID-19 (SARS-COV-2), NP SWAB IN UTM/VTP, 2 HR TAT - Normal    Narrative:     In the setting of a positive respiratory panel with a viral infection PLUS a negative procalcitonin without other underlying concern for bacterial infection, consider observing off antibiotics or discontinuation of antibiotics and continue supportive care. If the respiratory panel is positive for atypical bacterial infection (Bordetella pertussis, Chlamydophila pneumoniae, or Mycoplasma pneumoniae), consider antibiotic de-escalation to target atypical bacterial infection.   LEGIONELLA ANTIGEN, URINE - Normal   STREP PNEUMO AG, URINE OR CSF - Normal   BNP (IN-HOUSE) - Normal    Narrative:     This assay is used as an aid in the diagnosis of individuals suspected of having heart failure. It can be used as an aid in the diagnosis of acute decompensated heart failure (ADHF) in patients presenting with signs and symptoms of ADHF to the emergency department (ED). In addition, NT-proBNP of <300 pg/mL indicates ADHF is not likely.    Age Range Result Interpretation  NT-proBNP  Concentration (pg/mL:      <50             Positive            >450                   Gray                 300-450                    Negative             <300    50-75           Positive            >900                  Gray                300-900                  Negative            <300      >75             Positive            >1800                  Gray                300-1800                  Negative            <300   LACTIC ACID, PLASMA - Normal   AMMONIA - Normal   PSA DIAGNOSTIC - Normal    Narrative:     Results may be falsely decreased if patient taking Biotin.    Testing Method: Roche [employer] Electrochemiluminescence Immunoassay(ECLIA)  Values obtained with different assay methods or kits cannot be used interchangeably.   BLOOD CULTURE   BLOOD CULTURE   RAINBOW DRAW    Narrative:     The following orders were created for panel order Seth Draw.  Procedure                               Abnormality         Status                     ---------                               -----------         ------                     Green Top (Gel)[[id number]]                                  Final result               Lavender Top[[id number]]                                     Final result               Gold Top - SST[[id number]]                                   Final result               Light Blue Top[[id number]]                                   Final result                 Please view results for these tests on the individual orders.   CBC AND DIFFERENTIAL    Narrative:     The following orders were created for panel order CBC & Differential.  Procedure                               Abnormality         Status                     ---------                               -----------         ------                     CBC Auto Differential[[id number]]        Abnormal            Final result                 Please view results for these tests on the individual orders.   GREEN TOP   LAVENDER TOP   GOLD TOP - SST    LIGHT BLUE TOP       EKG:   ECG 12 Lead Dyspnea   Preliminary Result   HEART RATE= 118  bpm   RR Interval= 508  ms   WY Interval= 142  ms   P Horizontal Axis= 12  deg   P Front Axis= 35  deg   QRSD Interval= 83  ms   QT Interval= 306  ms   QTcB= 429  ms   QRS Axis= 257  deg   T Wave Axis= -2  deg   - ABNORMAL ECG -   Sinus tachycardia   Abnormal R-wave progression, late transition   Inferior infarct, old   Baseline wander in lead(s) V1   Electronically Signed By:    Date and Time of Study: 2024-05-25 07:40:01      Telemetry Scan   Final Result      Telemetry Scan   Final Result      ECG 12 Lead Tachycardia    (Results Pending)       Meds given in ED:   Medications   sodium chloride 0.9 % flush 10 mL (has no administration in time range)   sodium chloride 0.9 % flush 3 mL (has no administration in time range)   sodium chloride 0.9 % flush 3-10 mL (has no administration in time range)   sodium chloride 0.9 % infusion 40 mL (has no administration in time range)   acetaminophen (TYLENOL) tablet 650 mg (has no administration in time range)     Or   acetaminophen (TYLENOL) 160 MG/5ML oral solution 650 mg (has no administration in time range)     Or   acetaminophen (TYLENOL) suppository 650 mg (has no administration in time range)   famotidine (PEPCID) tablet 10 mg (has no administration in time range)   ondansetron ODT (ZOFRAN-ODT) disintegrating tablet 4 mg (has no administration in time range)     Or   ondansetron (ZOFRAN) injection 4 mg (has no administration in time range)   melatonin tablet 2.5 mg (has no administration in time range)   Enoxaparin Sodium (LOVENOX) syringe 40 mg (40 mg Subcutaneous Given 5/25/24 4324)   Potassium Replacement - Follow Nurse / BPA Driven Protocol (has no administration in time range)   Magnesium Low Dose Replacement - Follow Nurse / BPA Driven Protocol (has no administration in time range)   Phosphorus Replacement - Follow Nurse / BPA Driven Protocol (has no administration in time  range)   Calcium Replacement - Follow Nurse / BPA Driven Protocol (has no administration in time range)   sennosides-docusate (PERICOLACE) 8.6-50 MG per tablet 2 tablet (has no administration in time range)     And   polyethylene glycol (MIRALAX) packet 17 g (has no administration in time range)     And   bisacodyl (DULCOLAX) EC tablet 5 mg (has no administration in time range)     And   bisacodyl (DULCOLAX) suppository 10 mg (has no administration in time range)   cefTRIAXone (ROCEPHIN) 1,000 mg in sodium chloride 0.9 % 100 mL MBP (has no administration in time range)   doxycycline (VIBRAMYCIN) 100 mg in sodium chloride 0.9 % 100 mL MBP (has no administration in time range)   ascorbic acid (VITAMIN C) tablet 250 mg (has no administration in time range)   cetirizine (zyrTEC) tablet 5 mg (has no administration in time range)   multivitamin with minerals 1 tablet (1 tablet Oral Given 5/25/24 1115)   cholecalciferol (VITAMIN D3) tablet 1,000 Units (1,000 Units Oral Given 5/25/24 1115)   sodium chloride 0.9 % infusion (100 mL/hr Intravenous New Bag 5/25/24 1117)   traMADol (ULTRAM) tablet 25 mg (has no administration in time range)   lactated ringers bolus 1,000 mL (0 mL Intravenous Stopped 5/25/24 0940)   acetaminophen (TYLENOL) tablet 1,000 mg (1,000 mg Oral Given 5/25/24 0751)   cefTRIAXone (ROCEPHIN) 1,000 mg in sodium chloride 0.9 % 100 mL MBP (0 mg Intravenous Stopped 5/25/24 0937)   doxycycline (VIBRAMYCIN) 100 mg in sodium chloride 0.9 % 100 mL MBP (0 mg Intravenous Stopped 5/25/24 1048)       Imaging results:  XR Chest 1 View    Result Date: 5/25/2024  Heterogeneous multifocal left greater than right lung opacities, increased in the interval. Suspect multifocal pneumonia. Hemorrhage in the appropriate clinical setting.  This report was finalized on 5/25/2024 7:58 AM by Dr. Carlito Chand M.D on Workstation: [id number]       Ambulatory status:   - with assist     Social issues:   Social History      Socioeconomic History    Marital status: Single   Tobacco Use    Smoking status: Never     Passive exposure: Never    Smokeless tobacco: Never   Vaping Use    Vaping status: Never Used   Substance and Sexual Activity    Alcohol use: Yes     Alcohol/week: 2.0 standard drinks of alcohol     Types: 2 Shots of liquor per week     Comment: drinks maybe once a month    Drug use: Not Currently     Comment: hx pain pill use    Sexual activity: Defer       Peripheral Neurovascular  Peripheral Neurovascular (Adult)  Peripheral Neurovascular WDL: WDL    Neuro Cognitive  Neuro Cognitive (Adult)  Cognitive/Neuro/Behavioral WDL: WDL  Pupils  Pupil PERRLA: yes    Learning  Learning Assessment (Adult)  Learning Readiness and Ability: physical limitation noted    Respiratory  Respiratory WDL  Respiratory WDL: rhythm/pattern  Rhythm/Pattern, Respiratory: shortness of breath, tachypneic  Breath Sounds  Breath Sounds: LLL, RLL, BERNADETTE  BERNADETTE Breath Sounds: Posterior:, diminished  LLL Breath Sounds: Posterior:, diminished  RLL Breath Sounds: diminished, Posterior:    Abdominal Pain       Pain Assessments  Pain (Adult)  (0-10) Pain Rating: Rest: 0  Pain Location:  (jaw)  Response to Pain Interventions: interventions effective per patient    NIH Stroke Scale       Carlito Tovar RN  05/25/24 11:26 EDT      Electronically signed by Carlito Tovar RN at 05/25/24 1126       Rosa Piña MD at 05/25/24 0721           EMERGENCY DEPARTMENT ENCOUNTER    CHIEF COMPLAINT  Chief Complaint: Generalized weakness  History given by: Patient and family  History limited by: Patient status  Room Number: 23/23  PMD: Provider, No Known      HPI:  Pt is a 69 y.o. male presents with daughter at bedside complaining of generalized weakness, poor p.o. intake worsening at home over the past 2 days.  Daughter reports mild cough, denies known fever, patient and daughter report no complaints of chest pain, shortness of air, abdominal pain, vomiting, changes in  urinary or bowel habits, patient and daughter report of rash to patient's abdomen and face at prior admission, now much improved, denies any wounds.  Daughter reports patient finished antibiotics he was sent home on from the hospital.  Patient has been using walker at home, patient and family deny falls.    Patient and family report he is not eating or drinking well at home.        Aggravating Factors: Recent admission with pneumonia, taking antibiotics  Alleviating Factors: Nothing  Treatment before arrival: Antibiotics at home  Chronic or social conditions impacting care: None known    Additional sources:  Discussed/ obtained information from independent historians: Daughter at bedside    External (non-ED) record review:   Patient admitted 5/17 through 5/21/2024 with pneumonia, noted to have severe malnutrition,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 05/25/24 0922     Admin Instructions:   If BOTH ondansetron (ZOFRAN) and promethazine (PHENERGAN) are ordered use ondansetron first and THEN promethazine IF ondansetron is ineffective.       0950-Given          0910-Given            Phosphorus Replacement - Follow Nurse / BPA Driven Protocol  Freq: As Needed Route: XX  PRN Reason: Other  Start: 05/25/24 0924     Admin Instructions:   Open Order & Select \"[facility] Electrolyte Replacement Protocol Algorithm\" to View Details           piperacillin-tazobactam (ZOSYN) 3.375 g " "IVPB in 100 mL NS MBP (CD)  Dose: 3.375 g  Freq: Every 8 Hours Route: IV  Indications of Use: PNEUMONIA  Start: 05/26/24 1615 End: 06/02/24 1614       1715-New Bag          0003-New Bag     0856-New Bag     1615          Potassium Replacement - Follow Nurse / BPA Driven Protocol  Freq: As Needed Route: XX  PRN Reason: Other  Start: 05/25/24 0924     Admin Instructions:   Open Order & Select \"[facility] Electrolyte Replacement Protocol Algorithm\" to View Details           sodium chloride 0.9 % flush 10 mL  Dose: 10 mL  Freq: As Needed Route: IV  PRN Reason: Line Care  Start: 05/25/24 0738           sodium chloride 0.9 % flush 3 mL  Dose: 3 mL  Freq: Every 12 Hours Scheduled Route: IV  Start: 05/25/24 0940      (1311)-Not Given     2102-Given         0846-Given     2016-Given         (0857)-Not Given     2100           sodium chloride 0.9 % flush 3-10 mL  Dose: 3-10 mL  Freq: As Needed Route: IV  PRN Reason: Line Care  Start: 05/25/24 0922           sodium chloride 0.9 % infusion 40 mL  Dose: 40 mL  Freq: As Needed Route: IV  PRN Reason: Line Care  Start: 05/25/24 0922     Admin Instructions:   Following administration of an IV intermittent medication, flush line with 40mL NS at 100mL/hr.           Completed Medications  Medications 05/25/24 05/26/24 05/27/24      acetaminophen (TYLENOL) tablet 1,000 mg  Dose: 1,000 mg  Freq: Once Route: PO  Start: 05/25/24 0756 End: 05/25/24 0751     Admin Instructions:   If given for fever, use fever parameter: fever greater than 100.4 °F  Based on patient request - if ordered for moderate or severe pain, provider allows for administration of a medication prescribed for a lower pain scale.    Do not exceed 4 grams of acetaminophen in a 24 hr period. Max dose of 2gm for AST/ALT greater than 120 units/L.    If given for pain, use the following pain scale:   Mild Pain = Pain Score of 1-3, CPOT 1-2  Moderate Pain = Pain Score of 4-6, CPOT 3-4  Severe Pain = Pain Score of 7-10, CPOT 5-8      " 0751-Given              barium sulfate (E-Z-PAQUE) 96 % oral suspension reconstituted suspension 183 mL  Dose: 183 mL  Freq: Once in Imaging Route: PO  Start: 05/26/24 1600 End: 05/26/24 1507     Admin Instructions:      Mix with water according to package insert. Shake well before administration.       1507-Given [C]             cefTRIAXone (ROCEPHIN) 1,000 mg in sodium chloride 0.9 % 100 mL MBP  Dose: 1,000 mg  Freq: Once Route: IV  Indications of Use: PNEUMONIA  Start: 05/25/24 0912 End: 05/25/24 0937     Admin Instructions:   LR should be paused and flushing of the line with NS is recommended prior to and after completion of ceftriaxone infusion due to incompatibility. Do not co-adminster with calcium-containing solutions.  Caution: Look alike/sound alike drug alert      0901-New Bag     0937-Stopped             doxycycline (VIBRAMYCIN) 100 mg in sodium chloride 0.9 % 100 mL MBP  Dose: 100 mg  Freq: Once Route: IV  Indications of Use: PNEUMONIA  Start: 05/25/24 0912 End: 05/25/24 1048     Admin Instructions:   Protect from light.      0939-New Bag     1048-Stopped             lactated ringers bolus 1,000 mL  Dose: 1,000 mL  Freq: Once Route: IV  Start: 05/25/24 0756 End: 05/25/24 0940      0746-New Bag     0940-Stopped             piperacillin-tazobactam (ZOSYN) 3.375 g IVPB in 100 mL NS MBP (CD)  Dose: 3.375 g  Freq: Once Route: IV  Start: 05/26/24 1015 End: 05/26/24 1041       1011-New Bag             Discontinued Medications  Medications 05/25/24 05/26/24 05/27/24      cefTRIAXone (ROCEPHIN) 1,000 mg in sodium chloride 0.9 % 100 mL MBP  Dose: 1,000 mg  Freq: Every 24 Hours Route: IV  Indications of Use: PNEUMONIA  Start: 05/26/24 0900 End: 05/26/24 0927     Admin Instructions:   LR should be paused and flushing of the line with NS is recommended prior to and after completion of ceftriaxone infusion due to incompatibility. Do not co-adminster with calcium-containing solutions.  Caution: Look alike/sound alike  drug alert       0846-New Bag             doxycycline (VIBRAMYCIN) 100 mg in sodium chloride 0.9 % 100 mL MBP  Dose: 100 mg  Freq: Every 12 Hours Route: IV  Indications of Use: PNEUMONIA  Start: 05/25/24 2100 End: 05/26/24 0927     Admin Instructions:   Protect from light.      2102-New Bag          0351-Stopped     0846-New Bag            Enoxaparin Sodium (LOVENOX) syringe 40 mg  Dose: 40 mg  Freq: Daily Route: SC  Indications of Use: PROPHYLAXIS OF VENOUS THROMBOEMBOLISM  Start: 05/25/24 0940 End: 05/26/24 0918     Admin Instructions:   Give subcutaneous in abdomen only. Do not massage site after injection.      1119-Given              sodium chloride 0.9 % infusion  Rate: 100 mL/hr Dose: 100 mL/hr  Freq: Continuous Route: IV  Start: 05/25/24 0946 End: 05/26/24 0915      1117-New Bag     2308-New Bag             traMADol (ULTRAM) tablet 25 mg  Dose: 25 mg  Freq: Every 6 Hours PRN Route: PO  PRN Reason: Moderate Pain  Start: 05/25/24 0931 End: 05/26/24 0905     Admin Instructions:   Based on patient request - if ordered for moderate or severe pain, provider allows for administration of a medication prescribed for a lower pain scale.        Caution: Look alike/sound alike drug alert    If given for pain, use the following pain scale:  Mild Pain = Pain Score of 1-3, CPOT 1-2  Moderate Pain = Pain Score of 4-6, CPOT 3-4  Severe Pain = Pain Score of 7-10, CPOT 5-8       0345-Given                    Lab Results (all)       Procedure Component Value Units Date/Time    Occult Blood X 1, Stool - Stool, Per Rectum [[id number]]  (Normal) Collected: 05/27/24 1129    Specimen: Stool from Per Rectum Updated: 05/27/24 1206     Fecal Occult Blood Negative    Comprehensive Metabolic Panel [[id number]]  (Abnormal) Collected: 05/27/24 0629    Specimen: Blood Updated: 05/27/24 0815     Glucose 91 mg/dL      BUN 16 mg/dL      Creatinine 1.44 mg/dL      Sodium 140 mmol/L      Potassium 4.2 mmol/L      Comment: Slight hemolysis detected  by analyzer. Result may be falsely elevated.        Chloride 107 mmol/L      CO2 17.3 mmol/L      Calcium 8.6 mg/dL      Total Protein 6.9 g/dL      Albumin 2.5 g/dL      ALT (SGPT) 89 U/L      AST (SGOT) 160 U/L      Alkaline Phosphatase 87 U/L      Total Bilirubin 0.7 mg/dL      Globulin 4.4 gm/dL      A/G Ratio 0.6 g/dL      BUN/Creatinine Ratio 11.1     Anion Gap 15.7 mmol/L      eGFR 52.3 mL/min/1.73     Narrative:      GFR Normal >60  Chronic Kidney Disease <60  Kidney Failure <15      Blood Culture - Blood, Arm, Left [[id number]]  (Normal) Collected: 05/25/24 0809    Specimen: Blood from Arm, Left Updated: 05/27/24 0815     Blood Culture No growth at 2 days    Blood Culture - Blood, Arm, Right [[id number]]  (Normal) Collected: 05/25/24 0809    Specimen: Blood from Arm, Right Updated: 05/27/24 0815     Blood Culture No growth at 2 days    CBC (No Diff) [[id number]]  (Abnormal) Collected: 05/27/24 0630    Specimen: Blood Updated: 05/27/24 0652     WBC 6.49 10*3/mm3      RBC 3.28 10*6/mm3      Hemoglobin 9.3 g/dL      Hematocrit 28.8 %      MCV 87.8 fL      MCH 28.4 pg      MCHC 32.3 g/dL      RDW 13.1 %      RDW-SD 41.5 fl      MPV 9.5 fL      Platelets 346 10*3/mm3     CBC (No Diff) [[id number]]  (Abnormal) Collected: 05/26/24 1624    Specimen: Blood Updated: 05/26/24 1634     WBC 6.30 10*3/mm3      RBC 3.07 10*6/mm3      Hemoglobin 8.9 g/dL      Hematocrit 26.9 %      MCV 87.6 fL      MCH 29.0 pg      MCHC 33.1 g/dL      RDW 13.2 %      RDW-SD 41.8 fl      MPV 9.3 fL      Platelets 353 10*3/mm3     Comprehensive Metabolic Panel [[id number]]  (Abnormal) Collected: 05/26/24 0506    Specimen: Blood Updated: 05/26/24 0548     Glucose 110 mg/dL      BUN 15 mg/dL      Creatinine 1.24 mg/dL      Sodium 138 mmol/L      Potassium 3.7 mmol/L      Chloride 106 mmol/L      CO2 21.8 mmol/L      Calcium 8.4 mg/dL      Total Protein 6.6 g/dL      Albumin 2.8 g/dL      ALT (SGPT) 59 U/L      AST (SGOT) 106 U/L      Alkaline  Phosphatase 66 U/L      Total Bilirubin 0.5 mg/dL      Globulin 3.8 gm/dL      A/G Ratio 0.7 g/dL      BUN/Creatinine Ratio 12.1     Anion Gap 10.2 mmol/L      eGFR 62.5 mL/min/1.73     Narrative:      GFR Normal >60  Chronic Kidney Disease <60  Kidney Failure <15      CBC (No Diff) [[id number]]  (Abnormal) Collected: 05/26/24 0506    Specimen: Blood Updated: 05/26/24 0533     WBC 5.38 10*3/mm3      RBC 2.94 10*6/mm3      Hemoglobin 8.4 g/dL      Hematocrit 25.3 %      MCV 86.1 fL      MCH 28.6 pg      MCHC 33.2 g/dL      RDW 13.3 %      RDW-SD 41.3 fl      MPV 9.5 fL      Platelets 334 10*3/mm3     S. Pneumo Ag Urine or CSF - Urine, Urine, Clean Catch [[id number]]  (Normal) Collected: 05/25/24 0743    Specimen: Urine, Clean Catch Updated: 05/25/24 1118     Strep Pneumo Ag Negative    Legionella Antigen, Urine - Urine, Urine, Clean Catch [[id number]]  (Normal) Collected: 05/25/24 0743    Specimen: Urine, Clean Catch Updated: 05/25/24 1118     LEGIONELLA ANTIGEN, URINE Negative    PSA DIAGNOSTIC [[id number]]  (Normal) Collected: 05/25/24 0743    Specimen: Blood Updated: 05/25/24 1014     PSA 0.612 ng/mL     Narrative:      Results may be falsely decreased if patient taking Biotin.    Testing Method: [employer] Electrochemiluminescence Immunoassay(ECLIA)  Values obtained with different assay methods or kits cannot be used interchangeably.    Respiratory Panel PCR w/COVID-19(SARS-CoV-2) SCARLETT/DWIGHT/EVELYN/PAD/COR/BRITT In-House, NP Swab in UTM/VTM, 2 HR TAT - Swab, Nasopharynx [[id number]]  (Normal) Collected: 05/25/24 0745    Specimen: Swab from Nasopharynx Updated: 05/25/24 0922     ADENOVIRUS, PCR Not Detected     Coronavirus 229E Not Detected     Coronavirus HKU1 Not Detected     Coronavirus NL63 Not Detected     Coronavirus OC43 Not Detected     COVID19 Not Detected     Human Metapneumovirus Not Detected     Human Rhinovirus/Enterovirus Not Detected     Influenza A PCR Not Detected     Influenza B PCR Not Detected      Parainfluenza Virus 1 Not Detected     Parainfluenza Virus 2 Not Detected     Parainfluenza Virus 3 Not Detected     Parainfluenza Virus 4 Not Detected     RSV, PCR Not Detected     Bordetella pertussis pcr Not Detected     Bordetella parapertussis PCR Not Detected     Chlamydophila pneumoniae PCR Not Detected     Mycoplasma pneumo by PCR Not Detected    Narrative:      In the setting of a positive respiratory panel with a viral infection PLUS a negative procalcitonin without other underlying concern for bacterial infection, consider observing off antibiotics or discontinuation of antibiotics and continue supportive care. If the respiratory panel is positive for atypical bacterial infection (Bordetella pertussis, Chlamydophila pneumoniae, or Mycoplasma pneumoniae), consider antibiotic de-escalation to target atypical bacterial infection.    Urinalysis, Microscopic Only - Urine, Clean Catch [[id number]]  (Abnormal) Collected: 05/25/24 0743    Specimen: Urine, Clean Catch Updated: 05/25/24 0830     RBC, UA 0-2 /HPF      WBC, UA None Seen /HPF      Bacteria, UA 1+ /HPF      Squamous Epithelial Cells, UA 0-2 /HPF      Hyaline Casts, UA 0-2 /LPF      Methodology Manual Light Microscopy    Comprehensive Metabolic Panel [[id number]]  (Abnormal) Collected: 05/25/24 0743    Specimen: Blood Updated: 05/25/24 0827     Glucose 109 mg/dL      BUN 22 mg/dL      Creatinine 1.34 mg/dL      Sodium 140 mmol/L      Potassium 4.0 mmol/L      Chloride 104 mmol/L      CO2 24.0 mmol/L      Calcium 8.8 mg/dL      Total Protein 7.9 g/dL      Albumin 3.4 g/dL      ALT (SGPT) 60 U/L      AST (SGOT) 93 U/L      Alkaline Phosphatase 70 U/L      Total Bilirubin 0.7 mg/dL      Globulin 4.5 gm/dL      A/G Ratio 0.8 g/dL      BUN/Creatinine Ratio 16.4     Anion Gap 12.0 mmol/L      eGFR 57.0 mL/min/1.73     Narrative:      GFR Normal >60  Chronic Kidney Disease <60  Kidney Failure <15      BNP [[id number]]  (Normal) Collected: 05/25/24 0743     Specimen: Blood Updated: 05/25/24 0823     proBNP 65.1 pg/mL     Narrative:      This assay is used as an aid in the diagnosis of individuals suspected of having heart failure. It can be used as an aid in the diagnosis of acute decompensated heart failure (ADHF) in patients presenting with signs and symptoms of ADHF to the emergency department (ED). In addition, NT-proBNP of <300 pg/mL indicates ADHF is not likely.    Age Range Result Interpretation  NT-proBNP Concentration (pg/mL:      <50             Positive            >450                   Gray                 300-450                    Negative             <300    50-75           Positive            >900                  Gray                300-900                  Negative            <300      >75             Positive            >1800                  Gray                300-1800                  Negative            <300    Single High Sensitivity Troponin T [[id number]]  (Abnormal) Collected: 05/25/24 0743    Specimen: Blood Updated: 05/25/24 0823     HS Troponin T 22 ng/L     Narrative:      High Sensitive Troponin T Reference Range:  <14.0 ng/L- Negative Female for AMI  <22.0 ng/L- Negative Male for AMI  >=14 - Abnormal Female indicating possible myocardial injury.  >=22 - Abnormal Male indicating possible myocardial injury.   Clinicians would have to utilize clinical acumen, EKG, Troponin, and serial changes to determine if it is an Acute Myocardial Infarction or myocardial injury due to an underlying chronic condition.         Procalcitonin [[id number]]  (Abnormal) Collected: 05/25/24 0743    Specimen: Blood Updated: 05/25/24 0823     Procalcitonin 0.49 ng/mL     Narrative:      As a Marker for Sepsis (Non-Neonates):    1. <0.5 ng/mL represents a low risk of severe sepsis and/or septic shock.  2. >2 ng/mL represents a high risk of severe sepsis and/or septic shock.    As a Marker for Lower Respiratory Tract Infections that require antibiotic  "therapy:    PCT on Admission    Antibiotic Therapy       6-12 Hrs later    >0.5                Strongly Recommended  >0.25 - <0.5        Recommended   0.1 - 0.25          Discouraged              Remeasure/reassess PCT  <0.1                Strongly Discouraged     Remeasure/reassess PCT    As 28 day mortality risk marker: \"Change in Procalcitonin Result\" (>80% or <=80%) if Day 0 (or Day 1) and Day 4 values are available. Refer to http://[email or web address]-pct-calculator.[email or web address]    Change in PCT <=80%  A decrease of PCT levels below or equal to 80% defines a positive change in PCT test result representing a higher risk for 28-day all-cause mortality of patients diagnosed with severe sepsis for septic shock.    Change in PCT >80%  A decrease of PCT levels of more than 80% defines a negative change in PCT result representing a lower risk for 28-day all-cause mortality of patients diagnosed with severe sepsis or septic shock.       Urine Drug Screen - Urine, Clean Catch [[id number]]  (Abnormal) Collected: 05/25/24 0743    Specimen: Urine, Clean Catch Updated: 05/25/24 0817     Amphet/Methamphet, Screen Negative     Barbiturates Screen, Urine Negative     Benzodiazepine Screen, Urine Negative     Cocaine Screen, Urine Negative     Opiate Screen Positive     THC, Screen, Urine Negative     Methadone Screen, Urine Negative     Oxycodone Screen, Urine Negative     Fentanyl, Urine Negative    Narrative:      Negative Thresholds Per Drugs Screened:    Amphetamines                 500 ng/ml  Barbiturates                 200 ng/ml  Benzodiazepines              100 ng/ml  Cocaine                      300 ng/ml  Methadone                    300 ng/ml  Opiates                      300 ng/ml  Oxycodone                    100 ng/ml  THC                           50 ng/ml  Fentanyl                       5 ng/ml      The Normal Value for all drugs tested is negative. This report includes final unconfirmed screening results to be used for medical " treatment purposes only. Unconfirmed results must not be used for non-medical purposes such as employment or legal testing. Clinical consideration should be applied to any drug of abuse test, particularly when unconfirmed results are used.            Protime-INR [[id number]]  (Abnormal) Collected: 05/25/24 0743    Specimen: Blood Updated: 05/25/24 0816     Protime 14.9 Seconds      INR 1.15    Lactic Acid, Plasma [[id number]]  (Normal) Collected: 05/25/24 0743    Specimen: Blood Updated: 05/25/24 0816     Lactate 1.6 mmol/L     Urinalysis With Microscopic If Indicated (No Culture) - Urine, Clean Catch [[id number]]  (Abnormal) Collected: 05/25/24 0743    Specimen: Urine, Clean Catch Updated: 05/25/24 0815     Color, UA Yellow     Appearance, UA Clear     pH, UA 5.5     Specific Gravity, UA 1.013     Glucose, UA Negative     Ketones, UA Negative     Bilirubin, UA Negative     Blood, UA Trace     Protein,  mg/dL (2+)     Leuk Esterase, UA Negative     Nitrite, UA Negative     Urobilinogen, UA 1.0 E.U./dL    Ammonia [[id number]]  (Normal) Collected: 05/25/24 0743    Specimen: Blood Updated: 05/25/24 0811     Ammonia 26 umol/L     [place] Draw [[id number]] Collected: 05/25/24 0743    Specimen: Blood Updated: 05/25/24 0802    Narrative:      The following orders were created for panel order [place] Draw.  Procedure                               Abnormality         Status                     ---------                               -----------         ------                     Green Top (Gel)[[id number]]                                  Final result               Lavender Top[[id number]]                                     Final result               Gold Top - SST[[id number]]                                   Final result               Light Blue Top[[id number]]                                   Final result                 Please view results for these tests on the individual orders.    Green Top (Gel) [[id number]] Collected:  05/25/24 0743    Specimen: Blood Updated: 05/25/24 0802     Extra Tube Hold for add-ons.     Comment: Auto resulted.       Lavender Top [[id number]] Collected: 05/25/24 0743    Specimen: Blood Updated: 05/25/24 0802     Extra Tube hold for add-on     Comment: Auto resulted       Gold Top - SST [[id number]] Collected: 05/25/24 0743    Specimen: Blood Updated: 05/25/24 0802     Extra Tube Hold for add-ons.     Comment: Auto resulted.       Light Blue Top [[id number]] Collected: 05/25/24 0743    Specimen: Blood Updated: 05/25/24 0802     Extra Tube Hold for add-ons.     Comment: Auto resulted       CBC & Differential [[id number]]  (Abnormal) Collected: 05/25/24 0743    Specimen: Blood Updated: 05/25/24 0758    Narrative:      The following orders were created for panel order CBC & Differential.  Procedure                               Abnormality         Status                     ---------                               -----------         ------                     CBC Auto Differential[[id number]]        Abnormal            Final result                 Please view results for these tests on the individual orders.    CBC Auto Differential [[id number]]  (Abnormal) Collected: 05/25/24 0743    Specimen: Blood Updated: 05/25/24 0758     WBC 5.65 10*3/mm3      RBC 3.48 10*6/mm3      Hemoglobin 10.1 g/dL      Hematocrit 30.8 %      MCV 88.5 fL      MCH 29.0 pg      MCHC 32.8 g/dL      RDW 13.6 %      RDW-SD 43.8 fl      MPV 10.1 fL      Platelets 348 10*3/mm3      Neutrophil % 78.6 %      Lymphocyte % 9.6 %      Monocyte % 9.9 %      Eosinophil % 1.2 %      Basophil % 0.2 %      Immature Grans % 0.5 %      Neutrophils, Absolute 4.44 10*3/mm3      Lymphocytes, Absolute 0.54 10*3/mm3      Monocytes, Absolute 0.56 10*3/mm3      Eosinophils, Absolute 0.07 10*3/mm3      Basophils, Absolute 0.01 10*3/mm3      Immature Grans, Absolute 0.03 10*3/mm3      nRBC 0.4 /100 WBC           Imaging Results (All)       Procedure Component Value  Units Date/Time    FL ESOPHAGRAM SINGLE CONTRAST [[id number]] Collected: 05/26/24 2013     Updated: 05/26/24 2020    Narrative:      PROCEDURE: FL ESOPHAGRAM SINGLE CONTRAST-     HISTORY: Difficulty swallowing. Recommended by speech pathology.     TECHNIQUE:  A single phase barium esophagram was performed.     Fluoroscopy time: 6 seconds  # images obtained: 50  Reference Air Kerma (dose): 45 mGy     COMPARISON: CT chest 5/20/2024     FINDINGS: Limited exam secondary to patient condition.      radiograph shows left greater than right pulmonary opacities best  characterized on recent chest CT. Prior contrast within the partially  visualized colon. Esophagus is well distended and normal in course and  caliber. No narrowing/stricture. Normal esophageal motility. Mildly  patulous distal esophagus without sliding hiatal hernia. Contrast  continued into the proximal stomach.             Impression:      Normal limited esophagram.            This report was finalized on 5/26/2024 8:17 PM by Dr. Ronny Pérez M.D on Workstation: [id number]       XR Chest 1 View [[id number]] Collected: 05/25/24 0756     Updated: 05/25/24 0802    Narrative:      XR CHEST 1 VW-        INDICATION: CHF/COPD     COMPARISON: Chest radiograph May 17, 2024     TECHNIQUE: 1 view chest     FINDINGS:      Heterogeneous multifocal left greater than right lung opacities. No  effusions. Stable mediastinum. Heart is normal in size. Intraluminal  contrast seen in the colon.       Impression:      Heterogeneous multifocal left greater than right lung opacities,  increased in the interval. Suspect multifocal pneumonia. Hemorrhage in  the appropriate clinical setting.     This report was finalized on 5/25/2024 7:58 AM by Dr. Carlito Chand M.D on Workstation: [id number]             ECG/EMG Results (all)       Procedure Component Value Units Date/Time    Telemetry Scan [[id number]] Resulted: 05/25/24     Updated: 05/25/24 1005    Telemetry Scan  [[id number]] Resulted: 24     Updated: 24 1005    ECG 12 Lead Dyspnea [[id number]] Collected: 24 0740     Updated: 24 1541     QT Interval 306 ms      QTC Interval 429 ms     Narrative:      HEART RATE= 118  bpm  RR Interval= 508  ms  [place] Interval= 142  ms  P Horizontal Axis= 12  deg  P Front Axis= 35  deg  QRSD Interval= 83  ms  QT Interval= 306  ms  QTcB= 429  ms  QRS Axis= 257  deg  T Wave Axis= -2  deg  - ABNORMAL ECG -  Sinus tachycardia  Abnormal R-wave progression, late transition  Inferior infarct, old  When compared with ECG of 17-May-2024 23:17:16,  Significant rate increase  Electronically Signed By: Dalton Chicas ([facility]) 26-May-2024 15:41:23  Date and Time of Study: 2024 07:40:01        Physician Progress Notes (last 72 hours)        Juan Ramon Hinojosa MD at 24 0915              [facility] Medicine Services  PROGRESS NOTE    Patient Name: Jimmy Norman  : 1955  MRN: [id number]    Date of Admission: 2024  Primary Care Physician: Provider, No Known    Subjective   Subjective     CC:  Follow-up weakness    Subjective:  Patient says he feels a little better today.  He is completely oriented and a somewhat better historian.  His son is at the bedside and is supporting his father through his illness.  We discussed findings so far.  Family state he is much weaker than his typical baseline since his recent infections.  He remains on 2 L nasal cannula currently at 94%.    Review of Systems  No current fevers or chills  No current shortness of breath or cough  No current nausea, vomiting, or diarrhea  No current chest pain or palpitations       Objective   Objective     Vital Signs:   Temp:  [97.2 °F (36.2 °C)-101.2 °F (38.4 °C)] 98.8 °F (37.1 °C)  Heart Rate:  [90-98] 95  Resp:  [16-20] 20  BP: (118-150)/(83-95) 124/87        Physical Exam:  Constitutional:Awake, alert but still somewhat lethargic however improved since , elderly in appearance  HENT:  NCAT, mucous membranes moist, neck supple  Respiratory: Improving cough and coarse sounds, moderate inspiration, currently on nasal cannula oxygen  Cardiovascular: Pulse rate is now normal, palpable radial pulses  Gastrointestinal:  soft, nontender, nondistended  Musculoskeletal: Thin with muscle wasting noted, chronically debilitated in appearance, BMI is 23, no lower extremity edema  Psychiatric: Appropriate affect, cooperative, conversational  Neurologic: Oriented, no slurred speech or facial droop, follows commands  Skin: No rashes or jaundice, warm      Results Reviewed:  Results from last 7 days   Lab Units 05/26/24  0506 05/25/24  0743 05/21/24 0448   WBC 10*3/mm3 5.38 5.65 5.11   HEMOGLOBIN g/dL 8.4* 10.1* 10.4*   HEMATOCRIT % 25.3* 30.8* 31.2*   PLATELETS 10*3/mm3 334 348 294   INR   --  1.15*  --    PROCALCITONIN ng/mL  --  0.49*  --      Results from last 7 days   Lab Units 05/26/24  0506 05/25/24  0743 05/21/24  0448   SODIUM mmol/L 138 140 138   POTASSIUM mmol/L 3.7 4.0 3.9   CHLORIDE mmol/L 106 104 105   CO2 mmol/L 21.8* 24.0 21.1*   BUN mg/dL 15 22 11   CREATININE mg/dL 1.24 1.34* 0.88   GLUCOSE mg/dL 110* 109* 101*   CALCIUM mg/dL 8.4* 8.8 8.6   ALK PHOS U/L 66 70 66   ALT (SGPT) U/L 59* 60* 57*   AST (SGOT) U/L 106* 93* 89*   HSTROP T ng/L  --  22*  --    PROBNP pg/mL  --  65.1  --      Estimated Creatinine Clearance: 65 mL/min (by C-G formula based on SCr of 1.24 mg/dL).    Microbiology Results Abnormal       Procedure Component Value - Date/Time    Blood Culture - Blood, Arm, Left [[id number]]  (Normal) Collected: 05/25/24 0809    Lab Status: Preliminary result Specimen: Blood from Arm, Left Updated: 05/26/24 0816     Blood Culture No growth at 24 hours    Blood Culture - Blood, Arm, Right [[id number]]  (Normal) Collected: 05/25/24 0809    Lab Status: Preliminary result Specimen: Blood from Arm, Right Updated: 05/26/24 0816     Blood Culture No growth at 24 hours    S. Pneumo Ag Urine or CSF -  Urine, Urine, Clean Catch [[id number]]  (Normal) Collected: 05/25/24 0743    Lab Status: Final result Specimen: Urine, Clean Catch Updated: 05/25/24 1118     Strep Pneumo Ag Negative    Legionella Antigen, Urine - Urine, Urine, Clean Catch [[id number]]  (Normal) Collected: 05/25/24 0743    Lab Status: Final result Specimen: Urine, Clean Catch Updated: 05/25/24 1118     LEGIONELLA ANTIGEN, URINE Negative    Respiratory Panel PCR w/COVID-19(SARS-CoV-2) [facility]IGHT/EVELYN/PAD/COR/BRITT In-House, NP Swab in UTM/VTM, 2 HR TAT - Swab, Nasopharynx [[id number]]  (Normal) Collected: 05/25/24 0745    Lab Status: Final result Specimen: Swab from Nasopharynx Updated: 05/25/24 0922     ADENOVIRUS, PCR Not Detected     Coronavirus 229E Not Detected     Coronavirus HKU1 Not Detected     Coronavirus NL63 Not Detected     Coronavirus OC43 Not Detected     COVID19 Not Detected     Human Metapneumovirus Not Detected     Human Rhinovirus/Enterovirus Not Detected     Influenza A PCR Not Detected     Influenza B PCR Not Detected     Parainfluenza Virus 1 Not Detected     Parainfluenza Virus 2 Not Detected     Parainfluenza Virus 3 Not Detected     Parainfluenza Virus 4 Not Detected     RSV, PCR Not Detected     Bordetella pertussis pcr Not Detected     Bordetella parapertussis PCR Not Detected     Chlamydophila pneumoniae PCR Not Detected     Mycoplasma pneumo by PCR Not Detected    Narrative:      In the setting of a positive respiratory panel with a viral infection PLUS a negative procalcitonin without other underlying concern for bacterial infection, consider observing off antibiotics or discontinuation of antibiotics and continue supportive care. If the respiratory panel is positive for atypical bacterial infection (Bordetella pertussis, Chlamydophila pneumoniae, or Mycoplasma pneumoniae), consider antibiotic de-escalation to target atypical bacterial infection.            Imaging Results (Last 24 Hours)       ** No results found for the last 24  hours. **                I have reviewed the medications:  Scheduled Meds:vitamin C, 250 mg, Oral, Daily  cefTRIAXone, 1,000 mg, Intravenous, Q24H  cholecalciferol, 1,000 Units, Oral, Daily  doxycycline, 100 mg, Intravenous, Q12H  enoxaparin, 40 mg, Subcutaneous, Daily  multivitamin with minerals, 1 tablet, Oral, Daily  sodium chloride, 3 mL, Intravenous, Q12H      Continuous Infusions:   PRN Meds:.  acetaminophen **OR** acetaminophen **OR** acetaminophen    senna-docusate sodium **AND** polyethylene glycol **AND** bisacodyl **AND** bisacodyl    Calcium Replacement - Follow Nurse / BPA Driven Protocol    cetirizine    famotidine    Magnesium Low Dose Replacement - Follow Nurse / BPA Driven Protocol    melatonin    ondansetron ODT **OR** ondansetron    Phosphorus Replacement - Follow Nurse / BPA Driven Protocol    Potassium Replacement - Follow Nurse / BPA Driven Protocol    sodium chloride    sodium chloride    sodium chloride    Assessment & Plan   Assessment & Plan     Active Hospital Problems    Diagnosis  POA    **Aspiration pneumonia due to vomitus [J69.0]  Yes    MARTÍNEZ (acute kidney injury) [N17.9]  Yes    Sepsis [A41.9]  Yes    Hypoxia [R09.02]  Yes    Dehydration [E86.0]  Yes    Severe malnutrition [E43]  Yes    Jaw pain [R68.84]  Yes    History of prostate cancer [Z85.46]  Not Applicable      Resolved Hospital Problems   No resolved problems to display.        Brief Hospital Course to date:  Jimmy Norman is a 69 y.o. male presents to the hospital with sepsis due to aspiration pneumonia from emesis.     Discussion/plan for today: Patient appears adequately hydrated.  Stop IV fluid and monitor clinically.  Could restart fluids if needed.  Continue doxycycline and ceftriaxone for broad-spectrum coverage.  Clinically patient appears significantly improved compared to yesterday but still quite ill.  I discussed with his daughter over the phone reportedly when he was discharged he had significant vomiting on  Thursday and had choked on his emesis.  Esophagram ordered per recommendation from speech therapy.  Wean supplemental oxygen as possible.  PT OT for debility.  Continued speech therapy for dysphagia.  Hemoglobin significant lower partially likely dilution from IV fluids however check occult stool.  No other bleeding reported.  Treatment plan discussed with the patient, son at length who is in agreement.  Plan discussed with nursing.     Sepsis due to aspiration pneumonitis from emesis: Initially received broad-spectrum treatment with ceftriaxone and doxycycline, transition to Zosyn with persistent fevers for aspiration coverage.  Negative urine antigen.  Supplemental oxygen as needed for hypoxia.  Speech therapy recommends esophagram to evaluate which has been ordered.  Supportive care and symptom treatment.  Following blood cultures which are negative thus far.  Add albuterol nebulizers.  Most recent CT scan reviewed from last hospitalization.  I discussed with patient and his son the radiologist recommendation for repeat CT scan in 3 months which can be performed with primary care provider at follow-up.  They both understand and agree to make sure he has repeat CT scan.    Anemia of chronic disease:  Recent iron studies reviewed.  Consistent with anemia of chronic disease and likely some degree of dilution from IV fluids.  No bleeding reported.  Check occult stool.  Discontinue Lovenox prophylaxis.  Recheck hemoglobin later today.     Acute kidney injury, prerenal due to dehydration:  Patient given IV bolus.  IV fluid initially provided.  Previous baseline reviewed and records 0.8.  Patient was 1.34 at time of admission.  Improved with IV fluid and now appears euvolemic and stop fluid today.  Encourage oral intake and recheck tomorrow.     Oral dysphagia: Diet per speech therapy.  Speech therapy also recommending esophagram which is ordered and pending.    Dental caries: Family is working on outpatient dental  appointment to have cavities addressed.    Malnutrition: Nutritional supplements added and strongly encouraged     Chronic jaw pain: Patient reports he is fallen out with his pain management team and is no longer receiving prescriptions for gabapentin.  With recurrent aspiration I would recommend he stay off gabapentin for now while acutely ill.  Tylenol would be the preferable outpatient mainstay of treatment     History of prostate cancer: Noted.  Most recent PSA is negative.     Debility: PT OT and monitor clinically     DVT prophylaxis: SCDs      Disposition: Pending    CODE STATUS:   Code Status and Medical Interventions:   Ordered at: 05/25/24 0924     Level Of Support Discussed With:    Patient     Code Status (Patient has no pulse and is not breathing):    CPR (Attempt to Resuscitate)     Medical Interventions (Patient has pulse or is breathing):    Full Support       Juan Ramon Hinojosa MD  05/26/24      Electronically signed by Juan Ramon Hinojosa MD at 05/26/24 0928       Consult Notes (last 72 hours)  Notes from 05/24/24 1342 through 05/27/24 1342   No notes of this type exist for this encounter.

## 2024-05-28 NOTE — PROGRESS NOTES
"Nutrition Services    Patient Name:  Jimmy Norman  YOB: 1955  MRN: [id number]  Admit Date:  5/25/2024    Assessment Date:  05/28/24    Summary: Initial Visit for MST 3   Pt is a 68 y/o male who presented with generalized weakness, poor PO intake. Pt has a hx of cancer, CAD, MARTÍNEZ.   Pt sleeping when I visited but daughter at bedside was able to provide a hx. Daughter reported pt has not been eating very much over the past few weeks. Daughter reported pt's usual weight is about 219 lbs. Daughter reported pt had had weight loss recent but was unsure of the amount. Per EMR, pt has had 4.3% weight loss x 1 week (significant). Daughter reported pt has been having some issues chewing his food. SLP saw pt on 5/25 and recs soft/whole meats and thin liquids. Daughter reported pt having issues with nausea and vomiting. Daughter stated pt vomited twice today. Pt currently does not show any significant muscle wasting or fat loss. Pt currently receiving Boost Breeze BID and magic cups QD.     Patient meets ASPEN/AND criteria for nutrition diagnosis of severe malnutrition of acute illness based on: poor po intake and weight loss    NFPE results below    RECS:  Continue supplements  Encourage good nutrition    CLINICAL NUTRITION ASSESSMENT      Reason for Assessment MST score 2+     Diagnosis/Problem   generalized weakness, poor PO intake. Pt has a hx of cancer, CAD, MARTÍNEZ.    Medical/Surgical History Past Medical History:   Diagnosis Date    Arthritis     Cancer     Coronary artery disease        Past Surgical History:   Procedure Laterality Date    WOUND DEBRIDEMENT      pt states he had surgery on chest wall r/t infection        Anthropometrics        Current Height  Current Weight  BMI kg/m2 Height: 188 cm (74\")  Weight: 82.9 kg (182 lb 12.2 oz) (05/25/24 1332)  Body mass index is 23.47 kg/m².   Adjusted BMI (if applicable)    BMI Category Normal/Healthy (18.4 - 24.9)   Ideal Body Weight (IBW) 181 lbs "   Usual Body Weight (UBW) 219 lbs   Weight Trend Loss   Weight History Wt Readings from Last 30 Encounters:   05/25/24 1332 82.9 kg (182 lb 12.2 oz)   05/25/24 0729 82.9 kg (182 lb 12.2 oz)   05/18/24 0428 85.5 kg (188 lb 8 oz)   05/17/24 2250 86.2 kg (190 lb)        Estimated/Assessed Needs        Energy Requirements    Weight for Calculation 83 kg   Method for Estimation  25 kcal/kg   EST Needs (kcal/day) 2075       Protein Requirements    Weight for Calculation 83 kg   EST Protein Needs (g/kg) 1.2 - 1.5 gm/kg   EST Daily Needs (g/day)        Fluid Requirements     Method for Estimation 1 mL/kcal    Estimated Needs (mL/day)        Fluid Deficit    Current Na Level (mEq/L)    Desired Na Level (mEq/L)    Estimated Fluid Deficit (L)       Labs       Pertinent Labs    Results from last 7 days   Lab Units 05/28/24  0517 05/27/24  0629 05/26/24  0506   SODIUM mmol/L 141 140 138   POTASSIUM mmol/L 3.8 4.2 3.7   CHLORIDE mmol/L 107 107 106   CO2 mmol/L 24.0 17.3* 21.8*   BUN mg/dL 19 16 15   CREATININE mg/dL 1.67* 1.44* 1.24   CALCIUM mg/dL 8.2* 8.6 8.4*   BILIRUBIN mg/dL 0.9 0.7 0.5   ALK PHOS U/L 92 87 66   ALT (SGPT) U/L 102* 89* 59*   AST (SGOT) U/L 186* 160* 106*   GLUCOSE mg/dL 106* 91 110*     Results from last 7 days   Lab Units 05/28/24  0517   PHOSPHORUS mg/dL 2.7   HEMOGLOBIN g/dL 8.0*   HEMATOCRIT % 24.6*   WBC 10*3/mm3 7.61   ALBUMIN g/dL 2.6*     Results from last 7 days   Lab Units 05/28/24  0517 05/27/24  0630 05/26/24  1624 05/26/24  0506 05/25/24  0743   INR   --   --   --   --  1.15*   PLATELETS 10*3/mm3 362 346 353 334 348     COVID19   Date Value Ref Range Status   05/25/2024 Not Detected Not Detected - Ref. Range Final     Lab Results   Component Value Date    HGBA1C 5.20 05/18/2024          Medications           Scheduled Medications albuterol, 2.5 mg, Nebulization, Q6H - RT  vitamin C, 250 mg, Oral, Daily  cholecalciferol, 1,000 Units, Oral, Daily  docusate sodium, 100 mg, Oral,  Daily  ferrous sulfate, 325 mg, Oral, Daily With Breakfast  lactobacillus acidophilus, 1 capsule, Oral, Daily  multivitamin with minerals, 1 tablet, Oral, Daily  piperacillin-tazobactam, 3.375 g, Intravenous, Q8H  sodium chloride, 3 mL, Intravenous, Q12H       Infusions     PRN Medications   acetaminophen **OR** acetaminophen **OR** acetaminophen    albuterol    senna-docusate sodium **AND** polyethylene glycol **AND** bisacodyl **AND** bisacodyl    Calcium Replacement - Follow Nurse / BPA Driven Protocol    cetirizine    famotidine    Magnesium Low Dose Replacement - Follow Nurse / BPA Driven Protocol    melatonin    ondansetron ODT **OR** ondansetron    Phosphorus Replacement - Follow Nurse / BPA Driven Protocol    Potassium Replacement - Follow Nurse / BPA Driven Protocol    prochlorperazine    sodium chloride    sodium chloride    sodium chloride     Physical Findings          General Findings appeared asleep   Oral/Mouth Cavity tooth or teeth missing   Edema  not assessed   Gastrointestinal nausea, vomiting, last bowel movement: 5/28   Skin  skin intact   Tubes/Drains/Lines none   NFPE No clinical signs of muscle wasting or fat loss     Malnutrition Severity Assessment      Patient meets criteria for : Severe Malnutrition  Malnutrition Type (Last 8 Hours)       Malnutrition Severity Assessment       Row Name 05/28/24 1458       Malnutrition Severity Assessment    Malnutrition Type Acute Disease or Injury - Related Malnutrition      Row Name 05/28/24 1458       Insufficient Energy Intake     Insufficient Energy Intake Findings Severe    Insufficient Energy Intake  < or equal to 50% of est. energy requirement for > or equal to 5d)      Row Name 05/28/24 1458       Unintentional Weight Loss     Unintentional Weight Loss Findings Severe    Unintentional Weight Loss  Weight loss greater than 2% in one week      Row Name 05/28/24 1458       Muscle Loss    Loss of Muscle Mass Findings None      Row Name 05/28/24 1458        Fat Loss    Subcutaneous Fat Loss Findings None      Row Name 05/28/24 1458       Criteria Met (Must meet criteria for severity in at least 2 of these categories: M Wasting, Fat Loss, Fluid, Secondary Signs, Wt. Status, Intake)    Patient meets criteria for  Severe Malnutrition                       Current Nutrition Orders & Evaluation of Intake       Oral Nutrition     Food Allergies NKFA   Current PO Diet Diet: Regular/House; Texture: Soft to Chew (NDD 3); Soft to Chew: Whole Meat; Fluid Consistency: Thin (IDDSI 0)   Supplement Boost Breeze, Magic Cup, Daily, BID   PO Evaluation     % PO Intake Less than half of meals x 2 weeks     Factors Affecting Intake: decreased appetite     PES STATEMENT / NUTRITION DIAGNOSIS      Nutrition Dx Problem  Problem: Malnutrition (severe)  Etiology: Medical Diagnosis - generalized weakness, poor PO intake. Pt has a hx of cancer, CAD, MARTÍNEZ.     Signs/Symptoms: Report of Minimal PO Intake   --  NUTRITION INTERVENTION / PLAN OF CARE      Intervention Goal(s) Maintain nutrition status, Establish goals of care, Meet estimated needs, Initiate feeding/diet, Tolerate PO , Increase intake, Accepts oral nutrition supplement, Maintain weight, No significant weight loss, and PO intake goal %: 75%         RD Intervention/Action Encourage intake, Continue to monitor, and Care plan reviewed         Prescription/Orders:       PO Diet       Supplements       Enteral Nutrition       Parenteral Nutrition    New Prescription Ordered? Continue same per protocol, No changes at this time   --      Monitor/Evaluation Per protocol, PO intake, Supplement intake, Weight, Skin status, GI status, Symptoms, POC/GOC, Swallow function   Discharge Plan/Needs No discharge needs identified at this time   --    RD to follow per protocol.      Electronically signed by:  Aaron Harden  05/28/24 14:30 EDT     [place] Pregnancy And Lactation Text: This medication has not been assigned a Pregnancy Risk Category. It is unknown if the medication is excreted in breast milk.

## 2024-05-28 NOTE — PLAN OF CARE
Goal Outcome Evaluation:  Plan of Care Reviewed With: patient           Outcome Evaluation: Pt supine in bed upon arrival. initially agreeable to session. He reported jaw pain and then stated he was too fatigued. Worked on rolling for proper positioning and bed mobility. Pt had just used urinal supine in bed prior to session and declined ADLs or ther ex. Will continue to follow to progress

## 2024-05-28 NOTE — THERAPY TREATMENT NOTE
Patient Name: Jimmy Norman  : 1955    MRN: [id number]                              Today's Date: 2024       Admit Date: 2024    Visit Dx:     ICD-10-CM ICD-9-CM   1. Pneumonia of left lower lobe due to infectious organism  J18.9 486   2. Hypoxia  R09.02 799.02   3. Acute renal insufficiency  N28.9 593.9     Patient Active Problem List   Diagnosis    Jaw pain    Transaminitis    History of prostate cancer    Severe malnutrition    MARTÍNEZ (acute kidney injury)    Aspiration pneumonia due to vomitus    Sepsis    Hypoxia    Dehydration    Oral phase dysphagia    Dental caries     Past Medical History:   Diagnosis Date    Arthritis     Cancer     Coronary artery disease      Past Surgical History:   Procedure Laterality Date    WOUND DEBRIDEMENT      pt states he had surgery on chest wall r/t infection      General Information       Row Name 24 1447          Physical Therapy Time and Intention    Document Type therapy note (daily note)  -     Mode of Treatment physical therapy  -       Row Name 24 1447          General Information    Patient Profile Reviewed yes  -     Existing Precautions/Restrictions fall  -       Row Name 24 1447          Cognition    Orientation Status (Cognition) unable/difficult to assess;oriented to;person  -       Row Name 24 1447          Safety Issues, Functional Mobility    Impairments Affecting Function (Mobility) strength  -               User Key  (r) = Recorded By, (t) = Taken By, (c) = Cosigned By      Initials Name Provider Type    Leslee Banegas PTA Physical Therapist Assistant                   Mobility       Row Name 24 1448          Bed Mobility    Bed Mobility rolling right  -     Rolling Right [place] (Bed Mobility) contact guard;standby assist;verbal cues  -     Comment, (Bed Mobility) assist to reposition only, pt in pain upon entry, required assist of 2 to move up in bed and get centered correctly,  turned to R side and pillow between knees , much encouragement for incr mobility but declined  -JM       Row Name 05/28/24 1448          Sit-Stand Transfer    Sit-Stand [place] (Transfers) not tested  -JM       Row Name 05/28/24 1448          Gait/Stairs (Locomotion)    Comment, (Gait/Stairs) unable to get pt to mobilize, c/o pain all over but mainly R jaw, RN aware, already medicated  -               User Key  (r) = Recorded By, (t) = Taken By, (c) = Cosigned By      Initials Name Provider Type    Leslee Banegas PTA Physical Therapist Assistant                   Obj/Interventions       Row Name 05/28/24 1456          Motor Skills    Therapeutic Exercise --  declined exer at present  -               User Key  (r) = Recorded By, (t) = Taken By, (c) = Cosigned By      Initials Name Provider Type    Leslee Banegas PTA Physical Therapist Assistant                   Goals/Plan    No documentation.                  Clinical Impression       Row Name 05/28/24 1456          Pain    Pre/Posttreatment Pain Comment did not rate but c/o significant pain R jaw  -JM       Row Name 05/28/24 1456          Plan of Care Review    Plan of Care Reviewed With patient  -     Outcome Evaluation Pt resting upon arrival, attempted OT/PT session, but pt declined tfers or amb this session, did require A of 2 for proper positioning, pt still c/o pain in R jaw and RN aware and already medicated , pt declined exer also after much encouragement, will progress as able  -JM       Row Name 05/28/24 1456          Therapy Assessment/Plan (PT)    Rehab Potential (PT) fair, will monitor progress closely  -     Criteria for Skilled Interventions Met (PT) yes  -               User Key  (r) = Recorded By, (t) = Taken By, (c) = Cosigned By      Initials Name Provider Type    Leslee Banegas PTA Physical Therapist Assistant                   Outcome Measures    No documentation.                                Physical Therapy  Education       Title: PT OT SLP Therapies (In Progress)       Topic: Physical Therapy (In Progress)       Point: Mobility training (Done)       Learning Progress Summary             Patient Acceptance, E,TB, VU,DU,NR by  at 5/26/2024 1224   Family Acceptance, E,TB, VU,DU,NR by  at 5/26/2024 1224                         Point: Home exercise program (Not Started)       Learner Progress:  Not documented in this visit.              Point: Body mechanics (Done)       Learning Progress Summary             Patient Acceptance, E,TB, VU,DU,NR by  at 5/26/2024 1224   Family Acceptance, E,TB, VU,DU,NR by CS at 5/26/2024 1224                         Point: Precautions (Done)       Learning Progress Summary             Patient Acceptance, E,TB, VU,DU,NR by  at 5/26/2024 1224   Family Acceptance, E,TB, VU,DU,NR by  at 5/26/2024 1224                                         User Key       Initials Effective Dates Name Provider Type Discipline     09/22/22 -  Brooke Cabrales, PT Physical Therapist PT                  PT Recommendation and Plan     Plan of Care Reviewed With: patient  Outcome Evaluation: Pt resting upon arrival, attempted OT/PT session, but pt declined tfers or amb this session, did require A of 2 for proper positioning, pt still c/o pain in R jaw and RN aware and already medicated , pt declined exer also after much encouragement, will progress as able     Time Calculation:         PT Charges       Row Name 05/28/24 1502             Time Calculation    Start Time 1400  -      Stop Time 1412  -      Time Calculation (min) 12 min  -      PT Received On 05/28/24  -      PT - Next Appointment 05/29/24  -                User Key  (r) = Recorded By, (t) = Taken By, (c) = Cosigned By      Initials Name Provider Type    Leslee Banegas, PTA Physical Therapist Assistant                  Therapy Charges for Today       Code Description Service Date Service Provider Modifiers Qty    30823388837  PT  THER PROC EA 15 MIN 5/28/2024 Leslee Ordoñez, JUAN ALBERTO GP 1    [id number] HC PT THER SUPP EA 15 MIN 5/28/2024 Leslee Ordoñez PTA GP 1            PT G-Codes  Outcome Measure Options: AM-PAC 6 Clicks Daily Activity (OT)  AM-PAC 6 Clicks Score (PT): 18  AM-PAC 6 Clicks Score (OT): 17       Leslee Ordoñez PTA  5/28/2024

## 2024-05-28 NOTE — CONSULTS
Patient Care Team:  Provider, No Known as PCP - General    Chief complaint: MARTÍNEZ        History of Present Illness  Patient is a 70 yo male with no history of CKD who presented to ED on 5/25/24 with weakness. He was admitted with aspiration PNA due to vomiting, sepsis and MARTÍNEZ.   His Cr was 1.3 on arrival (baseline around 0.8-0.9), improved with IVF to 1.2, but now is trending up again, to 1.4 yesterday and is up to 1.6 today.   He has not been eating or drinking well. Currently off IVF.   No IV contrast given. He is on zosyn, no vancomycin. No NSAIDs and not hypotension that I can see.     He reports some chest tightness and SOB    Review of Systems   Review of Systems - History obtained from chart review and the patient  General ROS: feeling poorly   Psychological ROS: negative  Ophthalmic ROS: negative  ENT ROS: negative  Allergy and Immunology ROS: negative  Hematological and Lymphatic ROS: negative  Endocrine ROS: negative  Respiratory ROS: no cough, shortness of breath, or wheezing  Cardiovascular ROS: + SOB  Gastrointestinal ROS: no abdominal pain, change in bowel habits, or black or bloody stools  Genito-Urinary ROS: no dysuria, trouble voiding, or hematuria  Musculoskeletal ROS: negative  Neurological ROS: no TIA or stroke symptoms  Dermatological ROS: negative   Past Medical History:   Diagnosis Date    Arthritis     Cancer     Coronary artery disease    ,   Past Surgical History:   Procedure Laterality Date    WOUND DEBRIDEMENT      pt states he had surgery on chest wall r/t infection   , History reviewed. No pertinent family history.,   Social History     Socioeconomic History    Marital status: Single   Tobacco Use    Smoking status: Never     Passive exposure: Never    Smokeless tobacco: Never   Vaping Use    Vaping status: Never Used   Substance and Sexual Activity    Alcohol use: Yes     Alcohol/week: 2.0 standard drinks of alcohol     Types: 2 Shots of liquor per week     Comment: drinks maybe once  a month    Drug use: Not Currently     Comment: hx pain pill use    Sexual activity: Defer     E-cigarette/Vaping    E-cigarette/Vaping Use Never User      E-cigarette/Vaping Substances     E-cigarette/Vaping Devices         ,   Medications Prior to Admission   Medication Sig Dispense Refill Last Dose    Ascorbic Acid (Vitamin C) 500 MG chewable tablet Chew 1 tablet Daily.   Past Week    cetirizine (zyrTEC) 10 MG tablet Take 1 tablet by mouth Daily As Needed for Allergies.   Past Month    Multiple Vitamins-Minerals (ZINC PO) Take 1 tablet by mouth Daily.   Past Week    vitamin D (ERGOCALCIFEROL) 1.25 MG (73963 UT) capsule capsule Take 1 capsule by mouth 1 (One) Time Per Week. Thursdays   Past Week   , Scheduled Meds:  albuterol, 2.5 mg, Nebulization, Q6H - RT  vitamin C, 250 mg, Oral, Daily  cholecalciferol, 1,000 Units, Oral, Daily  docusate sodium, 100 mg, Oral, Daily  ferrous sulfate, 325 mg, Oral, Daily With Breakfast  lactobacillus acidophilus, 1 capsule, Oral, Daily  multivitamin with minerals, 1 tablet, Oral, Daily  piperacillin-tazobactam, 3.375 g, Intravenous, Q8H  sodium chloride, 3 mL, Intravenous, Q12H   , Continuous Infusions:   , PRN Meds:    acetaminophen **OR** acetaminophen **OR** acetaminophen    albuterol    senna-docusate sodium **AND** polyethylene glycol **AND** bisacodyl **AND** bisacodyl    Calcium Replacement - Follow Nurse / BPA Driven Protocol    cetirizine    famotidine    Magnesium Low Dose Replacement - Follow Nurse / BPA Driven Protocol    melatonin    ondansetron ODT **OR** ondansetron    Phosphorus Replacement - Follow Nurse / BPA Driven Protocol    Potassium Replacement - Follow Nurse / BPA Driven Protocol    prochlorperazine    sodium chloride    sodium chloride    sodium chloride, and Allergies:  Patient has no known allergies.    Objective     Vital Signs  Temp:  [97.5 °F (36.4 °C)-99 °F (37.2 °C)] 99 °F (37.2 °C)  Heart Rate:  [] 100  Resp:  [16-18] 16  BP:  "(130-139)/(80-91) 138/91    No intake/output data recorded.  I/O last 3 completed shifts:  In: 1030 [P.O.:630; IV Piggyback:400]  Out: 1705 [Urine:1705]    Physical Exam  Physical Examination: General appearance - uncomfortable, mild distress.   Chest - clear to auscultation, no wheezes, rales or rhonchi, symmetric air entry  Heart - normal rate, regular rhythm, normal S1, S2, no murmurs, rubs, clicks or gallops  Abdomen - soft, nontender, nondistended, no masses or organomegaly  Neurological - alert, oriented, normal speech, no focal findings or movement disorder noted  Extremities - peripheral pulses normal, no pedal edema, no clubbing or cyanosis    Results Review:    I reviewed the patient's new clinical results.    WBC WBC   Date Value Ref Range Status   05/28/2024 7.61 3.40 - 10.80 10*3/mm3 Final   05/27/2024 6.49 3.40 - 10.80 10*3/mm3 Final   05/26/2024 6.30 3.40 - 10.80 10*3/mm3 Final   05/26/2024 5.38 3.40 - 10.80 10*3/mm3 Final      HGB Hemoglobin   Date Value Ref Range Status   05/28/2024 8.0 (L) 13.0 - 17.7 g/dL Final   05/27/2024 9.3 (L) 13.0 - 17.7 g/dL Final   05/26/2024 8.9 (L) 13.0 - 17.7 g/dL Final   05/26/2024 8.4 (L) 13.0 - 17.7 g/dL Final      HCT Hematocrit   Date Value Ref Range Status   05/28/2024 24.6 (L) 37.5 - 51.0 % Final   05/27/2024 28.8 (L) 37.5 - 51.0 % Final   05/26/2024 26.9 (L) 37.5 - 51.0 % Final   05/26/2024 25.3 (L) 37.5 - 51.0 % Final      Platlets No results found for: \"LABPLAT\"   MCV MCV   Date Value Ref Range Status   05/28/2024 88.2 79.0 - 97.0 fL Final   05/27/2024 87.8 79.0 - 97.0 fL Final   05/26/2024 87.6 79.0 - 97.0 fL Final   05/26/2024 86.1 79.0 - 97.0 fL Final          Sodium Sodium   Date Value Ref Range Status   05/28/2024 141 136 - 145 mmol/L Final   05/27/2024 140 136 - 145 mmol/L Final   05/26/2024 138 136 - 145 mmol/L Final      Potassium Potassium   Date Value Ref Range Status   05/28/2024 3.8 3.5 - 5.2 mmol/L Final   05/27/2024 4.2 3.5 - 5.2 mmol/L Final     " "Comment:     Slight hemolysis detected by analyzer. Result may be falsely elevated.   05/26/2024 3.7 3.5 - 5.2 mmol/L Final      Chloride Chloride   Date Value Ref Range Status   05/28/2024 107 98 - 107 mmol/L Final   05/27/2024 107 98 - 107 mmol/L Final   05/26/2024 106 98 - 107 mmol/L Final      CO2 CO2   Date Value Ref Range Status   05/28/2024 24.0 22.0 - 29.0 mmol/L Final   05/27/2024 17.3 (L) 22.0 - 29.0 mmol/L Final   05/26/2024 21.8 (L) 22.0 - 29.0 mmol/L Final      BUN BUN   Date Value Ref Range Status   05/28/2024 19 8 - 23 mg/dL Final   05/27/2024 16 8 - 23 mg/dL Final   05/26/2024 15 8 - 23 mg/dL Final      Creatinine Creatinine   Date Value Ref Range Status   05/28/2024 1.67 (H) 0.76 - 1.27 mg/dL Final   05/27/2024 1.44 (H) 0.76 - 1.27 mg/dL Final   05/26/2024 1.24 0.76 - 1.27 mg/dL Final      Calcium Calcium   Date Value Ref Range Status   05/28/2024 8.2 (L) 8.6 - 10.5 mg/dL Final   05/27/2024 8.6 8.6 - 10.5 mg/dL Final   05/26/2024 8.4 (L) 8.6 - 10.5 mg/dL Final      PO4 No results found for: \"CAPO4\"   Albumin Albumin   Date Value Ref Range Status   05/28/2024 2.6 (L) 3.5 - 5.2 g/dL Final   05/27/2024 2.5 (L) 3.5 - 5.2 g/dL Final   05/26/2024 2.8 (L) 3.5 - 5.2 g/dL Final      Magnesium No results found for: \"MG\"   Uric Acid No results found for: \"URICACID\"         Assessment & Plan       Aspiration pneumonia due to vomitus    Jaw pain    History of prostate cancer    Severe malnutrition    MARTÍNEZ (acute kidney injury)    Sepsis    Hypoxia    Dehydration    Oral phase dysphagia    Dental caries      Assessment & Plan  MARTÍNEZ  Proteinuria   Metabolic acidosis   Elevated liver enzymes   Aspiration PNA  Sepsis     Probably pre-renal given poor po intake  UA is quite bland and only with protein. AIN less likely.   Will quantify proteinuria.   Start IVF.   Check renal US   Urine na 38.  Will follow   Thanks for referral     I discussed the patients findings and my recommendations with family    Milagros Ponce, " MD  05/28/24  09:43 EDT

## 2024-05-28 NOTE — PLAN OF CARE
Goal Outcome Evaluation:  Plan of Care Reviewed With: patient           Outcome Evaluation: Pt resting upon arrival, attempted OT/PT session, but pt declined tfers or amb this session, did require A of 2 for proper positioning, pt still c/o pain in R jaw and RN aware and already medicated , pt declined exer also after much encouragement, will progress as able

## 2024-05-28 NOTE — PROGRESS NOTES
"DAILY PROGRESS NOTE  Psychiatric    Patient Identification:  Name: Jimmy Norman  Age: 69 y.o.  Sex: male  :  1955  MRN: [id number]         Primary Care Physician: Provider, No Known      Subjective  Patient presently not verbalizing complaints but RN notes he had been nauseated earlier and has recently received Compazine.    Objective:  General Appearance:  Comfortable, well-appearing, in no acute distress and not in pain.    Vital signs: (most recent): Blood pressure 143/95, pulse (!) 122, temperature 97.3 °F (36.3 °C), temperature source Oral, resp. rate 16, height 188 cm (74\"), weight 82.9 kg (182 lb 12.2 oz), SpO2 92%.    Lungs:  Normal effort and normal respiratory rate.  He is not in respiratory distress.  No stridor.  There are rales.  No decreased breath sounds, wheezes or rhonchi.  (Pronounced rales left base.  Despite patient appearing comfortable his O2 requirements have significantly increased this afternoon.)  Heart: Tachycardia.  Regular rhythm.  (Heart rate presently between 100-110.)  Neurological: (Patient is awake and cooperative but not really verbalizing.).    Skin:  Warm and dry.                  Vital signs in last 24 hours:  Temp:  [97.3 °F (36.3 °C)-99 °F (37.2 °C)] 97.3 °F (36.3 °C)  Heart Rate:  [] 117  Resp:  [16-22] 16  BP: (130-143)/(80-95) 143/95    Intake/Output:    Intake/Output Summary (Last 24 hours) at 2024 1756  Last data filed at 2024 0500  Gross per 24 hour   Intake 240 ml   Output 1125 ml   Net -885 ml         Results from last 7 days   Lab Units 24  0517 24  0630 24  1624 24  0506 24  0743   WBC 10*3/mm3 7.61 6.49 6.30 5.38 5.65   HEMOGLOBIN g/dL 8.0* 9.3* 8.9* 8.4* 10.1*   PLATELETS 10*3/mm3 362 346 353 334 348     Results from last 7 days   Lab Units 24  0517 24  0629 24  0506 24  0743   SODIUM mmol/L 141 140 138 140   POTASSIUM mmol/L 3.8 4.2 3.7 4.0   CHLORIDE mmol/L 107 " 107 106 104   CO2 mmol/L 24.0 17.3* 21.8* 24.0   BUN mg/dL 19 16 15 22   CREATININE mg/dL 1.67* 1.44* 1.24 1.34*   GLUCOSE mg/dL 106* 91 110* 109*   Estimated Creatinine Clearance: 48.3 mL/min (A) (by C-G formula based on SCr of 1.67 mg/dL (H)).  Results from last 7 days   Lab Units 05/28/24  0517 05/27/24  0629 05/26/24  0506 05/25/24  0743   CALCIUM mg/dL 8.2* 8.6 8.4* 8.8   ALBUMIN g/dL 2.6* 2.5* 2.8* 3.4*   PHOSPHORUS mg/dL 2.7  --   --   --      Results from last 7 days   Lab Units 05/28/24 0517 05/27/24 0629 05/26/24  0506 05/25/24  0743   ALBUMIN g/dL 2.6* 2.5* 2.8* 3.4*   BILIRUBIN mg/dL 0.9 0.7 0.5 0.7   ALK PHOS U/L 92 87 66 70   AST (SGOT) U/L 186* 160* 106* 93*   ALT (SGPT) U/L 102* 89* 59* 60*       Assessment:  Aspiration pneumonia/pneumonitis: Still afebrile with normal WBCs but procalcitonin levels rising.  New rales left base.  Acute hypoxic respiratory failure: Recurring this afternoon.  ABGs with hypoxia and respiratory alkalosis.  New rales left base.  Concern for recurrence of aspiration.  Possibility of a pulmonary embolus is always a concern in this setting also.  This would be difficult workup with his worsening renal function.  Stat chest x-ray, pulmonary consult.  MARTÍNEZ: Initially improved but now creatinine back up again.  Check urinalysis, postvoid residual.  Nephrology input greatly appreciated.    Transaminitis: Slowly trending up.  Etiology uncertain.  Viral studies negative so far.  Gastroenterology input greatly appreciated.  Oropharyngeal dysphagia: Modified diet.  Speech therapy input appreciated.  History of mandibular fracture with jaw pain: Likely contributing to dysphagia.  Modified diet.  Anemia: Labs consistent with iron deficiency as well as anemia of chronic disease.  Iron supplements.  Monitor.  Dental caries: Contributing to severity of aspiration pneumonia.    Malnutrition: Encourage p.o. intake.  Food supplements.      Plan:  Please see above.  Awaiting pulmonary  consultation.  Likely transfer to ICU.  Discussed with her son at bedside.  Called patient's daughter and left a message on her voicemail.  Discussed with nursing staff.    Chad Wilkinson MD  5/28/2024  17:56 EDT    Addendum:  Called and discussed with patient's daughter.  Discussed with pulmonology.

## 2024-05-28 NOTE — PLAN OF CARE
Goal Outcome Evaluation:  Plan of Care Reviewed With: patient        Progress: no change  Outcome Evaluation: All needs met. Family at bedside. Rested well. Up w/ assist x 2 to toilet. BM tonight. Regular soft-to-chew diet. NPO at midnight. No fevers tonight. VSS. Oriented x 4. SR/ST on the monitor. Remains on 2L NC. Very SOB w/ exertion and takes a while to fully recover. IV antibiotics continued. PRN tylenol for tooth pain. US liver and urine studies completed tonight.

## 2024-05-28 NOTE — CONSULTS
Gastroenterology   Initial Inpatient Consult Note  Thank you for asking opinion on this patient.  Referring Provider: Chad Wilkinson MD    Reason for Consultation: Elevated transaminases    Subjective     History of present illness:    69 y.o. male that we are asked to see for elevated transaminases.  Patient has a history of coronary disease and prostate cancer and has had some recent diarrhea as well.  He presented to the ER with weakness and fatigue and decreased appetite and nausea with about a 25 pound weight loss over the last 2 weeks.  He thinks that he has choked action on his own emesis.  He has been found to have evidence of likely sepsis due to aspiration pneumonia and has been treated with ceftriaxone and doxycycline.  He also has evidence of acute kidney injury.  Labs today show an ALT of 89 and AST of 160.  These have steadily risen over the last 10 days.  Bilirubin is normal.  Alkaline phosphatase is normal.    Past Medical History:  Past Medical History:   Diagnosis Date    Arthritis     Cancer     Coronary artery disease      Past Surgical History:  Past Surgical History:   Procedure Laterality Date    WOUND DEBRIDEMENT      pt states he had surgery on chest wall r/t infection      Social History:   Social History     Tobacco Use    Smoking status: Never     Passive exposure: Never    Smokeless tobacco: Never   Substance Use Topics    Alcohol use: Yes     Alcohol/week: 2.0 standard drinks of alcohol     Types: 2 Shots of liquor per week     Comment: drinks maybe once a month      Family History:  History reviewed. No pertinent family history.    Home Meds:  Medications Prior to Admission   Medication Sig Dispense Refill Last Dose    Ascorbic Acid (Vitamin C) 500 MG chewable tablet Chew 1 tablet Daily.   Past Week    cetirizine (zyrTEC) 10 MG tablet Take 1 tablet by mouth Daily As Needed for Allergies.   Past Month    Multiple Vitamins-Minerals (ZINC PO) Take 1 tablet by mouth Daily.   Past Week     vitamin D (ERGOCALCIFEROL) 1.25 MG (21773 UT) capsule capsule Take 1 capsule by mouth 1 (One) Time Per Week. Thursdays   Past Week     Current Meds:   albuterol, 2.5 mg, Nebulization, BID  vitamin C, 250 mg, Oral, Daily  cholecalciferol, 1,000 Units, Oral, Daily  docusate sodium, 100 mg, Oral, Daily  [START ON 5/28/2024] ferrous sulfate, 325 mg, Oral, Daily With Breakfast  lactobacillus acidophilus, 1 capsule, Oral, Daily  multivitamin with minerals, 1 tablet, Oral, Daily  piperacillin-tazobactam, 3.375 g, Intravenous, Q8H  sodium chloride, 3 mL, Intravenous, Q12H      Allergies:  No Known Allergies  Review of Systems  Pertinent items are noted in HPI, all other systems reviewed and negative    Objective     Vital Signs  Temp:  [97.3 °F (36.3 °C)-99.9 °F (37.7 °C)] 99 °F (37.2 °C)  Heart Rate:  [] 96  Resp:  [16-18] 18  BP: (130-146)/(80-88) 130/80    Physical Exam:  CONSTITUTIONAL:  today's vital signs reviewed  EARS NOSE THROAT: trachea midline and no deformity of the nares  EYES: no scleral icterus  GASTROINTESTINAL: abdomen is soft, nontender, nondistended with normal active bowel sounds, no masses are appreciated  PSYCHIATRIC: appropriate mood and affect  RESPIRATORY: normal inspiratory effort with no increased work of breathing  NEUROLOGIC: patient is awake and alert  DERMATOLOGIC: skin is warm with no cyanosis  LYMPHATIC: no periumbilical lymphadenopathy     Results Review:   I reviewed the patient's new clinical results.    Results from last 7 days   Lab Units 05/27/24  0630 05/26/24  1624 05/26/24  0506   WBC 10*3/mm3 6.49 6.30 5.38   HEMOGLOBIN g/dL 9.3* 8.9* 8.4*   HEMATOCRIT % 28.8* 26.9* 25.3*   PLATELETS 10*3/mm3 346 353 334     Results from last 7 days   Lab Units 05/27/24  0629 05/26/24  0506 05/25/24  0743   SODIUM mmol/L 140 138 140   POTASSIUM mmol/L 4.2 3.7 4.0   CHLORIDE mmol/L 107 106 104   CO2 mmol/L 17.3* 21.8* 24.0   BUN mg/dL 16 15 22   CREATININE mg/dL 1.44* 1.24 1.34*   CALCIUM  mg/dL 8.6 8.4* 8.8   BILIRUBIN mg/dL 0.7 0.5 0.7   ALK PHOS U/L 87 66 70   ALT (SGPT) U/L 89* 59* 60*   AST (SGOT) U/L 160* 106* 93*   GLUCOSE mg/dL 91 110* 109*     Results from last 7 days   Lab Units 05/25/24  0743   INR  1.15*     Lab Results   Lab Value Date/Time    LIPASE 39 08/11/2023 2030       Radiology:  FL ESOPHAGRAM SINGLE CONTRAST   Final Result   Normal limited esophagram.                This report was finalized on 5/26/2024 8:17 PM by Dr. Ronny Pérez M.D on Workstation: [id number]          XR Chest 1 View   Final Result   Heterogeneous multifocal left greater than right lung opacities,   increased in the interval. Suspect multifocal pneumonia. Hemorrhage in   the appropriate clinical setting.       This report was finalized on 5/25/2024 7:58 AM by Dr. Carlito Chand M.D on Workstation: [id number]              Assessment & Plan   Active Hospital Problems    Diagnosis     **Aspiration pneumonia due to vomitus     Oral phase dysphagia     Dental caries     MARTÍNEZ (acute kidney injury)     Sepsis     Hypoxia     Dehydration     Severe malnutrition     Jaw pain     History of prostate cancer        Assessment:  Transaminitis.  This may be multifactorial due to sepsis, systemic inflammatory response, adverse reaction to medications including antibiotics etc.  Aspiration pneumonia.  Stable on ceftriaxone and doxycycline  Weight loss  Acute kidney injury  Malnutrition  History of prostate cancer  Small hepatic cyst  Mild anemia.  No overt bleeding.    Plan:  Will check a full metabolic/serologic panel on the liver but I feel as though most likely the cause for the elevated transaminases is reactive secondary to his acute inflammation with aspiration pneumonia and acute kidney injury.  Will check liver ultrasound  Follow liver enzymes  Appreciate nutrition input      I discussed the patients findings and my recommendations with patient and nursing staff.    MD Alli Almodovar  SEBASTIAN Zhu.  [facility] [place] [address]  Office: [phone]

## 2024-05-28 NOTE — THERAPY TREATMENT NOTE
Acute Care - Occupational Therapy Treatment Note  [facility]     Patient Name: Jimmy Norman  : 1955  MRN: [id number]  Today's Date: 2024             Admit Date: 2024       ICD-10-CM ICD-9-CM   1. Pneumonia of left lower lobe due to infectious organism  J18.9 486   2. Hypoxia  R09.02 799.02   3. Acute renal insufficiency  N28.9 593.9     Patient Active Problem List   Diagnosis    Jaw pain    Transaminitis    History of prostate cancer    Severe malnutrition    MARTÍNEZ (acute kidney injury)    Aspiration pneumonia due to vomitus    Sepsis    Hypoxia    Dehydration    Oral phase dysphagia    Dental caries     Past Medical History:   Diagnosis Date    Arthritis     Cancer     Coronary artery disease      Past Surgical History:   Procedure Laterality Date    WOUND DEBRIDEMENT      pt states he had surgery on chest wall r/t infection         OT ASSESSMENT FLOWSHEET (Last 12 Hours)       OT Evaluation and Treatment       Row Name 24 1500                   OT Time and Intention    Document Type therapy note (daily note)  -KA        Mode of Treatment co-treatment  due to pt's decreased endurance and activity tolerance today  -KA           General Information    Patient Profile Reviewed yes  -KA        Existing Precautions/Restrictions fall  -KA           Pain Assessment    Pre/Posttreatment Pain Comment Did not rate but reported jaw pain  -KA           Cognition    Orientation Status (Cognition) unable/difficult to assess;oriented to;person  pt very fatigued today  -KA           Toileting Assessment/Training    [place] Level (Toileting) toileting skills  -KA        Position (Toileting) supine  -KA        Comment, (Toileting) urinal use  -KA           Bed Mobility    Bed Mobility rolling right  -KA        Rolling Right [place] (Bed Mobility) contact guard;standby assist;verbal cues  -KA        Comment, (Bed Mobility) Ax2 to reposition and scoot pt up towards HOB today. He attempted to  assist with his LE's.  -           Motor Skills    Therapeutic Exercise --  Declined ther ex due to pain and being very fatigued. Only agreeable to work on rolling in bed  -           Plan of Care Review    Plan of Care Reviewed With patient  -        Outcome Evaluation Pt supine in bed upon arrival. initially agreeable to session. He reported jaw pain and then stated he was too fatigued. Worked on rolling for proper positioning and bed mobility. Pt had just used urinal supine in bed prior to session and declined ADLs or ther ex. Will continue to follow to progress  -           Vital Signs    Pre Patient Position Supine  -KA        Intra Patient Position Supine  -KA        Post Patient Position Supine  -KA           Positioning and Restraints    Pre-Treatment Position in bed  -KA        Post Treatment Position bed  -KA        In Bed notified nsg;supine;call light within reach;encouraged to call for assist;exit alarm on;with family/caregiver  -                  User Key  (r) = Recorded By, (t) = Taken By, (c) = Cosigned By      Initials Name Effective Dates    Zoey Adorno OT 09/22/22 -                      Occupational Therapy Education       Title: PT OT SLP Therapies (In Progress)       Topic: Occupational Therapy (Not Started)       Point: ADL training (Not Started)       Description:   Instruct learner(s) on proper safety adaptation and remediation techniques during self care or transfers.   Instruct in proper use of assistive devices.                  Learner Progress:  Not documented in this visit.              Point: Home exercise program (Not Started)       Description:   Instruct learner(s) on appropriate technique for monitoring, assisting and/or progressing therapeutic exercises/activities.                  Learner Progress:  Not documented in this visit.              Point: Precautions (Not Started)       Description:   Instruct learner(s) on prescribed precautions during self-care and  functional transfers.                  Learner Progress:  Not documented in this visit.              Point: Body mechanics (Not Started)       Description:   Instruct learner(s) on proper positioning and spine alignment during self-care, functional mobility activities and/or exercises.                  Learner Progress:  Not documented in this visit.                                      OT Recommendation and Plan     Plan of Care Review  Plan of Care Reviewed With: patient  Outcome Evaluation: Pt supine in bed upon arrival. initially agreeable to session. He reported jaw pain and then stated he was too fatigued. Worked on rolling for proper positioning and bed mobility. Pt had just used urinal supine in bed prior to session and declined ADLs or ther ex. Will continue to follow to progress  Plan of Care Reviewed With: patient  Outcome Evaluation: Pt supine in bed upon arrival. initially agreeable to session. He reported jaw pain and then stated he was too fatigued. Worked on rolling for proper positioning and bed mobility. Pt had just used urinal supine in bed prior to session and declined ADLs or ther ex. Will continue to follow to progress        Time Calculation:    Time Calculation- OT       Row Name 05/28/24 1542             Time Calculation- OT    OT Start Time 1400  -KA      OT Stop Time 1412  -KA      OT Time Calculation (min) 12 min  -KA      Total Timed Code Minutes- OT 12 minute(s)  -KA      OT Received On 05/28/24  -KA      OT - Next Appointment 05/29/24  -KA         Timed Charges    23993 - OT Therapeutic Activity Minutes 12  -KA         Total Minutes    Timed Charges Total Minutes 12  -KA       Total Minutes 12  -KA                User Key  (r) = Recorded By, (t) = Taken By, (c) = Cosigned By      Initials Name Provider Type    Zoey Adorno OT Occupational Therapist                  Therapy Charges for Today       Code Description Service Date Service Provider Modifiers Qty    [id number]  OT  THERAPEUTIC ACT EA 15 MIN 5/28/2024 Zoey Coughlin, OT GO 1                 Zoey Coughlin OT  5/28/2024

## 2024-05-28 NOTE — PROGRESS NOTES
[facility]  Inpatient Progress Note    Reason for Follow Up:  Elevated LFTs    Subjective     Interval History:   Pt is c/o nausea, but otherwise doing okay.  No RUQ pain. No acute GI symptoms.      Current Facility-Administered Medications:     acetaminophen (TYLENOL) tablet 650 mg, 650 mg, Oral, Q4H PRN, 650 mg at 05/27/24 2353 **OR** acetaminophen (TYLENOL) 160 MG/5ML oral solution 650 mg, 650 mg, Oral, Q4H PRN **OR** acetaminophen (TYLENOL) suppository 650 mg, 650 mg, Rectal, Q4H PRN, Juan Ramon Hinojosa MD    albuterol (PROVENTIL) nebulizer solution 0.083% 2.5 mg/3mL, 2.5 mg, Nebulization, BID, Juan Ramon Hinojosa MD, 2.5 mg at 05/28/24 0747    ascorbic acid (VITAMIN C) tablet 250 mg, 250 mg, Oral, Daily, Juan Ramon Hinojosa MD, 250 mg at 05/28/24 0850    sennosides-docusate (PERICOLACE) 8.6-50 MG per tablet 2 tablet, 2 tablet, Oral, BID PRN **AND** polyethylene glycol (MIRALAX) packet 17 g, 17 g, Oral, Daily PRN **AND** bisacodyl (DULCOLAX) EC tablet 5 mg, 5 mg, Oral, Daily PRN **AND** bisacodyl (DULCOLAX) suppository 10 mg, 10 mg, Rectal, Daily PRN, Juan Ramon Hinojosa MD    Calcium Replacement - Follow Nurse / BPA Driven Protocol, , Does not apply, PRN, Juan Ramon Hinojosa MD    cetirizine (zyrTEC) tablet 5 mg, 5 mg, Oral, Daily PRN, Juan Ramon Hinojosa MD    cholecalciferol (VITAMIN D3) tablet 1,000 Units, 1,000 Units, Oral, Daily, Juan Ramon Hinojosa MD, 1,000 Units at 05/28/24 0849    docusate sodium (COLACE) capsule 100 mg, 100 mg, Oral, Daily, Juan Ramon Hinojosa MD, 100 mg at 05/28/24 0849    famotidine (PEPCID) tablet 10 mg, 10 mg, Oral, BID PRN, Juan Ramon Hinojosa MD    ferrous sulfate tablet 325 mg, 325 mg, Oral, Daily With Breakfast, Chad Wilkinson MD, 325 mg at 05/28/24 0849    lactobacillus acidophilus (RISAQUAD) capsule 1 capsule, 1 capsule, Oral, Daily, Juan Ramon Hinojosa MD, 1 capsule at 05/28/24 0849     Magnesium Low Dose Replacement - Follow Nurse / BPA Driven Protocol, , Does not apply, PRN, Juan Ramon Hinojosa MD    melatonin tablet 2.5 mg, 2.5 mg, Oral, Nightly PRN, Juan Ramon Hinojosa MD    multivitamin with minerals 1 tablet, 1 tablet, Oral, Daily, Juan Ramon Hinojosa MD, 1 tablet at 05/28/24 0849    ondansetron ODT (ZOFRAN-ODT) disintegrating tablet 4 mg, 4 mg, Oral, Q6H PRN **OR** ondansetron (ZOFRAN) injection 4 mg, 4 mg, Intravenous, Q6H PRN, Juan Ramon Hinojosa MD, 4 mg at 05/28/24 0848    Phosphorus Replacement - Follow Nurse / BPA Driven Protocol, , Does not apply, Micaela KING Stephen Matthew, MD    piperacillin-tazobactam (ZOSYN) 3.375 g IVPB in 100 mL NS MBP (CD), 3.375 g, Intravenous, Q8H, Juan Ramon Hinojosa MD, 3.375 g at 05/28/24 0815    Potassium Replacement - Follow Nurse / BPA Driven Protocol, , Does not apply, PRN, Juan Ramon Hinojosa MD    prochlorperazine (COMPAZINE) injection 5 mg, 5 mg, Intravenous, Q6H PRN, Mariann Aldridge PA-C    sodium chloride 0.9 % flush 10 mL, 10 mL, Intravenous, PRN, Rosa Piña MD    sodium chloride 0.9 % flush 3 mL, 3 mL, Intravenous, Q12H, Juan Ramon Hinojosa MD, 3 mL at 05/27/24 2013    sodium chloride 0.9 % flush 3-10 mL, 3-10 mL, Intravenous, PRN, Juan Ramon Hinojosa MD    sodium chloride 0.9 % infusion 40 mL, 40 mL, Intravenous, PRMicaela JARRELL Stephen Matthew, MD        Objective     Vital Signs  Temp:  [97.5 °F (36.4 °C)-99 °F (37.2 °C)] 99 °F (37.2 °C)  Heart Rate:  [] 100  Resp:  [16-18] 16  BP: (130-139)/(80-91) 138/91  Body mass index is 23.47 kg/m².    Intake/Output Summary (Last 24 hours) at 5/28/2024 0947  Last data filed at 5/28/2024 0500  Gross per 24 hour   Intake 640 ml   Output 1125 ml   Net -485 ml     No intake/output data recorded.     Physical Exam:   General: patient awake, alert and cooperative   Eyes: Normal lids and lashes, no scleral icterus   Abdomen: soft, nontender, nondistended; normal  bowel sounds      Results Review:     I reviewed the patient's new clinical results.    Results from last 7 days   Lab Units 05/28/24  0517 05/27/24  0630 05/26/24  1624   WBC 10*3/mm3 7.61 6.49 6.30   HEMOGLOBIN g/dL 8.0* 9.3* 8.9*   HEMATOCRIT % 24.6* 28.8* 26.9*   PLATELETS 10*3/mm3 362 346 353     Results from last 7 days   Lab Units 05/28/24  0517 05/27/24  0629 05/26/24  0506   SODIUM mmol/L 141 140 138   POTASSIUM mmol/L 3.8 4.2 3.7   CHLORIDE mmol/L 107 107 106   CO2 mmol/L 24.0 17.3* 21.8*   BUN mg/dL 19 16 15   CREATININE mg/dL 1.67* 1.44* 1.24   CALCIUM mg/dL 8.2* 8.6 8.4*   BILIRUBIN mg/dL 0.9 0.7 0.5   ALK PHOS U/L 92 87 66   ALT (SGPT) U/L 102* 89* 59*   AST (SGOT) U/L 186* 160* 106*   GLUCOSE mg/dL 106* 91 110*     Results from last 7 days   Lab Units 05/25/24  0743   INR  1.15*     Lab Results   Lab Value Date/Time    LIPASE 39 08/11/2023 2030       Radiology:  US Abdomen Limited   Final Result   Gallbladder sludge, without evidence of acute cholecystitis.       This report was finalized on 5/28/2024 6:03 AM by Dr. Faith Del Rio M.D on Workstation: [id number]          FL ESOPHAGRAM SINGLE CONTRAST   Final Result   Normal limited esophagram.                This report was finalized on 5/26/2024 8:17 PM by Dr. Ronny Pérez M.D on Workstation: [id number]          XR Chest 1 View   Final Result   Heterogeneous multifocal left greater than right lung opacities,   increased in the interval. Suspect multifocal pneumonia. Hemorrhage in   the appropriate clinical setting.       This report was finalized on 5/25/2024 7:58 AM by Dr. Carlito Chand M.D on Workstation: [id number]              Assessment & Plan     Active Hospital Problems    Diagnosis     **Aspiration pneumonia due to vomitus     Oral phase dysphagia     Dental caries     MARTÍNEZ (acute kidney injury)     Sepsis     Hypoxia     Dehydration     Severe malnutrition     Jaw pain     History of prostate cancer          Assessment:    Transaminitis   likely multifactorial, in setting of sepsis, adverse reaction to medications. U/s w/ gb sludge and liver cyst, but no biliary dilation. No RUQ pain.   Negative ceruloplasmin, alpha 1 antitrypsin, viral hepatitis panel. Rest pending.   Aspiration pneumonia.  Stable on ceftriaxone and doxycycline  Weight loss  Acute kidney injury  Malnutrition  History of prostate cancer  Small hepatic cyst  Mild anemia.  No overt bleeding.    All problems new to me   Plan:   Continue to trend LFTs; check INR/PT  F/u w/ remaining liver serologies  Reviewed u/s with the patient and pt's son who is at bedside.   We will continue to trend LFTs. There is gb sludge on ultrasound however there is no biliary dilatation, RUQ pain, and bilirubin remains normal. No indication for MRCP at this time.     I discussed the patients findings and my recommendations with patient and family.         Mariann Aldridge PA-C  [facility]  [address] [place] [address]  Office: [phone]

## 2024-05-29 PROBLEM — G47.34 NOCTURNAL HYPOXIA: Status: ACTIVE | Noted: 2024-01-01

## 2024-05-29 NOTE — PROGRESS NOTES
[facility]  [phone]  Dr. Venu You    Subjective:  LOS: 3    Chief Complaint: Hypoxic respiratory failure    Transferred down to ICU because he was requiring 9 L high flow cannula.  Now on 3 to 4 L high flow cannula.  Desaturates with any exertion.  However improved since admission and family at bedside agrees.  Patient was laying flat in bed and tolerating.    Objective   Vital Signs past 24hrs  Temp range: Temp (24hrs), Av.6 °F (36.4 °C), Min:97 °F (36.1 °C), Max:98.2 °F (36.8 °C)    BP range: BP: (124-148)/() 144/90  Pulse range: Heart Rate:  [] 98  Resp rate range: Resp:  [16-24] 24  Device (Oxygen Therapy): high-flow nasal cannulaFlow (L/min):  [2-9] 8  Oxygen range:SpO2:  [86 %-99 %] 91 %   Mechanical Ventilator:     Physical Exam  Eyes:      Pupils: Pupils are equal, round, and reactive to light.   Cardiovascular:      Rate and Rhythm: Normal rate and regular rhythm.      Heart sounds: No murmur heard.  Pulmonary:      Effort: Pulmonary effort is normal.      Breath sounds: Rales present.   Abdominal:      General: Bowel sounds are normal.      Palpations: Abdomen is soft. There is no mass.      Tenderness: There is no abdominal tenderness.   Musculoskeletal:         General: No swelling.   Neurological:      Mental Status: He is alert.       Results Review:    I have reviewed the laboratory and imaging data since the last note by [facility] physician.  My annotations are noted in assessment and plan.      Result Review:  I have personally reviewed the results from last note by [facility] physician to 2024 09:35 EDT and agree with these findings:  [x]  Laboratory list / accordion  [x]  Microbiology  [x]  Radiology  []  EKG/Telemetry   []  Cardiology/Vascular   []  Pathology  []  Old records  []  Other:    Medication Review:  I have reviewed the current MAR.  My annotations are noted in assessment and plan.    albuterol, 2.5 mg, Nebulization, Q6H - RT  vitamin C, 250 mg, Oral,  Daily  cholecalciferol, 1,000 Units, Oral, Daily  docusate sodium, 100 mg, Oral, Daily  ferrous sulfate, 325 mg, Oral, Daily With Breakfast  lactobacillus acidophilus, 1 capsule, Oral, Daily  multivitamin with minerals, 1 tablet, Oral, Daily  piperacillin-tazobactam, 3.375 g, Intravenous, Q8H  sodium chloride, 10 mL, Intravenous, Q12H  sodium chloride, 3 mL, Intravenous, Q12H        sodium chloride, 125 mL/hr, Last Rate: 125 mL/hr (05/29/24 0617)      Lines, Drains & Airways       Active LDAs       Name Placement date Placement time Site Days    Peripheral IV Anterior;Left Forearm --  --  Forearm  --    External Urinary Catheter 05/28/24  --  --  1                  Isolation status: No active isolations    Dietary Orders (From admission, onward)       Start     Ordered    05/28/24 2218  NPO Diet NPO Type: Strict NPO  Diet Effective Now        Question:  NPO Type  Answer:  Strict NPO    05/28/24 2218                    PCCM Problems  Acute hypoxic respiratory failure  Primarily left lower lobe consolidation  Aspiration pneumonia suspected  Elevated D-dimer  Dysphagia  History mandibular fracture and chronic jaw pain  Nausea and vomiting  Elevated liver enzymes  MARTÍNEZ  Mildly elevated IgA level  Anemia  History of prostate cancer  Sclerotic foci on ribs  Multiple pulmonary nodules, NEEDS fu imaging        THESE ARE NEW MEDICAL PROBLEMS TO ME.    Plan of Treatment    Hypoxia is better.  Continue to wean oxygen as tolerated.    Elevated D-dimer in the context of MARTÍNEZ.  Unfortunately oxygen flows are too high for VQ scan.  Will at least add subcu heparin DVT prophylaxis.    Primarily left lower lobe consolidation.  I suspect this is all aspiration pneumonia.  Check CT chest without contrast.    Recent CT chest on 5/20/2024 showed patchy groundglass infiltrates in the lungs.  Also multiple pulmonary nodules up to 7 mm in size.  Needs fu imaging.    Additionally sclerotic focus on the posterior right third rib and posterior  left fifth rib.  With history of prostate cancer check PSA.  May need further imaging.    History mandibular fracture and previous pneumonias likely from aspiration.  Note speech evaluation.  Will keep n.p.o. and order a FEES for definitive testing.  May need a feeding tube.  Has chronic poor p.o. intake.    Nausea and vomiting.  Note recent CT abdomen on 5/20/2024.  GI following. Check HIDA scan.    Elevated liver enz of unclear sig. Await further GI input.    MARTÍNEZ and nephrology following. Also contrast exposure recently. ? Change fluids.    Anemia with stable Hb.    Transfer out of ICU.      Venu You MD  05/29/24  09:35 EDT      Part of this note may be an electronic transcription/translation of spoken language to printed text using the Dragon Dictation System.

## 2024-05-29 NOTE — PROGRESS NOTES
[facility]  Inpatient Progress Note    Reason for Follow Up:  Elevated LFTs    Subjective     Interval History:   Pt transferred to ICU yesterday given worsening infection.  He is noted to have worsening LFTs. No c/o abd pain at this time, although he isn't a great historian. Daughter at bedside.       Current Facility-Administered Medications:     acetaminophen (TYLENOL) tablet 650 mg, 650 mg, Oral, Q4H PRN, 650 mg at 05/27/24 2353 **OR** acetaminophen (TYLENOL) 160 MG/5ML oral solution 650 mg, 650 mg, Oral, Q4H PRN **OR** acetaminophen (TYLENOL) suppository 650 mg, 650 mg, Rectal, Q4H PRN, Venu You MD    albuterol (PROVENTIL) nebulizer solution 0.083% 2.5 mg/3mL, 2.5 mg, Nebulization, Q6H - RT, Venu You MD, 2.5 mg at 05/29/24 0711    albuterol (PROVENTIL) nebulizer solution 0.083% 2.5 mg/3mL, 2.5 mg, Nebulization, Q6H PRN, Venu You MD    ascorbic acid (VITAMIN C) tablet 250 mg, 250 mg, Oral, Daily, Venu You MD, 250 mg at 05/28/24 0850    sennosides-docusate (PERICOLACE) 8.6-50 MG per tablet 2 tablet, 2 tablet, Oral, BID PRN **AND** polyethylene glycol (MIRALAX) packet 17 g, 17 g, Oral, Daily PRN **AND** bisacodyl (DULCOLAX) EC tablet 5 mg, 5 mg, Oral, Daily PRN **AND** bisacodyl (DULCOLAX) suppository 10 mg, 10 mg, Rectal, Daily PRN, Venu You MD    Calcium Replacement - Follow Nurse / BPA Driven Protocol, , Does not apply, PRN, Venu You MD    cetirizine (zyrTEC) tablet 5 mg, 5 mg, Oral, Daily PRN, Venu You MD    cholecalciferol (VITAMIN D3) tablet 1,000 Units, 1,000 Units, Oral, Daily, Venu You MD, 1,000 Units at 05/28/24 0849    docusate sodium (COLACE) capsule 100 mg, 100 mg, Oral, Daily, Venu You MD, 100 mg at 05/28/24 0849    famotidine (PEPCID) tablet 10 mg, 10 mg, Oral, BID PRN, Venu You MD    ferrous sulfate tablet 325 mg, 325 mg, Oral, Daily With Breakfast, Venu You MD, 325 mg at 05/28/24 0849    heparin (porcine) 5000 UNIT/ML injection  5,000 Units, 5,000 Units, Subcutaneous, Q8H, Venu You MD    lactobacillus acidophilus (RISAQUAD) capsule 1 capsule, 1 capsule, Oral, Daily, Venu You MD, 1 capsule at 05/28/24 0849    Magnesium Low Dose Replacement - Follow Nurse / BPA Driven Protocol, , Does not apply, Avelino KING Subin, MD    melatonin tablet 2.5 mg, 2.5 mg, Oral, Nightly PRN, Venu You MD    multivitamin with minerals 1 tablet, 1 tablet, Oral, Daily, Venu You MD, 1 tablet at 05/28/24 0849    nitroglycerin (NITROSTAT) SL tablet 0.4 mg, 0.4 mg, Sublingual, Q5 Min PRN, Venu You MD    ondansetron ODT (ZOFRAN-ODT) disintegrating tablet 4 mg, 4 mg, Oral, Q6H PRN **OR** ondansetron (ZOFRAN) injection 4 mg, 4 mg, Intravenous, Q6H PRN, Venu You MD, 4 mg at 05/28/24 0848    Phosphorus Replacement - Follow Nurse / BPA Driven Protocol, , Does not apply, Avelino KING Subin, MD    piperacillin-tazobactam (ZOSYN) 3.375 g IVPB in 100 mL NS MBP (CD), 3.375 g, Intravenous, Q8H, Venu You MD, 3.375 g at 05/29/24 0858    Potassium Replacement - Follow Nurse / BPA Driven Protocol, , Does not apply, PRNAvelino Subin, MD    prochlorperazine (COMPAZINE) injection 5 mg, 5 mg, Intravenous, Q6H PRN, Venu You MD, 5 mg at 05/28/24 1650    sodium chloride 0.9 % flush 10 mL, 10 mL, Intravenous, PRNAvelino Subin, MD    sodium chloride 0.9 % flush 10 mL, 10 mL, Intravenous, Q12H, Venu You MD, 10 mL at 05/29/24 0904    sodium chloride 0.9 % flush 10 mL, 10 mL, Intravenous, PRNAvelino Subin, MD    sodium chloride 0.9 % flush 3 mL, 3 mL, Intravenous, Q12H, Venu You MD, 3 mL at 05/29/24 0905    sodium chloride 0.9 % flush 3-10 mL, 3-10 mL, Intravenous, PRN, Venu You MD    sodium chloride 0.9 % infusion 40 mL, 40 mL, Intravenous, PRN, Venu You MD    sodium chloride 0.9 % infusion 40 mL, 40 mL, Intravenous, PRN, Venu You MD    sodium chloride 0.9 % infusion, 125 mL/hr, Intravenous, Continuous, Venu You MD, Last Rate: 125 mL/hr at  05/29/24 0617, 125 mL/hr at 05/29/24 0617        Objective     Vital Signs  Temp:  [97 °F (36.1 °C)-98.2 °F (36.8 °C)] 98.2 °F (36.8 °C)  Heart Rate:  [] 97  Resp:  [16-24] 24  BP: (124-148)/() 140/90  Body mass index is 23.18 kg/m².    Intake/Output Summary (Last 24 hours) at 5/29/2024 1140  Last data filed at 5/29/2024 0400  Gross per 24 hour   Intake 731 ml   Output 700 ml   Net 31 ml     No intake/output data recorded.     Physical Exam:   General: patient awake,   Eyes: Normal lids and lashes   Abdomen: soft, nontender, nondistended; normal bowel sounds. Negative Martinez's sign.       Results Review:     I reviewed the patient's new clinical results.    Results from last 7 days   Lab Units 05/29/24 0432 05/28/24  0517 05/27/24  0630   WBC 10*3/mm3 8.39 7.61 6.49   HEMOGLOBIN g/dL 8.0* 8.0* 9.3*   HEMATOCRIT % 24.0* 24.6* 28.8*   PLATELETS 10*3/mm3 369 362 346     Results from last 7 days   Lab Units 05/29/24 0432 05/28/24  0517 05/27/24  0629   SODIUM mmol/L 146* 141 140   POTASSIUM mmol/L 3.9 3.8 4.2   CHLORIDE mmol/L 112* 107 107   CO2 mmol/L 22.0 24.0 17.3*   BUN mg/dL 27* 19 16   CREATININE mg/dL 2.07* 1.67* 1.44*   CALCIUM mg/dL 8.2* 8.2* 8.6   BILIRUBIN mg/dL 1.3* 0.9 0.7   ALK PHOS U/L 122* 92 87   ALT (SGPT) U/L 196* 102* 89*   AST (SGOT) U/L 352* 186* 160*   GLUCOSE mg/dL 107* 106* 91     Results from last 7 days   Lab Units 05/29/24  0432 05/25/24  0743   INR  1.26* 1.15*     Lab Results   Lab Value Date/Time    LIPASE 39 08/11/2023 2030       Radiology:  XR Chest 1 View   Preliminary Result   Interval progression in left lung pneumonia and/or   atelectasis with interval development of mild right lung atelectasis.          XR Chest 1 View   Final Result      US Abdomen Limited   Final Result   Gallbladder sludge, without evidence of acute cholecystitis.       This report was finalized on 5/28/2024 6:03 AM by Dr. Faith Del Rio M.D on Workstation: [id number]          FL ESOPHAGRAM  SINGLE CONTRAST   Final Result   Normal limited esophagram.                This report was finalized on 5/26/2024 8:17 PM by Dr. Ronny Pérez M.D on Workstation: [id number]          XR Chest 1 View   Final Result   Heterogeneous multifocal left greater than right lung opacities,   increased in the interval. Suspect multifocal pneumonia. Hemorrhage in   the appropriate clinical setting.       This report was finalized on 5/25/2024 7:58 AM by Dr. Carlito Chand M.D on Workstation: [id number]          NM Lung Ventilation Perfusion    (Results Pending)   US Renal Bilateral    (Results Pending)   CT Chest Without Contrast Diagnostic    (Results Pending)   SLP FEES - Fiberoptic Endo Eval Swallow    (Results Pending)   NM HIDA SCAN WITH PHARMACOLOGICAL INTERVENTION    (Results Pending)       Assessment & Plan     Active Hospital Problems    Diagnosis     **Aspiration pneumonia due to vomitus     Oral phase dysphagia     Dental caries     MARTÍNEZ (acute kidney injury)     Sepsis     Dehydration     Severe malnutrition     Jaw pain     History of prostate cancer          Assessment:   Transaminitis   likely multifactorial, in setting of sepsis, adverse reaction to medications. U/s w/ gb sludge and liver cyst, but no biliary dilation. No RUQ pain.   Negative ceruloplasmin, alpha 1 antitrypsin, viral hepatitis panel. Rest pending.   Aspiration pneumonia  Weight loss  Acute kidney injury  Malnutrition  History of prostate cancer  Mild anemia.  No overt bleeding.    Plan:   Given worsening LFTs- will get hepatic duplex  F/u remaining liver serologies  Continue to trend LFTs    I discussed the patients findings and my recommendations with patient.         Mariann Aldridge PA-C  [facility]  [address] [place] [address]  Office: [phone]

## 2024-05-29 NOTE — PROGRESS NOTES
"Nutrition Services    Patient Name:  Jimmy Norman  YOB: 1955  MRN: [id number]  Admit Date:  5/25/2024    Assessment Date:  05/29/24    Summary: MSA Follow up:   Pt NPO x 2 days. SLP following for FEES but unable to perform today d/t NPO for procedure. Plans for VQ scan today once able to lay flat and tolerate O2 @ 2L, pt currently tolerating O2 @ 3-4L. GI following for elevated LFT's. Pt with h/o madibular fx and previous PNA likely from aspiration and poor po intake. Reviewed EMR and pt has had 34 lb (16%) wt loss x 3 months.     Recommend:  1.  Await FEES results once SLP able to perform for diet recommendations.   2.  If pt fails FEES, may need feeding tube due to chronic poor po intake and severe malnutrition.     Will follow per protocol.    CLINICAL NUTRITION ASSESSMENT      Reason for Assessment Follow-up Protocol     Diagnosis/Problem   generalized weakness, poor PO intake. Pt has a hx of cancer, CAD, MARTÍNEZ.          Anthropometrics        Current Height  Current Weight  BMI kg/m2 Height: 188 cm (74\")  Weight: 81.9 kg (180 lb 8.9 oz) (05/28/24 2330)  Body mass index is 23.18 kg/m².   Adjusted BMI (if applicable)    BMI Category Normal/Healthy (18.4 - 24.9)   Ideal Body Weight (IBW) 181 lbs   Usual Body Weight (UBW) 219 lbs, 214 lb (2/5/24)   Weight Trend Loss 34 lb (16%) wt loss x 3 months   Weight History Wt Readings from Last 30 Encounters:   05/28/24 2330 81.9 kg (180 lb 8.9 oz)   05/25/24 1332 82.9 kg (182 lb 12.2 oz)   05/25/24 0729 82.9 kg (182 lb 12.2 oz)   05/18/24 0428 85.5 kg (188 lb 8 oz)   05/17/24 2250 86.2 kg (190 lb)        Estimated/Assessed Needs        Energy Requirements    Weight for Calculation 83 kg   Method for Estimation  25 kcal/kg   EST Needs (kcal/day) 2075       Protein Requirements    Weight for Calculation 83 kg   EST Protein Needs (g/kg) 1.2 - 1.5 gm/kg   EST Daily Needs (g/day)        Fluid Requirements     Method for Estimation 1 mL/kcal    " Estimated Needs (mL/day)      Labs       Pertinent Labs    Results from last 7 days   Lab Units 05/29/24  0432 05/28/24  0517 05/27/24  0629   SODIUM mmol/L 146* 141 140   POTASSIUM mmol/L 3.9 3.8 4.2   CHLORIDE mmol/L 112* 107 107   CO2 mmol/L 22.0 24.0 17.3*   BUN mg/dL 27* 19 16   CREATININE mg/dL 2.07* 1.67* 1.44*   CALCIUM mg/dL 8.2* 8.2* 8.6   BILIRUBIN mg/dL 1.3* 0.9 0.7   ALK PHOS U/L 122* 92 87   ALT (SGPT) U/L 196* 102* 89*   AST (SGOT) U/L 352* 186* 160*   GLUCOSE mg/dL 107* 106* 91     Results from last 7 days   Lab Units 05/29/24  0432 05/28/24  0517   PHOSPHORUS mg/dL  --  2.7   HEMOGLOBIN g/dL 8.0* 8.0*   HEMATOCRIT % 24.0* 24.6*   WBC 10*3/mm3 8.39 7.61   ALBUMIN g/dL 2.6* 2.6*     Results from last 7 days   Lab Units 05/29/24  0432 05/28/24  0517 05/27/24  0630 05/26/24  1624 05/26/24  0506 05/25/24  0743   INR  1.26*  --   --   --   --  1.15*   PLATELETS 10*3/mm3 369 362 346 353 334 348     COVID19   Date Value Ref Range Status   05/25/2024 Not Detected Not Detected - Ref. Range Final     Lab Results   Component Value Date    HGBA1C 5.20 05/18/2024          Medications           Scheduled Medications albuterol, 2.5 mg, Nebulization, Q6H - RT  vitamin C, 250 mg, Oral, Daily  cholecalciferol, 1,000 Units, Oral, Daily  docusate sodium, 100 mg, Oral, Daily  ferrous sulfate, 325 mg, Oral, Daily With Breakfast  heparin (porcine), 5,000 Units, Subcutaneous, Q8H  lactobacillus acidophilus, 1 capsule, Oral, Daily  Morphine, 3.2 mg, Intravenous, Once in imaging  multivitamin with minerals, 1 tablet, Oral, Daily  piperacillin-tazobactam, 3.375 g, Intravenous, Q8H  sodium chloride, 10 mL, Intravenous, Q12H  sodium chloride, 3 mL, Intravenous, Q12H       Infusions sodium chloride, 125 mL/hr, Last Rate: 125 mL/hr (05/29/24 0617)       PRN Medications   acetaminophen **OR** acetaminophen **OR** acetaminophen    albuterol    senna-docusate sodium **AND** polyethylene glycol **AND** bisacodyl **AND** bisacodyl     Calcium Replacement - Follow Nurse / BPA Driven Protocol    cetirizine    famotidine    Magnesium Low Dose Replacement - Follow Nurse / BPA Driven Protocol    melatonin    nitroglycerin    ondansetron ODT **OR** ondansetron    Phosphorus Replacement - Follow Nurse / BPA Driven Protocol    Potassium Replacement - Follow Nurse / BPA Driven Protocol    prochlorperazine    sodium chloride    sodium chloride    sodium chloride    sodium chloride    sodium chloride     Physical Findings          General Findings alert, disoriented, flat affect, on oxygen therapy   Oral/Mouth Cavity tooth or teeth missing   Edema  not assessed   Gastrointestinal hypoactive bowel sounds, nausea, vomiting, last bowel movement: 5/28   Skin  skin intact   Tubes/Drains/Lines none   NFPE No clinical signs of muscle wasting or fat loss, See Malnutrition Severity Assessment, Date Completed: 5/28     Malnutrition Severity Assessment      Patient meets criteria for : Severe Malnutrition           Current Nutrition Orders & Evaluation of Intake       Oral Nutrition     Food Allergies NKFA   Current PO Diet NPO Diet NPO Type: Strict NPO   Supplement n/a   PO Evaluation     % PO Intake N/a    Factors Affecting Intake: decreased appetite, swallow impairment h/o manibular fx     PES STATEMENT / NUTRITION DIAGNOSIS      Nutrition Dx Problem  Problem: Malnutrition (severe)  Etiology: Medical Diagnosis - generalized weakness, poor PO intake. Pt has a hx of cancer, CAD, MARTÍNEZ.     Signs/Symptoms: Report of Minimal PO Intake   --  NUTRITION INTERVENTION / PLAN OF CARE      Intervention Goal(s) Maintain nutrition status, Establish goals of care, Meet estimated needs, Initiate feeding/diet, Tolerate PO , Increase intake, Accepts oral nutrition supplement, Maintain weight, No significant weight loss, and PO intake goal %: 75%         RD Intervention/Action Encourage intake, Continue to monitor, and Care plan reviewed         Prescription/Orders:       PO Diet  Await FEES results once SLP able to perform for ability to advance diet.       Supplements       Enteral Nutrition       Parenteral Nutrition    New Prescription Ordered? Continue same per protocol, No changes at this time   --      Monitor/Evaluation Per protocol, PO intake, Supplement intake, Weight, Skin status, GI status, Symptoms, POC/GOC, Swallow function   Discharge Plan/Needs No discharge needs identified at this time   --    RD to follow per protocol.      Electronically signed by:  Rose Arvizu RD  05/29/24 14:07 EDT

## 2024-05-29 NOTE — PROGRESS NOTES
"DAILY PROGRESS NOTE  [facility]    Patient Identification:  Name: Jimmy Norman  Age: 69 y.o.  Sex: male  :  1955  MRN: [id number]         Primary Care Physician: Provider, No Known      Subjective  Patient presently with no new complaints.  2 daughters at bedside.  On questioning the patient realizes that he chokes on his food.    Objective:  General Appearance:  Comfortable, in no acute distress and not in pain (Frail-appearing.).    Vital signs: (most recent): Blood pressure 139/87, pulse 98, temperature 97.6 °F (36.4 °C), temperature source Oral, resp. rate 24, height 188 cm (74\"), weight 81.9 kg (180 lb 8.9 oz), SpO2 98%.    Lungs:  Normal effort and normal respiratory rate.  He is not in respiratory distress.  No stridor.  There are rales and rhonchi.  No decreased breath sounds or wheezes.  (Scattered rhonchi and rales.)  Heart: Normal rate.  Regular rhythm.    Abdomen: Abdomen is soft and non-distended.    Extremities: There is no dependent edema.    Neurological: (Awake and much more interactive today.  Voice is fairly gravelly.  Oriented to person and the fact that he is in a hospital but does not know which hospital.  Cooperative and pleasant.).    Skin:  Warm and dry.                  Vital signs in last 24 hours:  Temp:  [97 °F (36.1 °C)-99.9 °F (37.7 °C)] 97.6 °F (36.4 °C)  Heart Rate:  [] 98  Resp:  [20-24] 24  BP: (124-148)/() 139/87    Intake/Output:    Intake/Output Summary (Last 24 hours) at 2024 1630  Last data filed at 2024 1530  Gross per 24 hour   Intake 731 ml   Output 1000 ml   Net -269 ml         Results from last 7 days   Lab Units 24  0432 24  0517 24  0630 24  1624 24  0506 24  0743   WBC 10*3/mm3 8.39 7.61 6.49 6.30 5.38 5.65   HEMOGLOBIN g/dL 8.0* 8.0* 9.3* 8.9* 8.4* 10.1*   PLATELETS 10*3/mm3 369 362 346 353 334 348     Results from last 7 days   Lab Units 24  0432 24  0517 " 05/27/24  0629 05/26/24  0506 05/25/24  0743   SODIUM mmol/L 146* 141 140 138 140   POTASSIUM mmol/L 3.9 3.8 4.2 3.7 4.0   CHLORIDE mmol/L 112* 107 107 106 104   CO2 mmol/L 22.0 24.0 17.3* 21.8* 24.0   BUN mg/dL 27* 19 16 15 22   CREATININE mg/dL 2.07* 1.67* 1.44* 1.24 1.34*   GLUCOSE mg/dL 107* 106* 91 110* 109*   Estimated Creatinine Clearance: 38.5 mL/min (A) (by C-G formula based on SCr of 2.07 mg/dL (H)).  Results from last 7 days   Lab Units 05/29/24 0432 05/28/24  0517 05/27/24  0629 05/26/24  0506 05/25/24  0743   CALCIUM mg/dL 8.2* 8.2* 8.6 8.4* 8.8   ALBUMIN g/dL 2.6* 2.6* 2.5* 2.8* 3.4*   PHOSPHORUS mg/dL  --  2.7  --   --   --      Results from last 7 days   Lab Units 05/29/24 0432 05/28/24 0517 05/27/24 0629 05/26/24  0506 05/25/24  0743   ALBUMIN g/dL 2.6* 2.6* 2.5* 2.8* 3.4*   BILIRUBIN mg/dL 1.3* 0.9 0.7 0.5 0.7   ALK PHOS U/L 122* 92 87 66 70   AST (SGOT) U/L 352* 186* 160* 106* 93*   ALT (SGPT) U/L 196* 102* 89* 59* 60*       Assessment:  Aspiration pneumonia/pneumonitis: Recurrent problem.  See below.  Acute hypoxic respiratory failure: Recurrent respiratory failure.  Workup so far consistent with recurrence of aspiration.  Pulmonary input greatly appreciated  MARTÍNEZ: Creatinine up again today.  Nephrology but appreciated.    Transaminitis: Continues to trend up with uncertain etiology.  Gastroenterology but appreciated.    Oropharyngeal dysphagia: Serious ongoing problem.  Discussed the possibility of tube feedings with patient and daughters at bedside.  Continue speech evaluation.  History of mandibular fracture with jaw pain: Likely contributing to dysphagia.  Modified diet.  Anemia: Labs consistent with iron deficiency as well as anemia of chronic disease.  Iron supplements.  Monitor.  Dental caries: Contributing to severity of aspiration pneumonia.    Malnutrition: Compounding above issues.  May need to consider tube feedings.    Plan:  Please see above.  Discussed with patient and daughters  at bedside.    Chad Wilkinson MD  5/29/2024  16:30 EDT

## 2024-05-29 NOTE — NURSING NOTE
Patient transferrred from ICU this afternoon after CTA. Family at bedside. Patient requested urinal and voided large amount upon arrival. New IV was placed to RFA as LFA had infiltrated upon arrival. IVF and IV abt infusing without difficulty. Patient remains NPO with frequent oral care from daughter. Liver scans scheduled for tomorrow as well as possible FEES to determine diet. Vital signs stable. Patient will answer simple questions and follow commands appropriately. Children stay with patient at all times. Vital signs stable with o2 at 7L hiflo. Call light in reach with daughter to use if needed. Safety maintained.

## 2024-05-29 NOTE — SIGNIFICANT NOTE
05/29/24 1121   OTHER   Discipline speech language pathologist   Rehab Time/Intention   Session Not Performed other (see comments)  (Discussed with RN. Patient NPO for procedure at this time. SLP to follow for FEES.)

## 2024-05-29 NOTE — PLAN OF CARE
Goal Outcome Evaluation:  Plan of Care Reviewed With: patient, daughter, son        Progress: no change  Outcome Evaluation: Pt transferred to CICU from 4S on 9L HFNC. BLE venous dopplers done overnight. Plan for VQ scan today but pt has to tolerate lying flat and being on 2L NC. Currently titrating O2. AM labs complete. Son at bedside.

## 2024-05-29 NOTE — PROGRESS NOTES
" LOS: 3 days   Patient Care Team:  Provider, No Known as PCP - General    Chief Complaint: MARTÍNEZ    History of Present Illness  Patient is a 70 yo male with no history of CKD who presented to ED on 5/25/24 with weakness. He was admitted with aspiration PNA due to vomiting, sepsis and MARTÍNEZ.   His Cr was 1.3 on arrival (baseline around 0.8-0.9), improved with IVF to 1.2, but now is trending up again, to 1.4 yesterday and is up to 1.6 today.   He has not been eating or drinking well. Currently off IVF.   No IV contrast given. He is on zosyn, no vancomycin. No NSAIDs and not hypotension that I can see.    Subjective  5/29 patient moved to ICU yesterday due to increased supplemental O2 requirements after an episode of emesis and choking. Has been stable overnight. Patient remains encephalopathic. Son a bedside. Discussed with RN, he has not been straight cath.     History taken from: chart RN    Objective     Vital Sign Min/Max for last 24 hours  Temp  Min: 97 °F (36.1 °C)  Max: 99 °F (37.2 °C)   BP  Min: 124/85  Max: 148/97   Pulse  Min: 95  Max: 122   Resp  Min: 16  Max: 24   SpO2  Min: 86 %  Max: 99 %   Flow (L/min)  Min: 1.5  Max: 9   Weight  Min: 81.9 kg (180 lb 8.9 oz)  Max: 81.9 kg (180 lb 8.9 oz)     Flowsheet Rows      Flowsheet Row First Filed Value   Admission Height 188 cm (74\") Documented at 05/25/2024 1332   Admission Weight 82.9 kg (182 lb 12.2 oz) Documented at 05/25/2024 0729            I/O this shift:  In: 731 [I.V.:731]  Out: 700 [Urine:700]  I/O last 3 completed shifts:  In: 640 [P.O.:240; IV Piggyback:400]  Out: 1125 [Urine:1125]    Objective:  Vital signs: (most recent): Blood pressure 144/90, pulse 98, temperature 98.2 °F (36.8 °C), temperature source Oral, resp. rate 24, height 188 cm (74\"), weight 81.9 kg (180 lb 8.9 oz), SpO2 91%.            Physical Examination: General appearance - ill appearing, uncomfortable.   Mental status - sleeping, but is uncomfortable.   Chest - clear to auscultation, no " "wheezes, rales or rhonchi, symmetric air entry  Heart - normal rate, regular rhythm, normal S1, S2, no murmurs, rubs, clicks or gallops  Abdomen - soft, nontender, nondistended, no masses or organomegaly  Neurological - no focal deficits, but not following commands.   Extremities - peripheral pulses normal, no pedal edema, no clubbing or cyanosis     Results Review:     I reviewed the patient's new clinical results.    WBC WBC   Date Value Ref Range Status   05/29/2024 8.39 3.40 - 10.80 10*3/mm3 Final   05/28/2024 7.61 3.40 - 10.80 10*3/mm3 Final   05/27/2024 6.49 3.40 - 10.80 10*3/mm3 Final   05/26/2024 6.30 3.40 - 10.80 10*3/mm3 Final      HGB Hemoglobin   Date Value Ref Range Status   05/29/2024 8.0 (L) 13.0 - 17.7 g/dL Final   05/28/2024 8.0 (L) 13.0 - 17.7 g/dL Final   05/27/2024 9.3 (L) 13.0 - 17.7 g/dL Final   05/26/2024 8.9 (L) 13.0 - 17.7 g/dL Final      HCT Hematocrit   Date Value Ref Range Status   05/29/2024 24.0 (L) 37.5 - 51.0 % Final   05/28/2024 24.6 (L) 37.5 - 51.0 % Final   05/27/2024 28.8 (L) 37.5 - 51.0 % Final   05/26/2024 26.9 (L) 37.5 - 51.0 % Final      Platlets No results found for: \"LABPLAT\"   MCV MCV   Date Value Ref Range Status   05/29/2024 85.1 79.0 - 97.0 fL Final   05/28/2024 88.2 79.0 - 97.0 fL Final   05/27/2024 87.8 79.0 - 97.0 fL Final   05/26/2024 87.6 79.0 - 97.0 fL Final          Sodium Sodium   Date Value Ref Range Status   05/29/2024 146 (H) 136 - 145 mmol/L Final   05/28/2024 141 136 - 145 mmol/L Final   05/27/2024 140 136 - 145 mmol/L Final      Potassium Potassium   Date Value Ref Range Status   05/29/2024 3.9 3.5 - 5.2 mmol/L Final   05/28/2024 3.8 3.5 - 5.2 mmol/L Final   05/27/2024 4.2 3.5 - 5.2 mmol/L Final     Comment:     Slight hemolysis detected by analyzer. Result may be falsely elevated.      Chloride Chloride   Date Value Ref Range Status   05/29/2024 112 (H) 98 - 107 mmol/L Final   05/28/2024 107 98 - 107 mmol/L Final   05/27/2024 107 98 - 107 mmol/L Final    " "  CO2 CO2   Date Value Ref Range Status   05/29/2024 22.0 22.0 - 29.0 mmol/L Final   05/28/2024 24.0 22.0 - 29.0 mmol/L Final   05/27/2024 17.3 (L) 22.0 - 29.0 mmol/L Final      BUN BUN   Date Value Ref Range Status   05/29/2024 27 (H) 8 - 23 mg/dL Final   05/28/2024 19 8 - 23 mg/dL Final   05/27/2024 16 8 - 23 mg/dL Final      Creatinine Creatinine   Date Value Ref Range Status   05/29/2024 2.07 (H) 0.76 - 1.27 mg/dL Final   05/28/2024 1.67 (H) 0.76 - 1.27 mg/dL Final   05/27/2024 1.44 (H) 0.76 - 1.27 mg/dL Final      Calcium Calcium   Date Value Ref Range Status   05/29/2024 8.2 (L) 8.6 - 10.5 mg/dL Final   05/28/2024 8.2 (L) 8.6 - 10.5 mg/dL Final   05/27/2024 8.6 8.6 - 10.5 mg/dL Final      PO4 No results found for: \"CAPO4\"   Albumin Albumin   Date Value Ref Range Status   05/29/2024 2.6 (L) 3.5 - 5.2 g/dL Final   05/28/2024 2.6 (L) 3.5 - 5.2 g/dL Final   05/27/2024 2.5 (L) 3.5 - 5.2 g/dL Final      Magnesium No results found for: \"MG\"   Uric Acid No results found for: \"URICACID\"     Medication Review:     Current Facility-Administered Medications:     acetaminophen (TYLENOL) tablet 650 mg, 650 mg, Oral, Q4H PRN, 650 mg at 05/27/24 7535 **OR** acetaminophen (TYLENOL) 160 MG/5ML oral solution 650 mg, 650 mg, Oral, Q4H PRN **OR** acetaminophen (TYLENOL) suppository 650 mg, 650 mg, Rectal, Q4H PRN, Juan Ramon Hinojosa MD    albuterol (PROVENTIL) nebulizer solution 0.083% 2.5 mg/3mL, 2.5 mg, Nebulization, Q6H - RT, Chad Wilkinson MD, 2.5 mg at 05/28/24 2349    albuterol (PROVENTIL) nebulizer solution 0.083% 2.5 mg/3mL, 2.5 mg, Nebulization, Q6H PRN, Chad Wilkinson MD    ascorbic acid (VITAMIN C) tablet 250 mg, 250 mg, Oral, Daily, Juan Ramon Hinojosa MD, 250 mg at 05/28/24 0850    sennosides-docusate (PERICOLACE) 8.6-50 MG per tablet 2 tablet, 2 tablet, Oral, BID PRN **AND** polyethylene glycol (MIRALAX) packet 17 g, 17 g, Oral, Daily PRN **AND** bisacodyl (DULCOLAX) EC tablet 5 mg, 5 mg, Oral, " Daily PRN **AND** bisacodyl (DULCOLAX) suppository 10 mg, 10 mg, Rectal, Daily PRN, Juan Ramon Hinojosa MD    Calcium Replacement - Follow Nurse / BPA Driven Protocol, , Does not apply, PRN, Juan Ramon Hinojosa MD    cetirizine (zyrTEC) tablet 5 mg, 5 mg, Oral, Daily PRN, Juan Ramon Hinojosa MD    cholecalciferol (VITAMIN D3) tablet 1,000 Units, 1,000 Units, Oral, Daily, Juan Ramon Hinojosa MD, 1,000 Units at 05/28/24 0849    docusate sodium (COLACE) capsule 100 mg, 100 mg, Oral, Daily, Juan Ramon Hinojosa MD, 100 mg at 05/28/24 0849    famotidine (PEPCID) tablet 10 mg, 10 mg, Oral, BID PRN, Juan Ramon Hinojosa MD    ferrous sulfate tablet 325 mg, 325 mg, Oral, Daily With Breakfast, Chad Wilkinson MD, 325 mg at 05/28/24 0849    lactobacillus acidophilus (RISAQUAD) capsule 1 capsule, 1 capsule, Oral, Daily, Juan Ramon Hinojosa MD, 1 capsule at 05/28/24 0849    Magnesium Low Dose Replacement - Follow Nurse / BPA Driven Protocol, , Does not apply, PRN, Juan Ramon Hinojosa MD    melatonin tablet 2.5 mg, 2.5 mg, Oral, Nightly PRN, Juan Ramon Hinojosa MD    multivitamin with minerals 1 tablet, 1 tablet, Oral, Daily, Juan Ramon Hinojosa MD, 1 tablet at 05/28/24 0849    nitroglycerin (NITROSTAT) SL tablet 0.4 mg, 0.4 mg, Sublingual, Q5 Min PRN, James Rivera Jr., MD    ondansetron ODT (ZOFRAN-ODT) disintegrating tablet 4 mg, 4 mg, Oral, Q6H PRN **OR** ondansetron (ZOFRAN) injection 4 mg, 4 mg, Intravenous, Q6H PRN, Juan Ramon Hinojosa MD, 4 mg at 05/28/24 0848    Phosphorus Replacement - Follow Nurse / BPA Driven Protocol, , Does not apply, Micaela KING Stephen Matthew, MD    piperacillin-tazobactam (ZOSYN) 3.375 g IVPB in 100 mL NS MBP (CD), 3.375 g, Intravenous, Q8H, Juan Ramon Hinojosa MD, 3.375 g at 05/28/24 2350    Potassium Replacement - Follow Nurse / BPA Driven Protocol, , Does not apply, Micaela KING Stephen Matthew, MD    prochlorperazine  (COMPAZINE) injection 5 mg, 5 mg, Intravenous, Q6H PRN, Mariann Aldridge PA-C, 5 mg at 05/28/24 1650    sodium chloride 0.9 % flush 10 mL, 10 mL, Intravenous, PRN, Rosa Piña MD    sodium chloride 0.9 % flush 10 mL, 10 mL, Intravenous, Q12H, James Rivera Jr., MD, 10 mL at 05/28/24 2326    sodium chloride 0.9 % flush 10 mL, 10 mL, Intravenous, PRN, James Rivera Jr., MD    sodium chloride 0.9 % flush 3 mL, 3 mL, Intravenous, Q12H, Juan Ramon Hinojosa MD, 3 mL at 05/28/24 2027    sodium chloride 0.9 % flush 3-10 mL, 3-10 mL, Intravenous, PRN, Juan Ramon Hinojosa MD    sodium chloride 0.9 % infusion 40 mL, 40 mL, Intravenous, PRNMicaela Stephen Matthew, MD    sodium chloride 0.9 % infusion 40 mL, 40 mL, Intravenous, PRN, James Rivera Jr., MD    sodium chloride 0.9 % infusion, 125 mL/hr, Intravenous, Continuous, James Rivera Jr., MD, Last Rate: 125 mL/hr at 05/29/24 0617, 125 mL/hr at 05/29/24 0617     Assessment & Plan       Aspiration pneumonia due to vomitus    Jaw pain    History of prostate cancer    Severe malnutrition    MARTÍNEZ (acute kidney injury)    Sepsis    Hypoxia    Dehydration    Oral phase dysphagia    Dental caries      Assessment & Plan  MARTÍNEZ  Proteinuria   Metabolic acidosis   Respiratory alkalosis   Elevated liver enzymes   Aspiration PNA with worsening hypoxia   Sepsis   Hypernatremia -new   Hematuria     Renal function worse today   Repeat US with RBC and has proteinuria. He has not been straight cath (to help account for new microscopic hematuria)  He could have ATN due to sepsis and hypoxia. But now with active urine sediment, worsening renal function and pulm infiltrates, will need to rule out GN  Will check serologies.   Continue IVF for now; check renal US.   Will eventually need free water.   Continue abx per pulmonary/primary   Discussed with RN and son.   Will follow       Milagros Ponce MD  05/29/24  06:58 EDT

## 2024-05-29 NOTE — CASE MANAGEMENT/SOCIAL WORK
Discharge Planning Assessment  [facility]     Patient Name: Jimmy Norman  MRN: [id number]  Today's Date: 5/29/2024    Admit Date: 5/25/2024    Plan: Home  CCP following for therapy evaluations   Discharge Needs Assessment       Row Name 05/29/24 1321       Living Environment    People in Home alone    Current Living Arrangements apartment    Potentially Unsafe Housing Conditions none    In the past 12 months has the electric, gas, oil, or water company threatened to shut off services in your home? No    Primary Care Provided by self    Provides Primary Care For no one    Family Caregiver if Needed none    Quality of Family Relationships supportive    Able to Return to Prior Arrangements yes       Resource/Environmental Concerns    Resource/Environmental Concerns none    Transportation Concerns none       Transportation Needs    In the past 12 months, has lack of transportation kept you from medical appointments or from getting medications? no    In the past 12 months, has lack of transportation kept you from meetings, work, or from getting things needed for daily living? No       Transition Planning    Patient/Family Anticipates Transition to inpatient rehabilitation facility    Patient/Family Anticipated Services at Transition none    Transportation Anticipated family or friend will provide       Discharge Needs Assessment    Equipment Currently Used at Home walker, rolling    Concerns to be Addressed discharge planning    Anticipated Changes Related to Illness none                   Discharge Plan       Row Name 05/29/24 1323       Plan    Plan Home  CCP following for therapy evaluations    Plan Comments CCP spoke to patient's wife Valeria at bedside.  Face sheet verified  Pt lives in an apartment on the fouth floor with his wife.  He has an elevator.  He is IADL's.  he uses no assistive devices to ambulate.  He has a past history of HH  He has not been to rehab in the past.  Plan is home  Pt wife  concerned he may need rehab  She requests follow up floser to discharge  CCP following for therapy evaluations                  Continued Care and Services - Admitted Since 5/25/2024    No active coordination exists for this encounter.       Expected Discharge Date and Time       Expected Discharge Date Expected Discharge Time    May 31, 2024            Demographic Summary    No documentation.                  Functional Status    No documentation.                  Psychosocial    No documentation.                  Abuse/Neglect    No documentation.                  Legal    No documentation.                  Substance Abuse    No documentation.                  Patient Forms    No documentation.                     Lillian Moreno RN

## 2024-05-29 NOTE — CONSULTS
Patient Care Team:  Provider, No Known as PCP - General      Subjective     Patient is a 69 y.o. male.  Asked to see for worsening respiratory failure.  And assistance with critical care management.  Patient transferred to the ICU by hospitalist service for worsening respiratory function today.  Patient had been admitted to the hospital on 518 with pneumonia.  And chronic jaw pain he has a history of bilateral mandibular fracture and prior ORIF that was couple of years ago he has had chronic pain and follows apparently at [facility], he has a history of prostate cancer and has had prior radiation and surgery his PSA was recently normal and skeletal survey did not show any evidence of metastases.  Currently had about a 25 pound weight loss in the prior 2 to 3 months prior to his 5/18 admission patient went home on 521 and family says he continued to deteriorate weakness and then nausea and vomiting and came back and on 5/25 with cough chills shortness of breath he reported that he had choked on his vomitus.  He was hypoxic with sats of 85% on admission.  Also found to have some acute kidney injury and be dehydrated and severely malnourished.  Has been on Zosyn.  Today he had another episode of emesis and also choked on this thinks and following that his oxygen saturations worsened and he is now on 9 L high flow nasal cannula.  Had some elevated and rising liver enzymes he has had imaging studies with no evidence of obstruction just some gallbladder sludge no right upper quadrant pain acute hepatitis panel, ceruloplasmin and alpha-1 antitrypsin levels were all normal further studies are pending and GI is following.  Nephrology has been following for acute kidney injury think he was probably prerenal on presentation.  Does not talk much at all his son is in the room and gives some history he has a believe his sister on the phone who also is providing some history.  No prior history of pneumonia that they are  aware of no history of blood clots he has not had any recent complaints of lower extremity pain or swelling not complained of chest pain.  Currently he denies any nausea..  No abdominal pain his only complaint of pain is his jaw pain.      Review of Systems:  Pretty much as above again patient is not really answering questions      History  Past Medical History:   Diagnosis Date    Arthritis     Cancer     Coronary artery disease      Past Surgical History:   Procedure Laterality Date    WOUND DEBRIDEMENT      pt states he had surgery on chest wall r/t infection     Social History     Socioeconomic History    Marital status: Single   Tobacco Use    Smoking status: Never     Passive exposure: Never    Smokeless tobacco: Never   Vaping Use    Vaping status: Never Used   Substance and Sexual Activity    Alcohol use: Yes     Alcohol/week: 2.0 standard drinks of alcohol     Types: 2 Shots of liquor per week     Comment: drinks maybe once a month    Drug use: Not Currently     Comment: hx pain pill use    Sexual activity: Defer     History reviewed. No pertinent family history.      Allergies:  Patient has no known allergies.    Medications:  Prior to Admission medications    Medication Sig Start Date End Date Taking? Authorizing Provider   Ascorbic Acid (Vitamin C) 500 MG chewable tablet Chew 1 tablet Daily.   Yes Galo Street MD   cetirizine (zyrTEC) 10 MG tablet Take 1 tablet by mouth Daily As Needed for Allergies.   Yes Galo Street MD   Multiple Vitamins-Minerals (ZINC PO) Take 1 tablet by mouth Daily.   Yes Galo Street MD   vitamin D (ERGOCALCIFEROL) 1.25 MG (01698 UT) capsule capsule Take 1 capsule by mouth 1 (One) Time Per Week. Thursdays   Yes Galo Street MD     albuterol, 2.5 mg, Nebulization, Q6H - RT  vitamin C, 250 mg, Oral, Daily  cholecalciferol, 1,000 Units, Oral, Daily  docusate sodium, 100 mg, Oral, Daily  ferrous sulfate, 325 mg, Oral, Daily With  "Breakfast  lactobacillus acidophilus, 1 capsule, Oral, Daily  multivitamin with minerals, 1 tablet, Oral, Daily  piperacillin-tazobactam, 3.375 g, Intravenous, Q8H  sodium chloride, 3 mL, Intravenous, Q12H           Objective     Vital Signs  Vital Sign Min/Max for last 24 hours  Temp  Min: 97.3 °F (36.3 °C)  Max: 99 °F (37.2 °C)   BP  Min: 137/95  Max: 143/95   Pulse  Min: 93  Max: 122   Resp  Min: 16  Max: 24   SpO2  Min: 86 %  Max: 97 %   Flow (L/min)  Min: 1.5  Max: 9   No data recorded       Intake/Output Summary (Last 24 hours) at 5/28/2024 2157  Last data filed at 5/28/2024 2000  Gross per 24 hour   Intake 0 ml   Output 1150 ml   Net -1150 ml     I/O this shift:  In: -   Out: 200 [Urine:200]  Last Weight and Admission Weight        05/25/24  1332   Weight: 82.9 kg (182 lb 12.2 oz)     Flowsheet Rows      Flowsheet Row First Filed Value   Admission Height 188 cm (74\") Documented at 05/25/2024 1332   Admission Weight 82.9 kg (182 lb 12.2 oz) Documented at 05/25/2024 0729            Body mass index is 23.47 kg/m².           Physical Exam:  General Appearance: Developed elderly black male seems to have trouble talking his looks like he is in pain to talk.  He is complaining of jaw pain bilaterally.  Eyes: Conjunctiva are clear and anicteric pupils were about 2-1/2 mm equal and reactive to light  ENT: Mucous membranes were moist no erythema no exudates Mallampati type II airway and nasal septum midline  Neck: No adenopathy or thyromegaly no jugular venous tension and trachea midline  Lungs: He has some coarse rales bilaterally left much greater than right the left is pretty much throughout the entire chest the right is more in the base no dullness symmetric expansion he is not labored on 9 L O2 his sats are 95%  Cardiac: Regular rate rhythm no murmur  Abdomen: Soft nontender no palpable hepatosplenomegaly or masses  : Not examined  Musculoskeletal: Grossly normal there is no calf tenderness no lower extremity " edema no pain with dorsiflexion of the feet no palpable cords in the lower extremities  Skin: Warm and dry no jaundice no petechiae  Neuro: He is cooperative he will follow commands he just does not talk much and I am attributing that to his pain in his jaw.  Extremities/P Vascular: Palpable radial and dorsalis pedis pulses bilaterally  MSE: Other than not wanting to talk he was cooperative      Labs:  Results from last 7 days   Lab Units 05/28/24  0517 05/27/24  0629 05/26/24  0506 05/25/24  0743   GLUCOSE mg/dL 106* 91 110* 109*   SODIUM mmol/L 141 140 138 140   POTASSIUM mmol/L 3.8 4.2 3.7 4.0   CO2 mmol/L 24.0 17.3* 21.8* 24.0   CHLORIDE mmol/L 107 107 106 104   ANION GAP mmol/L 10.0 15.7* 10.2 12.0   CREATININE mg/dL 1.67* 1.44* 1.24 1.34*   BUN mg/dL 19 16 15 22   BUN / CREAT RATIO  11.4 11.1 12.1 16.4   CALCIUM mg/dL 8.2* 8.6 8.4* 8.8   ALK PHOS U/L 92 87 66 70   TOTAL PROTEIN g/dL 6.7 6.9 6.6 7.9   ALT (SGPT) U/L 102* 89* 59* 60*   AST (SGOT) U/L 186* 160* 106* 93*   BILIRUBIN mg/dL 0.9 0.7 0.5 0.7   ALBUMIN g/dL 2.6* 2.5* 2.8* 3.4*   GLOBULIN gm/dL 4.1 4.4 3.8 4.5     Estimated Creatinine Clearance: 48.3 mL/min (A) (by C-G formula based on SCr of 1.67 mg/dL (H)).      Results from last 7 days   Lab Units 05/28/24  0517 05/27/24  0630 05/26/24  1624 05/26/24  0506 05/25/24  0743   WBC 10*3/mm3 7.61 6.49 6.30 5.38 5.65   RBC 10*6/mm3 2.79* 3.28* 3.07* 2.94* 3.48*   HEMOGLOBIN g/dL 8.0* 9.3* 8.9* 8.4* 10.1*   HEMATOCRIT % 24.6* 28.8* 26.9* 25.3* 30.8*   MCV fL 88.2 87.8 87.6 86.1 88.5   MCH pg 28.7 28.4 29.0 28.6 29.0   MCHC g/dL 32.5 32.3 33.1 33.2 32.8   RDW % 13.7 13.1 13.2 13.3 13.6   RDW-SD fl 43.6 41.5 41.8 41.3 43.8   MPV fL 9.7 9.5 9.3 9.5 10.1   PLATELETS 10*3/mm3 362 346 353 334 348   NEUTROPHIL % % 82.9*  --   --   --  78.6*   LYMPHOCYTE % % 7.8*  --   --   --  9.6*   MONOCYTES % % 8.1  --   --   --  9.9   EOSINOPHIL % % 0.4  --   --   --  1.2   BASOPHIL % % 0.1  --   --   --  0.2   IMM GRAN % %  0.7*  --   --   --  0.5   NEUTROS ABS 10*3/mm3 6.31  --   --   --  4.44   LYMPHS ABS 10*3/mm3 0.59*  --   --   --  0.54*   MONOS ABS 10*3/mm3 0.62  --   --   --  0.56   EOS ABS 10*3/mm3 0.03  --   --   --  0.07   BASOS ABS 10*3/mm3 0.01  --   --   --  0.01   IMMATURE GRANS (ABS) 10*3/mm3 0.05  --   --   --  0.03   NRBC /100 WBC 0.0  --   --   --  0.4*     Results from last 7 days   Lab Units 05/28/24  1752   PH, ARTERIAL pH units 7.528*   PO2 ART mm Hg 47.8*   PCO2, ARTERIAL mm Hg 28.0*   HCO3 ART mmol/L 23.3     Results from last 7 days   Lab Units 05/28/24 2036 05/28/24 1813 05/27/24  0629 05/25/24  0743   CK TOTAL U/L  --   --  254*  --    HSTROP T ng/L 35* 30*  --  22*     Results from last 7 days   Lab Units 05/28/24 1813 05/25/24  0743   PROBNP pg/mL 375.0 65.1     Results from last 7 days   Lab Units 05/28/24  0517   TSH uIU/mL 0.653     Results from last 7 days   Lab Units 05/28/24  0517 05/25/24  0743   LACTATE mmol/L  --  1.6   PROCALCITONIN ng/mL 1.85* 0.49*     Results from last 7 days   Lab Units 05/25/24  0743   INR  1.15*     Microbiology Results (last 10 days)       Procedure Component Value - Date/Time    Blood Culture - Blood, Arm, Left [[id number]]  (Normal) Collected: 05/25/24 0809    Lab Status: Preliminary result Specimen: Blood from Arm, Left Updated: 05/28/24 0815     Blood Culture No growth at 3 days    Blood Culture - Blood, Arm, Right [[id number]]  (Normal) Collected: 05/25/24 0809    Lab Status: Preliminary result Specimen: Blood from Arm, Right Updated: 05/28/24 0815     Blood Culture No growth at 3 days    Respiratory Panel PCR w/COVID-19(SARS-CoV-2) [facility]IGHT/EVELYN/PAD/COR/BRITT In-House, NP Swab in UTM/VTM, 2 HR TAT - Swab, Nasopharynx [[id number]]  (Normal) Collected: 05/25/24 0745    Lab Status: Final result Specimen: Swab from Nasopharynx Updated: 05/25/24 0922     ADENOVIRUS, PCR Not Detected     Coronavirus 229E Not Detected     Coronavirus HKU1 Not Detected     Coronavirus NL63 Not  Detected     Coronavirus OC43 Not Detected     COVID19 Not Detected     Human Metapneumovirus Not Detected     Human Rhinovirus/Enterovirus Not Detected     Influenza A PCR Not Detected     Influenza B PCR Not Detected     Parainfluenza Virus 1 Not Detected     Parainfluenza Virus 2 Not Detected     Parainfluenza Virus 3 Not Detected     Parainfluenza Virus 4 Not Detected     RSV, PCR Not Detected     Bordetella pertussis pcr Not Detected     Bordetella parapertussis PCR Not Detected     Chlamydophila pneumoniae PCR Not Detected     Mycoplasma pneumo by PCR Not Detected    Narrative:      In the setting of a positive respiratory panel with a viral infection PLUS a negative procalcitonin without other underlying concern for bacterial infection, consider observing off antibiotics or discontinuation of antibiotics and continue supportive care. If the respiratory panel is positive for atypical bacterial infection (Bordetella pertussis, Chlamydophila pneumoniae, or Mycoplasma pneumoniae), consider antibiotic de-escalation to target atypical bacterial infection.    Legionella Antigen, Urine - Urine, Urine, Clean Catch [[id number]]  (Normal) Collected: 05/25/24 0743    Lab Status: Final result Specimen: Urine, Clean Catch Updated: 05/25/24 1118     LEGIONELLA ANTIGEN, URINE Negative    S. Pneumo Ag Urine or CSF - Urine, Urine, Clean Catch [[id number]]  (Normal) Collected: 05/25/24 0743    Lab Status: Final result Specimen: Urine, Clean Catch Updated: 05/25/24 1118     Strep Pneumo Ag Negative              Diagnostics:  XR Chest 1 View    Result Date: 5/28/2024  XR CHEST 1 VW-5/28/2024  HISTORY: Shortness of breath.  Heart size is at the upper limits of normal. There is moderate patchy increased density in the left mid to lower lung which appears similar to the 5/25/2024 study. Left apex appears clear. There are some minimal atelectasis or infiltrate in the right lung base. There is contrast material in the colon.  The chest  appears largely unchanged from the previous study.  Please see additional dictation for the CT of the chest from 5/20/2024.   This report was finalized on 5/28/2024 6:18 PM by Dr. Matthew Monroy M.D on Workstation: [id number]      US Abdomen Limited    Result Date: 5/28/2024  RIGHT UPPER QUADRANT ULTRASOUND  HISTORY: Abnormal elevated liver function tests  COMPARISON: May 20, 2024  TECHNIQUE: Grayscale and color Doppler sonographic images were obtained through the right upper quadrant.  FINDINGS: Pancreas cannot be visualized due to overlying bowel gas. It appeared unremarkable on the CT from May 20, 2024. Tiny cyst is noted within the right lobe of the liver, measuring 1.0 x 1.1 cm. No additional follow-up is necessary. Liver measures up to 17.8 cm in craniocaudal dimensions. There is no intra or extrahepatic biliary dilatation. Common bile duct measures 5 mm. Patient is noted to have sludge within the gallbladder, although there is no gallbladder wall thickening or pericholecystic fluid. Gallbladder wall measures approximately 3 mm. No sonographic Martinez sign was elicited. Right kidney measures 12.4 x 6.2 x 5.4 cm. There is no hydronephrosis. Renal echotexture is probably within normal limits. Patient does have some trace perinephric fluid      Gallbladder sludge, without evidence of acute cholecystitis.  This report was finalized on 5/28/2024 6:03 AM by Dr. Faith Del Rio M.D on Workstation: [id number]      FL ESOPHAGRAM SINGLE CONTRAST    Result Date: 5/26/2024  PROCEDURE: FL ESOPHAGRAM SINGLE CONTRAST-  HISTORY: Difficulty swallowing. Recommended by speech pathology.  TECHNIQUE:  A single phase barium esophagram was performed.  Fluoroscopy time: 6 seconds # images obtained: 50 Reference Air Kerma (dose): 45 mGy  COMPARISON: CT chest 5/20/2024  FINDINGS: Limited exam secondary to patient condition.   radiograph shows left greater than right pulmonary opacities best characterized on recent chest CT.  Prior contrast within the partially visualized colon. Esophagus is well distended and normal in course and caliber. No narrowing/stricture. Normal esophageal motility. Mildly patulous distal esophagus without sliding hiatal hernia. Contrast continued into the proximal stomach.        Normal limited esophagram.     This report was finalized on 5/26/2024 8:17 PM by Dr. Ronny Pérez M.D on Workstation: [id number]      XR Chest 1 View    Result Date: 5/25/2024  XR CHEST 1 VW-   INDICATION: CHF/COPD  COMPARISON: Chest radiograph May 17, 2024  TECHNIQUE: 1 view chest  FINDINGS:  Heterogeneous multifocal left greater than right lung opacities. No effusions. Stable mediastinum. Heart is normal in size. Intraluminal contrast seen in the colon.      Heterogeneous multifocal left greater than right lung opacities, increased in the interval. Suspect multifocal pneumonia. Hemorrhage in the appropriate clinical setting.  This report was finalized on 5/25/2024 7:58 AM by Dr. Carlito Chand M.D on Workstation: [id number]      FL Video Swallow Single Contrast    Result Date: 5/21/2024  VIDEO SWALLOWING EXAMINATION BY SPEECH PATHOLOGY  Clinical: Dysphasia  Reference Air Kerma: 6.20 mGy  Video swallowing examination performed under the direction of speech pathology.  Speech pathology summary: VFSS completed. MEÑO Kirby present  No aspiration was seen. For full details please see the speech pathology report  By electronically signing this report, I, the supervising radiologist, attest that I was not present for the procedure(s) but agree with this final edited report.     This report was finalized on 5/21/2024 5:26 PM by Dr. Brysno Hernandez M.D on Workstation: [id number]      CT Abdomen Pelvis With Contrast    Result Date: 5/20/2024  CT CHEST W CONTRAST DIAGNOSTIC-, CT ABDOMEN PELVIS W CONTRAST-  DATE OF EXAM: 5/20/2024 10:54 AM  INDICATION: Abnormal weight loss. History of prostate cancer.  COMPARISON: CT neck 5/18/2024.  Chest radiograph 5/17/2024.  TECHNIQUE: Multiple contiguous axial images were acquired through the chest, abdomen, and pelvis following the intravenous administration of 100 mL of Isovue-300. Reformatted coronal and sagittal sequences were also reviewed. Radiation dose reduction techniques were utilized, including automated exposure control and exposure modulation based on body size.  FINDINGS: CT chest: Low lung volumes with bilateral dependent atelectasis and multifocal bibasilar subsegmental atelectasis and/or scarring. Multifocal patchy and nodular consolidation and patchy groundglass opacities with interstitial thickening in both lungs, greatest posteriorly and greatest in the left lower lobe, likely multifocal pneumonia. A partially obscured subpleural nodule in the posterior left lower lobe measures 7 mm in average diameter (axial series 3 image 72). Additional sub-6 mm pulmonary nodules in both lungs. An index sub-6 mm subpleural nodule is seen in the anterior right upper lobe (axial series 3 image 54). An index mixed solid and groundglass sub-6 mm nodule is seen in the right lower lobe (axial series 3 image 67). The central airways are widely patent. No pneumothorax or pleural effusion.  The heart is normal in size. Moderate to severe calcified coronary artery disease. Trace pericardial fluid. Mild fusiform 4 cm ectasia of the ascending thoracic aorta. Normal variant arch anatomy with the left vertebral artery takes its origin directly from the aortic arch. Enlarged right lobe of the thyroid gland and similar-appearing small low-attenuation nodule in the small left lobe of the thyroid gland.  Mild multilevel thoracic spondylosis. Mild chronic degenerative changes in both shoulders. Nonspecific small sclerotic foci in the posterior right third rib (axial series 3 image 27) and the posterior left fifth rib (axial series 3 image 45). No acute osseous abnormality.  CT abdomen and pelvis: Incompletely evaluated  small 1 cm low-attenuation lesion in the posterior right lobe of the liver, statistically representing a hepatic cyst or hemangioma. The gallbladder, spleen, pancreas, and adrenal glands are unremarkable. Small low-density lesions in both kidneys measuring up to 1.5 cm in the interpolar left kidney, probably benign cysts. Enlarged prostate gland, measuring 5.2 cm in transverse diameter, with mild mass effect on the floor of the urinary bladder. The urinary bladder is otherwise unremarkable.  Mild colonic stool. No bowel obstruction or significant bowel wall thickening. The appendix is normal.  No free fluid in the abdomen or pelvis. No free intraperitoneal air. No pathologically enlarged lymph nodes in the abdomen or pelvis. Mild calcified and noncalcified atherosclerotic disease in the abdominal aorta and its distal branches with a small fusiform 3.1 cm infrarenal abdominal aortic aneurysm and a small 2.5 cm right common iliac artery aneurysm.  Tiny fat-containing umbilical hernia. Mild to moderate multilevel lumbar spondylosis. Moderate bilateral hip joint DJD. Old healed fracture deformity of the left inferior pubic ramus. No acute osseous abnormality or concerning osseous lesion.       1. Multifocal patchy and nodular consolidation and patchy groundglass opacities with interstitial thickening in both lungs, likely multifocal pneumonia. 2. Multiple pulmonary nodules in both lungs measuring up to 7 mm in diameter, which may also be infectious/inflammatory. Could consider follow-up CT chest in 3 months after treatment. 3. Small sclerotic foci in the posterior right third rib and the posterior left fifth rib, possible benign bone islands. Given the history of prostate cancer, recommend correlating with PSA. Could consider further evaluation with nuclear medicine bone scan if clinically indicated. 4. Moderate to severe calcified coronary artery disease. 5. Mild fusiform 4 cm ectasia of the ascending thoracic aorta,  small 3.1 cm fusiform infrarenal abdominal aortic aneurysm, and small 2.5 cm right common iliac artery aneurysm.  This report was finalized on 5/20/2024 12:38 PM by Akash Alarcon MD on Workstation: [id number]      CT Chest With Contrast Diagnostic    Result Date: 5/20/2024  CT CHEST W CONTRAST DIAGNOSTIC-, CT ABDOMEN PELVIS W CONTRAST-  DATE OF EXAM: 5/20/2024 10:54 AM  INDICATION: Abnormal weight loss. History of prostate cancer.  COMPARISON: CT neck 5/18/2024. Chest radiograph 5/17/2024.  TECHNIQUE: Multiple contiguous axial images were acquired through the chest, abdomen, and pelvis following the intravenous administration of 100 mL of Isovue-300. Reformatted coronal and sagittal sequences were also reviewed. Radiation dose reduction techniques were utilized, including automated exposure control and exposure modulation based on body size.  FINDINGS: CT chest: Low lung volumes with bilateral dependent atelectasis and multifocal bibasilar subsegmental atelectasis and/or scarring. Multifocal patchy and nodular consolidation and patchy groundglass opacities with interstitial thickening in both lungs, greatest posteriorly and greatest in the left lower lobe, likely multifocal pneumonia. A partially obscured subpleural nodule in the posterior left lower lobe measures 7 mm in average diameter (axial series 3 image 72). Additional sub-6 mm pulmonary nodules in both lungs. An index sub-6 mm subpleural nodule is seen in the anterior right upper lobe (axial series 3 image 54). An index mixed solid and groundglass sub-6 mm nodule is seen in the right lower lobe (axial series 3 image 67). The central airways are widely patent. No pneumothorax or pleural effusion.  The heart is normal in size. Moderate to severe calcified coronary artery disease. Trace pericardial fluid. Mild fusiform 4 cm ectasia of the ascending thoracic aorta. Normal variant arch anatomy with the left vertebral artery takes its origin directly from the  aortic arch. Enlarged right lobe of the thyroid gland and similar-appearing small low-attenuation nodule in the small left lobe of the thyroid gland.  Mild multilevel thoracic spondylosis. Mild chronic degenerative changes in both shoulders. Nonspecific small sclerotic foci in the posterior right third rib (axial series 3 image 27) and the posterior left fifth rib (axial series 3 image 45). No acute osseous abnormality.  CT abdomen and pelvis: Incompletely evaluated small 1 cm low-attenuation lesion in the posterior right lobe of the liver, statistically representing a hepatic cyst or hemangioma. The gallbladder, spleen, pancreas, and adrenal glands are unremarkable. Small low-density lesions in both kidneys measuring up to 1.5 cm in the interpolar left kidney, probably benign cysts. Enlarged prostate gland, measuring 5.2 cm in transverse diameter, with mild mass effect on the floor of the urinary bladder. The urinary bladder is otherwise unremarkable.  Mild colonic stool. No bowel obstruction or significant bowel wall thickening. The appendix is normal.  No free fluid in the abdomen or pelvis. No free intraperitoneal air. No pathologically enlarged lymph nodes in the abdomen or pelvis. Mild calcified and noncalcified atherosclerotic disease in the abdominal aorta and its distal branches with a small fusiform 3.1 cm infrarenal abdominal aortic aneurysm and a small 2.5 cm right common iliac artery aneurysm.  Tiny fat-containing umbilical hernia. Mild to moderate multilevel lumbar spondylosis. Moderate bilateral hip joint DJD. Old healed fracture deformity of the left inferior pubic ramus. No acute osseous abnormality or concerning osseous lesion.       1. Multifocal patchy and nodular consolidation and patchy groundglass opacities with interstitial thickening in both lungs, likely multifocal pneumonia. 2. Multiple pulmonary nodules in both lungs measuring up to 7 mm in diameter, which may also be  infectious/inflammatory. Could consider follow-up CT chest in 3 months after treatment. 3. Small sclerotic foci in the posterior right third rib and the posterior left fifth rib, possible benign bone islands. Given the history of prostate cancer, recommend correlating with PSA. Could consider further evaluation with nuclear medicine bone scan if clinically indicated. 4. Moderate to severe calcified coronary artery disease. 5. Mild fusiform 4 cm ectasia of the ascending thoracic aorta, small 3.1 cm fusiform infrarenal abdominal aortic aneurysm, and small 2.5 cm right common iliac artery aneurysm.  This report was finalized on 5/20/2024 12:38 PM by Akash Alarcon MD on Workstation: [id number]      CT Head Without Contrast    Result Date: 5/18/2024  Patient: VALERIA MUIR  Time Out: 01:57 Exam(s): CT HEAD Without Contrast EXAM:   CT Head Without Intravenous Contrast CLINICAL HISTORY:    Reason for exam: ha. TECHNIQUE:   Axial computed tomography images of the head brain without intravenous contrast.  CTDI is 55.7 mGy and DLP is 939.9 mGy-cm.  This CT exam was performed according to the principle of ALARA (As Low As Reasonably Achievable) by using one or more of the following dose reduction techniques: automated exposure control, adjustment of the mA and or kV according to patient size, and or use of iterative reconstruction technique. COMPARISON:   No relevant prior studies available. FINDINGS:   Brain:  Unremarkable.  No hemorrhage.  Mild nonspecific white matter changes.  No edema.   Ventricles:  Unremarkable.  No ventriculomegaly.   Bones joints:  Unremarkable.  No acute fracture.   Soft tissues:  Unremarkable.   Sinuses:  Unremarkable as visualized.  No acute sinusitis.   Mastoid air cells:  Unremarkable as visualized.  No mastoid effusion. IMPRESSION:     No evidence of acute intracranial pathology.     Electronically signed by Luz Maria Wild MD on 05-18-24 at 0157    CT Soft Tissue Neck With Contrast    Result  Date: 5/18/2024  Patient: VALERIA MUIR  Time Out: 01:49 Exam(s): CT NECK With Contrast EXAM:   CT Neck With Intravenous Contrast CLINICAL HISTORY:    Reason for exam: right sided jaw swelling, pain worsening - hx of frx'ed jaw 2 years ago. TECHNIQUE:   Axial computed tomography images of the neck with intravenous contrast.  CTDI is 13.23 mGy and DLP is 412.9 mGy-cm.  This CT exam was performed according to the principle of ALARA (As Low As Reasonably Achievable) by using one or more of the following dose reduction techniques: automated exposure control, adjustment of the mA and or kV according to patient size, and or use of iterative reconstruction technique. COMPARISON:   No relevant prior studies available. FINDINGS:   Oropharynx:  Unremarkable.  No significant tonsillar enlargement.  No peritonsillar abscess.   Hypopharynx:  Unremarkable.   Larynx:  Unremarkable.  Normal epiglottis.   Trachea:  Unremarkable.   Retropharyngeal space:  Unremarkable.   Submandibular parotid glands:  Unremarkable.  Glands are normal in size.   Thyroid: Tiny left thyroid nodule.  Hypoplastic left thyroid gland.  No enlarged or calcified nodules.   Bones joints:  No acute fracture.  Status post ORIF of the mandible bilaterally.   Soft tissues:  Unremarkable.   Vasculature:  No acute findings.  Finding is concerning for bilateral carotid endarterectomies with surgical staples in place.   Lymph nodes:  Unremarkable.  No lymphadenopathy.   Lung apices: Bronchitis with patchy opacities in the upper and visualized lower lobes. IMPRESSION:     No evidence of acute cervical soft tissue pathology. Findings concerning for bronchitis with infiltrates in the upper and lower lobes. Status post ORIF of the mandible and carotid endarterectomies with expected postsurgical changes.     Electronically signed by Luz Maria Wild MD on 05-18-24 at 0149    XR Chest 1 View    Result Date: 5/18/2024  Patient: VALERIA MUIR  Time Out: 01:21 Exam(s): XR CXR  "1 VIEW EXAM:   XR Chest, 1 View CLINICAL HISTORY:    Reason for exam: cough, myalgias. TECHNIQUE:   Frontal view of the chest. COMPARISON: Comparison made to prior CT scan of the cervical soft tissues from May 18,  2024. FINDINGS:   Lungs: Moderate to heavy peribronchial thickening of the central and lower lobe bronchi with bilateral lower lobe patchy opacities.  No consolidation.   Pleural space:  Unremarkable.  No pneumothorax.   Heart:  Unremarkable.  No cardiomegaly.   Mediastinum:  Unremarkable.  Normal mediastinal contour.   Bones joints:  Unremarkable.  No acute fracture. IMPRESSION:     Findings concerning for bilateral lower lobe infiltrates.  No evidence of consolidation or pleural effusion.     Electronically signed by Luz Maria Wild MD on 05-18-24 at 0121    XR chest 1 view    Result Date: 5/14/2024  EXAM: Single view chest radiograph DATE:  5/14/2024 1:22 ACCESSION:  [id number] CLINICAL HISTORY:  sepsis COMPARISON:  Chest radiographs dating back to 10/27/2014 TECHNIQUE: A single semiupright AP radiograph of the chest was obtained for review. FINDINGS:  Minimal basilar opacities likely reflecting subsegmental atelectasis. No evidence of effusion or pneumothorax. The cardiac and mediastinal contours are within normal limits. Degenerative changes of the acromioclavicular joints. IMPRESSION: Minimal basilar opacities likely reflecting subsegmental atelectasis. Otherwise no evidence of acute pulmonary process. \"I, the attending/teaching physician, have personally reviewed, discussed, and supervised this radiological examination with the resident and this report reflects my agreement.\" Dictated by: Justine Dixon M.D. Signed by Camille Bradley M.D. on 5/14/2024 6:42 ##### Final ##### Dictated by:    JUSTINE DIXON, RES-RAD Dictated DT/TM: 05/14/2024 6:42 am  Interpreted and electronically signed by:  CAMILLE BRADLEY, -RAD Signed DT/TM:  05/14/2024 6:42 am    CT max facial area w " contrast    Result Date: 5/7/2024  ACCESSION NUMBER: [id number] DATE: 5/7/2024 15:23 EXAMINATION: CT Max Facial Area W PROVIDED INDICATION: Other (Please Specify): Maxillary/facial abscess COMPARISON: None TECHNIQUE: Axial computed tomographic images of the face and mandible intravenous administration of  100 mL Isovue-370. Reformatted images include axial, sagittal, coronal, virtual Panorex and 3D surface-rendered images with tumbled and rotated views. FINDINGS: Major Findings: There is no acute fracture, fluid collection, or soft tissue mass. Previous bilateral plate and screw fixation of the mandible. Multiple missing teeth. Contrast within the left jugular vein. Incidental and Normal Findings: Orbits: Orbital walls are intact. The globes and other orbital soft tissues are normal. Paranasal sinuses: Scattered mucosal thickening within the paranasal sinuses. Mastoid air cells: The visualized mastoid air cells are clear. Mandible: The mandible is intact. Temporomandibular joints are normal alignment. IMPRESSION: No acute findings. No evidence of abscess. Dictated by: Camille Gordon M.D. Signed by Camille Gordon M.D. on 5/7/2024 15:45 ##### Final ##### Dictated by:    CAMILLE GORDON -RAD Dictated DT/TM: 05/07/2024 3:45 pm Interpreted and electronically signed by:  CAMILLE GORDON -RAD Signed DT/TM:  05/07/2024 3:45 pm    EKG reviewed looks like sinus rhythm probably old inferior infarct no definite acute changes    Reviewed his previous admission chest x-ray and CT chest and the chest x-rays from this admission and then from today I do disagree with radiology little I think the left-sided particularly infiltrates are worse on today's film than the one a couple days prior    Assessment & Plan     Bilateral pulmonary infiltrates overall look to be worsening over the last 10 days.  With episodes of vomiting and self-reported aspiration.  The distribution certainly could fit with aspiration  pneumonia/pneumonitis he is on Zosyn, Legionella, respiratory pathogen panel and MRSA swabs all negative.  Calcitonin has gone up slightly.  Course his creatinine is gone up slightly to and this could be the cause but we need to continue antibiotics  Elevated D-dimer I suspect this is related to infection and renal dysfunction but I certainly cannot rule out a pulmonary embolus his renal dysfunction makes CT angiogram fairly high risk endeavor.  I will check lower extremity Dopplers and VQ scan aware that the ventilation portion at least is probably going to be abnormal with the infiltrates.  He has anemia and hemoglobin has been drifting down family reports some hematuria I am hesitant to do empiric anticoagulation with this combination.  Acute hypoxic respiratory failure supplemental O2 titrate to maintain saturations.  Probably related to aspiration event see discussions above  Dysphagia some of this is probably related to his jaw issues and chronic jaw pain looks like he has had an extensive workup at [facility] for this I am not sure what more can or should be done.  He needs to be n.p.o. right now at least until his nausea and vomiting resolves.  Nausea and vomiting this is due to pneumonia as is due to his elevated liver enzymes or what ever is causing his liver enzymes to be elevated I am not certain.  Will keep him n.p.o. for right now keep the head of his bed elevated  Elevated troponin with slight increase I do not see any acute ST changes on his EKG this minimal increase could be related to his renal dysfunction he could have a little demand ischemia with his hypoxia will follow-up another troponin in the morning and make sure it continues to come down.  Elevated liver function test GI is working up etiology is not entirely clear continue to follow.  Acute kidney injury when he came in this admission his creatinine was up from the 5/21 discharge he improved some with hydration and felt that it was probably  just prerenal creatinine is gone back up I guess he could be prerenal with the nausea and vomiting and not taking good p.o. or does he have some sepsis ATN his urine looked pretty bland for an AIN.  Nephrology following  Anemia like he had some mild anemia when he came in on 517 his hemoglobin is 12 and is now 8 family does report a little bit of hematuria daughter said he had some stringy clots in his urine.  History of prostate carcinoma with normal PSA and recent skeletal survey that was negative prior radiation and resection.  Fluids/electrolytes/nutrition for right now patient he is to be n.p.o. will give him some IV fluids.      James Rivera Jr, MD  05/28/24  21:57 EDT    Time: Critical care time 58 minutes

## 2024-05-30 NOTE — PROGRESS NOTES
"DAILY PROGRESS NOTE  [facility]    Patient Identification:  Name: Jimmy Norman  Age: 69 y.o.  Sex: male  :  1955  MRN: [id number]         Primary Care Physician: Provider, No Known      Subjective  Patient verbalizes no complaints but he is on BiPAP now and very fatigued.  He does nod his head yes when asked if he is okay.    Objective:  General Appearance:  In no acute distress and not in pain.    Vital signs: (most recent): Blood pressure 133/85, pulse 101, temperature 98.4 °F (36.9 °C), temperature source Oral, resp. rate 18, height 188 cm (74\"), weight 82.2 kg (181 lb 3.5 oz), SpO2 (!) 85%.    Lungs:  Normal effort and normal respiratory rate.  He is not in respiratory distress.  No stridor.  There are rales.  No decreased breath sounds, wheezes or rhonchi.  (Scattered rales.  On BiPAP.)  Heart: Tachycardia.  (Heart rate slightly over 100.)  Extremities: There is no dependent edema.    Neurological: (Awake but fatigued.  Cooperative with exam.).    Skin:  Warm and dry.                  Vital signs in last 24 hours:  Temp:  [97.7 °F (36.5 °C)-100.5 °F (38.1 °C)] 98.4 °F (36.9 °C)  Heart Rate:  [] 101  Resp:  [18-22] 18  BP: (131-146)/(85-92) 133/85    Intake/Output:    Intake/Output Summary (Last 24 hours) at 2024 1558  Last data filed at 2024 1037  Gross per 24 hour   Intake 1250 ml   Output 250 ml   Net 1000 ml         Results from last 7 days   Lab Units 24  0245 24  0432 24  0517 24  0630 24  1624 24  0506 24  0743   WBC 10*3/mm3 8.35 8.39 7.61 6.49 6.30 5.38 5.65   HEMOGLOBIN g/dL 7.5* 8.0* 8.0* 9.3* 8.9* 8.4* 10.1*   PLATELETS 10*3/mm3 393 369 362 346 353 334 348     Results from last 7 days   Lab Units 24  0245 24  0432 24  0517 24  0629 24  0506 24  0743   SODIUM mmol/L 151* 146* 141 140 138 140   POTASSIUM mmol/L 4.2 3.9 3.8 4.2 3.7 4.0   CHLORIDE mmol/L 117* 112* 107 107 106 104 "   CO2 mmol/L 21.0* 22.0 24.0 17.3* 21.8* 24.0   BUN mg/dL 39* 27* 19 16 15 22   CREATININE mg/dL 2.44* 2.07* 1.67* 1.44* 1.24 1.34*   GLUCOSE mg/dL 104* 107* 106* 91 110* 109*   Estimated Creatinine Clearance: 32.8 mL/min (A) (by C-G formula based on SCr of 2.44 mg/dL (H)).  Results from last 7 days   Lab Units 05/30/24  0245 05/29/24  0432 05/28/24  0517 05/27/24  0629 05/26/24  0506 05/25/24  0743   CALCIUM mg/dL 8.1* 8.2* 8.2* 8.6 8.4* 8.8   ALBUMIN g/dL 2.5* 2.6* 2.6* 2.5* 2.8* 3.4*   PHOSPHORUS mg/dL  --   --  2.7  --   --   --      Results from last 7 days   Lab Units 05/30/24 0245 05/29/24 0432 05/28/24  0517 05/27/24  0629 05/26/24  0506 05/25/24  0743   ALBUMIN g/dL 2.5* 2.6* 2.6* 2.5* 2.8* 3.4*   BILIRUBIN mg/dL 1.8* 1.3* 0.9 0.7 0.5 0.7   ALK PHOS U/L 117 122* 92 87 66 70   AST (SGOT) U/L 329* 352* 186* 160* 106* 93*   ALT (SGPT) U/L 219* 196* 102* 89* 59* 60*       Assessment:  Aspiration pneumonia/pneumonitis: Recurrent problem.  See below.  Infectious disease, pulmonary evaluation appreciated.  Acute hypoxic respiratory failure: Recurrent respiratory failure.  Workup so far consistent with recurrence of aspiration.  Pulmonary input greatly appreciated.  Patient now in ICU on BiPAP.  Bronchoscopy report noted.  MARTÍNEZ: Creatinine up again today.  Nephrology input appreciated.    Transaminitis: Continues to trend up with uncertain etiology.  Gastroenterology but appreciated.    Oropharyngeal dysphagia: Serious ongoing problem.  Discussed the possibility of tube feedings with patient and daughters at bedside.  Continue speech evaluation.  History of mandibular fracture with jaw pain: Likely contributing to dysphagia.    Anemia: Labs consistent with iron deficiency as well as anemia of chronic disease.  Iron supplements.  Monitor.  Dental caries: Contributing to severity of aspiration pneumonia.    Malnutrition: Compounding above issues.  May need to consider tube feedings.      Plan:  Please see  above.  Discussed with son at bedside.    Chad Wilkinson MD  5/30/2024  15:58 EDT

## 2024-05-30 NOTE — CASE MANAGEMENT/SOCIAL WORK
Continued Stay Note  [facility]     Patient Name: Jimmy Norman  MRN: [id number]  Today's Date: 5/30/2024    Admit Date: 5/25/2024    Plan: SNF vs home   Discharge Plan       Row Name 05/30/24 1126       Plan    Plan SNF vs home    Plan Comments Met with pt and family at bedside. Introduced self and explained role of . Pt unstable medically at this time, however, family has requested that CCP place referrals to facilities that accepts his insurance. Provider list given to family, and referrals placed in epic. Advised family that  CCP would follow to assess for discharge needs.                   Discharge Codes    No documentation.                 Expected Discharge Date and Time       Expected Discharge Date Expected Discharge Time    Ilya 3, 2024               Ashley Darling RN

## 2024-05-30 NOTE — PROCEDURES
Bronchoscopy Procedure Note    Procedure:  Bronchoscopy with bronchoalveolar lavage    Pre-Operative Diagnosis:  1.  Bilateral lower lobe pneumonia of unclear etiology  2.  Progressive pneumonia, worsening respiratory failure    Post-Operative Diagnosis: Same    Indication:  1.  Bilateral lower lobe pneumonia of unclear etiology  2.  Progressive pneumonia, worsening respiratory failure    Anesthesia: Versed 4 mg IV and 30 cc of topical lidocaine    Procedure Details: Patient and his family were consented for the procedure with all risks and benefits of the procedure explained in detail.  Patient and his family was given the opportunity to ask questions and all concerns were answered.      Findings:  The bronchocope was inserted into the main airway via the oropharynx. An anatomical survey was done of the main airways and the subsegmental bronchus.  Patient had some mucopurulent secretions in the lower lobes.  A bronchoalveolar lavage was performed to the left lower lobe using aliquots of normal saline instilled into the airways then aspirated back.  120 cc was instilled and approximately 40 cc return was obtained of mucopurulent fluid.    Estimated Blood Loss:   0 cc           Specimens:  Sent purulent fluid                Complications: No immediate complications, but patient was quite hypoxic and required BiPAP after bronchoscopy.           Disposition: ICU - extubated and stable.      Patient tolerated the procedure well.    While I was in the room and during my examination of the patient I wore gown, gloves, mask, eye protection and washed my hands before and after the encounter.  Proper enhanced droplet precautions and isolation precautions were taken.    Jaciel Coleman MD  5/30/2024  15:35 EDT

## 2024-05-30 NOTE — PROGRESS NOTES
Patient was already evaluated earlier by my partner Dr. Jaciel Coleman, events were noted and notes were reviewed  I was called by the ICU staff for emergency bedside assistance because the patient was tachypneic, tachycardic and unstable  On the initial assessment the patient was already on the BiPAP, he was breathing at 57 breaths/min  He does have some rhonchi on exam  The patient was told holding a normal blood pressure and was tachycardic.  The biopsy report adjusted, the patient was given IV fentanyl and was started on the Precedex especially after a stat ABG showed no evidence of any acidosis with no significant hypoxemia on the current supplemental oxygen and BiPAP.  Patient seems to have improved some but she was still relatively tachypneic.  Shortly after I was called again to reassess because the patient was having more tachycardia and more tachypnea despite further adjustment on the BiPAP setting  Patient was given IV Versed because of suspected substance abuse with possible alcohol and possible alcohol withdrawal  That has only transient benefit and the patient was spiraling down  I was called to assess again because the patient was having significant tachycardia at the time, the patient with heart rate in the 160s which was very regular and looking more like a supraventricular tachycardia based on the monitor.  After consulting with the family at several interval and after updating the family with the recent events they were agreeable to proceed with further supportive measures including intubation for airway protection and in order to control the breathing since patient is likely to crash given the significant tachypnea despite the initial supportive measures  Patient was given propofol and intubated with size 8.5 ET tube  Patient had a drop in the blood pressure as anticipated and he was already loaded up with IV fluid bolus prior to the sedation and was started on the Levophed  Levophed is being  titrated and the blood pressure seems to be responding  Patient will have repeat ABG shortly after  Exam after the intubation showed positive crackles and rhonchi but with good breath sounds bilaterally  Tachycardia did improve post intubation and sedation  Sats did drop in the 70s but have been correcting up in the high 80s and continued to improve with supportive measures  The case was discussed with the on-call intensivist for tonight with the plan to consider further measures including bronchoscopy in case of refractory hypoxemia however looking at the x-ray it is better  Nasogastric tube was inserted to decompress the inflated stomach to further help with the oxygenation and the hemodynamics  Total additional critical care time excluding any separate billable procedure time was 76 minutes

## 2024-05-30 NOTE — PROGRESS NOTES
Dr. ANN Coleman    [address]        Patient ID:  Name:  Jimmy Norman  MRN:  [id number]  1955  69 y.o.  male            CC/Reason for visit: Progressive pneumonia, acute respiratory failure    Interval hx: Patient is suddenly much worse.  I was called stat to the bedside due to respiratory decompensation.  The patient is less responsive.  He has developed tachypnea and is requiring 100% FiO2 with 12 L heated humidified high flow oxygen at this time.  He was downstairs for duplex liver ultrasound and since he returned back to telemetry floor he has been in respiratory distress.  Family at bedside.  They informed me that 1 month ago all of his symptoms started.  This was preceded by working inside of the house of one of his relatives, remodeling, clearing up septic tank, bathrooms and doing contractor remodeling work.  They think he might have been exposed to mold.  Of note, reviewed discharge summary from 10 days ago on May 21.  The patient was in the hospital here from May 17 through 21 and received intravenous Zosyn followed by outpatient Augmentin for pneumonia.  He also has a history of illicit drug use.  Took fentanyl as outpatient which was not prescribed.  Also took Norco which was not prescribed.  Admitted purchasing this off the street.  He himself answered his name and his date of birth but he mumbles, is not very talkative, not very communicative, very difficult to obtain history from him.  I reviewed all medical notes from all medical specialist providing care during this hospital stay  The patient is new to me during this hospital stay    ROS: Unobtainable, patient not very communicative    I reviewed old medical records.  Past medical history, social history and family history: Unchanged from admission H&P.      Vitals:  Vitals:    05/30/24 0405 05/30/24 0555 05/30/24 0640 05/30/24 0645   BP: 131/92      BP Location: Left arm      Patient Position: Lying      Pulse: 107   98 96   Resp: 22  22 20   Temp: 99 °F (37.2 °C)      TempSrc: Oral      SpO2: 95%  97% 96%   Weight:  82.2 kg (181 lb 3.5 oz)     Height:               Body mass index is 23.27 kg/m².    Intake/Output Summary (Last 24 hours) at 5/30/2024 1152  Last data filed at 5/30/2024 1037  Gross per 24 hour   Intake 1250 ml   Output 550 ml   Net 700 ml       Exam:  GEN:  He appears acutely ill  Alert, oriented only x 2, cannot hold a conversation, keeps his eyes closed in between questions and mumbles, unintelligible speech  LUNGS: Scattered rhonchi and crackles bilat, tachypnea, he is using some accessory muscles  CV:  Normal S1S2, without murmur, no edema  ABD:  Non tender, no enlarged liver or masses      Scheduled meds:  albuterol, 2.5 mg, Nebulization, Q6H - RT  epoetin fabiola/fabiola-epbx, 20,000 Units, Subcutaneous, Weekly  heparin (porcine), 5,000 Units, Subcutaneous, Q8H  lactobacillus acidophilus, 1 capsule, Oral, Daily  piperacillin-tazobactam, 3.375 g, Intravenous, Q8H  sodium chloride, 10 mL, Intravenous, Q12H  sodium chloride, 3 mL, Intravenous, Q12H      IV meds:                      dextrose, 100 mL/hr, Last Rate: 100 mL/hr (05/30/24 1037)        Data Review:   I reviewed the patient's medications and new clinical results.          Lab Results   Component Value Date    CALCIUM 8.1 (L) 05/30/2024    PHOS 2.7 05/28/2024    MG 2.1 05/21/2024    MG 2.3 05/20/2024    MG 2.0 05/19/2024     Results from last 7 days   Lab Units 05/30/24  0245 05/29/24  0432 05/28/24  1813 05/28/24  0517 05/26/24  0506 05/25/24  0743   SODIUM mmol/L 151* 146*  --  141   < > 140   POTASSIUM mmol/L 4.2 3.9  --  3.8   < > 4.0   CHLORIDE mmol/L 117* 112*  --  107   < > 104   CO2 mmol/L 21.0* 22.0  --  24.0   < > 24.0   BUN mg/dL 39* 27*  --  19   < > 22   CREATININE mg/dL 2.44* 2.07*  --  1.67*   < > 1.34*   CALCIUM mg/dL 8.1* 8.2*  --  8.2*   < > 8.8   BILIRUBIN mg/dL 1.8* 1.3*  --  0.9   < > 0.7   ALK PHOS U/L 117 122*  --  92   < > 70   ALT  (SGPT) U/L 219* 196*  --  102*   < > 60*   AST (SGOT) U/L 329* 352*  --  186*   < > 93*   GLUCOSE mg/dL 104* 107*  --  106*   < > 109*   WBC 10*3/mm3 8.35 8.39  --  7.61   < > 5.65   HEMOGLOBIN g/dL 7.5* 8.0*  --  8.0*   < > 10.1*   PLATELETS 10*3/mm3 393 369  --  362   < > 348   INR   --  1.26*  --   --   --  1.15*   PROBNP pg/mL  --   --  375.0  --   --  65.1   PROCALCITONIN ng/mL  --  2.09*  --  1.85*  --  0.49*    < > = values in this interval not displayed.     Results from last 7 days   Lab Units 05/25/24  0809   BLOODCX  No growth at 5 days  No growth at 5 days         Results from last 7 days   Lab Units 05/29/24  0432 05/28/24  2036 05/28/24  1813 05/27/24  0629   CK TOTAL U/L  --   --   --  254*   HSTROP T ng/L 35* 35* 30*  --      Results from last 7 days   Lab Units 05/29/24  0132 05/28/24  1752   PH, ARTERIAL pH units 7.485* 7.528*   PCO2, ARTERIAL mm Hg 30.5* 28.0*   PO2 ART mm Hg 57.6* 47.8*   O2 SATURATION ART % 92.0 88.4*   FLOW RATE lpm 8.0000 6.0000   MODALITY  HFNC Cannula            ASSESSMENT:   Acute hypoxic respiratory failure  Aspiration pneumonia due to vomitus    Jaw pain    History of prostate cancer    Severe malnutrition    MARTÍNEZ (acute kidney injury)    Sepsis    Dehydration    Oral phase dysphagia    Dental caries        PLAN:  Patient and all problems new to me.  His condition is much worse.  He is now requiring 12 L high flow nasal cannula to maintain saturations around 89%.  I will transfer him to the intensive care unit.  I discussed with family and informed them that he may need bronchoscopy, intubation and mechanical ventilation for proper diagnosis.  He is being treated empirically for aspiration pneumonia, self-reported and witnessed vomiting and aspiration.  He has had chronic jaw pain for the past few years after mandibular surgery and family states that he has trouble eating, swallowing and has lost a lot of weight, recently started vomiting and difficulty  swallowing.  Microbiology workup so far includes urine Legionella and streptococcal antigens, both of which are negative.  Respiratory viral panel is also negative.  Nasal MRSA screening swab is also negative.  Blood cultures are pending.  I have visualized images of the CT scan of the chest performed yesterday and compared to the one performed May 20.  The infiltrates are worsening bilaterally, predominantly lower lobes.  This could be worsening aspiration pneumonia or could be fungal pneumonia or some other type of infection.  As mentioned above, I obtained consent from the family for bronchoscopy and transferred to the ICU and mechanical ventilation with intubation in the agree with all of these.    Total critical care time 38 minutes    I reviewed the chart and other providers notes and reviewed labs.  Copied text in this note has been reviewed and is accurate as of today      Jaciel Coleman MD  5/30/2024

## 2024-05-30 NOTE — PLAN OF CARE
Goal Outcome Evaluation:              Outcome Evaluation: pt very tired and weak. low grade temp early in shift. o2 at 8L with sats mid 90's but will desat to low 70's if o2 removed. family at bedside. pt resistant to turning in bed, enc to do so q2hr. ivf's and iv abx as ordered. cont to monitor

## 2024-05-30 NOTE — NURSING NOTE
Transferred to ICU. Assisted with getting patient settled. Daughter and all belongings accompanied.

## 2024-05-30 NOTE — PROCEDURES
Endotracheal Intubation Procedure Note    Indication for endotracheal intubation: respiratory failure.  Sedation:  Propofol .  Equipment: A video GlideScope was used and Yola 4 laryngoscope blade.  Number of attempts: 1.  ETT location confirmed by by auscultation, by CXR, and ETCO2 monitor.    Olivier Meyer MD  5/30/2024
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
## 2024-05-30 NOTE — DISCHARGE PLACEMENT REQUEST
"Jimmy Norman (69 y.o. Male)       Date of Birth   1955    Social Security Number       Address   [facility] [address]    Home Phone   [phone]    MRN   [id number]       [facility]    Marital Status   Single                            Admission Date   5/25/24    Admission Type   Emergency    Admitting Provider   Juan Ramon Hinojosa MD    Attending Provider   Chad Wilkinson MD    Department, Room/Bed   [address], [address]       Discharge Date       Discharge Disposition       Discharge Destination                                 Attending Provider: Chad Wilkinson MD    Allergies: No Known Allergies    Isolation: None   Infection: None   Code Status: CPR    Ht: 188 cm (74\")   Wt: 82.2 kg (181 lb 3.5 oz)    Admission Cmt: None   Principal Problem: Aspiration pneumonia due to vomitus [J69.0]                   Active Insurance as of 5/25/2024       Primary Coverage       Payor Plan Insurance Group Employer/Plan Group    PASSPORT MEDICARE ADVANTAGE PASSPORT ADVANTAGE [id number]       Payor Plan Address Payor Plan Phone Number Payor Plan Fax Number Effective Dates    [address]   5/17/2024 - None Entered    [place] [id number]         Subscriber Name Subscriber Birth Date Member ID       JIMMY NORMAN 1955 [id number]                     Emergency Contacts        (Rel.) Home Phone Work Phone Mobile Phone    caroline montalvo (Daughter) [phone] -- --    george norman (Brother) [phone] -- --                "

## 2024-05-30 NOTE — NURSING NOTE
Patient is currently resting with eyes closed, breathing has slowed, but remains tachypneic. Family at bedside. New order for transfer to ICU. RT to obtain stat abg. Infectious disease consult called. D5W infusing per order at 100ml/hr. Continues on Zosyn without adverse reactions noted. Report called to ICU. When room clean, will proceed with transfer.

## 2024-05-30 NOTE — SIGNIFICANT NOTE
05/30/24 1336   OTHER   Discipline occupational therapist   Rehab Time/Intention   Session Not Performed patient unavailable for treatment  (In AM pt. ELIN for vasc lab, PM transfer to ICU. OT to f/u when pt. is medically stable)   Recommendation   OT - Next Appointment 05/31/24

## 2024-05-30 NOTE — CASE MANAGEMENT/SOCIAL WORK
Discharge Planning Assessment  [facility]     Patient Name: Jimmy Norman  MRN: [id number]  Today's Date: 5/30/2024    Admit Date: 5/25/2024    Plan: SNF vs home   Discharge Needs Assessment    No documentation.                  Discharge Plan       Row Name 05/30/24 1130       Plan    Patient/Family in Agreement with Plan yes    Provided Post Acute Provider List? Yes    Post Acute Provider List --  SNF    Provided Post Acute Provider Quality & Resource List? Yes    Post Acute Provider Quality and Resource List --  SNF    Delivered To Support Person    Support Person Children    Method of Delivery In person      Row Name 05/30/24 1126       Plan    Plan SNF vs home    Plan Comments Met with pt and family at bedside. Introduced self and explained role of . Pt unstable medically at this time, however, family has requested that CCP place referrals to facilities that accepts his insurance. Provider list given to family, and referrals placed in [facility]. Advised family that  CCP would follow to assess for discharge needs.                  Continued Care and Services - Admitted Since 5/25/2024    No active coordination exists for this encounter.       Expected Discharge Date and Time       Expected Discharge Date Expected Discharge Time    Ilya 3, 2024            Demographic Summary    No documentation.                  Functional Status    No documentation.                  Psychosocial    No documentation.                  Abuse/Neglect    No documentation.                  Legal    No documentation.                  Substance Abuse    No documentation.                  Patient Forms    No documentation.                     Ashley Darling RN

## 2024-05-30 NOTE — PROGRESS NOTES
Gastroenterology   Inpatient Progress Note    Reason for Follow Up: Elevated LFTs    Subjective  Interval History:     Patient seen at bedside in ICU after experiencing worsening respiratory distress after hepatic duplex ultrasound evaluation.  Leading to transfer with plans to proceed with bronchoscopy to further assess pneumonia.    He denies abdominal pain, nausea, vomiting. Currently NPO     Hepatic duplex ultrasound negative for thrombus  , , alk phos 117, T. bili 1.8, Hgb 7.5, HCT 23.8    Labs thus far for elevated transaminases:   Negative/normal -ceruloplasmin, acute hepatitis panel, TSH, celiac disease, ASMA, AMA, alpha-1 antitrypsin,    Pending: ELIDA    Current Facility-Administered Medications:     acetaminophen (TYLENOL) tablet 650 mg, 650 mg, Oral, Q4H PRN, 650 mg at 05/27/24 2353 **OR** acetaminophen (TYLENOL) 160 MG/5ML oral solution 650 mg, 650 mg, Oral, Q4H PRN **OR** acetaminophen (TYLENOL) suppository 650 mg, 650 mg, Rectal, Q4H PRN, Venu You MD    albuterol (PROVENTIL) nebulizer solution 0.083% 2.5 mg/3mL, 2.5 mg, Nebulization, Q6H - RT, Venu You MD, 2.5 mg at 05/30/24 1240    albuterol (PROVENTIL) nebulizer solution 0.083% 2.5 mg/3mL, 2.5 mg, Nebulization, Q6H PRN, Venu You MD    sennosides-docusate (PERICOLACE) 8.6-50 MG per tablet 2 tablet, 2 tablet, Oral, BID PRN **AND** polyethylene glycol (MIRALAX) packet 17 g, 17 g, Oral, Daily PRN **AND** bisacodyl (DULCOLAX) EC tablet 5 mg, 5 mg, Oral, Daily PRN **AND** bisacodyl (DULCOLAX) suppository 10 mg, 10 mg, Rectal, Daily PRN, Venu You MD    Calcium Replacement - Follow Nurse / BPA Driven Protocol, , Does not apply, PRN, Venu You MD    dextrose (D5W) 5 % infusion, 100 mL/hr, Intravenous, Continuous, Jaciel Coleman MD    epoetin fabiola-epbx (RETACRIT) injection 20,000 Units, 20,000 Units, Subcutaneous, Weekly, Jonathan Montoya MD    famotidine (PEPCID) tablet 10 mg, 10 mg, Oral, BID PRN, Venu You MD     heparin (porcine) 5000 UNIT/ML injection 5,000 Units, 5,000 Units, Subcutaneous, Q8H, Venu You MD, 5,000 Units at 05/30/24 0556    lidocaine (XYLOCAINE) 1 % injection 30 mL, 30 mL, Other, Once, Jaciel Coleman MD    Magnesium Low Dose Replacement - Follow Nurse / BPA Driven Protocol, , Does not apply, PRNAvelino Subin, MD    melatonin tablet 2.5 mg, 2.5 mg, Oral, Nightly PRNAvelino Subin, MD    nitroglycerin (NITROSTAT) SL tablet 0.4 mg, 0.4 mg, Sublingual, Q5 Min PRNAvelino Subin, MD    ondansetron ODT (ZOFRAN-ODT) disintegrating tablet 4 mg, 4 mg, Oral, Q6H PRN **OR** ondansetron (ZOFRAN) injection 4 mg, 4 mg, Intravenous, Q6H PRN, Venu You MD, 4 mg at 05/28/24 0848    Phosphorus Replacement - Follow Nurse / BPA Driven Protocol, , Does not apply, VALERIENAvelino Subin, MD    piperacillin-tazobactam (ZOSYN) 3.375 g IVPB in 100 mL NS MBP (CD), 3.375 g, Intravenous, Q8H, Venu You MD, 3.375 g at 05/30/24 1037    Potassium Replacement - Follow Nurse / BPA Driven Protocol, , Does not apply, PRNAvelino Subin, MD    prochlorperazine (COMPAZINE) injection 5 mg, 5 mg, Intravenous, Q6H PRNAvelino Subin, MD, 5 mg at 05/28/24 1650    sodium chloride 0.9 % flush 10 mL, 10 mL, Intravenous, PRNAvelino Subin, MD    sodium chloride 0.9 % flush 10 mL, 10 mL, Intravenous, Q12H, Venu You MD, 10 mL at 05/29/24 0904    sodium chloride 0.9 % flush 10 mL, 10 mL, Intravenous, PRNAvelino Subin, MD    sodium chloride 0.9 % flush 3 mL, 3 mL, Intravenous, Q12H, Venu You MD, 3 mL at 05/30/24 1038    sodium chloride 0.9 % flush 3-10 mL, 3-10 mL, IntravenousCHRISTINE Jain, Subin, MD    sodium chloride 0.9 % infusion 40 mL, 40 mL, Intravenous, Avelino KING Subin, MD    sodium chloride 0.9 % infusion 40 mL, 40 mL, IntravenousCHRISTINE Jain, Subin, MD  Review of Systems:               All systems were reviewed and negative except for:  Constitution:  positive for anorexia and malaise  Gastrointestinal: positive for  See HPI    Objective      Vital Signs  Temp:  [97.5 °F (36.4 °C)-100.5 °F (38.1 °C)] 98.4 °F (36.9 °C)  Heart Rate:  [] 101  Resp:  [18-22] 18  BP: (119-146)/(71-92) 133/85  Body mass index is 23.27 kg/m².                  General Appearance:  in moderate distress and alert  Abdomen:  Soft, non-tender, normal bowel sounds; no bruits, organomegaly or masses.                Results Review:                I reviewed the patient's new clinical results.    Results from last 7 days   Lab Units 05/30/24  0245 05/29/24  0432 05/28/24  0517   WBC 10*3/mm3 8.35 8.39 7.61   HEMOGLOBIN g/dL 7.5* 8.0* 8.0*   HEMATOCRIT % 23.8* 24.0* 24.6*   PLATELETS 10*3/mm3 393 369 362     Results from last 7 days   Lab Units 05/30/24  0245 05/29/24  0432 05/28/24  0517   SODIUM mmol/L 151* 146* 141   POTASSIUM mmol/L 4.2 3.9 3.8   CHLORIDE mmol/L 117* 112* 107   CO2 mmol/L 21.0* 22.0 24.0   BUN mg/dL 39* 27* 19   CREATININE mg/dL 2.44* 2.07* 1.67*   CALCIUM mg/dL 8.1* 8.2* 8.2*   BILIRUBIN mg/dL 1.8* 1.3* 0.9   ALK PHOS U/L 117 122* 92   ALT (SGPT) U/L 219* 196* 102*   AST (SGOT) U/L 329* 352* 186*   GLUCOSE mg/dL 104* 107* 106*     Results from last 7 days   Lab Units 05/29/24  0432 05/25/24  0743   INR  1.26* 1.15*     Lab Results   Lab Value Date/Time    LIPASE 39 08/11/2023 2030       Radiology:  XR Chest 1 View   Final Result   As described.               This report was finalized on 5/30/2024 12:12 PM by Dr. Papo Selby M.D on Workstation: [id number]          CT Chest Without Contrast Diagnostic   Final Result       Conspicuous interval worsening of left more than right airspace disease,   clinical correlation and continued follow-up advised.       This report was finalized on 5/29/2024 4:41 PM by Dr. Papo Selby M.D on Workstation: [id number] HIDA SCAN WITH PHARMACOLOGICAL INTERVENTION   Final Result   Visualization of the gallbladder suggesting cystic duct   patency.           This report was finalized on 5/29/2024 3:32 PM by  Dr. Ronny Pérez M.D on Workstation: [id number]          US Renal Bilateral   Final Result   1. No hydronephrosis.   2. Ultrasound findings suggesting medical renal disease.           This report was finalized on 5/29/2024 12:47 PM by Dr. Ronny Pérez M.D on Workstation: [id number]          XR Chest 1 View   Final Result   Interval progression in left lung pneumonia and/or   atelectasis with interval development of mild right lung atelectasis.       This report was finalized on 5/29/2024 12:55 PM by Dr. José Miguel Thomas M.D on Workstation: [id number]          XR Chest 1 View   Final Result      US Abdomen Limited   Final Result   Gallbladder sludge, without evidence of acute cholecystitis.       This report was finalized on 5/28/2024 6:03 AM by Dr. Faith Del Rio M.D on Workstation: [id number]          FL ESOPHAGRAM SINGLE CONTRAST   Final Result   Normal limited esophagram.                This report was finalized on 5/26/2024 8:17 PM by Dr. Ronny Pérez M.D on Workstation: [id number]          XR Chest 1 View   Final Result   Heterogeneous multifocal left greater than right lung opacities,   increased in the interval. Suspect multifocal pneumonia. Hemorrhage in   the appropriate clinical setting.       This report was finalized on 5/25/2024 7:58 AM by Dr. Carlito Chand M.D on Workstation: [id number]          NM Lung Ventilation Perfusion    (Results Pending)   SLP FEES - Fiberoptic Endo Eval Swallow    (Results Pending)       Assessment & Plan     Active Hospital Problems    Diagnosis     **Aspiration pneumonia due to vomitus     Oral phase dysphagia     Dental caries     MARTÍNEZ (acute kidney injury)     Sepsis     Dehydration     Severe malnutrition     Jaw pain     History of prostate cancer        Assessment:  Transaminitis: Likely multifactorial in the setting of sepsis, adverse reaction to medications.  Ultrasound with GB sludge and liver cyst without biliary dilatation.  Negative  ceruloplasmin, alpha-1 antitrypsin, viral hepatitis panel, celiac disease panel, AMA, ASMA  Aspiration pneumonia  Weight loss  Acute kidney injury  Malnutrition  History of prostate cancer  Mild anemia    All problems are new to me    Plan:  Continue to trend LFTs  Hepatic duplex without evidence of thrombus  Follow-up remaining liver serologies  Nursing staff to notify GI service on call if any change in clinical status.    I discussed the patients findings and my recommendations with patient, family, and Dr. Zhu .          MEÑO Kerr  [facility] [id number] [place] [address]

## 2024-05-30 NOTE — PLAN OF CARE
Goal Outcome Evaluation:  Plan of Care Reviewed With: daughter           Outcome Evaluation: Events noted, FEES canceled. Pt transferring to ICU. ST will follow peripherally for improvement. Please place new consult when pt is appropriate for swallow eval and FEES to assess for micro aspiration.

## 2024-05-31 NOTE — PROGRESS NOTES
Gastroenterology   Inpatient Progress Note    Reason for Follow Up: Elevated liver enzymes    Subjective     Interval History:   Events of yesterday noted.  Patient now intubated.    Current Facility-Administered Medications:     acetaminophen (TYLENOL) tablet 650 mg, 650 mg, Oral, Q4H PRN, 650 mg at 05/27/24 2353 **OR** acetaminophen (TYLENOL) 160 MG/5ML oral solution 650 mg, 650 mg, Oral, Q4H PRN **OR** acetaminophen (TYLENOL) suppository 650 mg, 650 mg, Rectal, Q4H PRN, Jaciel Coleman MD, 650 mg at 05/30/24 2248    albuterol (PROVENTIL) nebulizer solution 0.083% 2.5 mg/3mL, 2.5 mg, Nebulization, Q6H - RT, Jaciel Coleman MD, 2.5 mg at 05/31/24 0654    albuterol (PROVENTIL) nebulizer solution 0.083% 2.5 mg/3mL, 2.5 mg, Nebulization, Q6H PRN, Jaciel Coleman MD    artificial tears ophthalmic ointment, , Both Eyes, Q4H, James Rivera Jr., MD, Given at 05/31/24 0921    sennosides-docusate (PERICOLACE) 8.6-50 MG per tablet 2 tablet, 2 tablet, Oral, BID PRN **AND** polyethylene glycol (MIRALAX) packet 17 g, 17 g, Oral, Daily PRN **AND** bisacodyl (DULCOLAX) EC tablet 5 mg, 5 mg, Oral, Daily PRN **AND** bisacodyl (DULCOLAX) suppository 10 mg, 10 mg, Rectal, Daily PRN, Jaciel Coleman MD    Calcium Replacement - Follow Nurse / BPA Driven Protocol, , Does not apply, PRN, Jaciel Coleman MD    cisatracurium besylate (NIMBEX) 200 mg in sodium chloride 0.9 % 100 mL (2 mg/mL) infusion, 0.5-10 mcg/kg/min, Intravenous, Titrated, James Rivera Jr., MD, Last Rate: 16.03 mL/hr at 05/31/24 0358, 6.5 mcg/kg/min at 05/31/24 0358    dexmedetomidine (PRECEDEX) 400 mcg in 100 mL NS infusion, 0.2-1.5 mcg/kg/hr, Intravenous, Titrated, Olivier Meyer MD, Stopped at 05/31/24 0516    dextrose (D5W) 5 % infusion, 100 mL/hr, Intravenous, Continuous, Jaciel Coleman MD, Last Rate: 100 mL/hr at 05/31/24 0656, 100 mL/hr at 05/31/24 0656    epoetin fabiola-epbx (RETACRIT) injection 20,000 Units, 20,000 Units, Subcutaneous, Weekly,  Jaciel Coleman MD    famotidine (PEPCID) tablet 10 mg, 10 mg, Oral, BID PRN, Jaciel Coleman MD    fentaNYL (SUBLIMAZE) bolus from bag 10 mcg/mL injection 50 mcg, 50 mcg, Intravenous, Q30 Min PRN, James Rivera Jr., MD    fentaNYL 1000 mcg in 100 mL NS infusion,  mcg/hr, Intravenous, Titrated, James Rivera Jr., MD, Last Rate: 10 mL/hr at 05/31/24 0919, 100 mcg/hr at 05/31/24 0919    heparin (porcine) 5000 UNIT/ML injection 5,000 Units, 5,000 Units, Subcutaneous, Q8H, Jaciel Coleman MD, 5,000 Units at 05/31/24 0519    Magnesium Low Dose Replacement - Follow Nurse / BPA Driven Protocol, , Does not apply, PRN, Jaciel Coleman MD    melatonin tablet 2.5 mg, 2.5 mg, Oral, Nightly PRN, Jaciel Coleman MD    nitroglycerin (NITROSTAT) SL tablet 0.4 mg, 0.4 mg, Sublingual, Q5 Min PRN, Jaciel Coleman MD    norepinephrine (LEVOPHED) 8 mg in 250 mL NS infusion (premix), 0.02-0.3 mcg/kg/min, Intravenous, Titrated, Olivier Meyer MD, Last Rate: 7.71 mL/hr at 05/31/24 0630, 0.05 mcg/kg/min at 05/31/24 0630    ondansetron ODT (ZOFRAN-ODT) disintegrating tablet 4 mg, 4 mg, Oral, Q6H PRN **OR** ondansetron (ZOFRAN) injection 4 mg, 4 mg, Intravenous, Q6H PRN, Jaciel Coleman MD, 4 mg at 05/28/24 0848    Phosphorus Replacement - Follow Nurse / BPA Driven Protocol, , Does not apply, PRN, Jaciel Coleman MD    piperacillin-tazobactam (ZOSYN) 4.5 g IVPB in 100 mL NS MBP (CD), 4.5 g, Intravenous, Q8H, Jaciel Coleman MD, 4.5 g at 05/31/24 0937    Potassium Replacement - Follow Nurse / BPA Driven Protocol, , Does not apply, PRN, Jaciel Coleman MD    prochlorperazine (COMPAZINE) injection 5 mg, 5 mg, Intravenous, Q6H PRN, Jaciel Coleman MD, 5 mg at 05/28/24 1650    propofol (DIPRIVAN) infusion 10 mg/mL 100 mL, 5-50 mcg/kg/min, Intravenous, Titrated, Olivier Meyer MD, Last Rate: 24.66 mL/hr at 05/31/24 0814, 50 mcg/kg/min at 05/31/24 0814    sodium chloride 0.9 % flush 10 mL, 10 mL, Intravenous, PRN,  Jaciel Coleman MD    sodium chloride 0.9 % flush 10 mL, 10 mL, Intravenous, Q12H, Jaciel Coleman MD, 10 mL at 05/31/24 0922    sodium chloride 0.9 % flush 10 mL, 10 mL, Intravenous, PRN, Jaciel Coleman MD    sodium chloride 0.9 % flush 10 mL, 10 mL, Intravenous, Q12H, Chad Wilkinson MD, 10 mL at 05/31/24 0921    sodium chloride 0.9 % flush 10 mL, 10 mL, Intravenous, Q12H, Cahd Wilkinson MD, 10 mL at 05/31/24 0921    sodium chloride 0.9 % flush 10 mL, 10 mL, Intravenous, Q12H, Chad Wilkinson MD, 10 mL at 05/31/24 0921    sodium chloride 0.9 % flush 10 mL, 10 mL, Intravenous, Q12H, Chad Wilkinson MD, 10 mL at 05/31/24 0921    sodium chloride 0.9 % flush 10 mL, 10 mL, Intravenous, PRN, Chad Wilkinson MD    sodium chloride 0.9 % flush 20 mL, 20 mL, Intravenous, PRNJaja Emmett J., MD    sodium chloride 0.9 % flush 3 mL, 3 mL, Intravenous, Q12H, Jaciel Coleman MD, 3 mL at 05/31/24 0922    sodium chloride 0.9 % flush 3-10 mL, 3-10 mL, Intravenous, PRNVirginia Ervin H., MD    sodium chloride 0.9 % infusion 40 mL, 40 mL, Intravenous, PRN, Jaciel Coleman MD    sodium chloride 0.9 % infusion 40 mL, 40 mL, Intravenous, PRNVirginia Ervin H., MD    sodium chloride 0.9 % infusion 40 mL, 40 mL, Intravenous, PRJaja JARRELL Emmett J., MD  Review of Systems:    Review of systems could not be obtained due to  patient intubated.    Objective     Vital Signs  Temp:  [97 °F (36.1 °C)-102.6 °F (39.2 °C)] 98.1 °F (36.7 °C)  Heart Rate:  [] 75  Resp:  [18-50] 30  BP: ()/() 105/75  FiO2 (%):  [50 %-100 %] 50 %  Body mass index is 24.82 kg/m².    Intake/Output Summary (Last 24 hours) at 5/31/2024 1025  Last data filed at 5/31/2024 0429  Gross per 24 hour   Intake 5987 ml   Output 420 ml   Net 5567 ml     No intake/output data recorded.     Physical Exam:   General: patient sedated on vent   Eyes: mild scleral icterus   Skin: warm and dry, not jaundiced   Abdomen: soft, nontender,  nondistended; normal bowel sounds, no masses palpated, no periumbical lymphadenopathy   Psychiatric: Unable to assess     Results Review:     I reviewed the patient's new clinical results.    Results from last 7 days   Lab Units 05/31/24 0428 05/30/24 0245 05/29/24 0432   WBC 10*3/mm3 18.29* 8.35 8.39   HEMOGLOBIN g/dL 6.6* 7.5* 8.0*   HEMATOCRIT % 20.7* 23.8* 24.0*   PLATELETS 10*3/mm3 363 393 369     Results from last 7 days   Lab Units 05/31/24  0428 05/30/24  0245 05/29/24  0432   SODIUM mmol/L 151* 151* 146*   POTASSIUM mmol/L 5.1 4.2 3.9   CHLORIDE mmol/L 121* 117* 112*   CO2 mmol/L 20.0* 21.0* 22.0   BUN mg/dL 56* 39* 27*   CREATININE mg/dL 3.98* 2.44* 2.07*   CALCIUM mg/dL 7.8* 8.1* 8.2*   BILIRUBIN mg/dL 3.9* 1.8* 1.3*   ALK PHOS U/L 115 117 122*   ALT (SGPT) U/L 155* 219* 196*   AST (SGOT) U/L 236* 329* 352*   GLUCOSE mg/dL 185* 104* 107*     Results from last 7 days   Lab Units 05/29/24  0432 05/25/24  0743   INR  1.26* 1.15*     Lab Results   Lab Value Date/Time    LIPASE 39 08/11/2023 2030       Radiology:  XR Chest Post CVA Port   Final Result   Tubes and lines as noted above. No pneumothorax identified.       This report was finalized on 5/30/2024 10:14 PM by Dr. Faith Del Rio M.D on Workstation: [id number]          XR Abdomen KUB   Final Result       As described.           This report was finalized on 5/30/2024 7:19 PM by Dr. Papo Selby M.D on Workstation: [id number]          XR Chest 1 View   Final Result   As described.       This report was finalized on 5/30/2024 7:03 PM by Dr. Papo Selby M.D on Workstation: [id number]          XR Chest 1 View   Final Result   As described.               This report was finalized on 5/30/2024 12:12 PM by Dr. Papo Selby M.D on Workstation: [id number]          CT Chest Without Contrast Diagnostic   Final Result       Conspicuous interval worsening of left more than right airspace disease,   clinical correlation and continued  follow-up advised.       This report was finalized on 5/29/2024 4:41 PM by Dr. Papo Selby M.D on Workstation: [id number]          NM HIDA SCAN WITH PHARMACOLOGICAL INTERVENTION   Final Result   Visualization of the gallbladder suggesting cystic duct   patency.           This report was finalized on 5/29/2024 3:32 PM by Dr. Ronny Pérez M.D on Workstation: [id number]          US Renal Bilateral   Final Result   1. No hydronephrosis.   2. Ultrasound findings suggesting medical renal disease.           This report was finalized on 5/29/2024 12:47 PM by Dr. Ronny Pérez M.D on Workstation: [id number]          XR Chest 1 View   Final Result   Interval progression in left lung pneumonia and/or   atelectasis with interval development of mild right lung atelectasis.       This report was finalized on 5/29/2024 12:55 PM by Dr. José Miguel Thomas M.D on Workstation: [id number]          XR Chest 1 View   Final Result      US Abdomen Limited   Final Result   Gallbladder sludge, without evidence of acute cholecystitis.       This report was finalized on 5/28/2024 6:03 AM by Dr. Faith Del Rio M.D on Workstation: [id number]          FL ESOPHAGRAM SINGLE CONTRAST   Final Result   Normal limited esophagram.                This report was finalized on 5/26/2024 8:17 PM by Dr. Ronny Pérez M.D on Workstation: [id number]          XR Chest 1 View   Final Result   Heterogeneous multifocal left greater than right lung opacities,   increased in the interval. Suspect multifocal pneumonia. Hemorrhage in   the appropriate clinical setting.       This report was finalized on 5/25/2024 7:58 AM by Dr. Carlito Chand M.D on Workstation: [id number]          NM Lung Ventilation Perfusion    (Results Pending)   SLP FEES - Fiberoptic Endo Eval Swallow    (Results Pending)       Assessment & Plan     Active Hospital Problems    Diagnosis     **Aspiration pneumonia due to vomitus     Oral phase dysphagia     Dental caries      MARTÍNEZ (acute kidney injury)     Sepsis     Dehydration     Severe malnutrition     Jaw pain     History of prostate cancer        Assessment:  Elevated liver enzymes.  Patient's transaminases have actually started to trend down today.  Bilirubin has risen slightly.  Flex Doppler study of the hepatic and portal vein unremarkable.  HIDA scan negative for obstruction  Acute hypoxic respiratory failure on the vent  Bilateral pneumonia with elevated procalcitonin  Acute kidney injury  History of prostate cancer  Sepsis likely secondary to a pulmonary source      Plan:  Monitor liver enzymes.  His transaminases are improving though the bilirubin has risen a little bit.  Would still favor that this is likely reactive due to systemic inflammation.  Continue antibiotics per ID  Check pro time and ammonia level  I discussed the patients findings and my recommendations with patient and family.    MD Alli Almodovar M.D.  [facility] [place] [address]  Office: [phone]

## 2024-05-31 NOTE — PROGRESS NOTES
ID progress note    Chief complaint: Follow-up sepsis due to pneumonia due to aspiration    Subjective: Moved to the ICU yesterday and intubated.  He underwent bronchoscopy where mucopurulent secretions were noted.  Cultures are pending.  He had hypotension overnight.  Discussed with his nurse and daughters at the bedside.          Medications:    Current Facility-Administered Medications:     acetaminophen (TYLENOL) tablet 650 mg, 650 mg, Oral, Q4H PRN, 650 mg at 05/27/24 2353 **OR** acetaminophen (TYLENOL) 160 MG/5ML oral solution 650 mg, 650 mg, Oral, Q4H PRN **OR** acetaminophen (TYLENOL) suppository 650 mg, 650 mg, Rectal, Q4H PRN, Jaciel Coleman MD, 650 mg at 05/30/24 2248    albuterol (PROVENTIL) nebulizer solution 0.083% 2.5 mg/3mL, 2.5 mg, Nebulization, Q6H - RT, Jaciel Coleman MD, 2.5 mg at 05/31/24 0654    albuterol (PROVENTIL) nebulizer solution 0.083% 2.5 mg/3mL, 2.5 mg, Nebulization, Q6H PRN, Jaciel Coleman MD    artificial tears ophthalmic ointment, , Both Eyes, Q4H, James Rivera Jr., MD, Given at 05/31/24 0921    sennosides-docusate (PERICOLACE) 8.6-50 MG per tablet 2 tablet, 2 tablet, Oral, BID PRN **AND** polyethylene glycol (MIRALAX) packet 17 g, 17 g, Oral, Daily PRN **AND** bisacodyl (DULCOLAX) EC tablet 5 mg, 5 mg, Oral, Daily PRN **AND** bisacodyl (DULCOLAX) suppository 10 mg, 10 mg, Rectal, Daily PRN, Jaciel Coleman MD    Calcium Replacement - Follow Nurse / BPA Driven Protocol, , Does not apply, PRN, Jaciel Coleman MD    cisatracurium besylate (NIMBEX) 200 mg in sodium chloride 0.9 % 100 mL (2 mg/mL) infusion, 0.5-10 mcg/kg/min, Intravenous, Titrated, James Rivera Jr., MD, Last Rate: 16.03 mL/hr at 05/31/24 0358, 6.5 mcg/kg/min at 05/31/24 0358    dexmedetomidine (PRECEDEX) 400 mcg in 100 mL NS infusion, 0.2-1.5 mcg/kg/hr, Intravenous, Titrated, Olivier Meyer MD, Stopped at 05/31/24 0516    dextrose (D5W) 5 % infusion, 100 mL/hr, Intravenous, Continuous, [place],  Jaciel MONTESINOS MD, Last Rate: 100 mL/hr at 05/31/24 0656, 100 mL/hr at 05/31/24 0656    epoetin fabiola-epbx (RETACRIT) injection 20,000 Units, 20,000 Units, Subcutaneous, Weekly, Jaciel Coleman MD    famotidine (PEPCID) tablet 10 mg, 10 mg, Oral, BID PRN, Jaciel Coleman MD    fentaNYL (SUBLIMAZE) bolus from bag 10 mcg/mL injection 50 mcg, 50 mcg, Intravenous, Q30 Min PRN, James Rivera Jr., MD    fentaNYL 1000 mcg in 100 mL NS infusion,  mcg/hr, Intravenous, Titrated, James Rivera Jr., MD, Last Rate: 10 mL/hr at 05/31/24 0919, 100 mcg/hr at 05/31/24 0919    heparin (porcine) 5000 UNIT/ML injection 5,000 Units, 5,000 Units, Subcutaneous, Q8H, Jaciel Coleman MD, 5,000 Units at 05/31/24 0519    Magnesium Low Dose Replacement - Follow Nurse / BPA Driven Protocol, , Does not apply, PRN, Jaciel Coleman MD    melatonin tablet 2.5 mg, 2.5 mg, Oral, Nightly PRN, Jaciel Coleman MD    nitroglycerin (NITROSTAT) SL tablet 0.4 mg, 0.4 mg, Sublingual, Q5 Min PRN, Jaciel Coleman MD    norepinephrine (LEVOPHED) 8 mg in 250 mL NS infusion (premix), 0.02-0.3 mcg/kg/min, Intravenous, Titrated, Olivier Meyer MD, Last Rate: 7.71 mL/hr at 05/31/24 0630, 0.05 mcg/kg/min at 05/31/24 0630    ondansetron ODT (ZOFRAN-ODT) disintegrating tablet 4 mg, 4 mg, Oral, Q6H PRN **OR** ondansetron (ZOFRAN) injection 4 mg, 4 mg, Intravenous, Q6H PRN, Jaciel Coleman MD, 4 mg at 05/28/24 0848    Phosphorus Replacement - Follow Nurse / BPA Driven Protocol, , Does not apply, PRN, Jaciel Coleman MD    piperacillin-tazobactam (ZOSYN) 4.5 g IVPB in 100 mL NS MBP (CD), 4.5 g, Intravenous, Q8H, Jaciel Coleman MD, 4.5 g at 05/31/24 0937    Potassium Replacement - Follow Nurse / BPA Driven Protocol, , Does not apply, PRN, Jaciel Coleman MD    prochlorperazine (COMPAZINE) injection 5 mg, 5 mg, Intravenous, Q6H PRN, Jaciel Coleman MD, 5 mg at 05/28/24 1650    propofol (DIPRIVAN) infusion 10 mg/mL 100 mL, 5-50 mcg/kg/min,  Intravenous, Titrated, Olivier Meyer MD, Last Rate: 24.66 mL/hr at 05/31/24 0814, 50 mcg/kg/min at 05/31/24 0814    sodium chloride 0.9 % flush 10 mL, 10 mL, Intravenous, PRNiVrginia Ervin H., MD    sodium chloride 0.9 % flush 10 mL, 10 mL, Intravenous, Q12H, Jaciel Coleman MD, 10 mL at 05/31/24 0922    sodium chloride 0.9 % flush 10 mL, 10 mL, Intravenous, PRNVirginia Ervin H., MD    sodium chloride 0.9 % flush 10 mL, 10 mL, Intravenous, Q12H, Chad Wilkinson MD, 10 mL at 05/31/24 0921    sodium chloride 0.9 % flush 10 mL, 10 mL, Intravenous, Q12H, Chad Wilkinson MD, 10 mL at 05/31/24 0921    sodium chloride 0.9 % flush 10 mL, 10 mL, Intravenous, Q12H, Chad Wilkinson MD, 10 mL at 05/31/24 0921    sodium chloride 0.9 % flush 10 mL, 10 mL, Intravenous, Q12H, Chad Wilkinson MD, 10 mL at 05/31/24 0921    sodium chloride 0.9 % flush 10 mL, 10 mL, Intravenous, PRN, Chad Wilkinson MD    sodium chloride 0.9 % flush 20 mL, 20 mL, Intravenous, PRNJaja Emmett J., MD    sodium chloride 0.9 % flush 3 mL, 3 mL, Intravenous, Q12H, Jaciel Coleman MD, 3 mL at 05/31/24 0922    sodium chloride 0.9 % flush 3-10 mL, 3-10 mL, Intravenous, [place] KING Ervin H., MD    sodium chloride 0.9 % infusion 40 mL, 40 mL, Intravenous, PRNVirginia Ervin H., MD    sodium chloride 0.9 % infusion 40 mL, 40 mL, Intravenous, PRNVirginia Ervin H., MD    sodium chloride 0.9 % infusion 40 mL, 40 mL, Intravenous, PRN, Chad Wilkinson MD      Objective   Vital Signs   Temp:  [97 °F (36.1 °C)-102.6 °F (39.2 °C)] 98.1 °F (36.7 °C)  Heart Rate:  [] 75  Resp:  [18-50] 30  BP: ()/() 105/75  FiO2 (%):  [50 %-100 %] 50 %    Physical Exam:   General: Sedated, intubated, ill-appearing  Eyes: no scleral icterus  ENT: no thrush  Cardiovascular: Normal rate  Respiratory: Improved rales  GI: Abdomen is soft, not distended  :  + Goncalves catheter  Psychiatric: Sedated  Vasc: Right IJ catheter and PIV's without  erythema    Labs:   CBC, CMP, procalcitonin, and blood and BAL cultures reviewed today  Lab Results   Component Value Date    WBC 18.29 (H) 05/31/2024    HGB 6.6 (C) 05/31/2024    HCT 20.7 (C) 05/31/2024    MCV 88.1 05/31/2024     05/31/2024       Lab Results   Component Value Date    GLUCOSE 185 (H) 05/31/2024    BUN 56 (H) 05/31/2024    CREATININE 3.98 (H) 05/31/2024    EGFRIFAFRI >60 11/21/2022    BCR 14.1 05/31/2024    CO2 20.0 (L) 05/31/2024    CALCIUM 7.8 (L) 05/31/2024    ALBUMIN 2.2 (L) 05/31/2024    LABIL2 1.3 02/06/2021     (H) 05/31/2024     (H) 05/31/2024     Procal 2.09----->146  HIV antibody negative  HIV viral load pending  Hepatitis C antibody negative  Hepatitis B surface antigen negative  Hepatitis B core IgM antibody negative  Cryptococcus antigen negative  Urine histoplasma antigen pending  Serum histoplasma antigen pending  Urine Blastomyces antigen pending  Aspergillus galactomannan pending    Microbiology:  5/14 flu/RSV/COVID PCR: Negative  5/14 BCx: Negative  5/17 RPP negative  5/17 BCx: Negative  5/18 urine Legionella and strep antigen: Negative  5/18 MRSA nares PCR: Negative  5/25 RPP: Negative  5/25 BCx: Negative  5/29 MRSA PCR: Negative  5/30 BAL cultures: Normal dwain thus far  5/30 RPP: Negative    Radiology:  5/30 CXR with bilateral pulmonary infiltrates    ASSESSMENT/PLAN:  Bilateral pneumonia  Acute hypoxic respiratory failure  Acute on chronic jaw pain due to mandibular fracture  Dysphagia  Aspiration  Nausea and vomiting  Acute kidney injury  Elevated liver enzymes  Elevated procalcitonin  History of prostate cancer  Weight loss    He underwent bronchoscopy yesterday which showed significant mucopurulent secretions.  BAL cultures are pending.  He had already been on Zosyn for several days prior to these being obtained so they may not grow anything.  The results closely and adjust antibiotics if needed.  I will follow-up his pending blood cultures. We can repeat  procal in 48-72 hours to see if is trending down though may be affected by MARTÍNEZ.     I recommend that he continue Zosyn for at least 7 more days through 6/6/2024.  I sent off a fungal workup but I think this is less likely.  His lung issues are due to aspiration.  A swallow study had been planned but is now on hold due to his clinical decompensation and intubated status.    ID will follow.

## 2024-05-31 NOTE — PROGRESS NOTES
" LOS: 5 days   Patient Care Team:  Jad, No Known as PCP - General    Chief Complaint: MARTÍNEZ      Subjective  Chart reviewed  Patient moved back to ICU  Now on the vent, on Levophed  200 cc UOP overnight    Objective     Vital Sign Min/Max for last 24 hours  Temp  Min: 97 °F (36.1 °C)  Max: 102.6 °F (39.2 °C)   BP  Min: 51/41  Max: 182/112   Pulse  Min: 67  Max: 156   Resp  Min: 18  Max: 42   SpO2  Min: 80 %  Max: 100 %   Flow (L/min)  Min: 6  Max: 12   Weight  Min: 87.7 kg (193 lb 5.5 oz)  Max: 87.7 kg (193 lb 5.5 oz)     Flowsheet Rows      Flowsheet Row First Filed Value   Admission Height 188 cm (74\") Documented at 05/25/2024 1332   Admission Weight 82.9 kg (182 lb 12.2 oz) Documented at 05/25/2024 0729            No intake/output data recorded.  I/O last 3 completed shifts:  In: 7037 [I.V.:6837; IV Piggyback:200]  Out: 670 [Urine:650; Emesis/NG output:20]    Objective:  Vital signs: (most recent): Blood pressure 108/74, pulse 74, temperature 98.1 °F (36.7 °C), temperature source Bladder, resp. rate (!) 30, height 188 cm (74\"), weight 87.7 kg (193 lb 5.5 oz), SpO2 100%.            Physical Examination: Intubated, sedated..   Chest - coarse  Heart - normal rate, regular rhythm, normal S1, S2, no murmurs, rubs, clicks or gallops  Abdomen - soft, nondistended, no masses or organomegaly  Neurological - sedated   Extremities - peripheral pulses normal, no pedal edema, no clubbing or cyanosis     Results Review:     I reviewed the patient's new clinical results.    WBC WBC   Date Value Ref Range Status   05/31/2024 18.29 (H) 3.40 - 10.80 10*3/mm3 Final   05/30/2024 8.35 3.40 - 10.80 10*3/mm3 Final   05/29/2024 8.39 3.40 - 10.80 10*3/mm3 Final      HGB Hemoglobin   Date Value Ref Range Status   05/31/2024 6.6 (C) 13.0 - 17.7 g/dL Final   05/30/2024 7.5 (L) 13.0 - 17.7 g/dL Final   05/29/2024 8.0 (L) 13.0 - 17.7 g/dL Final      HCT Hematocrit   Date Value Ref Range Status   05/31/2024 20.7 (C) 37.5 - 51.0 % Final " "  05/30/2024 23.8 (L) 37.5 - 51.0 % Final   05/29/2024 24.0 (L) 37.5 - 51.0 % Final      Platlets No results found for: \"LABPLAT\"   MCV MCV   Date Value Ref Range Status   05/31/2024 88.1 79.0 - 97.0 fL Final   05/30/2024 90.2 79.0 - 97.0 fL Final   05/29/2024 85.1 79.0 - 97.0 fL Final          Sodium Sodium   Date Value Ref Range Status   05/31/2024 151 (H) 136 - 145 mmol/L Final   05/30/2024 151 (H) 136 - 145 mmol/L Final   05/29/2024 146 (H) 136 - 145 mmol/L Final      Potassium Potassium   Date Value Ref Range Status   05/31/2024 5.1 3.5 - 5.2 mmol/L Final   05/30/2024 4.2 3.5 - 5.2 mmol/L Final   05/29/2024 3.9 3.5 - 5.2 mmol/L Final      Chloride Chloride   Date Value Ref Range Status   05/31/2024 121 (H) 98 - 107 mmol/L Final   05/30/2024 117 (H) 98 - 107 mmol/L Final   05/29/2024 112 (H) 98 - 107 mmol/L Final      CO2 CO2   Date Value Ref Range Status   05/31/2024 20.0 (L) 22.0 - 29.0 mmol/L Final   05/30/2024 21.0 (L) 22.0 - 29.0 mmol/L Final   05/29/2024 22.0 22.0 - 29.0 mmol/L Final      BUN BUN   Date Value Ref Range Status   05/31/2024 56 (H) 8 - 23 mg/dL Final   05/30/2024 39 (H) 8 - 23 mg/dL Final   05/29/2024 27 (H) 8 - 23 mg/dL Final      Creatinine Creatinine   Date Value Ref Range Status   05/31/2024 3.98 (H) 0.76 - 1.27 mg/dL Final   05/30/2024 2.44 (H) 0.76 - 1.27 mg/dL Final   05/29/2024 2.07 (H) 0.76 - 1.27 mg/dL Final      Calcium Calcium   Date Value Ref Range Status   05/31/2024 7.8 (L) 8.6 - 10.5 mg/dL Final   05/30/2024 8.1 (L) 8.6 - 10.5 mg/dL Final   05/29/2024 8.2 (L) 8.6 - 10.5 mg/dL Final      PO4 No results found for: \"CAPO4\"   Albumin Albumin   Date Value Ref Range Status   05/31/2024 2.2 (L) 3.5 - 5.2 g/dL Final   05/30/2024 2.5 (L) 3.5 - 5.2 g/dL Final   05/29/2024 2.6 (L) 3.5 - 5.2 g/dL Final      Magnesium Magnesium   Date Value Ref Range Status   05/31/2024 2.8 (H) 1.6 - 2.4 mg/dL Final      Uric Acid No results found for: \"URICACID\"     Medication Review:     Current " Facility-Administered Medications:     acetaminophen (TYLENOL) tablet 650 mg, 650 mg, Oral, Q4H PRN, 650 mg at 05/27/24 2353 **OR** acetaminophen (TYLENOL) 160 MG/5ML oral solution 650 mg, 650 mg, Oral, Q4H PRN **OR** acetaminophen (TYLENOL) suppository 650 mg, 650 mg, Rectal, Q4H PRN, Jaciel Coleman MD, 650 mg at 05/30/24 2248    albuterol (PROVENTIL) nebulizer solution 0.083% 2.5 mg/3mL, 2.5 mg, Nebulization, Q6H - RT, Jaciel Coleman MD, 2.5 mg at 05/31/24 0654    albuterol (PROVENTIL) nebulizer solution 0.083% 2.5 mg/3mL, 2.5 mg, Nebulization, Q6H PRN, Jaciel Coleman MD    artificial tears ophthalmic ointment, , Both Eyes, Q4H, James Rivera Jr., MD, Given at 05/31/24 0519    sennosides-docusate (PERICOLACE) 8.6-50 MG per tablet 2 tablet, 2 tablet, Oral, BID PRN **AND** polyethylene glycol (MIRALAX) packet 17 g, 17 g, Oral, Daily PRN **AND** bisacodyl (DULCOLAX) EC tablet 5 mg, 5 mg, Oral, Daily PRN **AND** bisacodyl (DULCOLAX) suppository 10 mg, 10 mg, Rectal, Daily PRN, Jaciel Coleman MD    Calcium Replacement - Follow Nurse / BPA Driven Protocol, , Does not apply, PRN, Jaciel Coleman MD    cisatracurium besylate (NIMBEX) 200 mg in sodium chloride 0.9 % 100 mL (2 mg/mL) infusion, 0.5-10 mcg/kg/min, Intravenous, Titrated, James Rivera Jr., MD, Last Rate: 16.03 mL/hr at 05/31/24 0358, 6.5 mcg/kg/min at 05/31/24 0358    dexmedetomidine (PRECEDEX) 400 mcg in 100 mL NS infusion, 0.2-1.5 mcg/kg/hr, Intravenous, Titrated, Olivier Meyer MD, Stopped at 05/31/24 0516    dextrose (D5W) 5 % infusion, 100 mL/hr, Intravenous, Continuous, Jaciel Coleman MD, Last Rate: 100 mL/hr at 05/31/24 0656, 100 mL/hr at 05/31/24 0656    epoetin fabiola-epbx (RETACRIT) injection 20,000 Units, 20,000 Units, Subcutaneous, Weekly, Jaciel Coleman MD    famotidine (PEPCID) tablet 10 mg, 10 mg, Oral, BID PRN, Jaciel Coleman MD    fentaNYL (SUBLIMAZE) bolus from bag 10 mcg/mL injection 50 mcg, 50 mcg, Intravenous, Q30  Min PRN, James Rivera Jr., MD    fentaNYL 1000 mcg in 100 mL NS infusion,  mcg/hr, Intravenous, Titrated, James Rivera Jr., MD, Last Rate: 10 mL/hr at 05/31/24 0405, 100 mcg/hr at 05/31/24 0405    heparin (porcine) 5000 UNIT/ML injection 5,000 Units, 5,000 Units, Subcutaneous, Q8H, Jaciel Coleman MD, 5,000 Units at 05/31/24 0519    Magnesium Low Dose Replacement - Follow Nurse / BPA Driven Protocol, , Does not apply, PRN, Jaciel Coleman MD    melatonin tablet 2.5 mg, 2.5 mg, Oral, Nightly PRN, Jaciel Coleman MD    nitroglycerin (NITROSTAT) SL tablet 0.4 mg, 0.4 mg, Sublingual, Q5 Min PRN, Jaciel Coleman MD    norepinephrine (LEVOPHED) 8 mg in 250 mL NS infusion (premix), 0.02-0.3 mcg/kg/min, Intravenous, Titrated, Olivier Meyer MD, Last Rate: 7.71 mL/hr at 05/31/24 0630, 0.05 mcg/kg/min at 05/31/24 0630    ondansetron ODT (ZOFRAN-ODT) disintegrating tablet 4 mg, 4 mg, Oral, Q6H PRN **OR** ondansetron (ZOFRAN) injection 4 mg, 4 mg, Intravenous, Q6H PRN, Jaciel Coleman MD, 4 mg at 05/28/24 0848    Phosphorus Replacement - Follow Nurse / BPA Driven Protocol, , Does not apply, PRN, Jaciel Coleman MD    piperacillin-tazobactam (ZOSYN) 3.375 g IVPB in 100 mL NS MBP (CD), 3.375 g, Intravenous, Q8H, Jaciel Coleman MD, 3.375 g at 05/30/24 2328    Potassium Replacement - Follow Nurse / BPA Driven Protocol, , Does not apply, PRN, Jaciel Coleman MD    prochlorperazine (COMPAZINE) injection 5 mg, 5 mg, Intravenous, Q6H PRN, Jaciel Coleman MD, 5 mg at 05/28/24 1650    propofol (DIPRIVAN) infusion 10 mg/mL 100 mL, 5-50 mcg/kg/min, Intravenous, Titrated, Olivier Meyer MD, Last Rate: 24.7 mL/hr at 05/31/24 0404, 50 mcg/kg/min at 05/31/24 0404    sodium chloride 0.9 % flush 10 mL, 10 mL, Intravenous, PRN, Jaciel Coleman MD    sodium chloride 0.9 % flush 10 mL, 10 mL, Intravenous, Q12H, Jaciel Coleman MD, 10 mL at 05/30/24 2010    sodium chloride 0.9 % flush 10 mL, 10 mL, Intravenous, PRN,  Jaciel Coleman MD    sodium chloride 0.9 % flush 3 mL, 3 mL, Intravenous, Q12H, Jaciel Coleman MD, 3 mL at 05/30/24 2010    sodium chloride 0.9 % flush 3-10 mL, 3-10 mL, Intravenous, PRN, Jaciel Coleman MD    sodium chloride 0.9 % infusion 40 mL, 40 mL, Intravenous, PRN, Jaciel Coleman MD    sodium chloride 0.9 % infusion 40 mL, 40 mL, Intravenous, PRN, Jaciel Coleman MD     Assessment & Plan       Assessment & Plan  MARTÍNEZ  Proteinuria   Metabolic acidosis   ARDS  Shock  PNA  Sepsis   Hypernatremia  Hematuria   Shock    Cr continues to trend up - 3.9 today = ATN from sepsis and hypoxia  Now with likely ARDS  Discussed CRRT with family - will repeat labs at noon with plans to initiate if WP's steadily rising  Na 151 - unchanged, continue D5W but will correct with CRRT if initiated  Continue abx per pulmonary/primary  Pressor and vent support per ICU team   Discussed with family  Will follow     35 min CCT spent in eval/management of this critically ill patient      Jonathan Montoya MD  [facility]  05/31/24  07:26 EDT

## 2024-05-31 NOTE — PROCEDURES
"Central Venous Catheter (dialysis catheter) insertion Procedure Note        1) Insertion of non-tunneled central venous catheter (CPT 83360)  2) Ultrasound-guided vascular access (CPT 75049)      Indications:   Urgent dialysis    Site:  Left IJ    Medications:  None for this procedure. Patient was already on propofol, fentanyl and Nimbex for ARDS.      Procedure Details:  Informed consent was obtained for the procedure, including sedation. Risks of lung perforation, hemorrhage, arrhythmia, and adverse drug reaction were discussed.       Maximum sterile technique was used including usual patient drapes, antiseptics and physician sterile garments.       Under sterile conditions the skin over the left IJ vein was prepped with chlorhexidine and covered with a sterile drape. Local anesthesia was applied to the skin and subcutaneous tissues with lidocaine 1%. An 18-gauge needle was then inserted into the vein. A guide wire was then passed easily through the catheter.  Dilation was performed.  A 12.5 Divehi, 20 cm dialysis catheter with a pigtail port was then inserted into the vessel over the guide wire. The catheter was sutured into place and dressed following sterile protocol with Biopatch placed.     Ultrasound was used to localize the site of insertion and to place the needle  with real time visualization. Images were saved under \"US sonosite portable\".      Findings:  There were no changes to vital signs. All catheter ports were flushed with saline. Patient did tolerate procedure well. CXR was ordered to confirm position.     "

## 2024-05-31 NOTE — PROGRESS NOTES
Events noted and chart reviewed.  Patient now on full ventilatory support and on pressor agents.  Management as per pulmonology, intensivist service.  I will follow him more peripherally.  Please call if there are any needs.  Multiple specialty input greatly appreciated.

## 2024-05-31 NOTE — PROGRESS NOTES
"Nutrition Services    Patient Name:  Jimmy Norman  YOB: 1955  MRN: [id number]  Admit Date:  5/25/2024    Assessment Date:  05/31/24    Summary: [facility] Follow up:   Pt NPO x 3 days. Pt is currently orally intubated since yesterday evening. NG in place currently to LWS for decompression. Currently sedated on precedex, fentanyl, and 1 pressor- levophed. He is on propofol @24.7ml/hr providing 652 lipid calories. Started on CRRT today. Per MD we will wait to start tube feeding until tomorrow. Recommend to start high protein, low calorie tube feeding formula to support high protein needs while on CRRT and considering calories from propofol. Monitor for refeeding as tube feeding is initiated. See recommendations below.     Recommend  - peptamen intense VHP @65ml/hr to provide 1560kcal, 144g protein, 1310ml free fluid. Propofol provides 652 kcal. Totals 2212kcal. Fluids per MD.   - advance slowly to goal rate 10ml q8 and monitor labs for refeeding- K, PO4, Mg     Will follow per protocol.    CLINICAL NUTRITION ASSESSMENT      Reason for Assessment Follow-up Protocol     Diagnosis/Problem   generalized weakness, poor PO intake. Pt has a hx of cancer, CAD, MARTÍNEZ.          Anthropometrics        Current Height  Current Weight  BMI kg/m2 Height: 188 cm (74\")  Weight: 87.7 kg (193 lb 5.5 oz) (05/31/24 0429)  Body mass index is 24.82 kg/m².   Adjusted BMI (if applicable)    BMI Category Normal/Healthy (18.4 - 24.9)   Ideal Body Weight (IBW) 181 lbs   Usual Body Weight (UBW) 219 lbs, 214 lb (2/5/24)   Weight Trend Loss 34 lb (16%) wt loss x 3 months   Weight History Wt Readings from Last 30 Encounters:   05/31/24 0429 87.7 kg (193 lb 5.5 oz)   05/30/24 0555 82.2 kg (181 lb 3.5 oz)   05/28/24 2330 81.9 kg (180 lb 8.9 oz)   05/25/24 1332 82.9 kg (182 lb 12.2 oz)   05/25/24 0729 82.9 kg (182 lb 12.2 oz)   05/18/24 0428 85.5 kg (188 lb 8 oz)   05/17/24 2250 86.2 kg (190 lb)        Estimated/Assessed Needs      "   Energy Requirements    Weight for Calculation 87.7 kg   Method for Estimation  25-30 kcal/kg   EST Needs (kcal/day) 4719-5531       Protein Requirements    Weight for Calculation 87.7 kg   EST Protein Needs (g/kg) 1.5 - 2.0 gm/kg   EST Daily Needs (g/day) 131-175       Fluid Requirements     Method for Estimation Defer to physician    Estimated Needs (mL/day)      Labs       Pertinent Labs    Results from last 7 days   Lab Units 05/31/24  1209 05/31/24  0428 05/30/24  0245   SODIUM mmol/L 148* 151* 151*   POTASSIUM mmol/L 5.2 5.1 4.2   CHLORIDE mmol/L 118* 121* 117*   CO2 mmol/L 17.5* 20.0* 21.0*   BUN mg/dL 57* 56* 39*   CREATININE mg/dL 4.88* 3.98* 2.44*   CALCIUM mg/dL 7.9* 7.8* 8.1*   BILIRUBIN mg/dL 4.0* 3.9* 1.8*   ALK PHOS U/L 116 115 117   ALT (SGPT) U/L 139* 155* 219*   AST (SGOT) U/L 192* 236* 329*   GLUCOSE mg/dL 151* 185* 104*     Results from last 7 days   Lab Units 05/31/24  1209 05/31/24  0428   MAGNESIUM mg/dL 2.8* 2.8*   PHOSPHORUS mg/dL 5.7* 5.2*   HEMOGLOBIN g/dL  --  6.6*   HEMATOCRIT %  --  20.7*   WBC 10*3/mm3  --  18.29*   ALBUMIN g/dL 2.1* 2.2*     Results from last 7 days   Lab Units 05/31/24  1209 05/31/24  0428 05/30/24  0245 05/29/24  0432 05/28/24  0517 05/27/24  0630 05/26/24  0506 05/25/24  0743   INR  1.27*  --   --  1.26*  --   --   --  1.15*   PLATELETS 10*3/mm3  --  363 393 369 362 346   < > 348    < > = values in this interval not displayed.     COVID19   Date Value Ref Range Status   05/30/2024 Not Detected Not Detected - Ref. Range Final     Lab Results   Component Value Date    HGBA1C 5.20 05/18/2024          Medications           Scheduled Medications albuterol, 2.5 mg, Nebulization, Q6H - RT  artificial tears, , Both Eyes, Q4H  epoetin fabiola/fabiola-epbx, 20,000 Units, Subcutaneous, Weekly  heparin (porcine), 5,000 Units, Subcutaneous, Q8H  insulin regular, 2-7 Units, Subcutaneous, Q6H  piperacillin-tazobactam, 4.5 g, Intravenous, Q8H  sodium chloride, 10 mL, Intravenous,  Q12H  sodium chloride, 10 mL, Intravenous, Q12H  sodium chloride, 10 mL, Intravenous, Q12H  sodium chloride, 10 mL, Intravenous, Q12H  sodium chloride, 10 mL, Intravenous, Q12H  sodium chloride, 3 mL, Intravenous, Q12H       Infusions cisatracurium besylate (NIMBEX) 200 mg in sodium chloride 0.9 % 100 mL (2 mg/mL) infusion, 0.5-10 mcg/kg/min, Last Rate: 5.25 mcg/kg/min (05/31/24 1242)  dexmedetomidine, 0.2-1.5 mcg/kg/hr, Last Rate: Stopped (05/31/24 0516)  fentanyl 10 mcg/mL,  mcg/hr, Last Rate: 100 mcg/hr (05/31/24 0919)  norepinephrine, 0.02-0.3 mcg/kg/min, Last Rate: 0.08 mcg/kg/min (05/31/24 0800)  Phoxillum BK4/2.5, 1,000 mL/hr  Phoxillum BK4/2.5, 1,000 mL/hr  Phoxillum BK4/2.5, 1,000 mL/hr  propofol, 5-50 mcg/kg/min, Last Rate: 50 mcg/kg/min (05/31/24 1230)       PRN Medications   acetaminophen **OR** acetaminophen **OR** acetaminophen    albuterol    senna-docusate sodium **AND** polyethylene glycol **AND** bisacodyl **AND** bisacodyl    Calcium Replacement - Follow Nurse / BPA Driven Protocol    dextrose    dextrose    famotidine    fentaNYL    glucagon (human recombinant)    heparin (porcine)    Magnesium Low Dose Replacement - Follow Nurse / BPA Driven Protocol    melatonin    nitroglycerin    ondansetron ODT **OR** ondansetron    Phosphorus Replacement - Follow Nurse / BPA Driven Protocol    Potassium Replacement - Follow Nurse / BPA Driven Protocol    prochlorperazine    sodium chloride    sodium chloride    sodium chloride    sodium chloride    sodium chloride    sodium chloride    sodium chloride    sodium chloride     Physical Findings          General Findings alert, disoriented, flat affect, on oxygen therapy   Oral/Mouth Cavity tooth or teeth missing   Edema  not assessed   Gastrointestinal hypoactive bowel sounds, nausea, vomiting, last bowel movement: 5/28   Skin  skin intact   Tubes/Drains/Lines none   NFPE No clinical signs of muscle wasting or fat loss, See Malnutrition Severity  Assessment, Date Completed: 5/28     Malnutrition Severity Assessment      Patient meets criteria for : Severe Malnutrition           Current Nutrition Orders & Evaluation of Intake       Oral Nutrition     Food Allergies NKFA   Current PO Diet NPO Diet NPO Type: Strict NPO   Supplement n/a   PO Evaluation     % PO Intake N/a    Factors Affecting Intake: decreased appetite, swallow impairment h/o manibular fx     PES STATEMENT / NUTRITION DIAGNOSIS      Nutrition Dx Problem  Problem: Malnutrition (severe)  Etiology: Medical Diagnosis - generalized weakness, poor PO intake. Pt has a hx of cancer, CAD, MARTÍNEZ.     Signs/Symptoms: NPO and Report of Minimal PO Intake   --  NUTRITION INTERVENTION / PLAN OF CARE      Intervention Goal(s) Maintain nutrition status, Establish goals of care, Meet estimated needs, Initiate TF/PN, Tolerate TF/PN at goal, Maintain weight, and No significant weight loss         RD Intervention/Action Await initiation of EN/PN and Continue to monitor         Prescription/Orders:       PO Diet       Supplements       Enteral Nutrition    Enteral Prescription:     Enteral Route NG    TF Delivery Method Continuous    Enteral Product Peptamen Intense VHP    Modular None    Propofol Rate/Kcal 24.7ml/hr- 652kcal    TF Start Rate  15 mL/hr, advance slowly 10ml q8 to goal rate    TF Goal Rate  65 mL/hr    Free Water Flush Defer to physician     Provision at Goal:          Calories 2212 kcal, meets 100% needs         Protein  144 gm protein, meets 100% needs         Fluid (mL) 1310 mL free water + FW per physician           Parenteral Nutrition    New Prescription Ordered? No, recommended   --      Monitor/Evaluation Per protocol, PO intake, Supplement intake, Weight, Skin status, GI status, Symptoms, POC/GOC, Swallow function   Discharge Plan/Needs No discharge needs identified at this time   --    RD to follow per protocol.      Electronically signed by:  Sabina Lozoya RD  05/31/24 16:01 EDT

## 2024-05-31 NOTE — NURSING NOTE
Patient moved to ICU due to acute respiratory failure. Patient bronched today at 3pm. Patient placed on bipap with WOB increased. Patient intubated due to tachycardia and tachypnea. Patient on propofol  at 50, dex at 1.5, ruth at.19. NG tube also placed to decompress inflated stomach.

## 2024-05-31 NOTE — PROCEDURES
Bronchoscopy with BAL (bronchoalveolar lavage) of 1 lobe.  CPT 36452    Indications:  Pneumonia with need to obtain adequate culture  Respiratory failure  Suspicion for ILD    Post op diagnosis:  As above.    Sedation:  Patient was already on propofol, fentanyl and Nimbex for ARDS.  No additional sedation was required for this procedure.    Procedure note:  An informed consent was obtained from the family. Risks and benefits of the procedure were explained at length.     Time out was performed.      The bronchoscope was inserted into the airway through the endotracheal tube. ET tube was in good position.    The tracheobronchial tree was examined at the level of segmental and subsegmental bronchi with the finding of: Mild secretions noted throughout the airways and suctioned at best.    BAL of the BERNADETTE was performed by instillation of 60 mL ml saline x 3. Return was about 35 ml and was yellowish/turbid. Fluid was sent for bacterial culture and cell count.     Estimated blood loss: None  The patient tolerated the procedure very well without immediate complications.    Electronically signed by Ed Mandujano MD, 05/31/24, 2:51 PM EDT.

## 2024-05-31 NOTE — PLAN OF CARE
Goal Outcome Evaluation:  Plan of Care Reviewed With: patient, daughter        Progress: declining            Pt in ICU for sepsis/ PNA. Pt paralyzed and on prop and fent for sedation. VSS. PT bronched. Shiley cath placed and pt started on CRRT. Levo and Nimbex titrated. Family updated on plan of care.

## 2024-05-31 NOTE — PROGRESS NOTES
Pulmonary/critical care I was called to bedside by nursing I discussed the case with Dr. Meyer earlier in the evening.  Patient respiratory rate not improved still breathing upper 40s to 50 I have tried multiple permutations on the ventilator nothing improved he is heavily sedated with propofol at 50 and dexmedetomidine at 1.5.  We gave him 100 mics of fentanyl he did slow down very shortly for a couple of minutes but his blood pressure dropped into the 93 systolic range I do not think we can continue to push that high dose I do not think he is going to tolerate this rate.  He has breath sounds bilaterally I reviewed his chest x-ray from intubation and his recent scans.  Really looks like he is developing an ARDS type situation.  I have talked at length with his family they understand he can continue at this rate for very long they agree after watching the effects of these medications that we need to do more we talked about paralytic trial and see if we can support him with this.  I will start with him just under 7 mL/kg ideal weight will get blood gases if his pH will allow we will wean his tidal volumes further once he is paralyzed and of course we will keep him heavily sedated.  He is on Levophed at 0.19 mics and looks like we may have to go up I suspect once we take his drive away with the paralytics he will need more I talk with the family about central venous access and they are agreeable some of his family clearly has a medical background seem to understand the situation.  So far I have spent an additional 70 minutes in critical care time excluding future procedures

## 2024-05-31 NOTE — PROGRESS NOTES
LOS: 5 days   Patient Care Team:  Provider, No Known as PCP - General    Chief Complaint:  F/up     Subjective   Interval History  I reviewed the admission note, progress notes, PMH, PSH, Family hx, social history, imagings and prior records on this admission, summarized the finding in my note and formulated a transition of care plan.      AC VC 30/570/80%/10.  Peak airway pressure 29.  P plat 226.  No significant secretions from the ET tube.  Had a Tmax of 102.  Sedated with propofol 50 mics/KG/minute and on fentanyl drip.  Levophed 0.08 mics/KG/minute    REVIEW OF SYSTEMS:   Cannot obtain.  Patient is on the ventilator.    Ventilator/Non-Invasive Ventilation Settings (From admission, onward)       Start     Ordered    24  Ventilator - Vent Mode: AC/VC; Rate: Other; Rate: 30; FiO2: 100%; PEEP: 10; Tidal Volume: mL/kg PBW; mL/k  Continuous        Question Answer Comment   Vent Mode AC/VC    Rate Other    Rate 30    FiO2 100%    PEEP 10    Tidal Volume mL/kg PBW    mL/kg 7        24 2100    24 1923  Ventilator - Vent Mode: AC/PC; Rate: 20; FiO2: 100%; PEEP: 5; Inspiratory Pressure: 25  Continuous,   Status:  Canceled        Comments: Increase peep to 10 when BP stabilizes   Question Answer Comment   Vent Mode AC/PC    Rate 20    FiO2 100%    PEEP 5    Inspiratory Pressure 25        24 19224 190  Ventilator - Vent Mode: AC/PC; Rate: 20; FiO2: 100%; PEEP: 5; Inspiratory Pressure: 25  Continuous,   Status:  Canceled        Comments: Increase peep to 5 when BP stabilizes   Question Answer Comment   Vent Mode AC/PC    Rate 20    FiO2 100%    PEEP 5    Inspiratory Pressure 25        24                          Physical Exam:     Vital Signs  Temp:  [97 °F (36.1 °C)-102.6 °F (39.2 °C)] 97 °F (36.1 °C)  Heart Rate:  [] 77  Resp:  [18-42] 30  BP: ()/() 90/69  FiO2 (%):  [50 %-100 %] 50 %    Intake/Output  "Summary (Last 24 hours) at 5/31/2024 0810  Last data filed at 5/31/2024 0429  Gross per 24 hour   Intake 5987 ml   Output 420 ml   Net 5567 ml     Flowsheet Rows      Flowsheet Row First Filed Value   Admission Height 188 cm (74\") Documented at 05/25/2024 1332   Admission Weight 82.9 kg (182 lb 12.2 oz) Documented at 05/25/2024 0729            PPE used per hospital policy    General Appearance:  The ventilator.  Paralyzed.  NAD.   ENMT: ET tube 7.5.  Moist lips.   Eyes:  Pupils equal and reactive to light. EOMI   Neck:    Trachea midline. No thyromegaly.   Lungs:   Relatively clear to anterior auscultation.  No use of accessory muscles.    Heart:   Regular rhythm and normal rate, normal S1 and S2, no         murmur   Skin:   No rash or ecchymosis   Abdomen:    Soft. No tenderness. No HSM.   Neuro/psych: Sedated and paralyzed.   Extremities:  No cyanosis, clubbing or edema.  Warm extremities and well-perfused          Results Review:        Results from last 7 days   Lab Units 05/31/24 0428 05/30/24 0245 05/29/24 0432   SODIUM mmol/L 151* 151* 146*   POTASSIUM mmol/L 5.1 4.2 3.9   CHLORIDE mmol/L 121* 117* 112*   CO2 mmol/L 20.0* 21.0* 22.0   BUN mg/dL 56* 39* 27*   CREATININE mg/dL 3.98* 2.44* 2.07*   GLUCOSE mg/dL 185* 104* 107*   CALCIUM mg/dL 7.8* 8.1* 8.2*     Results from last 7 days   Lab Units 05/29/24 0432 05/28/24 2036 05/28/24  1813 05/27/24  0629   CK TOTAL U/L  --   --   --  254*   HSTROP T ng/L 35* 35* 30*  --      Results from last 7 days   Lab Units 05/31/24 0428 05/30/24 0245 05/29/24  0432   WBC 10*3/mm3 18.29* 8.35 8.39   HEMOGLOBIN g/dL 6.6* 7.5* 8.0*   HEMATOCRIT % 20.7* 23.8* 24.0*   PLATELETS 10*3/mm3 363 393 369     Results from last 7 days   Lab Units 05/29/24  0432 05/25/24  0743   INR  1.26* 1.15*     Results from last 7 days   Lab Units 05/28/24  1813   PROBNP pg/mL 375.0       Results from last 7 days   Lab Units 05/28/24  1912   D DIMER QUANT MCGFEU/mL 3.14*             Results " from last 7 days   Lab Units 05/31/24  0317 05/30/24  2212 05/30/24  1739 05/30/24  1239 05/29/24  0132 05/28/24  1752   PH, ARTERIAL pH units 7.340* 7.317* 7.411 7.441 7.485* 7.528*   PCO2, ARTERIAL mm Hg 35.8 41.2 33.9* 32.6* 30.5* 28.0*   PO2 ART mm Hg 188.5* 138.7* 90.2 73.5* 57.6* 47.8*   O2 SATURATION ART % 99.6* 98.9* 97.2 95.3 92.0 88.4*   FLOW RATE lpm  --   --   --  12.0000 8.0000 6.0000   MODALITY  Adult Vent Adult Vent BiPap HFNC HFNC Cannula         I reviewed the patient's new clinical results.        Medication Review:   albuterol, 2.5 mg, Nebulization, Q6H - RT  artificial tears, , Both Eyes, Q4H  epoetin fabiola/fabiola-epbx, 20,000 Units, Subcutaneous, Weekly  heparin (porcine), 5,000 Units, Subcutaneous, Q8H  piperacillin-tazobactam, 3.375 g, Intravenous, Q8H  sodium chloride, 10 mL, Intravenous, Q12H  sodium chloride, 3 mL, Intravenous, Q12H        cisatracurium besylate (NIMBEX) 200 mg in sodium chloride 0.9 % 100 mL (2 mg/mL) infusion, 0.5-10 mcg/kg/min, Last Rate: 6.5 mcg/kg/min (05/31/24 0358)  dexmedetomidine, 0.2-1.5 mcg/kg/hr, Last Rate: Stopped (05/31/24 0516)  dextrose, 100 mL/hr, Last Rate: 100 mL/hr (05/31/24 0656)  fentanyl 10 mcg/mL,  mcg/hr, Last Rate: 100 mcg/hr (05/31/24 0405)  norepinephrine, 0.02-0.3 mcg/kg/min, Last Rate: 0.05 mcg/kg/min (05/31/24 0630)  propofol, 5-50 mcg/kg/min, Last Rate: 50 mcg/kg/min (05/31/24 0404)        Diagnostic imaging:  I personally and independently reviewed the following images:  CXR 5/30/2024: Bilateral pulmonary infiltrates.  ET tube and central line are in good position.  CT chest 5/29/2024: Bilateral groundglass pulmonary infiltrates mostly peripheral and pulmonary consolidations mostly in the lower lobe posteriorly.    Assessment     ARDS  Acute hypoxic respiratory failure, on mechanical ventilation since 5/30/2024  Bilateral lower lobe pneumonia/consolidations and peripheral GGO opacities, s/p bronchoscopy 5/30/2024.  Cultures negative so  far. Procal 146.  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  Hypernatremia  MARTÍNEZ, worsening  NAG, likely secondary  Metabolic acidosis  Transaminitis, secondary to sepsis    All problems new to me    Plan     Mechanical ventilation management: Settings reviewed and adjusted. TV currently at 570, reduced to 490 ml (6 ml/Kg/IBW) and increase PEEP from 10 to 12. P plateau= 23 on those settings. Daily ABG and CXR. Vent bundle.  Bronch with BAL to evaluate for CEP due to peripheral GG distrubution of infiltrates.  Check HP and ELIDA/ANCA panels. (Progressive BAL today was not bloody)  Sedation: Precedex and propofol.  Titrate for RASS -1-0.  Analgesia with fentanyl drip.  Paralytics for severe ARDS day 1. Will continue for 1 more day then stop. PF ratio improved from 138 on 5/30 to 235 on 5/31.  Check echocardiogram for completeness due to bilateral pulmonary infiltrates but doubt CHF  Empiric antibiotics with Zosyn awaiting cultures  Levophed IV drip and titrate to keep MAP >65  D5 W 100 ml/h for hypernatremia.   Insert core track and start trickle enteral nutrition as patient unlikely to be able to come off the ventilator.  Check UA, CK, uric acid, FeNa due to MARTÍNEZ.  Avoid nephrotoxins.  Nephrology consulted.  Albuterol 4 times daily  DVT prophylaxis with heparin subcu    Discussed with family and the previous critical care provider Dr. Meyer.    Ed Mandujano MD  05/31/24  08:10 EDT        Time: Critical care 40 min      This note was dictated utilizing Dragon dictation

## 2024-06-01 NOTE — PLAN OF CARE
Goal Outcome Evaluation:   Patient remains intubated, sedated, paralyzed, and on CRRT. 2 Pressors added for BP management. O2 Requirements increased - pulm notified. Bleeding noted from NG suction - GI/Pulm notified. HGB Q6 stable. Family at bedside. All questions answered.

## 2024-06-01 NOTE — PROGRESS NOTES
ID progress note    Chief complaint: Follow-up sepsis due to pneumonia due to aspiration    Subjective: Tmax 100.4.  Emergent dialysis catheter placed and the patient has been started on CRRT.  Is currently on 2 pressors.  Stable ventilatory setting with an FiO2 of 80% and PEEP of 10.  Small thick respiratory secretions.  Tolerating antibiotics without diarrhea or rash    ROS; unable to be obtained     Objective   Vital Signs   Temp:  [97 °F (36.1 °C)-100.4 °F (38 °C)] 99.7 °F (37.6 °C)  Heart Rate:  [] 110  Resp:  [30-36] 30  BP: ()/(52-91) 73/56  FiO2 (%):  [50 %-100 %] 80 %    Physical Exam:   General: Sedated, intubated, ill-appearing  Cardiovascular: Tachycardic  Respiratory: Coarse breath sounds bilaterally  GI: Abdomen is soft, not distended  :  + Goncalves catheter  Psychiatric: Sedated  Vasc: Right IJ catheter and PIV's without erythema    Labs:   CBC, CMP, procalcitonin, and blood and BAL cultures reviewed today  Lab Results   Component Value Date    WBC 19.27 (H) 06/01/2024    HGB 9.7 (L) 06/01/2024    HCT 29.0 (L) 06/01/2024    MCV 90.3 06/01/2024     06/01/2024       Lab Results   Component Value Date    GLUCOSE 77 06/01/2024    BUN 35 (H) 06/01/2024    CREATININE 3.27 (H) 06/01/2024    EGFRIFAFRI >60 11/21/2022    BCR 10.7 06/01/2024    CO2 20.4 (L) 06/01/2024    CALCIUM 7.8 (L) 06/01/2024    ALBUMIN 2.2 (L) 06/01/2024    LABIL2 1.3 02/06/2021     (H) 06/01/2024     (H) 06/01/2024     Procal 2.09----->146  HIV antibody negative  HIV viral load pending  Hepatitis C antibody negative  Hepatitis B surface antigen negative  Hepatitis B core IgM antibody negative  Cryptococcus antigen negative  Urine histoplasma antigen pending  Serum histoplasma antigen pending  Urine Blastomyces antigen pending  Aspergillus galactomannan pending    Microbiology:  5/14 flu/RSV/COVID PCR: Negative  5/14 BCx: Negative  5/17 RPP negative  5/17 BCx: Negative  5/18 urine Legionella and strep  antigen: Negative  5/18 MRSA nares PCR: Negative  5/25 RPP: Negative  5/25 BCx: Negative  5/29 MRSA PCR: Negative  5/30 BAL cultures: NGTD  5/30 BCx NGTDx 2  5/30 RPP: Negative  5/31 BAL P    Radiology:  5/30 CXR with bilateral pulmonary infiltrates    ASSESSMENT/PLAN:  Bilateral pneumonia  Acute hypoxic respiratory failure  Acute on chronic jaw pain due to mandibular fracture  Dysphagia  Aspiration  Nausea and vomiting  Acute kidney injury  Elevated liver enzymes  Elevated procalcitonin  History of prostate cancer  Septic shock    White blood cell count continues to increase.  Fever curve improved.  Started on pressors and on CRRT.  Blood cultures remain negative to date.  BAL cultures from May 30 are negative.  BAL cultures from the 31st are pending.  Continue empiric Zosyn 4.5 g IV every 8 hours.  Antibiotic duration to be determined    ID will follow.

## 2024-06-01 NOTE — PROGRESS NOTES
" LOS: 6 days   Patient Care Team:  Jad, No Known as PCP - General    Chief Complaint: MARTÍNEZ      Subjective  Chart reviewed  Seen in the ICU, on CRRT  Minimal UOP  On Levophed    Objective     Vital Sign Min/Max for last 24 hours  Temp  Min: 97 °F (36.1 °C)  Max: 100.4 °F (38 °C)   BP  Min: 75/60  Max: 140/91   Pulse  Min: 69  Max: 135   Resp  Min: 30  Max: 36   SpO2  Min: 90 %  Max: 100 %   No data recorded   Weight  Min: 85.2 kg (187 lb 13.3 oz)  Max: 85.2 kg (187 lb 13.3 oz)     Flowsheet Rows      Flowsheet Row First Filed Value   Admission Height 188 cm (74\") Documented at 05/25/2024 1332   Admission Weight 82.9 kg (182 lb 12.2 oz) Documented at 05/25/2024 0729            No intake/output data recorded.  I/O last 3 completed shifts:  In: 9990.1 [I.V.:9343; Blood:347.5; IV Piggyback:299.6]  Out: 2632.4 [Urine:440; Emesis/NG output:420]    Objective:  Vital signs: (most recent): Blood pressure 108/72, pulse 112, temperature 99.9 °F (37.7 °C), resp. rate (!) 30, height 188 cm (74\"), weight 85.2 kg (187 lb 13.3 oz), SpO2 91%.            Physical Examination: Intubated, sedated..   Chest - coarse  Heart - normal rate, regular rhythm, normal S1, S2, no murmurs, rubs, clicks or gallops  Abdomen - soft, nondistended, no masses or organomegaly  Neurological - sedated   Extremities - peripheral pulses normal, no pedal edema, no clubbing or cyanosis     Results Review:     I reviewed the patient's new clinical results.    WBC WBC   Date Value Ref Range Status   06/01/2024 19.93 (H) 3.40 - 10.80 10*3/mm3 Final   05/31/2024 18.29 (H) 3.40 - 10.80 10*3/mm3 Final   05/30/2024 8.35 3.40 - 10.80 10*3/mm3 Final      HGB Hemoglobin   Date Value Ref Range Status   06/01/2024 9.3 (L) 13.0 - 17.7 g/dL Final   05/31/2024 9.2 (L) 13.0 - 17.7 g/dL Final   05/31/2024 6.6 (C) 13.0 - 17.7 g/dL Final   05/30/2024 7.5 (L) 13.0 - 17.7 g/dL Final      HCT Hematocrit   Date Value Ref Range Status   06/01/2024 27.9 (L) 37.5 - 51.0 % Final " "  05/31/2024 28.7 (L) 37.5 - 51.0 % Final   05/31/2024 20.7 (C) 37.5 - 51.0 % Final   05/30/2024 23.8 (L) 37.5 - 51.0 % Final      Platlets No results found for: \"LABPLAT\"   MCV MCV   Date Value Ref Range Status   06/01/2024 90.3 79.0 - 97.0 fL Final   05/31/2024 88.1 79.0 - 97.0 fL Final   05/30/2024 90.2 79.0 - 97.0 fL Final          Sodium Sodium   Date Value Ref Range Status   06/01/2024 139 136 - 145 mmol/L Final   06/01/2024 141 136 - 145 mmol/L Final   05/31/2024 143 136 - 145 mmol/L Final   05/31/2024 146 (H) 136 - 145 mmol/L Final   05/31/2024 148 (H) 136 - 145 mmol/L Final   05/31/2024 151 (H) 136 - 145 mmol/L Final   05/30/2024 151 (H) 136 - 145 mmol/L Final      Potassium Potassium   Date Value Ref Range Status   06/01/2024 5.3 (H) 3.5 - 5.2 mmol/L Final   06/01/2024 5.3 (H) 3.5 - 5.2 mmol/L Final   05/31/2024 5.4 (H) 3.5 - 5.2 mmol/L Final   05/31/2024 5.7 (H) 3.5 - 5.2 mmol/L Final   05/31/2024 5.2 3.5 - 5.2 mmol/L Final   05/31/2024 5.1 3.5 - 5.2 mmol/L Final   05/30/2024 4.2 3.5 - 5.2 mmol/L Final      Chloride Chloride   Date Value Ref Range Status   06/01/2024 109 (H) 98 - 107 mmol/L Final   06/01/2024 107 98 - 107 mmol/L Final   05/31/2024 111 (H) 98 - 107 mmol/L Final   05/31/2024 114 (H) 98 - 107 mmol/L Final   05/31/2024 118 (H) 98 - 107 mmol/L Final   05/31/2024 121 (H) 98 - 107 mmol/L Final   05/30/2024 117 (H) 98 - 107 mmol/L Final      CO2 CO2   Date Value Ref Range Status   06/01/2024 20.4 (L) 22.0 - 29.0 mmol/L Final   06/01/2024 20.7 (L) 22.0 - 29.0 mmol/L Final   05/31/2024 22.0 22.0 - 29.0 mmol/L Final   05/31/2024 19.9 (L) 22.0 - 29.0 mmol/L Final   05/31/2024 17.5 (L) 22.0 - 29.0 mmol/L Final   05/31/2024 20.0 (L) 22.0 - 29.0 mmol/L Final   05/30/2024 21.0 (L) 22.0 - 29.0 mmol/L Final      BUN BUN   Date Value Ref Range Status   06/01/2024 35 (H) 8 - 23 mg/dL Final   06/01/2024 36 (H) 8 - 23 mg/dL Final   05/31/2024 43 (H) 8 - 23 mg/dL Final   05/31/2024 53 (H) 8 - 23 mg/dL Final " "  05/31/2024 57 (H) 8 - 23 mg/dL Final   05/31/2024 56 (H) 8 - 23 mg/dL Final   05/30/2024 39 (H) 8 - 23 mg/dL Final      Creatinine Creatinine   Date Value Ref Range Status   06/01/2024 3.27 (H) 0.76 - 1.27 mg/dL Final   06/01/2024 3.44 (H) 0.76 - 1.27 mg/dL Final   05/31/2024 3.74 (H) 0.76 - 1.27 mg/dL Final   05/31/2024 4.69 (H) 0.76 - 1.27 mg/dL Final   05/31/2024 4.88 (H) 0.76 - 1.27 mg/dL Final   05/31/2024 3.98 (H) 0.76 - 1.27 mg/dL Final   05/30/2024 2.44 (H) 0.76 - 1.27 mg/dL Final      Calcium Calcium   Date Value Ref Range Status   06/01/2024 7.8 (L) 8.6 - 10.5 mg/dL Final   06/01/2024 8.0 (L) 8.6 - 10.5 mg/dL Final   05/31/2024 7.9 (L) 8.6 - 10.5 mg/dL Final   05/31/2024 8.0 (L) 8.6 - 10.5 mg/dL Final   05/31/2024 7.9 (L) 8.6 - 10.5 mg/dL Final   05/31/2024 7.8 (L) 8.6 - 10.5 mg/dL Final   05/30/2024 8.1 (L) 8.6 - 10.5 mg/dL Final      PO4 No results found for: \"CAPO4\"   Albumin Albumin   Date Value Ref Range Status   06/01/2024 2.2 (L) 3.5 - 5.2 g/dL Final   06/01/2024 2.2 (L) 3.5 - 5.2 g/dL Final   05/31/2024 2.2 (L) 3.5 - 5.2 g/dL Final   05/31/2024 2.3 (L) 3.5 - 5.2 g/dL Final   05/31/2024 2.1 (L) 3.5 - 5.2 g/dL Final   05/31/2024 2.2 (L) 3.5 - 5.2 g/dL Final   05/30/2024 2.5 (L) 3.5 - 5.2 g/dL Final      Magnesium Magnesium   Date Value Ref Range Status   06/01/2024 2.5 (H) 1.6 - 2.4 mg/dL Final   05/31/2024 2.6 (H) 1.6 - 2.4 mg/dL Final   05/31/2024 2.7 (H) 1.6 - 2.4 mg/dL Final   05/31/2024 2.8 (H) 1.6 - 2.4 mg/dL Final   05/31/2024 2.8 (H) 1.6 - 2.4 mg/dL Final      Uric Acid Uric Acid   Date Value Ref Range Status   05/31/2024 6.4 3.4 - 7.0 mg/dL Final        Medication Review:     Current Facility-Administered Medications:     acetaminophen (TYLENOL) tablet 650 mg, 650 mg, Oral, Q4H PRN, 650 mg at 05/27/24 0756 **OR** acetaminophen (TYLENOL) 160 MG/5ML oral solution 650 mg, 650 mg, Oral, Q4H PRN, 650 mg at 05/31/24 2218 **OR** acetaminophen (TYLENOL) suppository 650 mg, 650 mg, Rectal, Q4H PRN, " Jaicel Coleman MD, 650 mg at 05/30/24 2248    albuterol (PROVENTIL) nebulizer solution 0.083% 2.5 mg/3mL, 2.5 mg, Nebulization, Q6H - RT, Jaciel Coleman MD, 2.5 mg at 05/31/24 1140    albuterol (PROVENTIL) nebulizer solution 0.083% 2.5 mg/3mL, 2.5 mg, Nebulization, Q6H PRN, Jaciel Coleman MD    artificial tears ophthalmic ointment, , Both Eyes, Q4H, James Rivera Jr., MD, Given at 06/01/24 0503    sennosides-docusate (PERICOLACE) 8.6-50 MG per tablet 2 tablet, 2 tablet, Oral, BID PRN **AND** polyethylene glycol (MIRALAX) packet 17 g, 17 g, Oral, Daily PRN **AND** bisacodyl (DULCOLAX) EC tablet 5 mg, 5 mg, Oral, Daily PRN **AND** bisacodyl (DULCOLAX) suppository 10 mg, 10 mg, Rectal, Daily PRN, Jaciel Coleman MD    Calcium Replacement - Follow Nurse / BPA Driven Protocol, , Does not apply, PRN, Jaciel Coleman MD    cisatracurium besylate (NIMBEX) 200 mg in sodium chloride 0.9 % 100 mL (2 mg/mL) infusion, 0.5-10 mcg/kg/min, Intravenous, Titrated, James Rivera Jr., MD, Last Rate: 14.8 mL/hr at 06/01/24 0516, 6 mcg/kg/min at 06/01/24 0516    dexmedetomidine (PRECEDEX) 400 mcg in 100 mL NS infusion, 0.2-1.5 mcg/kg/hr, Intravenous, Titrated, Olivier Meyer MD, Last Rate: 8.2 mL/hr at 06/01/24 0629, 0.4 mcg/kg/hr at 06/01/24 0629    dextrose (D50W) (25 g/50 mL) IV injection 25 g, 25 g, Intravenous, Q15 Min PRN, Ed Mandujano MD    dextrose (GLUTOSE) oral gel 15 g, 15 g, Oral, Q15 Min PRN, Ed Mandujano MD    epoetin fabiola-epbx (RETACRIT) injection 20,000 Units, 20,000 Units, Subcutaneous, Weekly, Jaciel Coleman MD    famotidine (PEPCID) tablet 10 mg, 10 mg, Oral, BID PRN, Jaciel Coleman MD    fentaNYL (SUBLIMAZE) bolus from bag 10 mcg/mL injection 50 mcg, 50 mcg, Intravenous, Q30 Min PRN, James Rivera Jr., MD, 50 mcg at 05/31/24 2019    fentaNYL 1000 mcg in 100 mL NS infusion,  mcg/hr, Intravenous, Titrated, James Rivera Jr., MD, Last Rate: 10 mL/hr at 06/01/24 0629, 100  mcg/hr at 06/01/24 0629    glucagon (GLUCAGEN) injection 1 mg, 1 mg, Intramuscular, Q15 Min PRN, Ed Mandujano MD    heparin (porcine) 5000 UNIT/ML injection 5,000 Units, 5,000 Units, Subcutaneous, Q8H, Jaciel Coleman MD, 5,000 Units at 06/01/24 0516    heparin (porcine) injection 1,000-2,000 Units, 1,000-2,000 Units, Intravenous, PRN, Jonathan Montoya MD, 2,000 Units at 05/31/24 1653    insulin regular (humuLIN R,novoLIN R) injection 2-7 Units, 2-7 Units, Subcutaneous, Q6H, Ed Mandujano MD, 2 Units at 05/31/24 1242    Magnesium Low Dose Replacement - Follow Nurse / BPA Driven Protocol, , Does not apply, PRN, Jaciel Coleman MD    melatonin tablet 2.5 mg, 2.5 mg, Oral, Nightly PRN, Jaciel Coleman MD    nitroglycerin (NITROSTAT) SL tablet 0.4 mg, 0.4 mg, Sublingual, Q5 Min PRN, Jaciel Coleman MD    norepinephrine (LEVOPHED) 8 mg in 250 mL NS infusion (premix), 0.02-0.3 mcg/kg/min, Intravenous, Titrated, Olivier Meyer MD, Last Rate: 43.2 mL/hr at 06/01/24 0519, 0.28 mcg/kg/min at 06/01/24 0519    ondansetron ODT (ZOFRAN-ODT) disintegrating tablet 4 mg, 4 mg, Oral, Q6H PRN **OR** ondansetron (ZOFRAN) injection 4 mg, 4 mg, Intravenous, Q6H PRN, Jaciel Coleman MD, 4 mg at 05/28/24 0848    phenylephrine (EYSY-SYNEPHRINE) 50 mg in 250 mL NS infusion, 0.5-3 mcg/kg/min, Intravenous, Titrated, Dominique Ortiz APRN    Phosphorus Replacement - Follow Nurse / BPA Driven Protocol, , Does not apply, PRN, Coleman, Jaciel H., MD    Phoxillum BK4/2.5 dialysis solution, 1,000 mL/hr, CRRT, Continuous, Jonathan Montoya MD, Last Rate: 1,000 mL/hr at 06/01/24 0236, 1,000 mL/hr at 06/01/24 0236    Phoxillum BK4/2.5 dialysis solution, 1,000 mL/hr, CRRT, Continuous, Jonathan Montoya MD, Last Rate: 1,000 mL/hr at 06/01/24 0236, 1,000 mL/hr at 06/01/24 0236    Phoxillum BK4/2.5 dialysis solution, 1,000 mL/hr, CRRT, Continuous, Jonathan Montoya MD, Last Rate: 1,000 mL/hr at 06/01/24 0235, 1,000  mL/hr at 06/01/24 0235    piperacillin-tazobactam (ZOSYN) 4.5 g IVPB in 100 mL NS MBP (CD), 4.5 g, Intravenous, Q8H, Sidney Lee MD, 4.5 g at 05/31/24 2358    Potassium Replacement - Follow Nurse / BPA Driven Protocol, , Does not apply, PRN, Jaciel Coleman MD    prochlorperazine (COMPAZINE) injection 5 mg, 5 mg, Intravenous, Q6H PRN, Jaciel Coleman MD, 5 mg at 05/28/24 1650    propofol (DIPRIVAN) infusion 10 mg/mL 100 mL, 5-50 mcg/kg/min, Intravenous, Titrated, Olivier Meyer MD, Last Rate: 17.26 mL/hr at 06/01/24 0409, 35 mcg/kg/min at 06/01/24 0409    sodium chloride 0.9 % flush 10 mL, 10 mL, Intravenous, PRN, Jaciel Coleman MD    sodium chloride 0.9 % flush 10 mL, 10 mL, Intravenous, Q12H, Jaciel Coleman MD, 10 mL at 05/31/24 2018    sodium chloride 0.9 % flush 10 mL, 10 mL, Intravenous, PRN, Jaciel Coleman MD    sodium chloride 0.9 % flush 10 mL, 10 mL, Intravenous, Q12H, Chad Wilkinson MD, 10 mL at 05/31/24 2017    sodium chloride 0.9 % flush 10 mL, 10 mL, Intravenous, Q12H, Chad Wilkinson MD, 10 mL at 05/31/24 2017    sodium chloride 0.9 % flush 10 mL, 10 mL, Intravenous, Q12H, Chad Wilkinson MD, 10 mL at 05/31/24 2017    sodium chloride 0.9 % flush 10 mL, 10 mL, Intravenous, Q12H, Chad Wilkinson MD, 10 mL at 05/31/24 2017    sodium chloride 0.9 % flush 10 mL, 10 mL, Intravenous, PRN, Chad Wilkinson MD    sodium chloride 0.9 % flush 20 mL, 20 mL, Intravenous, PRN, Chad Wilkinson MD    sodium chloride 0.9 % flush 3 mL, 3 mL, Intravenous, Q12H, Jaciel Coleman MD, 3 mL at 05/31/24 2018    sodium chloride 0.9 % flush 3-10 mL, 3-10 mL, Intravenous, PRN, Jaciel Coleman MD    sodium chloride 0.9 % infusion 40 mL, 40 mL, Intravenous, PRN, Jaciel Coleman MD    sodium chloride 0.9 % infusion 40 mL, 40 mL, Intravenous, PRN, Jaciel Coleman MD    sodium chloride 0.9 % infusion 40 mL, 40 mL, Intravenous, PRN, Chad Wilkinson MD     Assessment & Plan        Assessment & Plan  MARTÍNEZ/ATN  Proteinuria   Metabolic acidosis   ARDS  Shock  PNA  Sepsis   Hypernatremia  Hematuria   Shock    Continue CRRT - increase flow rates to improve clearance  UF as able  Continue Abx per pulmonary/primary  Pressor and vent support per ICU team   Discussed with family  Will follow         Jonathan Montoya MD  [facility]  06/01/24  07:01 EDT

## 2024-06-01 NOTE — PLAN OF CARE
Goal Outcome Evaluation:         Remains intubated/sedated/paralyzed. 5o%/10peep. Nimbex@6, fent@100, prop@35, dex@0.4, levo@0.28. on CRRT-pulled 100 first half of night, only able to pull 50 second half of night. Only had 20cc urine output overnight. NGT@70-no output, backed up to 65-400ml out after tube repositioned. NGT-LWS. Cooling blanket off. SCD's, family updated

## 2024-06-01 NOTE — PROGRESS NOTES
[facility]  Inpatient Progress Note    Reason for Follow Up: GI bleed, elevated LFTs    Subjective     Interval History:   Nurse called to report episode of bright red blood per NG.  Hemoglobin 9.3 with hematocrit of 27.9 and a white count of 19.9 platelet count 410,000.  AST and ALT still elevated but trending down.  INR 1.27.  Discussed with family at bedside, no prior history of peptic ulcer disease.  Reviewed possible explanations including Lidia-Malhotra tear versus gastritis/peptic ulcer disease versus other etiology.  Patient on PPI drip, will warrant eventual endoscopic evaluation but would monitor for the present given issues with sepsis and respiratory failure.  If bleeding persist or worsens we will have to consider more emergent endoscopic assessment.    Current Facility-Administered Medications:     acetaminophen (TYLENOL) tablet 650 mg, 650 mg, Oral, Q4H PRN, 650 mg at 05/27/24 2353 **OR** acetaminophen (TYLENOL) 160 MG/5ML oral solution 650 mg, 650 mg, Oral, Q4H PRN, 650 mg at 05/31/24 2218 **OR** acetaminophen (TYLENOL) suppository 650 mg, 650 mg, Rectal, Q4H PRN, Jaciel Coleman MD, 650 mg at 05/30/24 2248    albuterol (PROVENTIL) nebulizer solution 0.083% 2.5 mg/3mL, 2.5 mg, Nebulization, Q6H - RT, Jaciel Coleman MD, 2.5 mg at 06/01/24 0808    albuterol (PROVENTIL) nebulizer solution 0.083% 2.5 mg/3mL, 2.5 mg, Nebulization, Q6H PRN, Jaciel Coleman MD    artificial tears ophthalmic ointment, , Both Eyes, Q4H, James Rivera Jr., MD, Given at 06/01/24 0948    sennosides-docusate (PERICOLACE) 8.6-50 MG per tablet 2 tablet, 2 tablet, Oral, BID PRN **AND** polyethylene glycol (MIRALAX) packet 17 g, 17 g, Oral, Daily PRN **AND** bisacodyl (DULCOLAX) EC tablet 5 mg, 5 mg, Oral, Daily PRN **AND** bisacodyl (DULCOLAX) suppository 10 mg, 10 mg, Rectal, Daily PRN, Jaciel Coleman MD    calcium gluconate 1000 Mg/50ml 0.675% NaCl IV SOLN, 1,000 mg, Intravenous, Once, Lindsay  Jonathan Garza MD    Calcium Replacement - Follow Nurse / BPA Driven Protocol, , Does not apply, PRN, Jaciel Coleman MD    cisatracurium besylate (NIMBEX) 200 mg in sodium chloride 0.9 % 100 mL (2 mg/mL) infusion, 0.5-10 mcg/kg/min, Intravenous, Titrated, James Rivera Jr., MD, Last Rate: 14.8 mL/hr at 06/01/24 0516, 6 mcg/kg/min at 06/01/24 0516    dexmedetomidine (PRECEDEX) 400 mcg in 100 mL NS infusion, 0.2-1.5 mcg/kg/hr, Intravenous, Titrated, Olivier Meyer MD, Last Rate: 8.2 mL/hr at 06/01/24 0629, 0.4 mcg/kg/hr at 06/01/24 0629    dextrose (D50W) (25 g/50 mL) IV injection 25 g, 25 g, Intravenous, Q15 Min PRN, Ed Mandujano MD    dextrose (GLUTOSE) oral gel 15 g, 15 g, Oral, Q15 Min PRN, Ed Mandujano MD    EPINEPHrine 5 mg in 250 mL NS infusion, 0.02-0.3 mcg/kg/min, Intravenous, Titrated, Jaylon Kim MD    epoetin fabiola-epbx (RETACRIT) injection 20,000 Units, 20,000 Units, Subcutaneous, Weekly, Jaciel Coleman MD    famotidine (PEPCID) tablet 10 mg, 10 mg, Oral, BID PRN, Jaciel Coleman MD    fentaNYL (SUBLIMAZE) bolus from bag 10 mcg/mL injection 50 mcg, 50 mcg, Intravenous, Q30 Min PRN, James Rivera Jr., MD, 50 mcg at 05/31/24 2019    fentaNYL 1000 mcg in 100 mL NS infusion,  mcg/hr, Intravenous, Titrated, James Rivera Jr., MD, Last Rate: 10 mL/hr at 06/01/24 0629, 100 mcg/hr at 06/01/24 0629    glucagon (GLUCAGEN) injection 1 mg, 1 mg, Intramuscular, Q15 Min PRN, Ed Mandujano MD    heparin (porcine) injection 1,000-2,000 Units, 1,000-2,000 Units, Intravenous, PRN, Jonathan Montoya MD, 2,000 Units at 05/31/24 1653    insulin regular (humuLIN R,novoLIN R) injection 2-7 Units, 2-7 Units, Subcutaneous, Q6H, Ed Mandujano MD, 2 Units at 05/31/24 1242    Magnesium Low Dose Replacement - Follow Nurse / BPA Driven Protocol, , Does not apply, PRN, Jaciel Coleman MD    melatonin tablet 2.5 mg, 2.5 mg, Oral, Nightly PRN, Jaciel Coleman MD    nitroglycerin  (NITROSTAT) SL tablet 0.4 mg, 0.4 mg, Sublingual, Q5 Min PRN, Jaciel Coleman MD    norepinephrine (LEVOPHED) 8 mg in 250 mL NS infusion (premix), 0.02-0.3 mcg/kg/min, Intravenous, Titrated, Olivier Meyer MD, Last Rate: 46.2 mL/hr at 06/01/24 0830, 0.3 mcg/kg/min at 06/01/24 0830    ondansetron ODT (ZOFRAN-ODT) disintegrating tablet 4 mg, 4 mg, Oral, Q6H PRN **OR** ondansetron (ZOFRAN) injection 4 mg, 4 mg, Intravenous, Q6H PRN, Jaciel Coleman MD, 4 mg at 05/28/24 0848    pantoprazole (PROTONIX) 40 mg in sodium chloride 0.9 % 100 mL (0.4 mg/mL) MBP, 8 mg/hr, Intravenous, Continuous, Jaylon Kim MD    phenylephrine (YESY-SYNEPHRINE) 50 mg in 250 mL NS infusion, 0.5-3 mcg/kg/min, Intravenous, Titrated, Dominique Ortiz APRN, Last Rate: 26.3 mL/hr at 06/01/24 0950, 1 mcg/kg/min at 06/01/24 0950    Phosphorus Replacement - Follow Nurse / BPA Driven Protocol, , Does not apply, Virginia KING Ervin H., MD    Phoxillum BK4/2.5 dialysis solution, 1,500 mL/hr, CRRT, Continuous, Jonathan Montoya MD, Last Rate: 1,500 mL/hr at 06/01/24 0824, 1,500 mL/hr at 06/01/24 0824    Phoxillum BK4/2.5 dialysis solution, 1,500 mL/hr, CRRT, Continuous, Jonathan Montoya MD, Last Rate: 1,500 mL/hr at 06/01/24 0824, 1,500 mL/hr at 06/01/24 0824    Phoxillum BK4/2.5 dialysis solution, 1,500 mL/hr, CRRT, Continuous, Jonathan Montoya MD, Last Rate: 1,500 mL/hr at 06/01/24 0824, 1,500 mL/hr at 06/01/24 0824    piperacillin-tazobactam (ZOSYN) 4.5 g IVPB in 100 mL NS MBP (CD), 4.5 g, Intravenous, Q8H, Sidney Lee MD, 4.5 g at 06/01/24 0937    Potassium Replacement - Follow Nurse / BPA Driven Protocol, , Does not apply, PRN, Jaciel Coleman MD    prochlorperazine (COMPAZINE) injection 5 mg, 5 mg, Intravenous, Q6H PRN, Jaciel Coleman MD, 5 mg at 05/28/24 1650    propofol (DIPRIVAN) infusion 10 mg/mL 100 mL, 5-50 mcg/kg/min, Intravenous, Titrated, Olivier Meyer MD, Last Rate: 17.26 mL/hr at  06/01/24 0940, 35 mcg/kg/min at 06/01/24 0940    sodium chloride 0.9 % flush 10 mL, 10 mL, Intravenous, PRNVirginia Ervin H., MD    sodium chloride 0.9 % flush 10 mL, 10 mL, Intravenous, Q12H, Jaciel Coleman MD, 10 mL at 06/01/24 0947    sodium chloride 0.9 % flush 10 mL, 10 mL, Intravenous, PRNVirginia Ervin H., MD    sodium chloride 0.9 % flush 10 mL, 10 mL, Intravenous, Q12H, Chad Wilkinson MD, 10 mL at 06/01/24 0946    sodium chloride 0.9 % flush 10 mL, 10 mL, Intravenous, Q12H, Chad Wilkinson MD, 10 mL at 06/01/24 0946    sodium chloride 0.9 % flush 10 mL, 10 mL, Intravenous, Q12H, Chad Wilkinson MD, 10 mL at 06/01/24 0946    sodium chloride 0.9 % flush 10 mL, 10 mL, Intravenous, Q12H, Chad Wilkinson MD, 10 mL at 06/01/24 0946    sodium chloride 0.9 % flush 10 mL, 10 mL, Intravenous, PRNJaja Emmett J., MD    sodium chloride 0.9 % flush 20 mL, 20 mL, Intravenous, PRNJaja Emmett J., MD    sodium chloride 0.9 % flush 3 mL, 3 mL, Intravenous, Q12H, Jaciel Coleman MD, 3 mL at 06/01/24 0947    sodium chloride 0.9 % flush 3-10 mL, 3-10 mL, Intravenous, PRNVirginia Ervin H., MD    sodium chloride 0.9 % infusion 40 mL, 40 mL, Intravenous, Virginia KING Ervin H., MD    sodium chloride 0.9 % infusion 40 mL, 40 mL, Intravenous, PRNVirginia Ervin H., MD    sodium chloride 0.9 % infusion 40 mL, 40 mL, Intravenous, PRJaja JARRELL Emmett J., MD    vasopressin (PITRESSIN) 20 units in 100 mL NS infusion, 0.03 Units/min, Intravenous, Continuous, Jaylon Kim MD, Last Rate: 9 mL/hr at 06/01/24 0841, 0.03 Units/min at 06/01/24 0841  Review of Systems:    Review of systems could not be obtained due to  patient intubated.    Objective     Vital Signs  Temp:  [97 °F (36.1 °C)-100.4 °F (38 °C)] 99.7 °F (37.6 °C)  Heart Rate:  [] 110  Resp:  [30-36] 30  BP: ()/(52-91) 73/56  FiO2 (%):  [50 %-100 %] 80 %  Body mass index is 24.12 kg/m².    Intake/Output Summary (Last 24 hours) at  6/1/2024 0959  Last data filed at 6/1/2024 0900  Gross per 24 hour   Intake 4415.04 ml   Output 2472.1 ml   Net 1942.94 ml     I/O this shift:  In: 238.8 [I.V.:238.8]  Out: 174.5      Physical Exam:   General: patient awake, alert and cooperative   Eyes: Normal lids and lashes, no scleral icterus   Neck: supple, normal ROM   Skin: warm and dry, not jaundiced   Cardiovascular: regular rhythm and rate, no murmurs auscultated   Pulm: clear to auscultation bilaterally, regular and unlabored   Abdomen: soft, nontender, nondistended; normal bowel sounds   Extremities: no rash or edema   Psychiatric: Normal mood and behavior; memory intact     Results Review:     I reviewed the patient's new clinical results.    Results from last 7 days   Lab Units 06/01/24  0305 05/31/24  1815 05/31/24  0428 05/30/24  0245   WBC 10*3/mm3 19.93*  --  18.29* 8.35   HEMOGLOBIN g/dL 9.3* 9.2* 6.6* 7.5*   HEMATOCRIT % 27.9* 28.7* 20.7* 23.8*   PLATELETS 10*3/mm3 410  --  363 393     Results from last 7 days   Lab Units 06/01/24  0522 06/01/24  0305 05/31/24  2342 05/31/24  1815 05/31/24  1209 05/31/24  0428   SODIUM mmol/L 139 141 143   < > 148* 151*   POTASSIUM mmol/L 5.3* 5.3* 5.4*   < > 5.2 5.1   CHLORIDE mmol/L 109* 107 111*   < > 118* 121*   CO2 mmol/L 20.4* 20.7* 22.0   < > 17.5* 20.0*   BUN mg/dL 35* 36* 43*   < > 57* 56*   CREATININE mg/dL 3.27* 3.44* 3.74*   < > 4.88* 3.98*   CALCIUM mg/dL 7.8* 8.0* 7.9*   < > 7.9* 7.8*   BILIRUBIN mg/dL  --  3.6*  --   --  4.0* 3.9*   ALK PHOS U/L  --  114  --   --  116 115   ALT (SGPT) U/L  --  123*  --   --  139* 155*   AST (SGOT) U/L  --  167*  --   --  192* 236*   GLUCOSE mg/dL 77 77 83   < > 151* 185*    < > = values in this interval not displayed.     Results from last 7 days   Lab Units 05/31/24  1209 05/29/24  0432   INR  1.27* 1.26*     Lab Results   Lab Value Date/Time    LIPASE 39 08/11/2023 2030       Radiology:  XR Chest 1 View   Final Result   Central line placement. No pneumothorax.        This report was finalized on 5/31/2024 3:47 PM by Dr. Papo Selby M.D on Workstation: [id number]          XR Chest Post CVA Port   Final Result   Tubes and lines as noted above. No pneumothorax identified.       This report was finalized on 5/30/2024 10:14 PM by Dr. Faith Del Rio M.D on Workstation: [id number]          XR Abdomen KUB   Final Result       As described.           This report was finalized on 5/30/2024 7:19 PM by Dr. Papo Selby M.D on Workstation: [id number]          XR Chest 1 View   Final Result   As described.       This report was finalized on 5/30/2024 7:03 PM by Dr. Papo Selby M.D on Workstation: [id number]          XR Chest 1 View   Final Result   As described.               This report was finalized on 5/30/2024 12:12 PM by Dr. Papo Selby M.D on Workstation: [id number]          CT Chest Without Contrast Diagnostic   Final Result       Conspicuous interval worsening of left more than right airspace disease,   clinical correlation and continued follow-up advised.       This report was finalized on 5/29/2024 4:41 PM by Dr. Papo Selby M.D on Workstation: [id number]          NM HIDA SCAN WITH PHARMACOLOGICAL INTERVENTION   Final Result   Visualization of the gallbladder suggesting cystic duct   patency.           This report was finalized on 5/29/2024 3:32 PM by Dr. Ronny Pérez M.D on Workstation: [id number]          US Renal Bilateral   Final Result   1. No hydronephrosis.   2. Ultrasound findings suggesting medical renal disease.           This report was finalized on 5/29/2024 12:47 PM by Dr. Ronny Pérez M.D on Workstation: [id number]          XR Chest 1 View   Final Result   Interval progression in left lung pneumonia and/or   atelectasis with interval development of mild right lung atelectasis.       This report was finalized on 5/29/2024 12:55 PM by Dr. José Miguel Thomas M.D on Workstation: [id number]          XR Chest 1 View   Final Result       US Abdomen Limited   Final Result   Gallbladder sludge, without evidence of acute cholecystitis.       This report was finalized on 5/28/2024 6:03 AM by Dr. Faith Del Rio M.D on Workstation: [id number]          FL ESOPHAGRAM SINGLE CONTRAST   Final Result   Normal limited esophagram.                This report was finalized on 5/26/2024 8:17 PM by Dr. Ronny Pérez M.D on Workstation: [id number]          XR Chest 1 View   Final Result   Heterogeneous multifocal left greater than right lung opacities,   increased in the interval. Suspect multifocal pneumonia. Hemorrhage in   the appropriate clinical setting.       This report was finalized on 5/25/2024 7:58 AM by Dr. Carlito Chand M.D on Workstation: [id number]          NM Lung Ventilation Perfusion    (Results Pending)   SLP FEES - Fiberoptic Endo Eval Swallow    (Results Pending)   XR Chest 1 View    (Results Pending)   XR Chest 1 View    (Results Pending)   XR Chest 1 View    (Results Pending)   US Abdomen Limited    (Results Pending)       Assessment & Plan     Active Hospital Problems    Diagnosis     **Aspiration pneumonia due to vomitus     Oral phase dysphagia     Dental caries     MARTÍNEZ (acute kidney injury)     Sepsis     Dehydration     Severe malnutrition     Jaw pain     History of prostate cancer        Assessment:  Upper GI bleeding: Etiology not well-defined, H&H actually stable at present posttransfusion.  Aspiration pneumonia   Dysphagia  MARTÍNEZ  Prostate cancer  Elevated LFTs      Plan:  Monitor H&H   Continue IV PPI  Pending his response will entertain endoscopic evaluation  I discussed the patients findings and my recommendations with family and nursing staff.    Carlito Carlos MD

## 2024-06-01 NOTE — PROGRESS NOTES
"  Intensive Care Unit Daily Progress Note.   [facility] INTENSIVE CARE  6/1/2024    Patient Name:  Jimmy Norman  MRN:  [id number]  YOB: 1955  Age: 69 y.o.  Sex: male         Reason for Admission / Chief Complaint:  Respiratory failure, pneumonia    Hospital Course:   69-year-old male who presented to the hospital on 5/25 with weakness and shortness of breath and ultimately became progressively more hypoxic necessitating intubation and found to be in ARDS complicated by shock.    Interval History:  Intubated, sedated  Paralyzed  Requiring vasopressor support  On CRRT  Bloody output from NG    Physical Exam:  /72   Pulse 112   Temp 99.9 °F (37.7 °C)   Resp (!) 30   Ht 188 cm (74\")   Wt 85.2 kg (187 lb 13.3 oz)   SpO2 91%   BMI 24.12 kg/m²   Body mass index is 24.12 kg/m².    Intake/Output    Intake/Output Summary (Last 24 hours) at 6/1/2024 0756  Last data filed at 6/1/2024 0700  Gross per 24 hour   Intake 4176.21 ml   Output 2317.6 ml   Net 1858.61 ml     General: Intubated, sedated  HEENT: NC/AT, EOMI, MMM  Neck: Supple, trachea midline  Cardiac: RRR, no murmur, gallops, rubs  Pulmonary: Mechanical breath sounds   GI: Soft, non-tender, non-distended, normal bowel sounds  Extremities: Warm, well perfused, no LE edema  Skin: no visible rash  Neuro: CN II - XII grossly intact  Psychiatry: Sedated      Data Review:  Notable Labs:  Results from last 7 days   Lab Units 06/01/24  0305 05/31/24  1815 05/31/24  0428 05/30/24  0245 05/29/24  0432 05/28/24  0517 05/27/24  0630 05/26/24  1624   WBC 10*3/mm3 19.93*  --  18.29* 8.35 8.39 7.61 6.49 6.30   HEMOGLOBIN g/dL 9.3* 9.2* 6.6* 7.5* 8.0* 8.0* 9.3* 8.9*   PLATELETS 10*3/mm3 410  --  363 393 369 362 346 353     Results from last 7 days   Lab Units 06/01/24  0522 06/01/24  0305 05/31/24  2342 05/31/24  1815 05/31/24  1209 05/31/24  0428 05/30/24  0245 05/29/24  0432 05/28/24  0517   SODIUM mmol/L 139 141 143 146* 148* 151* " 151*   < > 141   POTASSIUM mmol/L 5.3* 5.3* 5.4* 5.7* 5.2 5.1 4.2   < > 3.8   CHLORIDE mmol/L 109* 107 111* 114* 118* 121* 117*   < > 107   CO2 mmol/L 20.4* 20.7* 22.0 19.9* 17.5* 20.0* 21.0*   < > 24.0   BUN mg/dL 35* 36* 43* 53* 57* 56* 39*   < > 19   CREATININE mg/dL 3.27* 3.44* 3.74* 4.69* 4.88* 3.98* 2.44*   < > 1.67*   GLUCOSE mg/dL 77 77 83 96 151* 185* 104*   < > 106*   CALCIUM mg/dL 7.8* 8.0* 7.9* 8.0* 7.9* 7.8* 8.1*   < > 8.2*   MAGNESIUM mg/dL 2.5*  --  2.6* 2.7* 2.8* 2.8*  --   --   --    PHOSPHORUS mg/dL 6.2*  --  6.1* 6.5* 5.7* 5.2*  --   --  2.7    < > = values in this interval not displayed.   Estimated Creatinine Clearance: 25.3 mL/min (A) (by C-G formula based on SCr of 3.27 mg/dL (H)).    Results from last 7 days   Lab Units 06/01/24  0305 05/31/24  1209 05/31/24  0428 05/30/24  0245 05/29/24  0947 05/29/24  0432 05/28/24  1912 05/28/24  0517   AST (SGOT) U/L 167* 192* 236* 329*  --  352*  --  186*   ALT (SGPT) U/L 123* 139* 155* 219*  --  196*  --  102*   PROCALCITONIN ng/mL  --   --  146.80*  --   --  2.09*  --  1.85*   C3 COMPLEMENT mg/dL  --   --   --   --  172*  --   --   --    C4 COMPLEMENT mg/dL  --   --   --   --  46*  --   --   --    D DIMER QUANT MCGFEU/mL  --   --   --   --   --   --  3.14*  --    PLATELETS 10*3/mm3 410  --  363 393  --  369  --  362       Results from last 7 days   Lab Units 05/31/24  0317 05/30/24  2212 05/30/24  1739 05/30/24  1239 05/29/24  0132 05/28/24  1752   PH, ARTERIAL pH units 7.340* 7.317* 7.411 7.441 7.485* 7.528*   PCO2, ARTERIAL mm Hg 35.8 41.2 33.9* 32.6* 30.5* 28.0*   PO2 ART mm Hg 188.5* 138.7* 90.2 73.5* 57.6* 47.8*   HCO3 ART mmol/L 19.4* 21.1* 21.5* 22.2 22.9 23.3       Imaging:  Reviewed chest images personally from past 3 days    ASSESSMENT  /  PLAN:    ARDS  Acute hypoxic respiratory failure, on mechanical ventilation since 5/30/2024  Bilateral lower lobe pneumonia/consolidations and peripheral GGO opacities  s/p bronchoscopy 5/30/2024.  Cultures  negative so far. Procal 146.    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  Hypernatremia  MARTÍNEZ, worsening  NAG, likely secondary  Metabolic acidosis  Transaminitis, secondary to sepsis  Hepatic Doppler negative     -Sedation: Propofol, Precedex, titrate for RASS -2  - Analgesia: Fentanyl drip  - Paralytics for severe ARDS  -Mechanical ventilation management: Settings reviewed and adjusted, tidal volume 6-7 mL/kg, PEEP 10, daily ABGs  -Requiring 2 vasopressors to support MAP goal greater than 65  -Bronchoscopy (5/30) with cultures pending  -New bloody output from NG tube, started on Protonix drip, GI consulted, hemoglobin monitoring, ultrasound liver  -HP, ELIDA, ANCA panel pending  -Continue antibiotics with Zosyn, infectious disease following, infectious workup pending  -Echocardiogram pending  -On CRRT per nephrology, appreciate recommendations  -Core track and trickle enteral nutrition  -Bronchodilator with albuterol nebulizer  -Patient is in critical condition with poor prognosis in the setting of ARDS, respiratory failure, shock, renal failure, now new and has been suspected GI bleed.    All issues new to me today.  Prior hospital course, labs and imaging reviewed.    GI prophylaxis: Protonix drip  DVT prophylaxis: SCDs, heparin subcu held for GI bleed  Goncalves catheter: Yes  Bowel regimen: Ordered  Diet: N.p.o.    Discharge: Continue to monitor in the ICU due to critical status    Critical Care Time: 50 minutes    Jaylon Kim MD  [facility]  Pulmonary and Critical Care Medicine, Interventional Pulmonology    Parts of this note may be an electronic transcription/translation of spoken language to printed text using the Dragon dictation system.

## 2024-06-02 NOTE — PROCEDURES
Arterial Catheter Insertion Procedure Note      Indications:  continuous blood pressure monitoring, frequent ABGs    Procedure Details   Informed consent was obtained for the procedure, including sedation.  Risks of distal ischemia, hemorrhage, and adverse drug reaction were discussed.   Prashant test was performed with good collateral circulation noted.    Maximum sterile technique was used including usual patient drapes, antiseptics and physician sterile garments.    Under sterile conditions the skin above the left radial artery was prepped with chlorhexidine and covered with a sterile drape. Local anesthesia was applied to the skin and subcutaneous tissues with lidocaine 1%. An 21-gauge needle was then inserted into the artery. Bright red pulsating blood return noted. A guide wire was then passed easily through the catheter. An angiocatheter was then inserted into the vessel over the guide wire. The catheter was sutured into place and dressed following sterile protocol with CHG gel pad.    Findings:  There were no changes to vital signs. Catheter port was flushed with saline. Patient tolerated procedure well.    Recommendations:  Line will be used for invasive hemodynamic monitoring and blood draws for ABG's.    Dominique Ortiz, MEÑO  6/1/2024

## 2024-06-02 NOTE — CONSULTS
Chp visit with family, Pt on vent. Chp provided grief support and prayer to family. Chaplains are available to family as need.

## 2024-06-02 NOTE — PROGRESS NOTES
"  Intensive Care Unit Daily Progress Note.   [facility] INTENSIVE CARE  6/2/2024    Patient Name:  Jimmy Norman  MRN:  [id number]  YOB: 1955  Age: 69 y.o.  Sex: male         Reason for Admission / Chief Complaint:  Respiratory failure, pneumonia    Hospital Course:   69-year-old male who presented to the hospital on 5/25 with weakness and shortness of breath and ultimately became progressively more hypoxic necessitating intubation and found to be in ARDS complicated by shock.    Interval History:  Chest imaging from overnight demonstrates pneumomediastinum  Patient remains intubated, sedated, paralyzed  Vasopressor support with 3 pressors  Daughter is at bedside     Physical Exam:  /81   Pulse 98   Temp 99.3 °F (37.4 °C)   Resp (!) 30   Ht 188 cm (74.02\")   Wt 85 kg (187 lb 6.3 oz)   SpO2 95%   BMI 24.05 kg/m²   Body mass index is 24.05 kg/m².    Intake/Output    Intake/Output Summary (Last 24 hours) at 6/2/2024 0812  Last data filed at 6/2/2024 0700  Gross per 24 hour   Intake 3981.14 ml   Output 4808.9 ml   Net -827.76 ml     General: Intubated, sedated  HEENT: NC/AT, EOMI, MMM  Neck: Supple, trachea midline  Cardiac: RRR, no murmur, gallops, rubs  Pulmonary: Mechanical breath sounds   GI: Soft, non-tender, non-distended, normal bowel sounds  Extremities: Warm, well perfused, 1+ LE edema  Skin: no visible rash  Neuro: CN II - XII grossly intact  Psychiatry: Sedated      Data Review:  Notable Labs:  Results from last 7 days   Lab Units 06/02/24  0541 06/02/24  0321 06/01/24  2334 06/01/24  1818 06/01/24  1219 06/01/24  0945 06/01/24  0305 05/31/24  1815 05/31/24  0428 05/30/24  0245 05/29/24  0432 05/28/24  0517   WBC 10*3/mm3  --  19.47*  --   --   --  19.27* 19.93*  --  18.29* 8.35 8.39 7.61   HEMOGLOBIN g/dL 9.1* 9.3* 9.3* 9.3* 9.7* 9.7* 9.3*   < > 6.6* 7.5* 8.0* 8.0*   PLATELETS 10*3/mm3  --  371  --   --   --  390 410  --  363 393 369 362    < > = values in " this interval not displayed.     Results from last 7 days   Lab Units 06/02/24  0541 06/02/24  0321 06/01/24  2334 06/01/24  1818 06/01/24  1219 06/01/24  0522 06/01/24  0305 05/31/24  2342 05/31/24  1815   SODIUM mmol/L 134* 137 135* 137 137 139 141 143 146*   POTASSIUM mmol/L 4.5 4.6 4.7 4.2 5.3* 5.3* 5.3* 5.4* 5.7*   CHLORIDE mmol/L 101 101 101 103 104 109* 107 111* 114*   CO2 mmol/L 18.0* 18.0* 17.4* 20.0* 18.2* 20.4* 20.7* 22.0 19.9*   BUN mg/dL 19 19 20 25* 26* 35* 36* 43* 53*   CREATININE mg/dL 1.63* 1.71* 1.74* 2.31* 2.36* 3.27* 3.44* 3.74* 4.69*   GLUCOSE mg/dL 104* 71 79 101* 71 77 77 83 96   CALCIUM mg/dL 7.8* 7.8* 7.9* 7.6* 8.1* 7.8* 8.0* 7.9* 8.0*   MAGNESIUM mg/dL 2.5*  --  2.5* 2.4 2.6* 2.5*  --  2.6* 2.7*   PHOSPHORUS mg/dL 4.8*  --  4.9* 5.1* 5.5* 6.2*  --  6.1* 6.5*   Estimated Creatinine Clearance: 50.7 mL/min (A) (by C-G formula based on SCr of 1.63 mg/dL (H)).    Results from last 7 days   Lab Units 06/02/24  0321 06/01/24  0945 06/01/24  0305 05/31/24  1209 05/31/24  0428 05/30/24  0245 05/29/24  0947 05/29/24  0432 05/28/24  1912 05/28/24  0517   AST (SGOT) U/L 146*  --  167* 192* 236*   < >  --  352*  --  186*   ALT (SGPT) U/L 99*  --  123* 139* 155*   < >  --  196*  --  102*   PROCALCITONIN ng/mL  --   --   --   --  146.80*  --   --  2.09*  --  1.85*   C3 COMPLEMENT mg/dL  --   --   --   --   --   --  172*  --   --   --    C4 COMPLEMENT mg/dL  --   --   --   --   --   --  46*  --   --   --    D DIMER QUANT MCGFEU/mL  --   --   --   --   --   --   --   --  3.14*  --    PLATELETS 10*3/mm3 371 390 410  --  363   < >  --  369  --  362    < > = values in this interval not displayed.       Results from last 7 days   Lab Units 06/02/24  0410 06/01/24  2043 06/01/24  0847 05/31/24  0317 05/30/24  2212 05/30/24  1739 05/30/24  1239 05/29/24  0132 05/28/24  1752   PH, ARTERIAL pH units 7.282* 7.263* 7.212* 7.340* 7.317* 7.411 7.441 7.485* 7.528*   PCO2, ARTERIAL mm Hg 40.2 45.2* 55.6* 35.8 41.2 33.9*  32.6* 30.5* 28.0*   PO2 ART mm Hg 79.8* 122.7* 96.6 188.5* 138.7* 90.2 73.5* 57.6* 47.8*   HCO3 ART mmol/L 19.0* 20.5* 22.3 19.4* 21.1* 21.5* 22.2 22.9 23.3       Imaging:  Reviewed chest images personally from past 3 days    ASSESSMENT  /  PLAN:    ARDS  Acute hypoxic respiratory failure, on mechanical ventilation since 5/30/2024  Bilateral lower lobe pneumonia/consolidations and peripheral GGO opacities  s/p bronchoscopy 5/30/2024.  Cultures negative so far. Procal 146.    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  Hypernatremia  MARTÍNEZ, worsening  NAG, likely secondary  Metabolic acidosis  Transaminitis, secondary to sepsis  Hepatic Doppler negative     -Sedation: Propofol, Precedex, titrate for RASS -4/5  - Analgesia: Fentanyl drip  - Paralytics for severe ARDS  -Chest x-ray demonstrates pneumomediastinum without specific cause, no pneumothorax.  It is unusual as his peak pressures are not markedly high as would be expected.  Will continue to monitor with repeat chest x-ray in the afternoon.  -Mechanical ventilation management: Settings reviewed and adjusted, tidal volume 6 mL/kg, PEEP 10.  Weaning FiO2 as tolerated and will slowly wean PEEP after.  Resistance 5, compliance 30-40.  PF ratio 150. Daily ABGs  -Requiring 3 vasopressors to support MAP goal greater than 65  -Bronchoscopy (5/30) with cultures no growth to date  -Hemoglobin stable, no bloody output from NG, PPI drip switched to IV PPI twice daily.  Appreciate GI recommendations.    -HP, ELIDA, ANCA panel pending  -Continue antibiotics with Zosyn, infectious disease following, infectious workup no growth to date  -Echocardiogram unremarkable  -On CRRT per nephrology, appreciate recommendations  -Core track and trickle enteral nutrition  -Bronchodilator with albuterol nebulizer  -Patient is in critical  condition with poor prognosis in the setting of ARDS, respiratory failure, shock, renal failure.     GI prophylaxis: Protonix   DVT prophylaxis: SCDs, heparin subcu held for GI bleed  Goncalves catheter: Yes  Bowel regimen: Ordered  Diet: N.p.o.    Discharge: Continue to monitor in the ICU due to critical status    Critical Care Time: 55 minutes    Jaylon Kim MD  [facility]  Pulmonary and Critical Care Medicine, Interventional Pulmonology    Parts of this note may be an electronic transcription/translation of spoken language to printed text using the Dragon dictation system.     Addendum  =========  Discussed with patient's family members (2 daughters and 1 son) regarding worsening of patient's clinical status.  Now requiring 4 pressors for vasopressor support.  Additionally subcutaneous emphysema worsening with trace apical left pneumothorax present.  Family elected for comfort measures.  Will transition patient and ensure comfort.

## 2024-06-02 NOTE — PROGRESS NOTES
" LOS: 7 days   Patient Care Team:  Provider, No Known as PCP - General    Chief Complaint: MARTÍNEZ      Subjective  Chart reviewed  Seen in the ICU, on CRRT  Minimal UOP  Increasing pressor requirements    Objective     Vital Sign Min/Max for last 24 hours  Temp  Min: 98.6 °F (37 °C)  Max: 99.9 °F (37.7 °C)   BP  Min: 70/52  Max: 149/101   Pulse  Min: 90  Max: 113   Resp  Min: 30  Max: 30   SpO2  Min: 79 %  Max: 100 %   No data recorded   Weight  Min: 85 kg (187 lb 6.3 oz)  Max: 85 kg (187 lb 6.3 oz)     Flowsheet Rows      Flowsheet Row First Filed Value   Admission Height 188 cm (74\") Documented at 05/25/2024 1332   Admission Weight 82.9 kg (182 lb 12.2 oz) Documented at 05/25/2024 0729            I/O this shift:  In: 1754.9 [I.V.:1584.8; IV Piggyback:170.1]  Out: 2675.4 [Emesis/NG output:75]  I/O last 3 completed shifts:  In: 6145.1 [I.V.:5498; Blood:347.5; IV Piggyback:299.6]  Out: 4443 [Urine:40; Emesis/NG output:725]    Objective:  Vital signs: (most recent): Blood pressure 108/81, pulse 98, temperature 99.3 °F (37.4 °C), resp. rate (!) 30, height 188 cm (74.02\"), weight 85 kg (187 lb 6.3 oz), SpO2 95%.            Physical Examination: Intubated, sedated..   Chest - coarse  Heart - normal rate, regular rhythm, normal S1, S2, no murmurs, rubs, clicks or gallops  Abdomen - soft, nondistended, no masses or organomegaly  Neurological - sedated   Extremities - peripheral pulses normal, no pedal edema, no clubbing or cyanosis     Results Review:     I reviewed the patient's new clinical results.    WBC WBC   Date Value Ref Range Status   06/02/2024 19.47 (H) 3.40 - 10.80 10*3/mm3 Final   06/01/2024 19.27 (H) 3.40 - 10.80 10*3/mm3 Final   06/01/2024 19.93 (H) 3.40 - 10.80 10*3/mm3 Final   05/31/2024 18.29 (H) 3.40 - 10.80 10*3/mm3 Final      HGB Hemoglobin   Date Value Ref Range Status   06/02/2024 9.1 (L) 13.0 - 17.7 g/dL Final   06/02/2024 9.3 (L) 13.0 - 17.7 g/dL Final   06/01/2024 9.3 (L) 13.0 - 17.7 g/dL Final " "  06/01/2024 9.3 (L) 13.0 - 17.7 g/dL Final   06/01/2024 9.7 (L) 13.0 - 17.7 g/dL Final   06/01/2024 9.7 (L) 13.0 - 17.7 g/dL Final   06/01/2024 9.3 (L) 13.0 - 17.7 g/dL Final   05/31/2024 9.2 (L) 13.0 - 17.7 g/dL Final   05/31/2024 6.6 (C) 13.0 - 17.7 g/dL Final      HCT Hematocrit   Date Value Ref Range Status   06/02/2024 27.5 (L) 37.5 - 51.0 % Final   06/02/2024 27.6 (L) 37.5 - 51.0 % Final   06/01/2024 28.2 (L) 37.5 - 51.0 % Final   06/01/2024 28.4 (L) 37.5 - 51.0 % Final   06/01/2024 28.5 (L) 37.5 - 51.0 % Final   06/01/2024 29.0 (L) 37.5 - 51.0 % Final   06/01/2024 27.9 (L) 37.5 - 51.0 % Final   05/31/2024 28.7 (L) 37.5 - 51.0 % Final   05/31/2024 20.7 (C) 37.5 - 51.0 % Final      Platlets No results found for: \"LABPLAT\"   MCV MCV   Date Value Ref Range Status   06/02/2024 88.2 79.0 - 97.0 fL Final   06/01/2024 90.3 79.0 - 97.0 fL Final   06/01/2024 90.3 79.0 - 97.0 fL Final   05/31/2024 88.1 79.0 - 97.0 fL Final          Sodium Sodium   Date Value Ref Range Status   06/02/2024 134 (L) 136 - 145 mmol/L Final   06/02/2024 137 136 - 145 mmol/L Final   06/01/2024 135 (L) 136 - 145 mmol/L Final   06/01/2024 137 136 - 145 mmol/L Final   06/01/2024 137 136 - 145 mmol/L Final   06/01/2024 139 136 - 145 mmol/L Final   06/01/2024 141 136 - 145 mmol/L Final   05/31/2024 143 136 - 145 mmol/L Final   05/31/2024 146 (H) 136 - 145 mmol/L Final   05/31/2024 148 (H) 136 - 145 mmol/L Final   05/31/2024 151 (H) 136 - 145 mmol/L Final      Potassium Potassium   Date Value Ref Range Status   06/02/2024 4.5 3.5 - 5.2 mmol/L Final   06/02/2024 4.6 3.5 - 5.2 mmol/L Final   06/01/2024 4.7 3.5 - 5.2 mmol/L Final   06/01/2024 4.2 3.5 - 5.2 mmol/L Final   06/01/2024 5.3 (H) 3.5 - 5.2 mmol/L Final     Comment:     Slight hemolysis detected by analyzer. Result may be falsely elevated.   06/01/2024 5.3 (H) 3.5 - 5.2 mmol/L Final   06/01/2024 5.3 (H) 3.5 - 5.2 mmol/L Final   05/31/2024 5.4 (H) 3.5 - 5.2 mmol/L Final   05/31/2024 5.7 (H) " 3.5 - 5.2 mmol/L Final   05/31/2024 5.2 3.5 - 5.2 mmol/L Final   05/31/2024 5.1 3.5 - 5.2 mmol/L Final      Chloride Chloride   Date Value Ref Range Status   06/02/2024 101 98 - 107 mmol/L Final   06/02/2024 101 98 - 107 mmol/L Final   06/01/2024 101 98 - 107 mmol/L Final   06/01/2024 103 98 - 107 mmol/L Final   06/01/2024 104 98 - 107 mmol/L Final   06/01/2024 109 (H) 98 - 107 mmol/L Final   06/01/2024 107 98 - 107 mmol/L Final   05/31/2024 111 (H) 98 - 107 mmol/L Final   05/31/2024 114 (H) 98 - 107 mmol/L Final   05/31/2024 118 (H) 98 - 107 mmol/L Final   05/31/2024 121 (H) 98 - 107 mmol/L Final      CO2 CO2   Date Value Ref Range Status   06/02/2024 18.0 (L) 22.0 - 29.0 mmol/L Final   06/02/2024 18.0 (L) 22.0 - 29.0 mmol/L Final   06/01/2024 17.4 (L) 22.0 - 29.0 mmol/L Final   06/01/2024 20.0 (L) 22.0 - 29.0 mmol/L Final   06/01/2024 18.2 (L) 22.0 - 29.0 mmol/L Final   06/01/2024 20.4 (L) 22.0 - 29.0 mmol/L Final   06/01/2024 20.7 (L) 22.0 - 29.0 mmol/L Final   05/31/2024 22.0 22.0 - 29.0 mmol/L Final   05/31/2024 19.9 (L) 22.0 - 29.0 mmol/L Final   05/31/2024 17.5 (L) 22.0 - 29.0 mmol/L Final   05/31/2024 20.0 (L) 22.0 - 29.0 mmol/L Final      BUN BUN   Date Value Ref Range Status   06/02/2024 19 8 - 23 mg/dL Final   06/02/2024 19 8 - 23 mg/dL Final   06/01/2024 20 8 - 23 mg/dL Final   06/01/2024 25 (H) 8 - 23 mg/dL Final   06/01/2024 26 (H) 8 - 23 mg/dL Final   06/01/2024 35 (H) 8 - 23 mg/dL Final   06/01/2024 36 (H) 8 - 23 mg/dL Final   05/31/2024 43 (H) 8 - 23 mg/dL Final   05/31/2024 53 (H) 8 - 23 mg/dL Final   05/31/2024 57 (H) 8 - 23 mg/dL Final   05/31/2024 56 (H) 8 - 23 mg/dL Final      Creatinine Creatinine   Date Value Ref Range Status   06/02/2024 1.63 (H) 0.76 - 1.27 mg/dL Final   06/02/2024 1.71 (H) 0.76 - 1.27 mg/dL Final   06/01/2024 1.74 (H) 0.76 - 1.27 mg/dL Final   06/01/2024 2.31 (H) 0.76 - 1.27 mg/dL Final   06/01/2024 2.36 (H) 0.76 - 1.27 mg/dL Final   06/01/2024 3.27 (H) 0.76 - 1.27 mg/dL  "Final   06/01/2024 3.44 (H) 0.76 - 1.27 mg/dL Final   05/31/2024 3.74 (H) 0.76 - 1.27 mg/dL Final   05/31/2024 4.69 (H) 0.76 - 1.27 mg/dL Final   05/31/2024 4.88 (H) 0.76 - 1.27 mg/dL Final   05/31/2024 3.98 (H) 0.76 - 1.27 mg/dL Final      Calcium Calcium   Date Value Ref Range Status   06/02/2024 7.8 (L) 8.6 - 10.5 mg/dL Final   06/02/2024 7.8 (L) 8.6 - 10.5 mg/dL Final   06/01/2024 7.9 (L) 8.6 - 10.5 mg/dL Final   06/01/2024 7.6 (L) 8.6 - 10.5 mg/dL Final   06/01/2024 8.1 (L) 8.6 - 10.5 mg/dL Final   06/01/2024 7.8 (L) 8.6 - 10.5 mg/dL Final   06/01/2024 8.0 (L) 8.6 - 10.5 mg/dL Final   05/31/2024 7.9 (L) 8.6 - 10.5 mg/dL Final   05/31/2024 8.0 (L) 8.6 - 10.5 mg/dL Final   05/31/2024 7.9 (L) 8.6 - 10.5 mg/dL Final   05/31/2024 7.8 (L) 8.6 - 10.5 mg/dL Final      PO4 No results found for: \"CAPO4\"   Albumin Albumin   Date Value Ref Range Status   06/02/2024 2.4 (L) 3.5 - 5.2 g/dL Final   06/02/2024 2.4 (L) 3.5 - 5.2 g/dL Final   06/01/2024 2.3 (L) 3.5 - 5.2 g/dL Final   06/01/2024 2.3 (L) 3.5 - 5.2 g/dL Final   06/01/2024 2.1 (L) 3.5 - 5.2 g/dL Final   06/01/2024 2.2 (L) 3.5 - 5.2 g/dL Final   06/01/2024 2.2 (L) 3.5 - 5.2 g/dL Final   05/31/2024 2.2 (L) 3.5 - 5.2 g/dL Final   05/31/2024 2.3 (L) 3.5 - 5.2 g/dL Final   05/31/2024 2.1 (L) 3.5 - 5.2 g/dL Final   05/31/2024 2.2 (L) 3.5 - 5.2 g/dL Final      Magnesium Magnesium   Date Value Ref Range Status   06/02/2024 2.5 (H) 1.6 - 2.4 mg/dL Final   06/01/2024 2.5 (H) 1.6 - 2.4 mg/dL Final   06/01/2024 2.4 1.6 - 2.4 mg/dL Final   06/01/2024 2.6 (H) 1.6 - 2.4 mg/dL Final   06/01/2024 2.5 (H) 1.6 - 2.4 mg/dL Final   05/31/2024 2.6 (H) 1.6 - 2.4 mg/dL Final   05/31/2024 2.7 (H) 1.6 - 2.4 mg/dL Final   05/31/2024 2.8 (H) 1.6 - 2.4 mg/dL Final   05/31/2024 2.8 (H) 1.6 - 2.4 mg/dL Final      Uric Acid Uric Acid   Date Value Ref Range Status   05/31/2024 6.4 3.4 - 7.0 mg/dL Final        Medication Review:     Current Facility-Administered Medications:     acetaminophen " (TYLENOL) tablet 650 mg, 650 mg, Oral, Q4H PRN, 650 mg at 05/27/24 2353 **OR** acetaminophen (TYLENOL) 160 MG/5ML oral solution 650 mg, 650 mg, Oral, Q4H PRN, 650 mg at 05/31/24 2218 **OR** acetaminophen (TYLENOL) suppository 650 mg, 650 mg, Rectal, Q4H PRN, Jaciel Coleman MD, 650 mg at 05/30/24 2248    albuterol (PROVENTIL) nebulizer solution 0.083% 2.5 mg/3mL, 2.5 mg, Nebulization, Q6H - RT, Jaciel Coleman MD, 2.5 mg at 06/01/24 2318    albuterol (PROVENTIL) nebulizer solution 0.083% 2.5 mg/3mL, 2.5 mg, Nebulization, Q6H PRN, Jaciel Coleman MD    artificial tears ophthalmic ointment, , Both Eyes, Q4H, James Rivera Jr., MD, Given at 06/02/24 0456    sennosides-docusate (PERICOLACE) 8.6-50 MG per tablet 2 tablet, 2 tablet, Oral, BID PRN **AND** polyethylene glycol (MIRALAX) packet 17 g, 17 g, Oral, Daily PRN **AND** bisacodyl (DULCOLAX) EC tablet 5 mg, 5 mg, Oral, Daily PRN **AND** bisacodyl (DULCOLAX) suppository 10 mg, 10 mg, Rectal, Daily PRN, Jaciel Coleman MD    Calcium Replacement - Follow Nurse / BPA Driven Protocol, , Does not apply, PRN, Jaciel Coleman MD    cisatracurium besylate (NIMBEX) 200 mg in sodium chloride 0.9 % 100 mL (2 mg/mL) infusion, 0.5-10 mcg/kg/min, Intravenous, Titrated, James Rivera Jr., MD, Last Rate: 14.8 mL/hr at 06/02/24 0454, 6 mcg/kg/min at 06/02/24 0454    dexmedetomidine (PRECEDEX) 400 mcg in 100 mL NS infusion, 0.2-1.5 mcg/kg/hr, Intravenous, Titrated, Olivier Meyer MD, Last Rate: 8.2 mL/hr at 06/02/24 0317, 0.4 mcg/kg/hr at 06/02/24 0317    dextrose (D50W) (25 g/50 mL) IV injection 25 g, 25 g, Intravenous, Q15 Min PRN, Ed Mandujano MD, 25 g at 06/02/24 0415    dextrose (GLUTOSE) oral gel 15 g, 15 g, Oral, Q15 Min PRN, Ed Mandujano MD    EPINEPHrine 5 mg in 250 mL NS infusion, 0.02-0.3 mcg/kg/min, Intravenous, Titrated, Jaylon Kim MD    epoetin fabiola-epbx (RETACRIT) injection 20,000 Units, 20,000 Units, Subcutaneous, Weekly, Jaciel Coleman,  MD    famotidine (PEPCID) tablet 10 mg, 10 mg, Oral, BID PRN, Jaciel Coleman MD    fentaNYL (SUBLIMAZE) bolus from bag 10 mcg/mL injection 50 mcg, 50 mcg, Intravenous, Q30 Min PRN, James Rivera Jr., MD, 50 mcg at 05/31/24 2019    fentaNYL 1000 mcg in 100 mL NS infusion,  mcg/hr, Intravenous, Titrated, James Rivera Jr., MD, Last Rate: 10 mL/hr at 06/01/24 2244, 100 mcg/hr at 06/01/24 2244    glucagon (GLUCAGEN) injection 1 mg, 1 mg, Intramuscular, Q15 Min PRN, Ed Mandujano MD    heparin (porcine) injection 1,000-2,000 Units, 1,000-2,000 Units, Intravenous, PRN, Jonathan Montoya MD, 2,000 Units at 05/31/24 1653    insulin regular (humuLIN R,novoLIN R) injection 2-7 Units, 2-7 Units, Subcutaneous, Q6H, Ed Mandujano MD, 2 Units at 05/31/24 1242    Magnesium Low Dose Replacement - Follow Nurse / BPA Driven Protocol, , Does not apply, PRN, Jaciel Coleman MD    melatonin tablet 2.5 mg, 2.5 mg, Oral, Nightly PRN, Jaciel Coleman MD    nitroglycerin (NITROSTAT) SL tablet 0.4 mg, 0.4 mg, Sublingual, Q5 Min PRN, Jaciel Coleman MD    norepinephrine (LEVOPHED) 8 mg in 250 mL NS infusion (premix), 0.02-0.3 mcg/kg/min, Intravenous, Titrated, Olivier Meyer MD, Last Rate: 46.2 mL/hr at 06/02/24 0317, 0.3 mcg/kg/min at 06/02/24 0317    ondansetron ODT (ZOFRAN-ODT) disintegrating tablet 4 mg, 4 mg, Oral, Q6H PRN **OR** ondansetron (ZOFRAN) injection 4 mg, 4 mg, Intravenous, Q6H PRN, Jaciel Coleman MD, 4 mg at 05/28/24 0848    pantoprazole (PROTONIX) 40 mg in sodium chloride 0.9 % 100 mL (0.4 mg/mL) MBP, 8 mg/hr, Intravenous, Continuous, Jaylon Kim MD, Last Rate: 20 mL/hr at 06/02/24 0456, 8 mg/hr at 06/02/24 0456    phenylephrine (YESY-SYNEPHRINE) 50 mg in 250 mL NS infusion, 0.5-3 mcg/kg/min, Intravenous, Titrated, Dominique Ortiz APRN, Last Rate: 65.8 mL/hr at 06/02/24 0614, 2.5 mcg/kg/min at 06/02/24 0614    Phosphorus Replacement - Follow Nurse / BPA Driven Protocol, , Does not  apply, Virginia KING Ervin H., MD    Phoxillum BK4/2.5 dialysis solution, 1,500 mL/hr, CRRT, Continuous, Jonathan Montoya MD, Last Rate: 1,500 mL/hr at 06/02/24 0510, 1,500 mL/hr at 06/02/24 0510    Phoxillum BK4/2.5 dialysis solution, 1,500 mL/hr, CRRT, Continuous, Jonathan Montoya MD, Last Rate: 1,500 mL/hr at 06/02/24 0510, 1,500 mL/hr at 06/02/24 0510    Phoxillum BK4/2.5 dialysis solution, 1,500 mL/hr, CRRT, Continuous, Jonathan Montoya MD, Last Rate: 1,500 mL/hr at 06/02/24 0510, 1,500 mL/hr at 06/02/24 0510    piperacillin-tazobactam (ZOSYN) 4.5 g IVPB in 100 mL NS MBP (CD), 4.5 g, Intravenous, Q8H, Sidney Lee MD, 4.5 g at 06/02/24 0006    Potassium Replacement - Follow Nurse / BPA Driven Protocol, , Does not apply, Virginia KING Ervin H., MD    prochlorperazine (COMPAZINE) injection 5 mg, 5 mg, Intravenous, Q6H PRN, Jaciel Coleman MD, 5 mg at 05/28/24 1650    propofol (DIPRIVAN) infusion 10 mg/mL 100 mL, 5-50 mcg/kg/min, Intravenous, Titrated, Olivier Meyer MD, Last Rate: 17.26 mL/hr at 06/02/24 0142, 35 mcg/kg/min at 06/02/24 0142    sodium chloride 0.9 % flush 10 mL, 10 mL, Intravenous, PRN, Jaciel Coleman MD    sodium chloride 0.9 % flush 10 mL, 10 mL, Intravenous, Q12H, Jaciel Coleman MD, 10 mL at 06/01/24 2029    sodium chloride 0.9 % flush 10 mL, 10 mL, Intravenous, PRN, Jaciel Coleman MD    sodium chloride 0.9 % flush 10 mL, 10 mL, Intravenous, Q12H, Chad Wilkinson MD, 10 mL at 06/01/24 2028    sodium chloride 0.9 % flush 10 mL, 10 mL, Intravenous, Q12H, Chad Wilkinson MD, 10 mL at 06/01/24 2028    sodium chloride 0.9 % flush 10 mL, 10 mL, Intravenous, Q12H, Chad Wilkinson MD, 10 mL at 06/01/24 2028    sodium chloride 0.9 % flush 10 mL, 10 mL, Intravenous, Q12H, Chad Wilkinson MD, 10 mL at 06/01/24 2027    sodium chloride 0.9 % flush 10 mL, 10 mL, Intravenous, PRN, Chad Wilkinson MD    sodium chloride 0.9 % flush 20 mL, 20  mL, Intravenous, PRN, Chad Wilkinson MD    sodium chloride 0.9 % flush 3 mL, 3 mL, Intravenous, Q12H, Jaciel Coleman MD, 3 mL at 06/01/24 2029    sodium chloride 0.9 % flush 3-10 mL, 3-10 mL, Intravenous, PRN, Jaciel Coleman MD    sodium chloride 0.9 % infusion 40 mL, 40 mL, Intravenous, PRN, Jaciel Coleman MD    sodium chloride 0.9 % infusion 40 mL, 40 mL, Intravenous, PRN, Jaciel Coleman MD    sodium chloride 0.9 % infusion 40 mL, 40 mL, Intravenous, PRN, Chad Wilkinson MD    vasopressin (PITRESSIN) 20 units in 100 mL NS infusion, 0.03 Units/min, Intravenous, Continuous, Jaylon Kim MD, Last Rate: 9 mL/hr at 06/02/24 0317, 0.03 Units/min at 06/02/24 0317     Assessment & Plan       Assessment & Plan  MARTÍNEZ/ATN  Proteinuria   Metabolic acidosis   ARDS  Shock  PNA  Sepsis   Hypernatremia  Hematuria - ANCA pending  Shock    Patient anuric  Continue CRRT   UF as able to keep even  Continue Abx per pulmonary/primary  Pressor and vent support per ICU team   F/U ANCA studies  Discussed with family  Guarded prognosis  Will follow         Jonathan Montoya MD  [facility]  06/02/24  06:33 EDT

## 2024-06-02 NOTE — PROGRESS NOTES
[facility]  Inpatient Progress Note    Reason for Follow Up: Elevated LFTs, GI bleed    Subjective     Interval History:   Elevated LFTs continue to trend downward.  Current hemoglobin is 9.1 with hematocrit of 27.5.  No longer showing evidence of active GI bleeding via NG tube.  Discussed with intensivist, he has switched from PPI drip to twice daily infusion.  Given his significant medical issues he does not feel the patient is a good candidate for endoscopic evaluation at this time and I am inclined to agree.  As long as his blood count remained stable would continue empiric treatment and may entertain the evaluation down the road once his other parameters improve.  Discussed same with family at bedside and she is amenable to this.    Current Facility-Administered Medications:     acetaminophen (TYLENOL) tablet 650 mg, 650 mg, Oral, Q4H PRN, 650 mg at 05/27/24 2353 **OR** acetaminophen (TYLENOL) 160 MG/5ML oral solution 650 mg, 650 mg, Oral, Q4H PRN, 650 mg at 05/31/24 2218 **OR** acetaminophen (TYLENOL) suppository 650 mg, 650 mg, Rectal, Q4H PRN, Jaciel Coleman MD, 650 mg at 05/30/24 2248    albuterol (PROVENTIL) nebulizer solution 0.083% 2.5 mg/3mL, 2.5 mg, Nebulization, Q6H - RT, Jaciel Coleman MD, 2.5 mg at 06/02/24 0744    albuterol (PROVENTIL) nebulizer solution 0.083% 2.5 mg/3mL, 2.5 mg, Nebulization, Q6H PRN, Jaciel Coleman MD    artificial tears ophthalmic ointment, , Both Eyes, Q4H, James Rivera Jr., MD, Given at 06/02/24 0456    sennosides-docusate (PERICOLACE) 8.6-50 MG per tablet 2 tablet, 2 tablet, Oral, BID PRN **AND** polyethylene glycol (MIRALAX) packet 17 g, 17 g, Oral, Daily PRN **AND** bisacodyl (DULCOLAX) EC tablet 5 mg, 5 mg, Oral, Daily PRN **AND** bisacodyl (DULCOLAX) suppository 10 mg, 10 mg, Rectal, Daily PRN, Jaciel Coleman MD    Calcium Replacement - Follow Nurse / BPA Driven Protocol, , Does not apply, PRN, Jaciel Coleman MD    cisatracurium  besylate (NIMBEX) 200 mg in sodium chloride 0.9 % 100 mL (2 mg/mL) infusion, 0.5-10 mcg/kg/min, Intravenous, Titrated, James Rivrea Jr., MD, Last Rate: 14.8 mL/hr at 06/02/24 0454, 6 mcg/kg/min at 06/02/24 0454    dexmedetomidine (PRECEDEX) 400 mcg in 100 mL NS infusion, 0.2-1.5 mcg/kg/hr, Intravenous, Titrated, Olivier Meyer MD, Last Rate: 6.2 mL/hr at 06/02/24 0758, 0.3 mcg/kg/hr at 06/02/24 0758    dextrose (D50W) (25 g/50 mL) IV injection 25 g, 25 g, Intravenous, Q15 Min PRN, Ed Mandujano MD, 25 g at 06/02/24 0415    dextrose (GLUTOSE) oral gel 15 g, 15 g, Oral, Q15 Min PRN, Ed Mandujano MD    EPINEPHrine 5 mg in 250 mL NS infusion, 0.02-0.3 mcg/kg/min, Intravenous, Titrated, Jaylon Kim MD    epoetin fabiola-epbx (RETACRIT) injection 20,000 Units, 20,000 Units, Subcutaneous, Weekly, Jaciel Coleman MD    famotidine (PEPCID) tablet 10 mg, 10 mg, Oral, BID PRN, Jaciel Coleman MD    fentaNYL (SUBLIMAZE) bolus from bag 10 mcg/mL injection 50 mcg, 50 mcg, Intravenous, Q30 Min PRN, James Rivera Jr., MD, 50 mcg at 05/31/24 2019    fentaNYL 1000 mcg in 100 mL NS infusion,  mcg/hr, Intravenous, Titrated, James Rivera Jr., MD, Last Rate: 10 mL/hr at 06/02/24 0817, 100 mcg/hr at 06/02/24 0817    glucagon (GLUCAGEN) injection 1 mg, 1 mg, Intramuscular, Q15 Min PRN, Ed Mandujano MD    heparin (porcine) injection 1,000-2,000 Units, 1,000-2,000 Units, Intravenous, PRN, Jonathan Montoya MD, 2,000 Units at 05/31/24 1653    insulin regular (humuLIN R,novoLIN R) injection 2-7 Units, 2-7 Units, Subcutaneous, Q6H, Ed Mandujano MD, 2 Units at 05/31/24 1242    Magnesium Low Dose Replacement - Follow Nurse / BPA Driven Protocol, , Does not apply, PRN, Jaciel Coleman MD    melatonin tablet 2.5 mg, 2.5 mg, Oral, Nightly PRN, Jaciel Coleman MD    nitroglycerin (NITROSTAT) SL tablet 0.4 mg, 0.4 mg, Sublingual, Q5 Min PRN, Jaciel Coleman MD    norepinephrine (LEVOPHED) 8 mg in 250  mL NS infusion (premix), 0.02-0.3 mcg/kg/min, Intravenous, Titrated, Olivier Meyer MD, Last Rate: 46.2 mL/hr at 06/02/24 0317, 0.3 mcg/kg/min at 06/02/24 0317    ondansetron ODT (ZOFRAN-ODT) disintegrating tablet 4 mg, 4 mg, Oral, Q6H PRN **OR** ondansetron (ZOFRAN) injection 4 mg, 4 mg, Intravenous, Q6H PRN, Jaciel Coleman MD, 4 mg at 05/28/24 0848    pantoprazole (PROTONIX) injection 40 mg, 40 mg, Intravenous, Q12H, Jaylon Kim MD    phenylephrine (YESY-SYNEPHRINE) 50 mg in 250 mL NS infusion, 0.5-3 mcg/kg/min, Intravenous, Titrated, Dominique Ortiz APRN, Last Rate: 78.9 mL/hr at 06/02/24 0816, 3 mcg/kg/min at 06/02/24 0816    Phosphorus Replacement - Follow Nurse / BPA Driven Protocol, , Does not apply, PRN, Jaciel Coleman MD    Phoxillum BK4/2.5 dialysis solution, 1,500 mL/hr, CRRT, Continuous, Jonathan Montoya MD, Last Rate: 1,500 mL/hr at 06/02/24 0510, 1,500 mL/hr at 06/02/24 0510    Phoxillum BK4/2.5 dialysis solution, 1,500 mL/hr, CRRT, Continuous, Jonathan Montoya MD, Last Rate: 1,500 mL/hr at 06/02/24 0510, 1,500 mL/hr at 06/02/24 0510    Phoxillum BK4/2.5 dialysis solution, 1,500 mL/hr, CRRT, Continuous, Jonathan Montoya MD, Last Rate: 1,500 mL/hr at 06/02/24 0510, 1,500 mL/hr at 06/02/24 0510    piperacillin-tazobactam (ZOSYN) 4.5 g IVPB in 100 mL NS MBP (CD), 4.5 g, Intravenous, Q8H, Sidney Lee MD, 4.5 g at 06/02/24 0816    Potassium Replacement - Follow Nurse / BPA Driven Protocol, , Does not apply, PRN, Jaciel Coleman MD    prochlorperazine (COMPAZINE) injection 5 mg, 5 mg, Intravenous, Q6H PRN, Jaciel Coleman MD, 5 mg at 05/28/24 1650    propofol (DIPRIVAN) infusion 10 mg/mL 100 mL, 5-50 mcg/kg/min, Intravenous, Titrated, Olivier Meyer MD, Last Rate: 17.26 mL/hr at 06/02/24 0803, 35 mcg/kg/min at 06/02/24 0803    sodium bicarbonate injection 8.4% 50 mEq, 50 mEq, Intravenous, Once, Jonathan Montoya MD    sodium chloride 0.9 %  flush 10 mL, 10 mL, Intravenous, PRNVirginia Ervin H., MD    sodium chloride 0.9 % flush 10 mL, 10 mL, Intravenous, Q12H, Jaciel Coleman MD, 10 mL at 06/02/24 0820    sodium chloride 0.9 % flush 10 mL, 10 mL, Intravenous, PRNVirginia Ervin H., MD    sodium chloride 0.9 % flush 10 mL, 10 mL, Intravenous, Q12H, Chad Wilkinson MD, 10 mL at 06/02/24 0820    sodium chloride 0.9 % flush 10 mL, 10 mL, Intravenous, Q12H, Chad Wilkinson MD, 10 mL at 06/02/24 0814    sodium chloride 0.9 % flush 10 mL, 10 mL, Intravenous, Q12H, Chad Wilkinson MD, 10 mL at 06/02/24 0814    sodium chloride 0.9 % flush 10 mL, 10 mL, Intravenous, Q12H, Chad Wilkinson MD, 10 mL at 06/02/24 0814    sodium chloride 0.9 % flush 10 mL, 10 mL, Intravenous, PRNJaja Emmett J., MD    sodium chloride 0.9 % flush 20 mL, 20 mL, Intravenous, PRNJaja Emmett J., MD    sodium chloride 0.9 % flush 3 mL, 3 mL, Intravenous, Q12H, Jaciel Coleman MD, 3 mL at 06/02/24 0820    sodium chloride 0.9 % flush 3-10 mL, 3-10 mL, Intravenous, Virginia KING Ervin H., MD    sodium chloride 0.9 % infusion 40 mL, 40 mL, Intravenous, PRNVirginia Ervin H., MD    sodium chloride 0.9 % infusion 40 mL, 40 mL, Intravenous, Virginia KING Ervin H., MD    sodium chloride 0.9 % infusion 40 mL, 40 mL, Intravenous, PRJaja JARRELL Emmett J., MD    vasopressin (PITRESSIN) 20 units in 100 mL NS infusion, 0.03 Units/min, Intravenous, Continuous, Jaylon Kim MD, Last Rate: 9 mL/hr at 06/02/24 0317, 0.03 Units/min at 06/02/24 0317  Review of Systems:    Review of systems could not be obtained due to  patient intubated.    Objective     Vital Signs  Temp:  [98.6 °F (37 °C)-99.7 °F (37.6 °C)] 99.3 °F (37.4 °C)  Heart Rate:  [] 98  Resp:  [30] 30  BP: ()/() 108/81  FiO2 (%):  [56 %-80 %] 60 %  Body mass index is 24.05 kg/m².    Intake/Output Summary (Last 24 hours) at 6/2/2024 0824  Last data filed at 6/2/2024 0800  Gross per 24 hour    Intake 3981.14 ml   Output 5035.4 ml   Net -1054.26 ml     I/O this shift:  In: -   Out: 226.5      Physical Exam:   General: patient awake, alert and cooperative   Eyes: Normal lids and lashes, no scleral icterus   Neck: supple, normal ROM   Skin: warm and dry, not jaundiced   Cardiovascular: regular rhythm and rate, no murmurs auscultated   Pulm: clear to auscultation bilaterally, regular and unlabored   Abdomen: soft, nontender, nondistended; normal bowel sounds   Extremities: no rash or edema   Psychiatric: Normal mood and behavior; memory intact     Results Review:     I reviewed the patient's new clinical results.    Results from last 7 days   Lab Units 06/02/24  0541 06/02/24  0321 06/01/24  2334 06/01/24  1219 06/01/24  0945 06/01/24  0305   WBC 10*3/mm3  --  19.47*  --   --  19.27* 19.93*   HEMOGLOBIN g/dL 9.1* 9.3* 9.3*   < > 9.7* 9.3*   HEMATOCRIT % 27.5* 27.6* 28.2*   < > 29.0* 27.9*   PLATELETS 10*3/mm3  --  371  --   --  390 410    < > = values in this interval not displayed.     Results from last 7 days   Lab Units 06/02/24  0541 06/02/24 0321 06/01/24  2334 06/01/24  0522 06/01/24  0305 05/31/24  1815 05/31/24  1209   SODIUM mmol/L 134* 137 135*   < > 141   < > 148*   POTASSIUM mmol/L 4.5 4.6 4.7   < > 5.3*   < > 5.2   CHLORIDE mmol/L 101 101 101   < > 107   < > 118*   CO2 mmol/L 18.0* 18.0* 17.4*   < > 20.7*   < > 17.5*   BUN mg/dL 19 19 20   < > 36*   < > 57*   CREATININE mg/dL 1.63* 1.71* 1.74*   < > 3.44*   < > 4.88*   CALCIUM mg/dL 7.8* 7.8* 7.9*   < > 8.0*   < > 7.9*   BILIRUBIN mg/dL  --  3.6*  --   --  3.6*  --  4.0*   ALK PHOS U/L  --  113  --   --  114  --  116   ALT (SGPT) U/L  --  99*  --   --  123*  --  139*   AST (SGOT) U/L  --  146*  --   --  167*  --  192*   GLUCOSE mg/dL 104* 71 79   < > 77   < > 151*    < > = values in this interval not displayed.     Results from last 7 days   Lab Units 05/31/24  1209 05/29/24  0432   INR  1.27* 1.26*     Lab Results   Lab Value Date/Time     LIPASE 39 08/11/2023 2030       Radiology:  XR Chest 1 View   Final Result   As described.       Discussed by telephone with patient's nurse, Angelita, at time of   interpretation, 0605, 6/2/2024.               This report was finalized on 6/2/2024 6:06 AM by Dr. Papo Selby M.D on Workstation: [id number]          XR Chest 1 View   Final Result   Potential mild right supraclavicular emphysema. No other   change or evidence for pneumothorax.       This report was finalized on 6/1/2024 3:15 PM by Dr. Edson Marquez M.D on Workstation: [id number]          XR Chest 1 View   Final Result   Central line placement. No pneumothorax.       This report was finalized on 5/31/2024 3:47 PM by Dr. Papo Selby M.D on Workstation: [id number]          XR Chest Post CVA Port   Final Result   Tubes and lines as noted above. No pneumothorax identified.       This report was finalized on 5/30/2024 10:14 PM by Dr. Faith Del Rio M.D on Workstation: [id number]          XR Abdomen KUB   Final Result       As described.           This report was finalized on 5/30/2024 7:19 PM by Dr. Papo Selby M.D on Workstation: [id number]          XR Chest 1 View   Final Result   As described.       This report was finalized on 5/30/2024 7:03 PM by Dr. Papo Selby M.D on Workstation: [id number]          XR Chest 1 View   Final Result   As described.               This report was finalized on 5/30/2024 12:12 PM by Dr. Papo Selby M.D on Workstation: [id number]          CT Chest Without Contrast Diagnostic   Final Result       Conspicuous interval worsening of left more than right airspace disease,   clinical correlation and continued follow-up advised.       This report was finalized on 5/29/2024 4:41 PM by Dr. Papo Selby M.D on Workstation: [id number]          NM HIDA SCAN WITH PHARMACOLOGICAL INTERVENTION   Final Result   Visualization of the gallbladder suggesting cystic duct   patency.           This report  was finalized on 5/29/2024 3:32 PM by Dr. Ronny Pérez M.D on Workstation: [id number]          US Renal Bilateral   Final Result   1. No hydronephrosis.   2. Ultrasound findings suggesting medical renal disease.           This report was finalized on 5/29/2024 12:47 PM by Dr. Ronny Pérez M.D on Workstation: [id number]          XR Chest 1 View   Final Result   Interval progression in left lung pneumonia and/or   atelectasis with interval development of mild right lung atelectasis.       This report was finalized on 5/29/2024 12:55 PM by Dr. José Miguel Thomas M.D on Workstation: [id number]          XR Chest 1 View   Final Result      US Abdomen Limited   Final Result   Gallbladder sludge, without evidence of acute cholecystitis.       This report was finalized on 5/28/2024 6:03 AM by Dr. Faith Del Rio M.D on Workstation: [id number]          FL ESOPHAGRAM SINGLE CONTRAST   Final Result   Normal limited esophagram.                This report was finalized on 5/26/2024 8:17 PM by Dr. Ronny Pérez M.D on Workstation: [id number]          XR Chest 1 View   Final Result   Heterogeneous multifocal left greater than right lung opacities,   increased in the interval. Suspect multifocal pneumonia. Hemorrhage in   the appropriate clinical setting.       This report was finalized on 5/25/2024 7:58 AM by Dr. Carlito Chand M.D on Workstation: [id number]          NM Lung Ventilation Perfusion    (Results Pending)   SLP FEES - Fiberoptic Endo Eval Swallow    (Results Pending)   XR Chest 1 View    (Results Pending)   XR Chest 1 View    (Results Pending)   XR Chest 1 View    (Results Pending)       Assessment & Plan     Active Hospital Problems    Diagnosis     **Aspiration pneumonia due to vomitus     Oral phase dysphagia     Dental caries     MARTÍNEZ (acute kidney injury)     Sepsis     Dehydration     Severe malnutrition     Jaw pain     History of prostate cancer        Assessment:  Upper GI bleeding: H&H  stable with no evidence of active bleeding at present    Aspiration pneumonia  MARTÍNEZ  Elevated LFTs: Improving  Prostate cancer      Plan:  Continue to monitor labs including LFTs and CBC  Will defer endoscopic assessment at this time  Continue PPI  I discussed the patients findings and my recommendations with family, nursing staff, and primary care team.    Carlito Carlos MD

## 2024-06-02 NOTE — NURSING NOTE
0826 - Respiratory Therapist called - patient satting in the 70s, MAP in the 50s. Levo max at 0.3 and Eddie started. Dr. Kim came to bedside and ordered vaso. CRRT fluid removal rate dropped down. BP improved with Eddie and 02 increased to 100%.     0850- BP stable but fresh blood from NG tube. Dr. Carlos notified. Orders for protonix drip in place and serial HGB/HCT.    Topical Sulfur Applications Pregnancy And Lactation Text: This medication is Pregnancy Category C and has an unknown safety profile during pregnancy. It is unknown if this topical medication is excreted in breast milk.

## 2024-06-02 NOTE — NURSING NOTE
Patient BP dropped, started on 4th pressor, radiologist called withx-ray results, dr rebollar updated on above.    Julio at bedside spoke with family, family decided to go palliative. Palliative orders are in. Nimbex/CRRT stopped per verbal order. Waiting on family to proceed

## 2024-06-02 NOTE — PLAN OF CARE
Goal Outcome Evaluation:      Pt remains intubated/sedated/paralyzed. 55%/10peep. Nimbex@6, fent@100, prop@35, dex@0.4, levo@0.3, vaso@0.03,ruth@2.5. protonix@8. Anuric, NGT to LWS only 75cc dark red output. On CRRT-pulling . [place] placed overnight. Pt required D50x1 for glucose-71. He remains acidotic on abg. Slightly improving w/ repeat abg. Family updated

## 2024-06-03 LAB
ANA SER QL IF: POSITIVE
ANA SPECKLED TITR SER: ABNORMAL {TITER}
C-ANCA TITR SER IF: NORMAL TITER
CENTROMERE B AB SER-ACNC: <0.2 AI (ref 0–0.9)
CENTROMERE B AB SER-ACNC: <0.2 AI (ref 0–0.9)
CHROMATIN AB SERPL-ACNC: <0.2 AI (ref 0–0.9)
CHROMATIN AB SERPL-ACNC: <0.2 AI (ref 0–0.9)
DPYD GENE MUT ANL BLD/T: NEGATIVE
DSDNA AB SER-ACNC: <1 IU/ML (ref 0–9)
DSDNA AB SER-ACNC: <1 IU/ML (ref 0–9)
ENA JO1 AB SER-ACNC: <0.2 AI (ref 0–0.9)
ENA JO1 AB SER-ACNC: <0.2 AI (ref 0–0.9)
ENA RNP AB SER-ACNC: 0.4 AI (ref 0–0.9)
ENA RNP AB SER-ACNC: 0.6 AI (ref 0–0.9)
ENA SCL70 AB SER-ACNC: <0.2 AI (ref 0–0.9)
ENA SCL70 AB SER-ACNC: <0.2 AI (ref 0–0.9)
ENA SM AB SER-ACNC: <0.2 AI (ref 0–0.9)
ENA SM AB SER-ACNC: <0.2 AI (ref 0–0.9)
ENA SS-A AB SER-ACNC: 0.6 AI (ref 0–0.9)
ENA SS-A AB SER-ACNC: 0.7 AI (ref 0–0.9)
ENA SS-B AB SER-ACNC: <0.2 AI (ref 0–0.9)
ENA SS-B AB SER-ACNC: <0.2 AI (ref 0–0.9)
FUNGITELL VALUE: <31.25 PG/ML
IMP & REVIEW OF LAB RESULTS: NORMAL
LABORATORY COMMENT REPORT: ABNORMAL
Lab: ABNORMAL
Lab: ABNORMAL
Lab: NORMAL
MYELOPEROXIDASE AB SER IA-ACNC: <0.2 UNITS (ref 0–0.9)
P JIROVECII DNA # SPEC NAA+PROBE: NEGATIVE {COPIES}/ML
P-ANCA ATYPICAL TITR SER IF: NORMAL TITER
P-ANCA TITR SER IF: NORMAL TITER
PATHOLOGIST NAME: NORMAL
PROTEINASE3 AB SER IA-ACNC: <0.2 UNITS (ref 0–0.9)
QT INTERVAL: 312 MS
QTC INTERVAL: 463 MS
REFERENCE VALUE: NORMAL
SPECIMEN SOURCE: NORMAL

## 2024-06-03 NOTE — CASE MANAGEMENT/SOCIAL WORK
Case Management Discharge Note      Final Note: Pt     Provided Post Acute Provider List?: Yes  Post Acute Provider List:  (SNF)  Provided Post Acute Provider Quality & Resource List?: Yes  Post Acute Provider Quality and Resource List:  (SNF)  Delivered To: Support Person  Support Person: Children  Method of Delivery: In person    Selected Continued Care - Discharged on 6/3/2024 Admission date: 2024 - Discharge disposition:       Destination    No services have been selected for the patient.                Durable Medical Equipment    No services have been selected for the patient.                Dialysis/Infusion    No services have been selected for the patient.                Home Medical Care    No services have been selected for the patient.                Therapy    No services have been selected for the patient.                Community Resources    No services have been selected for the patient.                Community & DME    No services have been selected for the patient.                         Final Discharge Disposition Code: 41 -  in medical facility

## 2024-06-03 NOTE — PAYOR COMM NOTE
"Jimmy Norman (Dcsd. Male)          DC SUMMARY FOR [id number]     CONTACT F# [phone]         Date of Birth   1955    Social Security Number       Address   [address] [place] [address]    Home Phone   [phone]    MRN   [id number]       Gnosticism   [facility]    Marital Status   Single                            Admission Date   24    Admission Type   Emergency    Admitting Provider   Juan Ramon Hinojosa MD    Attending Provider       Department, Room/Bed   [facility] INTENSIVE CARE, I377/1       Discharge Date   6/3/2024    Discharge Disposition       Discharge Destination                                 Attending Provider: (none)   Allergies: No Known Allergies    Isolation: None   Infection: None   Code Status: Prior    Ht: 188 cm (74.02\")   Wt: 85 kg (187 lb 6.3 oz)    Admission Cmt: None   Principal Problem: Aspiration pneumonia due to vomitus [J69.0]                   Active Insurance as of 2024       Primary Coverage       Payor Plan Insurance Group Employer/Plan Group    PASSPORT MEDICARE ADVANTAGE PASSPORT ADVANTAGE [id number]       Payor Plan Address Payor Plan Phone Number Payor Plan Fax Number Effective Dates    [address]   2024 - None Entered    [place] [id number]         Subscriber Name Subscriber Birth Date Member ID       JIMMY NORMAN 1955 [id number]                     Emergency Contacts        (Rel.) Home Phone Work Phone Mobile Phone    caroline montalvo (Daughter) [phone] -- --    george norman (Brother) [phone] -- --                 Physician Progress Notes (last 4 days)        Jessica Hernandez MD at 24 1033          ID progress note    Chief complaint: Follow-up sepsis due to pneumonia due to aspiration    Subjective: Afebrile, worsening shock and the patient is currently on 3 pressors.  Overall stable ventilatory settings with an FiO2 of 60% and PEEP of 10.  Remains on " CRRT.  Tolerating antibiotics without rash or diarrhea     ROS; unable to be obtained     Objective   Vital Signs   Temp:  [98.6 °F (37 °C)-99.7 °F (37.6 °C)] 99.1 °F (37.3 °C)  Heart Rate:  [] 99  Resp:  [30] 30  BP: ()/() 116/79  FiO2 (%):  [56 %-80 %] 60 %    Physical Exam:   General: Sedated, intubated, ill-appearing  Cardiovascular: Tachycardic  Respiratory: Coarse breath sounds bilaterally  GI: Abdomen is soft, not distended  :  + Goncalves catheter  Psychiatric: Sedated  Vasc: Right IJ catheter and PIV's without erythema    Labs:   Lab Results   Component Value Date    WBC 19.47 (H) 06/02/2024    HGB 9.1 (L) 06/02/2024    HCT 27.5 (L) 06/02/2024    MCV 88.2 06/02/2024     06/02/2024       Lab Results   Component Value Date    GLUCOSE 104 (H) 06/02/2024    BUN 19 06/02/2024    CREATININE 1.63 (H) 06/02/2024    EGFRIFAFRI >60 11/21/2022    BCR 11.7 06/02/2024    CO2 18.0 (L) 06/02/2024    CALCIUM 7.8 (L) 06/02/2024    ALBUMIN 2.4 (L) 06/02/2024    LABIL2 1.3 02/06/2021     (H) 06/02/2024    ALT 99 (H) 06/02/2024     Procal 2.09----->146  HIV antibody negative  HIV viral load pending  Hepatitis C antibody negative  Hepatitis B surface antigen negative  Hepatitis B core IgM antibody negative  Cryptococcus antigen negative  Urine histoplasma antigen pending  Serum histoplasma antigen pending  Urine Blastomyces antigen pending  Aspergillus galactomannan pending    Microbiology:  5/14 flu/RSV/COVID PCR: Negative  5/14 BCx: Negative  5/17 RPP negative  5/17 BCx: Negative  5/18 urine Legionella and strep antigen: Negative  5/18 MRSA nares PCR: Negative  5/25 RPP: Negative  5/25 BCx: Negative  5/29 MRSA PCR: Negative  5/30 BAL cultures: NGTD  5/30 BCx NGTDx 2  5/30 RPP: Negative  5/31 BAL yeast     Radiology:  6/2 CXR with new subcutaneous emphysema over the right aspect of the neck and pneumomediastinum.  Stable bilateral pulmonary infiltrates    ASSESSMENT/PLAN:  Bilateral  pneumonia  Acute hypoxic respiratory failure -ARDS  Acute on chronic jaw pain due to mandibular fracture  Aspiration  Acute kidney injury on CRRT  Elevated liver enzymes  History of prostate cancer  Septic shock  Pneumomediastinum    Fever curve improved.  Stable white blood cell count.  BAL from May 31 with yeast although this is most likely a colonizer given the patient's critical status will treat with fluconazole 400 mg IV every 24 hours x 7 days..  Blood cultures remain negative to date.  Chest x-ray with stable infiltrates but new pneumomediastinum  Continue empiric Zosyn 4.5 g IV every 8 hours.  Antibiotic duration to be determined    LFTs including T. bili are elevated which is most likely due to shock liver but will check ultrasound of the gallbladder    ID will follow.    Electronically signed by Jessica Hernandez MD at 06/02/24 1328       Carlito Carlos MD at 06/02/24 0824          [facility], [place], operated by [facility]  Inpatient Progress Note    Reason for Follow Up: Elevated LFTs, GI bleed    Subjective     Interval History:   Elevated LFTs continue to trend downward.  Current hemoglobin is 9.1 with hematocrit of 27.5.  No longer showing evidence of active GI bleeding via NG tube.  Discussed with intensivist, he has switched from PPI drip to twice daily infusion.  Given his significant medical issues he does not feel the patient is a good candidate for endoscopic evaluation at this time and I am inclined to agree.  As long as his blood count remained stable would continue empiric treatment and may entertain the evaluation down the road once his other parameters improve.  Discussed same with family at bedside and she is amenable to this.    Current Facility-Administered Medications:     acetaminophen (TYLENOL) tablet 650 mg, 650 mg, Oral, Q4H PRN, 650 mg at 05/27/24 8753 **OR** acetaminophen (TYLENOL) 160 MG/5ML oral solution 650 mg, 650 mg, Oral, Q4H PRN, 650 mg at 05/31/24 2218 **OR** acetaminophen  (TYLENOL) suppository 650 mg, 650 mg, Rectal, Q4H PRN, Jaciel Coleman MD, 650 mg at 05/30/24 2248    albuterol (PROVENTIL) nebulizer solution 0.083% 2.5 mg/3mL, 2.5 mg, Nebulization, Q6H - RT, Jaciel Coleman MD, 2.5 mg at 06/02/24 0744    albuterol (PROVENTIL) nebulizer solution 0.083% 2.5 mg/3mL, 2.5 mg, Nebulization, Q6H PRN, Jaciel Coleman MD    artificial tears ophthalmic ointment, , Both Eyes, Q4H, James Rivera Jr., MD, Given at 06/02/24 0456    sennosides-docusate (PERICOLACE) 8.6-50 MG per tablet 2 tablet, 2 tablet, Oral, BID PRN **AND** polyethylene glycol (MIRALAX) packet 17 g, 17 g, Oral, Daily PRN **AND** bisacodyl (DULCOLAX) EC tablet 5 mg, 5 mg, Oral, Daily PRN **AND** bisacodyl (DULCOLAX) suppository 10 mg, 10 mg, Rectal, Daily PRN, Jaciel Coleman MD    Calcium Replacement - Follow Nurse / BPA Driven Protocol, , Does not apply, PRN, Jaciel Coleman MD    cisatracurium besylate (NIMBEX) 200 mg in sodium chloride 0.9 % 100 mL (2 mg/mL) infusion, 0.5-10 mcg/kg/min, Intravenous, Titrated, James Rivera Jr., MD, Last Rate: 14.8 mL/hr at 06/02/24 0454, 6 mcg/kg/min at 06/02/24 0454    dexmedetomidine (PRECEDEX) 400 mcg in 100 mL NS infusion, 0.2-1.5 mcg/kg/hr, Intravenous, Titrated, Olivier Meyer MD, Last Rate: 6.2 mL/hr at 06/02/24 0758, 0.3 mcg/kg/hr at 06/02/24 0758    dextrose (D50W) (25 g/50 mL) IV injection 25 g, 25 g, Intravenous, Q15 Min PRN, Ed Mandujano MD, 25 g at 06/02/24 0415    dextrose (GLUTOSE) oral gel 15 g, 15 g, Oral, Q15 Min PRN, Ed Mandujano MD    EPINEPHrine 5 mg in 250 mL NS infusion, 0.02-0.3 mcg/kg/min, Intravenous, Titrated, Jaylon Kim MD    epoetin fabiola-epbx (RETACRIT) injection 20,000 Units, 20,000 Units, Subcutaneous, Weekly, Jaciel Coleman MD    famotidine (PEPCID) tablet 10 mg, 10 mg, Oral, BID PRN, Jaciel Coleman MD    fentaNYL (SUBLIMAZE) bolus from bag 10 mcg/mL injection 50 mcg, 50 mcg, Intravenous, Q30 Min PRN, James Rivera  MD Trip, 50 mcg at 05/31/24 2019    fentaNYL 1000 mcg in 100 mL NS infusion,  mcg/hr, Intravenous, Titrated, James Rivera Jr., MD, Last Rate: 10 mL/hr at 06/02/24 0817, 100 mcg/hr at 06/02/24 0817    glucagon (GLUCAGEN) injection 1 mg, 1 mg, Intramuscular, Q15 Min PRN, Ed Mandujano MD    heparin (porcine) injection 1,000-2,000 Units, 1,000-2,000 Units, Intravenous, PRN, Jonathan Montoya MD, 2,000 Units at 05/31/24 1653    insulin regular (humuLIN R,novoLIN R) injection 2-7 Units, 2-7 Units, Subcutaneous, Q6H, Ed Mandujano MD, 2 Units at 05/31/24 1242    Magnesium Low Dose Replacement - Follow Nurse / BPA Driven Protocol, , Does not apply, PRN, Jaciel Coleman MD    melatonin tablet 2.5 mg, 2.5 mg, Oral, Nightly PRN, Jaciel Coleman MD    nitroglycerin (NITROSTAT) SL tablet 0.4 mg, 0.4 mg, Sublingual, Q5 Min PRN, Jaciel Coleman MD    norepinephrine (LEVOPHED) 8 mg in 250 mL NS infusion (premix), 0.02-0.3 mcg/kg/min, Intravenous, Titrated, Olivier Meyer MD, Last Rate: 46.2 mL/hr at 06/02/24 0317, 0.3 mcg/kg/min at 06/02/24 0317    ondansetron ODT (ZOFRAN-ODT) disintegrating tablet 4 mg, 4 mg, Oral, Q6H PRN **OR** ondansetron (ZOFRAN) injection 4 mg, 4 mg, Intravenous, Q6H PRN, Jaciel Coleman MD, 4 mg at 05/28/24 0848    pantoprazole (PROTONIX) injection 40 mg, 40 mg, Intravenous, Q12H, Jaylon Kim MD    phenylephrine (YESY-SYNEPHRINE) 50 mg in 250 mL NS infusion, 0.5-3 mcg/kg/min, Intravenous, Titrated, Dominique Ortiz APRN, Last Rate: 78.9 mL/hr at 06/02/24 0816, 3 mcg/kg/min at 06/02/24 0816    Phosphorus Replacement - Follow Nurse / BPA Driven Protocol, , Does not apply, Virginia KING Ervin H., MD    Phoxillum BK4/2.5 dialysis solution, 1,500 mL/hr, CRRT, Continuous, Jonathan Montoya MD, Last Rate: 1,500 mL/hr at 06/02/24 0510, 1,500 mL/hr at 06/02/24 0510    Phoxillum BK4/2.5 dialysis solution, 1,500 mL/hr, CRRT, Continuous, Jonathan Montoya MD, Last Rate:  1,500 mL/hr at 06/02/24 0510, 1,500 mL/hr at 06/02/24 0510    Phoxillum BK4/2.5 dialysis solution, 1,500 mL/hr, CRRT, Continuous, Jonathan Montoya MD, Last Rate: 1,500 mL/hr at 06/02/24 0510, 1,500 mL/hr at 06/02/24 0510    piperacillin-tazobactam (ZOSYN) 4.5 g IVPB in 100 mL NS MBP (CD), 4.5 g, Intravenous, Q8H, Sidney Lee MD, 4.5 g at 06/02/24 0816    Potassium Replacement - Follow Nurse / BPA Driven Protocol, , Does not apply, PRN, Jaciel Coleman MD    prochlorperazine (COMPAZINE) injection 5 mg, 5 mg, Intravenous, Q6H PRN, Jaciel Coleman MD, 5 mg at 05/28/24 1650    propofol (DIPRIVAN) infusion 10 mg/mL 100 mL, 5-50 mcg/kg/min, Intravenous, Titrated, Olivier Meyer MD, Last Rate: 17.26 mL/hr at 06/02/24 0803, 35 mcg/kg/min at 06/02/24 0803    sodium bicarbonate injection 8.4% 50 mEq, 50 mEq, Intravenous, Once, Jonathan Montoya MD    sodium chloride 0.9 % flush 10 mL, 10 mL, Intravenous, PRN, Jaciel Coleman MD    sodium chloride 0.9 % flush 10 mL, 10 mL, Intravenous, Q12H, Jaciel Coleman MD, 10 mL at 06/02/24 0820    sodium chloride 0.9 % flush 10 mL, 10 mL, Intravenous, PRN, Jaciel Coleman MD    sodium chloride 0.9 % flush 10 mL, 10 mL, Intravenous, Q12H, Chad Wilkinson MD, 10 mL at 06/02/24 0820    sodium chloride 0.9 % flush 10 mL, 10 mL, Intravenous, Q12H, Chad Wilkinson MD, 10 mL at 06/02/24 0814    sodium chloride 0.9 % flush 10 mL, 10 mL, Intravenous, Q12H, Chad Wilkinson MD, 10 mL at 06/02/24 0814    sodium chloride 0.9 % flush 10 mL, 10 mL, Intravenous, Q12H, Chad Wilkinson MD, 10 mL at 06/02/24 0814    sodium chloride 0.9 % flush 10 mL, 10 mL, Intravenous, PRN, Chad Wilkinson MD    sodium chloride 0.9 % flush 20 mL, 20 mL, Intravenous, PRN, Chad Wilkinson MD    sodium chloride 0.9 % flush 3 mL, 3 mL, Intravenous, Q12H, Jaciel Coleman MD, 3 mL at 06/02/24 0820    sodium chloride 0.9 % flush 3-10 mL, 3-10 mL, Intravenous,  Virginia KING Ervin H., MD    sodium chloride 0.9 % infusion 40 mL, 40 mL, Intravenous, Virginia KING Ervin H., MD    sodium chloride 0.9 % infusion 40 mL, 40 mL, Intravenous, Virginia KING Ervin H., MD    sodium chloride 0.9 % infusion 40 mL, 40 mL, Intravenous, PRJaja JARRELL Emmett J., MD    vasopressin (PITRESSIN) 20 units in 100 mL NS infusion, 0.03 Units/min, Intravenous, Continuous, Jaylon Kim MD, Last Rate: 9 mL/hr at 06/02/24 0317, 0.03 Units/min at 06/02/24 0317  Review of Systems:    Review of systems could not be obtained due to  patient intubated.    Objective     Vital Signs  Temp:  [98.6 °F (37 °C)-99.7 °F (37.6 °C)] 99.3 °F (37.4 °C)  Heart Rate:  [] 98  Resp:  [30] 30  BP: ()/() 108/81  FiO2 (%):  [56 %-80 %] 60 %  Body mass index is 24.05 kg/m².    Intake/Output Summary (Last 24 hours) at 6/2/2024 0824  Last data filed at 6/2/2024 0800  Gross per 24 hour   Intake 3981.14 ml   Output 5035.4 ml   Net -1054.26 ml     I/O this shift:  In: -   Out: 226.5      Physical Exam:   General: patient awake, alert and cooperative   Eyes: Normal lids and lashes, no scleral icterus   Neck: supple, normal ROM   Skin: warm and dry, not jaundiced   Cardiovascular: regular rhythm and rate, no murmurs auscultated   Pulm: clear to auscultation bilaterally, regular and unlabored   Abdomen: soft, nontender, nondistended; normal bowel sounds   Extremities: no rash or edema   Psychiatric: Normal mood and behavior; memory intact     Results Review:     I reviewed the patient's new clinical results.    Results from last 7 days   Lab Units 06/02/24  0541 06/02/24  0321 06/01/24  2334 06/01/24  1219 06/01/24  0945 06/01/24  0305   WBC 10*3/mm3  --  19.47*  --   --  19.27* 19.93*   HEMOGLOBIN g/dL 9.1* 9.3* 9.3*   < > 9.7* 9.3*   HEMATOCRIT % 27.5* 27.6* 28.2*   < > 29.0* 27.9*   PLATELETS 10*3/mm3  --  371  --   --  390 410    < > = values in this interval not displayed.     Results from last 7 days   Lab  Units 06/02/24  0541 06/02/24  0321 06/01/24  2334 06/01/24  0522 06/01/24  0305 05/31/24  1815 05/31/24  1209   SODIUM mmol/L 134* 137 135*   < > 141   < > 148*   POTASSIUM mmol/L 4.5 4.6 4.7   < > 5.3*   < > 5.2   CHLORIDE mmol/L 101 101 101   < > 107   < > 118*   CO2 mmol/L 18.0* 18.0* 17.4*   < > 20.7*   < > 17.5*   BUN mg/dL 19 19 20   < > 36*   < > 57*   CREATININE mg/dL 1.63* 1.71* 1.74*   < > 3.44*   < > 4.88*   CALCIUM mg/dL 7.8* 7.8* 7.9*   < > 8.0*   < > 7.9*   BILIRUBIN mg/dL  --  3.6*  --   --  3.6*  --  4.0*   ALK PHOS U/L  --  113  --   --  114  --  116   ALT (SGPT) U/L  --  99*  --   --  123*  --  139*   AST (SGOT) U/L  --  146*  --   --  167*  --  192*   GLUCOSE mg/dL 104* 71 79   < > 77   < > 151*    < > = values in this interval not displayed.     Results from last 7 days   Lab Units 05/31/24  1209 05/29/24  0432   INR  1.27* 1.26*     Lab Results   Lab Value Date/Time    LIPASE 39 08/11/2023 2030       Radiology:  XR Chest 1 View   Final Result   As described.       Discussed by telephone with patient's nurse, Angelita, at time of   interpretation, 0605, 6/2/2024.               This report was finalized on 6/2/2024 6:06 AM by Dr. Papo Selby M.D on Workstation: [email or web address]          XR Chest 1 View   Final Result   Potential mild right supraclavicular emphysema. No other   change or evidence for pneumothorax.       This report was finalized on 6/1/2024 3:15 PM by Dr. Edson Marquez M.D on Workstation: [id number]          XR Chest 1 View   Final Result   Central line placement. No pneumothorax.       This report was finalized on 5/31/2024 3:47 PM by Dr. Papo Selby M.D on Workstation: [id number]          XR Chest Post CVA Port   Final Result   Tubes and lines as noted above. No pneumothorax identified.       This report was finalized on 5/30/2024 10:14 PM by Dr. Faith Del Rio M.D on Workstation: [id number]          XR Abdomen KUB   Final Result       As described.           This  report was finalized on 5/30/2024 7:19 PM by Dr. Papo Selby M.D on Workstation: [id number]          XR Chest 1 View   Final Result   As described.       This report was finalized on 5/30/2024 7:03 PM by Dr. Papo Selby M.D on Workstation: [id number]          XR Chest 1 View   Final Result   As described.               This report was finalized on 5/30/2024 12:12 PM by Dr. Papo Selby M.D on Workstation: [id number]          CT Chest Without Contrast Diagnostic   Final Result       Conspicuous interval worsening of left more than right airspace disease,   clinical correlation and continued follow-up advised.       This report was finalized on 5/29/2024 4:41 PM by Dr. Papo Selby M.D on Workstation: [id number]          NM HIDA SCAN WITH PHARMACOLOGICAL INTERVENTION   Final Result   Visualization of the gallbladder suggesting cystic duct   patency.           This report was finalized on 5/29/2024 3:32 PM by Dr. Ronny Pérez M.D on Workstation: [id number]          US Renal Bilateral   Final Result   1. No hydronephrosis.   2. Ultrasound findings suggesting medical renal disease.           This report was finalized on 5/29/2024 12:47 PM by Dr. Ronny Pérez M.D on Workstation: [id number]          XR Chest 1 View   Final Result   Interval progression in left lung pneumonia and/or   atelectasis with interval development of mild right lung atelectasis.       This report was finalized on 5/29/2024 12:55 PM by Dr. José Miguel Thomas M.D on Workstation: [id number]          XR Chest 1 View   Final Result      US Abdomen Limited   Final Result   Gallbladder sludge, without evidence of acute cholecystitis.       This report was finalized on 5/28/2024 6:03 AM by Dr. Faith Del Rio M.D on Workstation: [id number]          FL ESOPHAGRAM SINGLE CONTRAST   Final Result   Normal limited esophagram.                This report was finalized on 5/26/2024 8:17 PM by Dr. Ronny Pérez M.D on  Workstation: [id number]          XR Chest 1 View   Final Result   Heterogeneous multifocal left greater than right lung opacities,   increased in the interval. Suspect multifocal pneumonia. Hemorrhage in   the appropriate clinical setting.       This report was finalized on 5/25/2024 7:58 AM by Dr. Carlito Chand M.D on Workstation: [id number]          NM Lung Ventilation Perfusion    (Results Pending)   SLP FEES - Fiberoptic Endo Eval Swallow    (Results Pending)   XR Chest 1 View    (Results Pending)   XR Chest 1 View    (Results Pending)   XR Chest 1 View    (Results Pending)       Assessment & Plan     Active Hospital Problems    Diagnosis     **Aspiration pneumonia due to vomitus     Oral phase dysphagia     Dental caries     MARTÍNEZ (acute kidney injury)     Sepsis     Dehydration     Severe malnutrition     Jaw pain     History of prostate cancer        Assessment:  Upper GI bleeding: H&H stable with no evidence of active bleeding at present    Aspiration pneumonia  MARTÍNEZ  Elevated LFTs: Improving  Prostate cancer      Plan:  Continue to monitor labs including LFTs and CBC  Will defer endoscopic assessment at this time  Continue PPI  I discussed the patients findings and my recommendations with family, nursing staff, and primary care team.    Carlito Carlos MD                Electronically signed by Carlito Carlos MD at 06/02/24 0833       Jaylon Kim MD at 06/02/24 0812            Intensive Care Unit Daily Progress Note.   [facility] INTENSIVE CARE  6/2/2024    Patient Name:  Jimmy Norman  MRN:  [id number]  YOB: 1955  Age: 69 y.o.  Sex: male         Reason for Admission / Chief Complaint:  Respiratory failure, pneumonia    Hospital Course:   69-year-old male who presented to the hospital on 5/25 with weakness and shortness of breath and ultimately became progressively more hypoxic necessitating intubation and found to be in ARDS complicated by  "shock.    Interval History:  Chest imaging from overnight demonstrates pneumomediastinum  Patient remains intubated, sedated, paralyzed  Vasopressor support with 3 pressors  Daughter is at bedside     Physical Exam:  /81   Pulse 98   Temp 99.3 °F (37.4 °C)   Resp (!) 30   Ht 188 cm (74.02\")   Wt 85 kg (187 lb 6.3 oz)   SpO2 95%   BMI 24.05 kg/m²   Body mass index is 24.05 kg/m².    Intake/Output    Intake/Output Summary (Last 24 hours) at 6/2/2024 0812  Last data filed at 6/2/2024 0700  Gross per 24 hour   Intake 3981.14 ml   Output 4808.9 ml   Net -827.76 ml     General: Intubated, sedated  HEENT: NC/AT, EOMI, MMM  Neck: Supple, trachea midline  Cardiac: RRR, no murmur, gallops, rubs  Pulmonary: Mechanical breath sounds   GI: Soft, non-tender, non-distended, normal bowel sounds  Extremities: Warm, well perfused, 1+ LE edema  Skin: no visible rash  Neuro: CN II - XII grossly intact  Psychiatry: Sedated      Data Review:  Notable Labs:  Results from last 7 days   Lab Units 06/02/24  0541 06/02/24  0321 06/01/24 2334 06/01/24  1818 06/01/24  1219 06/01/24  0945 06/01/24  0305 05/31/24  1815 05/31/24  0428 05/30/24  0245 05/29/24  0432 05/28/24  0517   WBC 10*3/mm3  --  19.47*  --   --   --  19.27* 19.93*  --  18.29* 8.35 8.39 7.61   HEMOGLOBIN g/dL 9.1* 9.3* 9.3* 9.3* 9.7* 9.7* 9.3*   < > 6.6* 7.5* 8.0* 8.0*   PLATELETS 10*3/mm3  --  371  --   --   --  390 410  --  363 393 369 362    < > = values in this interval not displayed.     Results from last 7 days   Lab Units 06/02/24  0541 06/02/24  0321 06/01/24  2334 06/01/24  1818 06/01/24  1219 06/01/24  0522 06/01/24  0305 05/31/24  2342 05/31/24 1815   SODIUM mmol/L 134* 137 135* 137 137 139 141 143 146*   POTASSIUM mmol/L 4.5 4.6 4.7 4.2 5.3* 5.3* 5.3* 5.4* 5.7*   CHLORIDE mmol/L 101 101 101 103 104 109* 107 111* 114*   CO2 mmol/L 18.0* 18.0* 17.4* 20.0* 18.2* 20.4* 20.7* 22.0 19.9*   BUN mg/dL 19 19 20 25* 26* 35* 36* 43* 53*   CREATININE mg/dL 1.63* " 1.71* 1.74* 2.31* 2.36* 3.27* 3.44* 3.74* 4.69*   GLUCOSE mg/dL 104* 71 79 101* 71 77 77 83 96   CALCIUM mg/dL 7.8* 7.8* 7.9* 7.6* 8.1* 7.8* 8.0* 7.9* 8.0*   MAGNESIUM mg/dL 2.5*  --  2.5* 2.4 2.6* 2.5*  --  2.6* 2.7*   PHOSPHORUS mg/dL 4.8*  --  4.9* 5.1* 5.5* 6.2*  --  6.1* 6.5*   Estimated Creatinine Clearance: 50.7 mL/min (A) (by C-G formula based on SCr of 1.63 mg/dL (H)).    Results from last 7 days   Lab Units 06/02/24  0321 06/01/24  0945 06/01/24  0305 05/31/24  1209 05/31/24  0428 05/30/24  0245 05/29/24  0947 05/29/24  0432 05/28/24  1912 05/28/24  0517   AST (SGOT) U/L 146*  --  167* 192* 236*   < >  --  352*  --  186*   ALT (SGPT) U/L 99*  --  123* 139* 155*   < >  --  196*  --  102*   PROCALCITONIN ng/mL  --   --   --   --  146.80*  --   --  2.09*  --  1.85*   C3 COMPLEMENT mg/dL  --   --   --   --   --   --  172*  --   --   --    C4 COMPLEMENT mg/dL  --   --   --   --   --   --  46*  --   --   --    D DIMER QUANT MCGFEU/mL  --   --   --   --   --   --   --   --  3.14*  --    PLATELETS 10*3/mm3 371 390 410  --  363   < >  --  369  --  362    < > = values in this interval not displayed.       Results from last 7 days   Lab Units 06/02/24  0410 06/01/24  2043 06/01/24  0847 05/31/24  0317 05/30/24  2212 05/30/24  1739 05/30/24  1239 05/29/24  0132 05/28/24  1752   PH, ARTERIAL pH units 7.282* 7.263* 7.212* 7.340* 7.317* 7.411 7.441 7.485* 7.528*   PCO2, ARTERIAL mm Hg 40.2 45.2* 55.6* 35.8 41.2 33.9* 32.6* 30.5* 28.0*   PO2 ART mm Hg 79.8* 122.7* 96.6 188.5* 138.7* 90.2 73.5* 57.6* 47.8*   HCO3 ART mmol/L 19.0* 20.5* 22.3 19.4* 21.1* 21.5* 22.2 22.9 23.3       Imaging:  Reviewed chest images personally from past 3 days    ASSESSMENT  /  PLAN:    ARDS  Acute hypoxic respiratory failure, on mechanical ventilation since 5/30/2024  Bilateral lower lobe pneumonia/consolidations and peripheral GGO opacities  s/p bronchoscopy 5/30/2024.  Cultures negative so far. Procal 146.    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  Hypernatremia  MARTÍNEZ, worsening  NAG, likely secondary  Metabolic acidosis  Transaminitis, secondary to sepsis  Hepatic Doppler negative     -Sedation: Propofol, Precedex, titrate for RASS -4/5  - Analgesia: Fentanyl drip  - Paralytics for severe ARDS  -Chest x-ray demonstrates pneumomediastinum without specific cause, no pneumothorax.  It is unusual as his peak pressures are not markedly high as would be expected.  Will continue to monitor with repeat chest x-ray in the afternoon.  -Mechanical ventilation management: Settings reviewed and adjusted, tidal volume 6 mL/kg, PEEP 10.  Weaning FiO2 as tolerated and will slowly wean PEEP after.  Resistance 5, compliance 30-40.  PF ratio 150. Daily ABGs  -Requiring 3 vasopressors to support MAP goal greater than 65  -Bronchoscopy (5/30) with cultures no growth to date  -Hemoglobin stable, no bloody output from NG, PPI drip switched to IV PPI twice daily.  Appreciate GI recommendations.    -HP, ELIDA, ANCA panel pending  -Continue antibiotics with Zosyn, infectious disease following, infectious workup no growth to date  -Echocardiogram unremarkable  -On CRRT per nephrology, appreciate recommendations  -Core track and trickle enteral nutrition  -Bronchodilator with albuterol nebulizer  -Patient is in critical condition with poor prognosis in the setting of ARDS, respiratory failure, shock, renal failure.     GI prophylaxis: Protonix   DVT prophylaxis: SCDs, heparin subcu held for GI bleed  Goncalves catheter: Yes  Bowel regimen: Ordered  Diet: N.p.o.    Discharge: Continue to monitor in the ICU due to critical status    Critical Care Time: 55 minutes    Jaylon Kim MD  [facility]  Pulmonary and Critical Care Medicine, Interventional Pulmonology    Parts of this note may be an electronic  "transcription/translation of spoken language to printed text using the Dragon dictation system.     Addendum  =========  Discussed with patient's family members (2 daughters and 1 son) regarding worsening of patient's clinical status.  Now requiring 4 pressors for vasopressor support.  Additionally subcutaneous emphysema worsening with trace apical left pneumothorax present.  Family elected for comfort measures.  Will transition patient and ensure comfort.      Electronically signed by Jaylon Kim MD at 06/02/24 1728       Jonathan Montoya MD at 06/02/24 0633           LOS: 7 days   Patient Care Team:  Provider, No Known as PCP - General    Chief Complaint: MARTÍNEZ      Subjective  Chart reviewed  Seen in the ICU, on CRRT  Minimal UOP  Increasing pressor requirements    Objective     Vital Sign Min/Max for last 24 hours  Temp  Min: 98.6 °F (37 °C)  Max: 99.9 °F (37.7 °C)   BP  Min: 70/52  Max: 149/101   Pulse  Min: 90  Max: 113   Resp  Min: 30  Max: 30   SpO2  Min: 79 %  Max: 100 %   No data recorded   Weight  Min: 85 kg (187 lb 6.3 oz)  Max: 85 kg (187 lb 6.3 oz)     Flowsheet Rows      Flowsheet Row First Filed Value   Admission Height 188 cm (74\") Documented at 05/25/2024 1332   Admission Weight 82.9 kg (182 lb 12.2 oz) Documented at 05/25/2024 0729            I/O this shift:  In: 1754.9 [I.V.:1584.8; IV Piggyback:170.1]  Out: 2675.4 [Emesis/NG output:75]  I/O last 3 completed shifts:  In: 6145.1 [I.V.:5498; Blood:347.5; IV Piggyback:299.6]  Out: 4443 [Urine:40; Emesis/NG output:725]    Objective:  Vital signs: (most recent): Blood pressure 108/81, pulse 98, temperature 99.3 °F (37.4 °C), resp. rate (!) 30, height 188 cm (74.02\"), weight 85 kg (187 lb 6.3 oz), SpO2 95%.            Physical Examination: Intubated, sedated..   Chest - coarse  Heart - normal rate, regular rhythm, normal S1, S2, no murmurs, rubs, clicks or gallops  Abdomen - soft, nondistended, no masses or organomegaly  Neurological - " "sedated   Extremities - peripheral pulses normal, no pedal edema, no clubbing or cyanosis     Results Review:     I reviewed the patient's new clinical results.    WBC WBC   Date Value Ref Range Status   06/02/2024 19.47 (H) 3.40 - 10.80 10*3/mm3 Final   06/01/2024 19.27 (H) 3.40 - 10.80 10*3/mm3 Final   06/01/2024 19.93 (H) 3.40 - 10.80 10*3/mm3 Final   05/31/2024 18.29 (H) 3.40 - 10.80 10*3/mm3 Final      HGB Hemoglobin   Date Value Ref Range Status   06/02/2024 9.1 (L) 13.0 - 17.7 g/dL Final   06/02/2024 9.3 (L) 13.0 - 17.7 g/dL Final   06/01/2024 9.3 (L) 13.0 - 17.7 g/dL Final   06/01/2024 9.3 (L) 13.0 - 17.7 g/dL Final   06/01/2024 9.7 (L) 13.0 - 17.7 g/dL Final   06/01/2024 9.7 (L) 13.0 - 17.7 g/dL Final   06/01/2024 9.3 (L) 13.0 - 17.7 g/dL Final   05/31/2024 9.2 (L) 13.0 - 17.7 g/dL Final   05/31/2024 6.6 (C) 13.0 - 17.7 g/dL Final      HCT Hematocrit   Date Value Ref Range Status   06/02/2024 27.5 (L) 37.5 - 51.0 % Final   06/02/2024 27.6 (L) 37.5 - 51.0 % Final   06/01/2024 28.2 (L) 37.5 - 51.0 % Final   06/01/2024 28.4 (L) 37.5 - 51.0 % Final   06/01/2024 28.5 (L) 37.5 - 51.0 % Final   06/01/2024 29.0 (L) 37.5 - 51.0 % Final   06/01/2024 27.9 (L) 37.5 - 51.0 % Final   05/31/2024 28.7 (L) 37.5 - 51.0 % Final   05/31/2024 20.7 (C) 37.5 - 51.0 % Final      Platlets No results found for: \"LABPLAT\"   MCV MCV   Date Value Ref Range Status   06/02/2024 88.2 79.0 - 97.0 fL Final   06/01/2024 90.3 79.0 - 97.0 fL Final   06/01/2024 90.3 79.0 - 97.0 fL Final   05/31/2024 88.1 79.0 - 97.0 fL Final          Sodium Sodium   Date Value Ref Range Status   06/02/2024 134 (L) 136 - 145 mmol/L Final   06/02/2024 137 136 - 145 mmol/L Final   06/01/2024 135 (L) 136 - 145 mmol/L Final   06/01/2024 137 136 - 145 mmol/L Final   06/01/2024 137 136 - 145 mmol/L Final   06/01/2024 139 136 - 145 mmol/L Final   06/01/2024 141 136 - 145 mmol/L Final   05/31/2024 143 136 - 145 mmol/L Final   05/31/2024 146 (H) 136 - 145 mmol/L Final "   05/31/2024 148 (H) 136 - 145 mmol/L Final   05/31/2024 151 (H) 136 - 145 mmol/L Final      Potassium Potassium   Date Value Ref Range Status   06/02/2024 4.5 3.5 - 5.2 mmol/L Final   06/02/2024 4.6 3.5 - 5.2 mmol/L Final   06/01/2024 4.7 3.5 - 5.2 mmol/L Final   06/01/2024 4.2 3.5 - 5.2 mmol/L Final   06/01/2024 5.3 (H) 3.5 - 5.2 mmol/L Final     Comment:     Slight hemolysis detected by analyzer. Result may be falsely elevated.   06/01/2024 5.3 (H) 3.5 - 5.2 mmol/L Final   06/01/2024 5.3 (H) 3.5 - 5.2 mmol/L Final   05/31/2024 5.4 (H) 3.5 - 5.2 mmol/L Final   05/31/2024 5.7 (H) 3.5 - 5.2 mmol/L Final   05/31/2024 5.2 3.5 - 5.2 mmol/L Final   05/31/2024 5.1 3.5 - 5.2 mmol/L Final      Chloride Chloride   Date Value Ref Range Status   06/02/2024 101 98 - 107 mmol/L Final   06/02/2024 101 98 - 107 mmol/L Final   06/01/2024 101 98 - 107 mmol/L Final   06/01/2024 103 98 - 107 mmol/L Final   06/01/2024 104 98 - 107 mmol/L Final   06/01/2024 109 (H) 98 - 107 mmol/L Final   06/01/2024 107 98 - 107 mmol/L Final   05/31/2024 111 (H) 98 - 107 mmol/L Final   05/31/2024 114 (H) 98 - 107 mmol/L Final   05/31/2024 118 (H) 98 - 107 mmol/L Final   05/31/2024 121 (H) 98 - 107 mmol/L Final      CO2 CO2   Date Value Ref Range Status   06/02/2024 18.0 (L) 22.0 - 29.0 mmol/L Final   06/02/2024 18.0 (L) 22.0 - 29.0 mmol/L Final   06/01/2024 17.4 (L) 22.0 - 29.0 mmol/L Final   06/01/2024 20.0 (L) 22.0 - 29.0 mmol/L Final   06/01/2024 18.2 (L) 22.0 - 29.0 mmol/L Final   06/01/2024 20.4 (L) 22.0 - 29.0 mmol/L Final   06/01/2024 20.7 (L) 22.0 - 29.0 mmol/L Final   05/31/2024 22.0 22.0 - 29.0 mmol/L Final   05/31/2024 19.9 (L) 22.0 - 29.0 mmol/L Final   05/31/2024 17.5 (L) 22.0 - 29.0 mmol/L Final   05/31/2024 20.0 (L) 22.0 - 29.0 mmol/L Final      BUN BUN   Date Value Ref Range Status   06/02/2024 19 8 - 23 mg/dL Final   06/02/2024 19 8 - 23 mg/dL Final   06/01/2024 20 8 - 23 mg/dL Final   06/01/2024 25 (H) 8 - 23 mg/dL Final   06/01/2024  "26 (H) 8 - 23 mg/dL Final   06/01/2024 35 (H) 8 - 23 mg/dL Final   06/01/2024 36 (H) 8 - 23 mg/dL Final   05/31/2024 43 (H) 8 - 23 mg/dL Final   05/31/2024 53 (H) 8 - 23 mg/dL Final   05/31/2024 57 (H) 8 - 23 mg/dL Final   05/31/2024 56 (H) 8 - 23 mg/dL Final      Creatinine Creatinine   Date Value Ref Range Status   06/02/2024 1.63 (H) 0.76 - 1.27 mg/dL Final   06/02/2024 1.71 (H) 0.76 - 1.27 mg/dL Final   06/01/2024 1.74 (H) 0.76 - 1.27 mg/dL Final   06/01/2024 2.31 (H) 0.76 - 1.27 mg/dL Final   06/01/2024 2.36 (H) 0.76 - 1.27 mg/dL Final   06/01/2024 3.27 (H) 0.76 - 1.27 mg/dL Final   06/01/2024 3.44 (H) 0.76 - 1.27 mg/dL Final   05/31/2024 3.74 (H) 0.76 - 1.27 mg/dL Final   05/31/2024 4.69 (H) 0.76 - 1.27 mg/dL Final   05/31/2024 4.88 (H) 0.76 - 1.27 mg/dL Final   05/31/2024 3.98 (H) 0.76 - 1.27 mg/dL Final      Calcium Calcium   Date Value Ref Range Status   06/02/2024 7.8 (L) 8.6 - 10.5 mg/dL Final   06/02/2024 7.8 (L) 8.6 - 10.5 mg/dL Final   06/01/2024 7.9 (L) 8.6 - 10.5 mg/dL Final   06/01/2024 7.6 (L) 8.6 - 10.5 mg/dL Final   06/01/2024 8.1 (L) 8.6 - 10.5 mg/dL Final   06/01/2024 7.8 (L) 8.6 - 10.5 mg/dL Final   06/01/2024 8.0 (L) 8.6 - 10.5 mg/dL Final   05/31/2024 7.9 (L) 8.6 - 10.5 mg/dL Final   05/31/2024 8.0 (L) 8.6 - 10.5 mg/dL Final   05/31/2024 7.9 (L) 8.6 - 10.5 mg/dL Final   05/31/2024 7.8 (L) 8.6 - 10.5 mg/dL Final      PO4 No results found for: \"CAPO4\"   Albumin Albumin   Date Value Ref Range Status   06/02/2024 2.4 (L) 3.5 - 5.2 g/dL Final   06/02/2024 2.4 (L) 3.5 - 5.2 g/dL Final   06/01/2024 2.3 (L) 3.5 - 5.2 g/dL Final   06/01/2024 2.3 (L) 3.5 - 5.2 g/dL Final   06/01/2024 2.1 (L) 3.5 - 5.2 g/dL Final   06/01/2024 2.2 (L) 3.5 - 5.2 g/dL Final   06/01/2024 2.2 (L) 3.5 - 5.2 g/dL Final   05/31/2024 2.2 (L) 3.5 - 5.2 g/dL Final   05/31/2024 2.3 (L) 3.5 - 5.2 g/dL Final   05/31/2024 2.1 (L) 3.5 - 5.2 g/dL Final   05/31/2024 2.2 (L) 3.5 - 5.2 g/dL Final      Magnesium Magnesium   Date Value Ref " Range Status   06/02/2024 2.5 (H) 1.6 - 2.4 mg/dL Final   06/01/2024 2.5 (H) 1.6 - 2.4 mg/dL Final   06/01/2024 2.4 1.6 - 2.4 mg/dL Final   06/01/2024 2.6 (H) 1.6 - 2.4 mg/dL Final   06/01/2024 2.5 (H) 1.6 - 2.4 mg/dL Final   05/31/2024 2.6 (H) 1.6 - 2.4 mg/dL Final   05/31/2024 2.7 (H) 1.6 - 2.4 mg/dL Final   05/31/2024 2.8 (H) 1.6 - 2.4 mg/dL Final   05/31/2024 2.8 (H) 1.6 - 2.4 mg/dL Final      Uric Acid Uric Acid   Date Value Ref Range Status   05/31/2024 6.4 3.4 - 7.0 mg/dL Final        Medication Review:     Current Facility-Administered Medications:     acetaminophen (TYLENOL) tablet 650 mg, 650 mg, Oral, Q4H PRN, 650 mg at 05/27/24 2353 **OR** acetaminophen (TYLENOL) 160 MG/5ML oral solution 650 mg, 650 mg, Oral, Q4H PRN, 650 mg at 05/31/24 2218 **OR** acetaminophen (TYLENOL) suppository 650 mg, 650 mg, Rectal, Q4H PRN, Jaciel Coleman MD, 650 mg at 05/30/24 2248    albuterol (PROVENTIL) nebulizer solution 0.083% 2.5 mg/3mL, 2.5 mg, Nebulization, Q6H - RT, Jaciel Coleman MD, 2.5 mg at 06/01/24 2318    albuterol (PROVENTIL) nebulizer solution 0.083% 2.5 mg/3mL, 2.5 mg, Nebulization, Q6H PRN, Jaciel Coleman MD    artificial tears ophthalmic ointment, , Both Eyes, Q4H, James Rivera Jr., MD, Given at 06/02/24 0456    sennosides-docusate (PERICOLACE) 8.6-50 MG per tablet 2 tablet, 2 tablet, Oral, BID PRN **AND** polyethylene glycol (MIRALAX) packet 17 g, 17 g, Oral, Daily PRN **AND** bisacodyl (DULCOLAX) EC tablet 5 mg, 5 mg, Oral, Daily PRN **AND** bisacodyl (DULCOLAX) suppository 10 mg, 10 mg, Rectal, Daily PRN, Jaciel Coleman MD    Calcium Replacement - Follow Nurse / BPA Driven Protocol, , Does not apply, PRN, Jaciel Coleman MD    cisatracurium besylate (NIMBEX) 200 mg in sodium chloride 0.9 % 100 mL (2 mg/mL) infusion, 0.5-10 mcg/kg/min, Intravenous, Titrated, James Rivera Jr., MD, Last Rate: 14.8 mL/hr at 06/02/24 0454, 6 mcg/kg/min at 06/02/24 0454    dexmedetomidine (PRECEDEX) 400  mcg in 100 mL NS infusion, 0.2-1.5 mcg/kg/hr, Intravenous, Titrated, Olivier Meyer MD, Last Rate: 8.2 mL/hr at 06/02/24 0317, 0.4 mcg/kg/hr at 06/02/24 0317    dextrose (D50W) (25 g/50 mL) IV injection 25 g, 25 g, Intravenous, Q15 Min PRN, Ed Mandujano MD, 25 g at 06/02/24 0415    dextrose (GLUTOSE) oral gel 15 g, 15 g, Oral, Q15 Min PRN, Ed Mandujano MD    EPINEPHrine 5 mg in 250 mL NS infusion, 0.02-0.3 mcg/kg/min, Intravenous, Titrated, Jaylon Kim MD    epoetin fabiola-epbx (RETACRIT) injection 20,000 Units, 20,000 Units, Subcutaneous, Weekly, Jaciel Coleman MD    famotidine (PEPCID) tablet 10 mg, 10 mg, Oral, BID PRN, Jaciel Coleman MD    fentaNYL (SUBLIMAZE) bolus from bag 10 mcg/mL injection 50 mcg, 50 mcg, Intravenous, Q30 Min PRN, James Rivera Jr., MD, 50 mcg at 05/31/24 2019    fentaNYL 1000 mcg in 100 mL NS infusion,  mcg/hr, Intravenous, Titrated, James Rivera Jr., MD, Last Rate: 10 mL/hr at 06/01/24 2244, 100 mcg/hr at 06/01/24 2244    glucagon (GLUCAGEN) injection 1 mg, 1 mg, Intramuscular, Q15 Min PRN, Ed Mandujano MD    heparin (porcine) injection 1,000-2,000 Units, 1,000-2,000 Units, Intravenous, PRN, Jonathan Montoya MD, 2,000 Units at 05/31/24 1653    insulin regular (humuLIN R,novoLIN R) injection 2-7 Units, 2-7 Units, Subcutaneous, Q6H, Ed Mandujano MD, 2 Units at 05/31/24 1242    Magnesium Low Dose Replacement - Follow Nurse / BPA Driven Protocol, , Does not apply, PRN, Jaciel Coleman MD    melatonin tablet 2.5 mg, 2.5 mg, Oral, Nightly PRN, Jaciel Coleman MD    nitroglycerin (NITROSTAT) SL tablet 0.4 mg, 0.4 mg, Sublingual, Q5 Min PRN, Jaciel Coleman MD    norepinephrine (LEVOPHED) 8 mg in 250 mL NS infusion (premix), 0.02-0.3 mcg/kg/min, Intravenous, Titrated, Olivier Meyer MD, Last Rate: 46.2 mL/hr at 06/02/24 0317, 0.3 mcg/kg/min at 06/02/24 0317    ondansetron ODT (ZOFRAN-ODT) disintegrating tablet 4 mg, 4 mg, Oral, Q6H PRN **OR**  ondansetron (ZOFRAN) injection 4 mg, 4 mg, Intravenous, Q6H PRN, Jaciel Coleman MD, 4 mg at 05/28/24 0848    pantoprazole (PROTONIX) 40 mg in sodium chloride 0.9 % 100 mL (0.4 mg/mL) MBP, 8 mg/hr, Intravenous, Continuous, Jaylon Kim MD, Last Rate: 20 mL/hr at 06/02/24 0456, 8 mg/hr at 06/02/24 0456    phenylephrine (YESY-SYNEPHRINE) 50 mg in 250 mL NS infusion, 0.5-3 mcg/kg/min, Intravenous, Titrated, Dominique Ortiz APRN, Last Rate: 65.8 mL/hr at 06/02/24 0614, 2.5 mcg/kg/min at 06/02/24 0614    Phosphorus Replacement - Follow Nurse / BPA Driven Protocol, , Does not apply, PRNVirginia Ervin H., MD    Phoxillum BK4/2.5 dialysis solution, 1,500 mL/hr, CRRT, Continuous, Jonathan Montoya MD, Last Rate: 1,500 mL/hr at 06/02/24 0510, 1,500 mL/hr at 06/02/24 0510    Phoxillum BK4/2.5 dialysis solution, 1,500 mL/hr, CRRT, Continuous, Jonathan Montoya MD, Last Rate: 1,500 mL/hr at 06/02/24 0510, 1,500 mL/hr at 06/02/24 0510    Phoxillum BK4/2.5 dialysis solution, 1,500 mL/hr, CRRT, Continuous, Jonathan Montoya MD, Last Rate: 1,500 mL/hr at 06/02/24 0510, 1,500 mL/hr at 06/02/24 0510    piperacillin-tazobactam (ZOSYN) 4.5 g IVPB in 100 mL NS MBP (CD), 4.5 g, Intravenous, Q8H, Sidney Lee MD, 4.5 g at 06/02/24 0006    Potassium Replacement - Follow Nurse / BPA Driven Protocol, , Does not apply, PRVirginia JARRELL Ervin H., MD    prochlorperazine (COMPAZINE) injection 5 mg, 5 mg, Intravenous, Q6H PRN, Jaciel Coleman MD, 5 mg at 05/28/24 1650    propofol (DIPRIVAN) infusion 10 mg/mL 100 mL, 5-50 mcg/kg/min, Intravenous, Titrated, Olivier Meyer MD, Last Rate: 17.26 mL/hr at 06/02/24 0142, 35 mcg/kg/min at 06/02/24 0142    sodium chloride 0.9 % flush 10 mL, 10 mL, Intravenous, PRN, Jaciel Coleman MD    sodium chloride 0.9 % flush 10 mL, 10 mL, Intravenous, Q12H, Jaciel Coleman MD, 10 mL at 06/01/24 2029    sodium chloride 0.9 % flush 10 mL, 10 mL, Intravenous, PRN, [place]  Jaciel MONTESINOS MD    sodium chloride 0.9 % flush 10 mL, 10 mL, Intravenous, Q12H, Chad Wilkinson MD, 10 mL at 06/01/24 2028    sodium chloride 0.9 % flush 10 mL, 10 mL, Intravenous, Q12H, Chad Wilkinson MD, 10 mL at 06/01/24 2028    sodium chloride 0.9 % flush 10 mL, 10 mL, Intravenous, Q12H, Chad Wilkinson MD, 10 mL at 06/01/24 2028    sodium chloride 0.9 % flush 10 mL, 10 mL, Intravenous, Q12H, Chad Wilkinson MD, 10 mL at 06/01/24 2027    sodium chloride 0.9 % flush 10 mL, 10 mL, Intravenous, PRN, Chad Wilkinson MD    sodium chloride 0.9 % flush 20 mL, 20 mL, Intravenous, PRN, Chad Wilkinson MD    sodium chloride 0.9 % flush 3 mL, 3 mL, Intravenous, Q12H, Jaciel Coleman MD, 3 mL at 06/01/24 2029    sodium chloride 0.9 % flush 3-10 mL, 3-10 mL, Intravenous, [place] KING Ervin H., MD    sodium chloride 0.9 % infusion 40 mL, 40 mL, Intravenous, PRVirginia JARRELL Ervin H., MD    sodium chloride 0.9 % infusion 40 mL, 40 mL, Intravenous, [place] KING Ervin H., MD    sodium chloride 0.9 % infusion 40 mL, 40 mL, Intravenous, Jaja KING Emmett J., MD    vasopressin (PITRESSIN) 20 units in 100 mL NS infusion, 0.03 Units/min, Intravenous, Continuous, Jaylon Kim MD, Last Rate: 9 mL/hr at 06/02/24 0317, 0.03 Units/min at 06/02/24 0317     Assessment & Plan       Assessment & Plan  MARTÍNEZ/ATN  Proteinuria   Metabolic acidosis   ARDS  Shock  PNA  Sepsis   Hypernatremia  Hematuria - ANCA pending  Shock    Patient anuric  Continue CRRT   UF as able to keep even  Continue Abx per pulmonary/primary  Pressor and vent support per ICU team   F/U ANCA studies  Discussed with family  Guarded prognosis  Will follow         Jonathan Montoya MD  [facility]  06/02/24  06:33 EDT              Electronically signed by Jonathan Montoya MD at 06/02/24 0757       Jessica Hernandez MD at 06/01/24 1044          ID progress note    Chief complaint: Follow-up sepsis due to pneumonia due to  aspiration    Subjective: Tmax 100.4.  Emergent dialysis catheter placed and the patient has been started on CRRT.  Is currently on 2 pressors.  Stable ventilatory setting with an FiO2 of 80% and PEEP of 10.  Small thick respiratory secretions.  Tolerating antibiotics without diarrhea or rash    ROS; unable to be obtained     Objective   Vital Signs   Temp:  [97 °F (36.1 °C)-100.4 °F (38 °C)] 99.7 °F (37.6 °C)  Heart Rate:  [] 110  Resp:  [30-36] 30  BP: ()/(52-91) 73/56  FiO2 (%):  [50 %-100 %] 80 %    Physical Exam:   General: Sedated, intubated, ill-appearing  Cardiovascular: Tachycardic  Respiratory: Coarse breath sounds bilaterally  GI: Abdomen is soft, not distended  :  + Goncalves catheter  Psychiatric: Sedated  Vasc: Right IJ catheter and PIV's without erythema    Labs:   CBC, CMP, procalcitonin, and blood and BAL cultures reviewed today  Lab Results   Component Value Date    WBC 19.27 (H) 06/01/2024    HGB 9.7 (L) 06/01/2024    HCT 29.0 (L) 06/01/2024    MCV 90.3 06/01/2024     06/01/2024       Lab Results   Component Value Date    GLUCOSE 77 06/01/2024    BUN 35 (H) 06/01/2024    CREATININE 3.27 (H) 06/01/2024    EGFRIFAFRI >60 11/21/2022    BCR 10.7 06/01/2024    CO2 20.4 (L) 06/01/2024    CALCIUM 7.8 (L) 06/01/2024    ALBUMIN 2.2 (L) 06/01/2024    LABIL2 1.3 02/06/2021     (H) 06/01/2024     (H) 06/01/2024     Procal 2.09----->146  HIV antibody negative  HIV viral load pending  Hepatitis C antibody negative  Hepatitis B surface antigen negative  Hepatitis B core IgM antibody negative  Cryptococcus antigen negative  Urine histoplasma antigen pending  Serum histoplasma antigen pending  Urine Blastomyces antigen pending  Aspergillus galactomannan pending    Microbiology:  5/14 flu/RSV/COVID PCR: Negative  5/14 BCx: Negative  5/17 RPP negative  5/17 BCx: Negative  5/18 urine Legionella and strep antigen: Negative  5/18 MRSA nares PCR: Negative  5/25 RPP: Negative  5/25 BCx:  Negative  5/29 MRSA PCR: Negative  5/30 BAL cultures: NGTD  5/30 BCx NGTDx 2  5/30 RPP: Negative  5/31 BAL P    Radiology:  5/30 CXR with bilateral pulmonary infiltrates    ASSESSMENT/PLAN:  Bilateral pneumonia  Acute hypoxic respiratory failure  Acute on chronic jaw pain due to mandibular fracture  Dysphagia  Aspiration  Nausea and vomiting  Acute kidney injury  Elevated liver enzymes  Elevated procalcitonin  History of prostate cancer  Septic shock    White blood cell count continues to increase.  Fever curve improved.  Started on pressors and on CRRT.  Blood cultures remain negative to date.  BAL cultures from May 30 are negative.  BAL cultures from the 31st are pending.  Continue empiric Zosyn 4.5 g IV every 8 hours.  Antibiotic duration to be determined    ID will follow.    Electronically signed by Jessica Hernandez MD at 06/01/24 8602       Carlito Carlos MD at 06/01/24 0992          [facility]  Inpatient Progress Note    Reason for Follow Up: GI bleed, elevated LFTs    Subjective     Interval History:   Nurse called to report episode of bright red blood per NG.  Hemoglobin 9.3 with hematocrit of 27.9 and a white count of 19.9 platelet count 410,000.  AST and ALT still elevated but trending down.  INR 1.27.  Discussed with family at bedside, no prior history of peptic ulcer disease.  Reviewed possible explanations including Lidia-Malhotra tear versus gastritis/peptic ulcer disease versus other etiology.  Patient on PPI drip, will warrant eventual endoscopic evaluation but would monitor for the present given issues with sepsis and respiratory failure.  If bleeding persist or worsens we will have to consider more emergent endoscopic assessment.    Current Facility-Administered Medications:     acetaminophen (TYLENOL) tablet 650 mg, 650 mg, Oral, Q4H PRN, 650 mg at 05/27/24 9670 **OR** acetaminophen (TYLENOL) 160 MG/5ML oral solution 650 mg, 650 mg, Oral, Q4H PRN, 650 mg at 05/31/24  2218 **OR** acetaminophen (TYLENOL) suppository 650 mg, 650 mg, Rectal, Q4H PRN, Jaciel Coleman MD, 650 mg at 05/30/24 2248    albuterol (PROVENTIL) nebulizer solution 0.083% 2.5 mg/3mL, 2.5 mg, Nebulization, Q6H - RT, Jaciel Coleman MD, 2.5 mg at 06/01/24 0808    albuterol (PROVENTIL) nebulizer solution 0.083% 2.5 mg/3mL, 2.5 mg, Nebulization, Q6H PRN, Jaciel Coleman MD    artificial tears ophthalmic ointment, , Both Eyes, Q4H, James Rivera Jr., MD, Given at 06/01/24 0948    sennosides-docusate (PERICOLACE) 8.6-50 MG per tablet 2 tablet, 2 tablet, Oral, BID PRN **AND** polyethylene glycol (MIRALAX) packet 17 g, 17 g, Oral, Daily PRN **AND** bisacodyl (DULCOLAX) EC tablet 5 mg, 5 mg, Oral, Daily PRN **AND** bisacodyl (DULCOLAX) suppository 10 mg, 10 mg, Rectal, Daily PRN, Jaciel Coleman MD    calcium gluconate 1000 Mg/50ml 0.675% NaCl IV SOLN, 1,000 mg, Intravenous, Once, Jonathan Montoya MD    Calcium Replacement - Follow Nurse / BPA Driven Protocol, , Does not apply, PRN, Jaciel Coleman MD    cisatracurium besylate (NIMBEX) 200 mg in sodium chloride 0.9 % 100 mL (2 mg/mL) infusion, 0.5-10 mcg/kg/min, Intravenous, Titrated, James Rivera Jr., MD, Last Rate: 14.8 mL/hr at 06/01/24 0516, 6 mcg/kg/min at 06/01/24 0516    dexmedetomidine (PRECEDEX) 400 mcg in 100 mL NS infusion, 0.2-1.5 mcg/kg/hr, Intravenous, Titrated, Olivier Meyer MD, Last Rate: 8.2 mL/hr at 06/01/24 0629, 0.4 mcg/kg/hr at 06/01/24 0629    dextrose (D50W) (25 g/50 mL) IV injection 25 g, 25 g, Intravenous, Q15 Min PRN, Ed Mandujano MD    dextrose (GLUTOSE) oral gel 15 g, 15 g, Oral, Q15 Min PRN, Ed Mandujano MD    EPINEPHrine 5 mg in 250 mL NS infusion, 0.02-0.3 mcg/kg/min, Intravenous, Titrated, Jaylon Kim MD    epoetin fabiola-epbx (RETACRIT) injection 20,000 Units, 20,000 Units, Subcutaneous, Weekly, Jaciel Coleman MD    famotidine (PEPCID) tablet 10 mg, 10 mg, Oral, BID PRN, Jaciel Coleman MD     fentaNYL (SUBLIMAZE) bolus from bag 10 mcg/mL injection 50 mcg, 50 mcg, Intravenous, Q30 Min PRN, James Rievra Jr., MD, 50 mcg at 05/31/24 2019    fentaNYL 1000 mcg in 100 mL NS infusion,  mcg/hr, Intravenous, Titrated, James Rivera Jr., MD, Last Rate: 10 mL/hr at 06/01/24 0629, 100 mcg/hr at 06/01/24 0629    glucagon (GLUCAGEN) injection 1 mg, 1 mg, Intramuscular, Q15 Min PRN, Ed Mandujano MD    heparin (porcine) injection 1,000-2,000 Units, 1,000-2,000 Units, Intravenous, PRN, Jonathan Montoya MD, 2,000 Units at 05/31/24 1653    insulin regular (humuLIN R,novoLIN R) injection 2-7 Units, 2-7 Units, Subcutaneous, Q6H, Ed Mandujano MD, 2 Units at 05/31/24 1242    Magnesium Low Dose Replacement - Follow Nurse / BPA Driven Protocol, , Does not apply, PRN, Jaciel Coleman MD    melatonin tablet 2.5 mg, 2.5 mg, Oral, Nightly PRN, Jaciel Coleman MD    nitroglycerin (NITROSTAT) SL tablet 0.4 mg, 0.4 mg, Sublingual, Q5 Min PRN, Jaciel Coleman MD    norepinephrine (LEVOPHED) 8 mg in 250 mL NS infusion (premix), 0.02-0.3 mcg/kg/min, Intravenous, Titrated, Olivier Meyer MD, Last Rate: 46.2 mL/hr at 06/01/24 0830, 0.3 mcg/kg/min at 06/01/24 0830    ondansetron ODT (ZOFRAN-ODT) disintegrating tablet 4 mg, 4 mg, Oral, Q6H PRN **OR** ondansetron (ZOFRAN) injection 4 mg, 4 mg, Intravenous, Q6H PRN, Jaciel Coleman MD, 4 mg at 05/28/24 0848    pantoprazole (PROTONIX) 40 mg in sodium chloride 0.9 % 100 mL (0.4 mg/mL) MBP, 8 mg/hr, Intravenous, Continuous, Jaylon Kim MD    phenylephrine (YESY-SYNEPHRINE) 50 mg in 250 mL NS infusion, 0.5-3 mcg/kg/min, Intravenous, Titrated, Dominique Ortiz APRN, Last Rate: 26.3 mL/hr at 06/01/24 0950, 1 mcg/kg/min at 06/01/24 0950    Phosphorus Replacement - Follow Nurse / BPA Driven Protocol, , Does not apply, CHRISTINE, Jaciel Coleman MD    Phoxillum BK4/2.5 dialysis solution, 1,500 mL/hr, CRRT, Continuous, Jonathan Montoya MD, Last Rate: 1,500  mL/hr at 06/01/24 0824, 1,500 mL/hr at 06/01/24 0824    Phoxillum BK4/2.5 dialysis solution, 1,500 mL/hr, CRRT, Continuous, Jonathan Montoya MD, Last Rate: 1,500 mL/hr at 06/01/24 0824, 1,500 mL/hr at 06/01/24 0824    Phoxillum BK4/2.5 dialysis solution, 1,500 mL/hr, CRRT, Continuous, Jonathan Montoya MD, Last Rate: 1,500 mL/hr at 06/01/24 0824, 1,500 mL/hr at 06/01/24 0824    piperacillin-tazobactam (ZOSYN) 4.5 g IVPB in 100 mL NS MBP (CD), 4.5 g, Intravenous, Q8H, Sidney Lee MD, 4.5 g at 06/01/24 0937    Potassium Replacement - Follow Nurse / BPA Driven Protocol, , Does not apply, PRN, Jaciel Coleman MD    prochlorperazine (COMPAZINE) injection 5 mg, 5 mg, Intravenous, Q6H PRN, Jaciel Coleman MD, 5 mg at 05/28/24 1650    propofol (DIPRIVAN) infusion 10 mg/mL 100 mL, 5-50 mcg/kg/min, Intravenous, Titrated, Olivier Meyer MD, Last Rate: 17.26 mL/hr at 06/01/24 0940, 35 mcg/kg/min at 06/01/24 0940    sodium chloride 0.9 % flush 10 mL, 10 mL, Intravenous, PRN, Jaciel Coleman MD    sodium chloride 0.9 % flush 10 mL, 10 mL, Intravenous, Q12H, Jaciel Coleman MD, 10 mL at 06/01/24 0947    sodium chloride 0.9 % flush 10 mL, 10 mL, Intravenous, PRN, Jaciel Coleman MD    sodium chloride 0.9 % flush 10 mL, 10 mL, Intravenous, Q12H, Chad Wilkinson MD, 10 mL at 06/01/24 0946    sodium chloride 0.9 % flush 10 mL, 10 mL, Intravenous, Q12H, Chad Wilkinson MD, 10 mL at 06/01/24 0946    sodium chloride 0.9 % flush 10 mL, 10 mL, Intravenous, Q12H, Chad Wilkinson MD, 10 mL at 06/01/24 0946    sodium chloride 0.9 % flush 10 mL, 10 mL, Intravenous, Q12H, Chad Wilkinson MD, 10 mL at 06/01/24 0946    sodium chloride 0.9 % flush 10 mL, 10 mL, Intravenous, PRN, Chad Wilkinson MD    sodium chloride 0.9 % flush 20 mL, 20 mL, Intravenous, PRN, Chad Wilkinson MD    sodium chloride 0.9 % flush 3 mL, 3 mL, Intravenous, Q12H, Jaciel Coleman MD, 3 mL at 06/01/24  0947    sodium chloride 0.9 % flush 3-10 mL, 3-10 mL, Intravenous, PRN, Jaciel Coleman MD    sodium chloride 0.9 % infusion 40 mL, 40 mL, Intravenous, PRN, Jaciel Coleman MD    sodium chloride 0.9 % infusion 40 mL, 40 mL, Intravenous, PRN, Jaciel Coleman MD    sodium chloride 0.9 % infusion 40 mL, 40 mL, Intravenous, PRN, Chad Wilkinson MD    vasopressin (PITRESSIN) 20 units in 100 mL NS infusion, 0.03 Units/min, Intravenous, Continuous, Jaylon Kim MD, Last Rate: 9 mL/hr at 06/01/24 0841, 0.03 Units/min at 06/01/24 0841  Review of Systems:    Review of systems could not be obtained due to  patient intubated.    Objective     Vital Signs  Temp:  [97 °F (36.1 °C)-100.4 °F (38 °C)] 99.7 °F (37.6 °C)  Heart Rate:  [] 110  Resp:  [30-36] 30  BP: ()/(52-91) 73/56  FiO2 (%):  [50 %-100 %] 80 %  Body mass index is 24.12 kg/m².    Intake/Output Summary (Last 24 hours) at 6/1/2024 0959  Last data filed at 6/1/2024 0900  Gross per 24 hour   Intake 4415.04 ml   Output 2472.1 ml   Net 1942.94 ml     I/O this shift:  In: 238.8 [I.V.:238.8]  Out: 174.5      Physical Exam:   General: patient awake, alert and cooperative   Eyes: Normal lids and lashes, no scleral icterus   Neck: supple, normal ROM   Skin: warm and dry, not jaundiced   Cardiovascular: regular rhythm and rate, no murmurs auscultated   Pulm: clear to auscultation bilaterally, regular and unlabored   Abdomen: soft, nontender, nondistended; normal bowel sounds   Extremities: no rash or edema   Psychiatric: Normal mood and behavior; memory intact     Results Review:     I reviewed the patient's new clinical results.    Results from last 7 days   Lab Units 06/01/24  0305 05/31/24  1815 05/31/24  0428 05/30/24  0245   WBC 10*3/mm3 19.93*  --  18.29* 8.35   HEMOGLOBIN g/dL 9.3* 9.2* 6.6* 7.5*   HEMATOCRIT % 27.9* 28.7* 20.7* 23.8*   PLATELETS 10*3/mm3 410  --  363 393     Results from last 7 days   Lab Units 06/01/24  0522 06/01/24  0305  05/31/24  2342 05/31/24  1815 05/31/24  1209 05/31/24  0428   SODIUM mmol/L 139 141 143   < > 148* 151*   POTASSIUM mmol/L 5.3* 5.3* 5.4*   < > 5.2 5.1   CHLORIDE mmol/L 109* 107 111*   < > 118* 121*   CO2 mmol/L 20.4* 20.7* 22.0   < > 17.5* 20.0*   BUN mg/dL 35* 36* 43*   < > 57* 56*   CREATININE mg/dL 3.27* 3.44* 3.74*   < > 4.88* 3.98*   CALCIUM mg/dL 7.8* 8.0* 7.9*   < > 7.9* 7.8*   BILIRUBIN mg/dL  --  3.6*  --   --  4.0* 3.9*   ALK PHOS U/L  --  114  --   --  116 115   ALT (SGPT) U/L  --  123*  --   --  139* 155*   AST (SGOT) U/L  --  167*  --   --  192* 236*   GLUCOSE mg/dL 77 77 83   < > 151* 185*    < > = values in this interval not displayed.     Results from last 7 days   Lab Units 05/31/24  1209 05/29/24  0432   INR  1.27* 1.26*     Lab Results   Lab Value Date/Time    LIPASE 39 08/11/2023 2030       Radiology:  XR Chest 1 View   Final Result   Central line placement. No pneumothorax.       This report was finalized on 5/31/2024 3:47 PM by Dr. Papo Selby M.D on Workstation: [id number]          XR Chest Post CVA Port   Final Result   Tubes and lines as noted above. No pneumothorax identified.       This report was finalized on 5/30/2024 10:14 PM by Dr. Faith Del Rio M.D on Workstation: [id number]          XR Abdomen KUB   Final Result       As described.           This report was finalized on 5/30/2024 7:19 PM by Dr. Papo Selby M.D on Workstation: [id number]          XR Chest 1 View   Final Result   As described.       This report was finalized on 5/30/2024 7:03 PM by Dr. Papo Selby M.D on Workstation: [id number]          XR Chest 1 View   Final Result   As described.               This report was finalized on 5/30/2024 12:12 PM by Dr. Papo Selby M.D on Workstation: [id number]          CT Chest Without Contrast Diagnostic   Final Result       Conspicuous interval worsening of left more than right airspace disease,   clinical correlation and continued follow-up advised.        This report was finalized on 5/29/2024 4:41 PM by Dr. Papo Selby M.D on Workstation: [id number]          NM HIDA SCAN WITH PHARMACOLOGICAL INTERVENTION   Final Result   Visualization of the gallbladder suggesting cystic duct   patency.           This report was finalized on 5/29/2024 3:32 PM by Dr. Ronny Pérez M.D on Workstation: [id number]          US Renal Bilateral   Final Result   1. No hydronephrosis.   2. Ultrasound findings suggesting medical renal disease.           This report was finalized on 5/29/2024 12:47 PM by Dr. Ronny Pérez M.D on Workstation: [id number]          XR Chest 1 View   Final Result   Interval progression in left lung pneumonia and/or   atelectasis with interval development of mild right lung atelectasis.       This report was finalized on 5/29/2024 12:55 PM by Dr. José Miguel Thomas M.D on Workstation: [id number]          XR Chest 1 View   Final Result      US Abdomen Limited   Final Result   Gallbladder sludge, without evidence of acute cholecystitis.       This report was finalized on 5/28/2024 6:03 AM by Dr. Faith Del Rio M.D on Workstation: [id number]          FL ESOPHAGRAM SINGLE CONTRAST   Final Result   Normal limited esophagram.                This report was finalized on 5/26/2024 8:17 PM by Dr. Ronny Pérez M.D on Workstation: [id number]          XR Chest 1 View   Final Result   Heterogeneous multifocal left greater than right lung opacities,   increased in the interval. Suspect multifocal pneumonia. Hemorrhage in   the appropriate clinical setting.       This report was finalized on 5/25/2024 7:58 AM by Dr. Carlito Chand M.D on Workstation: [id number]          NM Lung Ventilation Perfusion    (Results Pending)   SLP FEES - Fiberoptic Endo Eval Swallow    (Results Pending)   XR Chest 1 View    (Results Pending)   XR Chest 1 View    (Results Pending)   XR Chest 1 View    (Results Pending)   US Abdomen Limited    (Results Pending)  "      Assessment & Plan     Active Hospital Problems    Diagnosis     **Aspiration pneumonia due to vomitus     Oral phase dysphagia     Dental caries     MARTÍNEZ (acute kidney injury)     Sepsis     Dehydration     Severe malnutrition     Jaw pain     History of prostate cancer        Assessment:  Upper GI bleeding: Etiology not well-defined, H&H actually stable at present posttransfusion.  Aspiration pneumonia   Dysphagia  MARTÍNEZ  Prostate cancer  Elevated LFTs      Plan:  Monitor H&H   Continue IV PPI  Pending his response will entertain endoscopic evaluation  I discussed the patients findings and my recommendations with family and nursing staff.    Carlito Carlos MD                Electronically signed by Carlito Carlos MD at 06/01/24 1009       Jaylon Kim MD at 06/01/24 0756            Intensive Care Unit Daily Progress Note.   [facility] INTENSIVE CARE  6/1/2024    Patient Name:  Jimmy Norman  MRN:  [id number]  YOB: 1955  Age: 69 y.o.  Sex: male         Reason for Admission / Chief Complaint:  Respiratory failure, pneumonia    Hospital Course:   69-year-old male who presented to the hospital on 5/25 with weakness and shortness of breath and ultimately became progressively more hypoxic necessitating intubation and found to be in ARDS complicated by shock.    Interval History:  Intubated, sedated  Paralyzed  Requiring vasopressor support  On CRRT  Bloody output from NG    Physical Exam:  /72   Pulse 112   Temp 99.9 °F (37.7 °C)   Resp (!) 30   Ht 188 cm (74\")   Wt 85.2 kg (187 lb 13.3 oz)   SpO2 91%   BMI 24.12 kg/m²   Body mass index is 24.12 kg/m².    Intake/Output    Intake/Output Summary (Last 24 hours) at 6/1/2024 0756  Last data filed at 6/1/2024 0700  Gross per 24 hour   Intake 4176.21 ml   Output 2317.6 ml   Net 1858.61 ml     General: Intubated, sedated  HEENT: NC/AT, EOMI, MMM  Neck: Supple, trachea midline  Cardiac: RRR, no murmur, " gallops, rubs  Pulmonary: Mechanical breath sounds   GI: Soft, non-tender, non-distended, normal bowel sounds  Extremities: Warm, well perfused, no LE edema  Skin: no visible rash  Neuro: CN II - XII grossly intact  Psychiatry: Sedated      Data Review:  Notable Labs:  Results from last 7 days   Lab Units 06/01/24  0305 05/31/24  1815 05/31/24  0428 05/30/24  0245 05/29/24  0432 05/28/24  0517 05/27/24  0630 05/26/24  1624   WBC 10*3/mm3 19.93*  --  18.29* 8.35 8.39 7.61 6.49 6.30   HEMOGLOBIN g/dL 9.3* 9.2* 6.6* 7.5* 8.0* 8.0* 9.3* 8.9*   PLATELETS 10*3/mm3 410  --  363 393 369 362 346 353     Results from last 7 days   Lab Units 06/01/24  0522 06/01/24  0305 05/31/24  2342 05/31/24  1815 05/31/24  1209 05/31/24  0428 05/30/24  0245 05/29/24  0432 05/28/24  0517   SODIUM mmol/L 139 141 143 146* 148* 151* 151*   < > 141   POTASSIUM mmol/L 5.3* 5.3* 5.4* 5.7* 5.2 5.1 4.2   < > 3.8   CHLORIDE mmol/L 109* 107 111* 114* 118* 121* 117*   < > 107   CO2 mmol/L 20.4* 20.7* 22.0 19.9* 17.5* 20.0* 21.0*   < > 24.0   BUN mg/dL 35* 36* 43* 53* 57* 56* 39*   < > 19   CREATININE mg/dL 3.27* 3.44* 3.74* 4.69* 4.88* 3.98* 2.44*   < > 1.67*   GLUCOSE mg/dL 77 77 83 96 151* 185* 104*   < > 106*   CALCIUM mg/dL 7.8* 8.0* 7.9* 8.0* 7.9* 7.8* 8.1*   < > 8.2*   MAGNESIUM mg/dL 2.5*  --  2.6* 2.7* 2.8* 2.8*  --   --   --    PHOSPHORUS mg/dL 6.2*  --  6.1* 6.5* 5.7* 5.2*  --   --  2.7    < > = values in this interval not displayed.   Estimated Creatinine Clearance: 25.3 mL/min (A) (by C-G formula based on SCr of 3.27 mg/dL (H)).    Results from last 7 days   Lab Units 06/01/24  0305 05/31/24  1209 05/31/24  0428 05/30/24  0245 05/29/24  0947 05/29/24  0432 05/28/24  1912 05/28/24  0517   AST (SGOT) U/L 167* 192* 236* 329*  --  352*  --  186*   ALT (SGPT) U/L 123* 139* 155* 219*  --  196*  --  102*   PROCALCITONIN ng/mL  --   --  146.80*  --   --  2.09*  --  1.85*   C3 COMPLEMENT mg/dL  --   --   --   --  172*  --   --   --    C4 COMPLEMENT  mg/dL  --   --   --   --  46*  --   --   --    D DIMER QUANT MCGFEU/mL  --   --   --   --   --   --  3.14*  --    PLATELETS 10*3/mm3 410  --  363 393  --  369  --  362       Results from last 7 days   Lab Units 05/31/24  0317 05/30/24  2212 05/30/24  1739 05/30/24  1239 05/29/24  0132 05/28/24  1752   PH, ARTERIAL pH units 7.340* 7.317* 7.411 7.441 7.485* 7.528*   PCO2, ARTERIAL mm Hg 35.8 41.2 33.9* 32.6* 30.5* 28.0*   PO2 ART mm Hg 188.5* 138.7* 90.2 73.5* 57.6* 47.8*   HCO3 ART mmol/L 19.4* 21.1* 21.5* 22.2 22.9 23.3       Imaging:  Reviewed chest images personally from past 3 days    ASSESSMENT  /  PLAN:    ARDS  Acute hypoxic respiratory failure, on mechanical ventilation since 5/30/2024  Bilateral lower lobe pneumonia/consolidations and peripheral GGO opacities  s/p bronchoscopy 5/30/2024.  Cultures negative so far. Procal 146.    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  Hypernatremia  MARTÍNEZ, worsening  NAG, likely secondary  Metabolic acidosis  Transaminitis, secondary to sepsis  Hepatic Doppler negative     -Sedation: Propofol, Precedex, titrate for RASS -2  - Analgesia: Fentanyl drip  - Paralytics for severe ARDS  -Mechanical ventilation management: Settings reviewed and adjusted, tidal volume 6-7 mL/kg, PEEP 10, daily ABGs  -Requiring 2 vasopressors to support MAP goal greater than 65  -Bronchoscopy (5/30) with cultures pending  -New bloody output from NG tube, started on Protonix drip, GI consulted, hemoglobin monitoring, ultrasound liver  -HP, ELIDA, ANCA panel pending  -Continue antibiotics with Zosyn, infectious disease following, infectious workup pending  -Echocardiogram pending  -On CRRT per nephrology, appreciate recommendations  -Core track and trickle enteral nutrition  -Bronchodilator with albuterol nebulizer  -Patient is in critical condition  "with poor prognosis in the setting of ARDS, respiratory failure, shock, renal failure, now new and has been suspected GI bleed.    All issues new to me today.  Prior hospital course, labs and imaging reviewed.    GI prophylaxis: Protonix drip  DVT prophylaxis: SCDs, heparin subcu held for GI bleed  Goncalves catheter: Yes  Bowel regimen: Ordered  Diet: N.p.o.    Discharge: Continue to monitor in the ICU due to critical status    Critical Care Time: 50 minutes    Jaylon Kim MD  [facility]  Pulmonary and Critical Care Medicine, Interventional Pulmonology    Parts of this note may be an electronic transcription/translation of spoken language to printed text using the Dragon dictation system.         Electronically signed by Jaylon Kim MD at 06/01/24 0924       Jonathan Montoya MD at 06/01/24 0700           LOS: 6 days   Patient Care Team:  Provider, No Known as PCP - General    Chief Complaint: MARTÍNEZ      Subjective  Chart reviewed  Seen in the ICU, on CRRT  Minimal UOP  On Levophed    Objective     Vital Sign Min/Max for last 24 hours  Temp  Min: 97 °F (36.1 °C)  Max: 100.4 °F (38 °C)   BP  Min: 75/60  Max: 140/91   Pulse  Min: 69  Max: 135   Resp  Min: 30  Max: 36   SpO2  Min: 90 %  Max: 100 %   No data recorded   Weight  Min: 85.2 kg (187 lb 13.3 oz)  Max: 85.2 kg (187 lb 13.3 oz)     Flowsheet Rows      Flowsheet Row First Filed Value   Admission Height 188 cm (74\") Documented at 05/25/2024 1332   Admission Weight 82.9 kg (182 lb 12.2 oz) Documented at 05/25/2024 0729            No intake/output data recorded.  I/O last 3 completed shifts:  In: 9990.1 [I.V.:9343; Blood:347.5; IV Piggyback:299.6]  Out: 2632.4 [Urine:440; Emesis/NG output:420]    Objective:  Vital signs: (most recent): Blood pressure 108/72, pulse 112, temperature 99.9 °F (37.7 °C), resp. rate (!) 30, height 188 cm (74\"), weight 85.2 kg (187 lb 13.3 oz), SpO2 91%.            Physical Examination: Intubated, sedated.. " "  Chest - coarse  Heart - normal rate, regular rhythm, normal S1, S2, no murmurs, rubs, clicks or gallops  Abdomen - soft, nondistended, no masses or organomegaly  Neurological - sedated   Extremities - peripheral pulses normal, no pedal edema, no clubbing or cyanosis     Results Review:     I reviewed the patient's new clinical results.    WBC WBC   Date Value Ref Range Status   06/01/2024 19.93 (H) 3.40 - 10.80 10*3/mm3 Final   05/31/2024 18.29 (H) 3.40 - 10.80 10*3/mm3 Final   05/30/2024 8.35 3.40 - 10.80 10*3/mm3 Final      HGB Hemoglobin   Date Value Ref Range Status   06/01/2024 9.3 (L) 13.0 - 17.7 g/dL Final   05/31/2024 9.2 (L) 13.0 - 17.7 g/dL Final   05/31/2024 6.6 (C) 13.0 - 17.7 g/dL Final   05/30/2024 7.5 (L) 13.0 - 17.7 g/dL Final      HCT Hematocrit   Date Value Ref Range Status   06/01/2024 27.9 (L) 37.5 - 51.0 % Final   05/31/2024 28.7 (L) 37.5 - 51.0 % Final   05/31/2024 20.7 (C) 37.5 - 51.0 % Final   05/30/2024 23.8 (L) 37.5 - 51.0 % Final      Platlets No results found for: \"LABPLAT\"   MCV MCV   Date Value Ref Range Status   06/01/2024 90.3 79.0 - 97.0 fL Final   05/31/2024 88.1 79.0 - 97.0 fL Final   05/30/2024 90.2 79.0 - 97.0 fL Final          Sodium Sodium   Date Value Ref Range Status   06/01/2024 139 136 - 145 mmol/L Final   06/01/2024 141 136 - 145 mmol/L Final   05/31/2024 143 136 - 145 mmol/L Final   05/31/2024 146 (H) 136 - 145 mmol/L Final   05/31/2024 148 (H) 136 - 145 mmol/L Final   05/31/2024 151 (H) 136 - 145 mmol/L Final   05/30/2024 151 (H) 136 - 145 mmol/L Final      Potassium Potassium   Date Value Ref Range Status   06/01/2024 5.3 (H) 3.5 - 5.2 mmol/L Final   06/01/2024 5.3 (H) 3.5 - 5.2 mmol/L Final   05/31/2024 5.4 (H) 3.5 - 5.2 mmol/L Final   05/31/2024 5.7 (H) 3.5 - 5.2 mmol/L Final   05/31/2024 5.2 3.5 - 5.2 mmol/L Final   05/31/2024 5.1 3.5 - 5.2 mmol/L Final   05/30/2024 4.2 3.5 - 5.2 mmol/L Final      Chloride Chloride   Date Value Ref Range Status   06/01/2024 109 (H) " "98 - 107 mmol/L Final   06/01/2024 107 98 - 107 mmol/L Final   05/31/2024 111 (H) 98 - 107 mmol/L Final   05/31/2024 114 (H) 98 - 107 mmol/L Final   05/31/2024 118 (H) 98 - 107 mmol/L Final   05/31/2024 121 (H) 98 - 107 mmol/L Final   05/30/2024 117 (H) 98 - 107 mmol/L Final      CO2 CO2   Date Value Ref Range Status   06/01/2024 20.4 (L) 22.0 - 29.0 mmol/L Final   06/01/2024 20.7 (L) 22.0 - 29.0 mmol/L Final   05/31/2024 22.0 22.0 - 29.0 mmol/L Final   05/31/2024 19.9 (L) 22.0 - 29.0 mmol/L Final   05/31/2024 17.5 (L) 22.0 - 29.0 mmol/L Final   05/31/2024 20.0 (L) 22.0 - 29.0 mmol/L Final   05/30/2024 21.0 (L) 22.0 - 29.0 mmol/L Final      BUN BUN   Date Value Ref Range Status   06/01/2024 35 (H) 8 - 23 mg/dL Final   06/01/2024 36 (H) 8 - 23 mg/dL Final   05/31/2024 43 (H) 8 - 23 mg/dL Final   05/31/2024 53 (H) 8 - 23 mg/dL Final   05/31/2024 57 (H) 8 - 23 mg/dL Final   05/31/2024 56 (H) 8 - 23 mg/dL Final   05/30/2024 39 (H) 8 - 23 mg/dL Final      Creatinine Creatinine   Date Value Ref Range Status   06/01/2024 3.27 (H) 0.76 - 1.27 mg/dL Final   06/01/2024 3.44 (H) 0.76 - 1.27 mg/dL Final   05/31/2024 3.74 (H) 0.76 - 1.27 mg/dL Final   05/31/2024 4.69 (H) 0.76 - 1.27 mg/dL Final   05/31/2024 4.88 (H) 0.76 - 1.27 mg/dL Final   05/31/2024 3.98 (H) 0.76 - 1.27 mg/dL Final   05/30/2024 2.44 (H) 0.76 - 1.27 mg/dL Final      Calcium Calcium   Date Value Ref Range Status   06/01/2024 7.8 (L) 8.6 - 10.5 mg/dL Final   06/01/2024 8.0 (L) 8.6 - 10.5 mg/dL Final   05/31/2024 7.9 (L) 8.6 - 10.5 mg/dL Final   05/31/2024 8.0 (L) 8.6 - 10.5 mg/dL Final   05/31/2024 7.9 (L) 8.6 - 10.5 mg/dL Final   05/31/2024 7.8 (L) 8.6 - 10.5 mg/dL Final   05/30/2024 8.1 (L) 8.6 - 10.5 mg/dL Final      PO4 No results found for: \"CAPO4\"   Albumin Albumin   Date Value Ref Range Status   06/01/2024 2.2 (L) 3.5 - 5.2 g/dL Final   06/01/2024 2.2 (L) 3.5 - 5.2 g/dL Final   05/31/2024 2.2 (L) 3.5 - 5.2 g/dL Final   05/31/2024 2.3 (L) 3.5 - 5.2 g/dL " Final   05/31/2024 2.1 (L) 3.5 - 5.2 g/dL Final   05/31/2024 2.2 (L) 3.5 - 5.2 g/dL Final   05/30/2024 2.5 (L) 3.5 - 5.2 g/dL Final      Magnesium Magnesium   Date Value Ref Range Status   06/01/2024 2.5 (H) 1.6 - 2.4 mg/dL Final   05/31/2024 2.6 (H) 1.6 - 2.4 mg/dL Final   05/31/2024 2.7 (H) 1.6 - 2.4 mg/dL Final   05/31/2024 2.8 (H) 1.6 - 2.4 mg/dL Final   05/31/2024 2.8 (H) 1.6 - 2.4 mg/dL Final      Uric Acid Uric Acid   Date Value Ref Range Status   05/31/2024 6.4 3.4 - 7.0 mg/dL Final        Medication Review:     Current Facility-Administered Medications:     acetaminophen (TYLENOL) tablet 650 mg, 650 mg, Oral, Q4H PRN, 650 mg at 05/27/24 2353 **OR** acetaminophen (TYLENOL) 160 MG/5ML oral solution 650 mg, 650 mg, Oral, Q4H PRN, 650 mg at 05/31/24 2218 **OR** acetaminophen (TYLENOL) suppository 650 mg, 650 mg, Rectal, Q4H PRN, Jaciel Coleman MD, 650 mg at 05/30/24 2248    albuterol (PROVENTIL) nebulizer solution 0.083% 2.5 mg/3mL, 2.5 mg, Nebulization, Q6H - RT, Jaciel Coleman MD, 2.5 mg at 05/31/24 1140    albuterol (PROVENTIL) nebulizer solution 0.083% 2.5 mg/3mL, 2.5 mg, Nebulization, Q6H PRN, Jaciel Coleman MD    artificial tears ophthalmic ointment, , Both Eyes, Q4H, James Rivera Jr., MD, Given at 06/01/24 0503    sennosides-docusate (PERICOLACE) 8.6-50 MG per tablet 2 tablet, 2 tablet, Oral, BID PRN **AND** polyethylene glycol (MIRALAX) packet 17 g, 17 g, Oral, Daily PRN **AND** bisacodyl (DULCOLAX) EC tablet 5 mg, 5 mg, Oral, Daily PRN **AND** bisacodyl (DULCOLAX) suppository 10 mg, 10 mg, Rectal, Daily PRN, Jaciel Coleman MD    Calcium Replacement - Follow Nurse / BPA Driven Protocol, , Does not apply, PRN, Jaciel Coleman MD    cisatracurium besylate (NIMBEX) 200 mg in sodium chloride 0.9 % 100 mL (2 mg/mL) infusion, 0.5-10 mcg/kg/min, Intravenous, Titrated, James Rivera Jr., MD, Last Rate: 14.8 mL/hr at 06/01/24 0516, 6 mcg/kg/min at 06/01/24 0516    dexmedetomidine (PRECEDEX)  400 mcg in 100 mL NS infusion, 0.2-1.5 mcg/kg/hr, Intravenous, Titrated, Olivier Meyer MD, Last Rate: 8.2 mL/hr at 06/01/24 0629, 0.4 mcg/kg/hr at 06/01/24 0629    dextrose (D50W) (25 g/50 mL) IV injection 25 g, 25 g, Intravenous, Q15 Min PRN, Ed Mandujano MD    dextrose (GLUTOSE) oral gel 15 g, 15 g, Oral, Q15 Min PRN, Ed Mandujano MD    epoetin fabiola-epbx (RETACRIT) injection 20,000 Units, 20,000 Units, Subcutaneous, Weekly, Jaciel Coleman MD    famotidine (PEPCID) tablet 10 mg, 10 mg, Oral, BID PRN, Jaciel Coleman MD    fentaNYL (SUBLIMAZE) bolus from bag 10 mcg/mL injection 50 mcg, 50 mcg, Intravenous, Q30 Min PRN, James Rivera Jr., MD, 50 mcg at 05/31/24 2019    fentaNYL 1000 mcg in 100 mL NS infusion,  mcg/hr, Intravenous, Titrated, James Rivera Jr., MD, Last Rate: 10 mL/hr at 06/01/24 0629, 100 mcg/hr at 06/01/24 0629    glucagon (GLUCAGEN) injection 1 mg, 1 mg, Intramuscular, Q15 Min PRN, Ed Mandujano MD    heparin (porcine) 5000 UNIT/ML injection 5,000 Units, 5,000 Units, Subcutaneous, Q8H, Jaciel Coleman MD, 5,000 Units at 06/01/24 0516    heparin (porcine) injection 1,000-2,000 Units, 1,000-2,000 Units, Intravenous, PRN, Jonathan Montoya MD, 2,000 Units at 05/31/24 1653    insulin regular (humuLIN R,novoLIN R) injection 2-7 Units, 2-7 Units, Subcutaneous, Q6H, Ed Mandujano MD, 2 Units at 05/31/24 1242    Magnesium Low Dose Replacement - Follow Nurse / BPA Driven Protocol, , Does not apply, PRN, Jaciel Coleman MD    melatonin tablet 2.5 mg, 2.5 mg, Oral, Nightly PRN, Jaciel Coleman MD    nitroglycerin (NITROSTAT) SL tablet 0.4 mg, 0.4 mg, Sublingual, Q5 Min PRN, Jaciel Coleman MD    norepinephrine (LEVOPHED) 8 mg in 250 mL NS infusion (premix), 0.02-0.3 mcg/kg/min, Intravenous, Titrated, Olivier Meyer MD, Last Rate: 43.2 mL/hr at 06/01/24 0519, 0.28 mcg/kg/min at 06/01/24 0519    ondansetron ODT (ZOFRAN-ODT) disintegrating tablet 4 mg, 4 mg, Oral, Q6H PRN  **OR** ondansetron (ZOFRAN) injection 4 mg, 4 mg, Intravenous, Q6H PRN, Jaciel Coleman MD, 4 mg at 05/28/24 0848    phenylephrine (YESY-SYNEPHRINE) 50 mg in 250 mL NS infusion, 0.5-3 mcg/kg/min, Intravenous, Titrated, Dominique Ortiz APRN    Phosphorus Replacement - Follow Nurse / BPA Driven Protocol, , Does not apply, PRN, Jaciel Coleman MD    Phoxillum BK4/2.5 dialysis solution, 1,000 mL/hr, CRRT, Continuous, Jonathan Montoya MD, Last Rate: 1,000 mL/hr at 06/01/24 0236, 1,000 mL/hr at 06/01/24 0236    Phoxillum BK4/2.5 dialysis solution, 1,000 mL/hr, CRRT, Continuous, Jonathan Montoya MD, Last Rate: 1,000 mL/hr at 06/01/24 0236, 1,000 mL/hr at 06/01/24 0236    Phoxillum BK4/2.5 dialysis solution, 1,000 mL/hr, CRRT, Continuous, Jonathan Montoya MD, Last Rate: 1,000 mL/hr at 06/01/24 0235, 1,000 mL/hr at 06/01/24 0235    piperacillin-tazobactam (ZOSYN) 4.5 g IVPB in 100 mL NS MBP (CD), 4.5 g, Intravenous, Q8H, Sidney Lee MD, 4.5 g at 05/31/24 2358    Potassium Replacement - Follow Nurse / BPA Driven Protocol, , Does not apply, PRN, Jaciel Coleman MD    prochlorperazine (COMPAZINE) injection 5 mg, 5 mg, Intravenous, Q6H PRN, Jaciel Coleman MD, 5 mg at 05/28/24 1650    propofol (DIPRIVAN) infusion 10 mg/mL 100 mL, 5-50 mcg/kg/min, Intravenous, Titrated, Olivier Meyer MD, Last Rate: 17.26 mL/hr at 06/01/24 0409, 35 mcg/kg/min at 06/01/24 0409    sodium chloride 0.9 % flush 10 mL, 10 mL, Intravenous, PRN, Jaciel Coleman MD    sodium chloride 0.9 % flush 10 mL, 10 mL, Intravenous, Q12H, Jaciel Coleman MD, 10 mL at 05/31/24 2018    sodium chloride 0.9 % flush 10 mL, 10 mL, Intravenous, PRN, Jaciel Coleman MD    sodium chloride 0.9 % flush 10 mL, 10 mL, Intravenous, Q12H, Chad Wilkinson MD, 10 mL at 05/31/24 2017    sodium chloride 0.9 % flush 10 mL, 10 mL, Intravenous, Q12H, Chad Wilkinson MD, 10 mL at 05/31/24 2017    sodium chloride 0.9 % flush 10  mL, 10 mL, Intravenous, Q12H, Chad Wilkinson MD, 10 mL at 05/31/24 2017    sodium chloride 0.9 % flush 10 mL, 10 mL, Intravenous, Q12H, Chad Wilkinson MD, 10 mL at 05/31/24 2017    sodium chloride 0.9 % flush 10 mL, 10 mL, Intravenous, PRN, Chad Wilkinson MD    sodium chloride 0.9 % flush 20 mL, 20 mL, Intravenous, PRN, Chad Wilkinson MD    sodium chloride 0.9 % flush 3 mL, 3 mL, Intravenous, Q12H, Jaciel Coleman MD, 3 mL at 05/31/24 2018    sodium chloride 0.9 % flush 3-10 mL, 3-10 mL, Intravenous, PRNVirginia Ervin H., MD    sodium chloride 0.9 % infusion 40 mL, 40 mL, Intravenous, PRNVirginia Ervin H., MD    sodium chloride 0.9 % infusion 40 mL, 40 mL, Intravenous, PRNVirginia Ervin H., MD    sodium chloride 0.9 % infusion 40 mL, 40 mL, Intravenous, PRN, Chad Wilkinson MD     Assessment & Plan       Assessment & Plan  MARTÍNEZ/ATN  Proteinuria   Metabolic acidosis   ARDS  Shock  PNA  Sepsis   Hypernatremia  Hematuria   Shock    Continue CRRT - increase flow rates to improve clearance  UF as able  Continue Abx per pulmonary/primary  Pressor and vent support per ICU team   Discussed with family  Will follow         Jonathan Montoya MD  [facility]  06/01/24  07:01 EDT              Electronically signed by Jonathan Montoya MD at 06/01/24 4343       Chad Wilkinson MD at 05/31/24 1630          Events noted and chart reviewed.  Patient now on full ventilatory support and on pressor agents.  Management as per pulmonology, intensivist service.  I will follow him more peripherally.  Please call if there are any needs.  Multiple specialty input greatly appreciated.    Electronically signed by Chad Wilkinson MD at 05/31/24 1631       Alli Zhu MD at 05/31/24 1024          Gastroenterology   Inpatient Progress Note    Reason for Follow Up: Elevated liver enzymes    Subjective     Interval History:   Events of yesterday noted.  Patient now  intubated.    Current Facility-Administered Medications:     acetaminophen (TYLENOL) tablet 650 mg, 650 mg, Oral, Q4H PRN, 650 mg at 05/27/24 2353 **OR** acetaminophen (TYLENOL) 160 MG/5ML oral solution 650 mg, 650 mg, Oral, Q4H PRN **OR** acetaminophen (TYLENOL) suppository 650 mg, 650 mg, Rectal, Q4H PRN, Jaciel Coleman MD, 650 mg at 05/30/24 2248    albuterol (PROVENTIL) nebulizer solution 0.083% 2.5 mg/3mL, 2.5 mg, Nebulization, Q6H - RT, Jaciel Coleman MD, 2.5 mg at 05/31/24 0654    albuterol (PROVENTIL) nebulizer solution 0.083% 2.5 mg/3mL, 2.5 mg, Nebulization, Q6H PRN, Jaciel Coleman MD    artificial tears ophthalmic ointment, , Both Eyes, Q4H, James Rivera Jr., MD, Given at 05/31/24 0921    sennosides-docusate (PERICOLACE) 8.6-50 MG per tablet 2 tablet, 2 tablet, Oral, BID PRN **AND** polyethylene glycol (MIRALAX) packet 17 g, 17 g, Oral, Daily PRN **AND** bisacodyl (DULCOLAX) EC tablet 5 mg, 5 mg, Oral, Daily PRN **AND** bisacodyl (DULCOLAX) suppository 10 mg, 10 mg, Rectal, Daily PRN, Jaciel Coleman MD    Calcium Replacement - Follow Nurse / BPA Driven Protocol, , Does not apply, PRN, Jaciel Coleman MD    cisatracurium besylate (NIMBEX) 200 mg in sodium chloride 0.9 % 100 mL (2 mg/mL) infusion, 0.5-10 mcg/kg/min, Intravenous, Titrated, James Rivera Jr., MD, Last Rate: 16.03 mL/hr at 05/31/24 0358, 6.5 mcg/kg/min at 05/31/24 0358    dexmedetomidine (PRECEDEX) 400 mcg in 100 mL NS infusion, 0.2-1.5 mcg/kg/hr, Intravenous, Titrated, Olivier Meyer MD, Stopped at 05/31/24 0516    dextrose (D5W) 5 % infusion, 100 mL/hr, Intravenous, Continuous, Jaciel Coleman MD, Last Rate: 100 mL/hr at 05/31/24 0656, 100 mL/hr at 05/31/24 0656    epoetin fabiola-epbx (RETACRIT) injection 20,000 Units, 20,000 Units, Subcutaneous, Weekly, Jaciel Coleman MD    famotidine (PEPCID) tablet 10 mg, 10 mg, Oral, BID PRN, Jaciel Coleman MD    fentaNYL (SUBLIMAZE) bolus from bag 10 mcg/mL injection 50 mcg, 50  mcg, Intravenous, Q30 Min PRN, James Rivera Jr., MD    fentaNYL 1000 mcg in 100 mL NS infusion,  mcg/hr, Intravenous, Titrated, James Rivera Jr., MD, Last Rate: 10 mL/hr at 05/31/24 0919, 100 mcg/hr at 05/31/24 0919    heparin (porcine) 5000 UNIT/ML injection 5,000 Units, 5,000 Units, Subcutaneous, Q8H, Jaciel Coleman MD, 5,000 Units at 05/31/24 0519    Magnesium Low Dose Replacement - Follow Nurse / BPA Driven Protocol, , Does not apply, PRN, Jaciel Coleman MD    melatonin tablet 2.5 mg, 2.5 mg, Oral, Nightly PRN, Jaciel Coleman MD    nitroglycerin (NITROSTAT) SL tablet 0.4 mg, 0.4 mg, Sublingual, Q5 Min PRN, Jaciel Coleman MD    norepinephrine (LEVOPHED) 8 mg in 250 mL NS infusion (premix), 0.02-0.3 mcg/kg/min, Intravenous, Titrated, Olivier Meyer MD, Last Rate: 7.71 mL/hr at 05/31/24 0630, 0.05 mcg/kg/min at 05/31/24 0630    ondansetron ODT (ZOFRAN-ODT) disintegrating tablet 4 mg, 4 mg, Oral, Q6H PRN **OR** ondansetron (ZOFRAN) injection 4 mg, 4 mg, Intravenous, Q6H PRN, Jaciel Coleman MD, 4 mg at 05/28/24 0848    Phosphorus Replacement - Follow Nurse / BPA Driven Protocol, , Does not apply, PRNVirginia Ervin H., MD    piperacillin-tazobactam (ZOSYN) 4.5 g IVPB in 100 mL NS MBP (CD), 4.5 g, Intravenous, Q8H, Jaciel Coleman MD, 4.5 g at 05/31/24 0937    Potassium Replacement - Follow Nurse / BPA Driven Protocol, , Does not apply, PRNVirginia Ervin H., MD    prochlorperazine (COMPAZINE) injection 5 mg, 5 mg, Intravenous, Q6H PRN, Jaciel Coleman MD, 5 mg at 05/28/24 1650    propofol (DIPRIVAN) infusion 10 mg/mL 100 mL, 5-50 mcg/kg/min, Intravenous, Titrated, Olivier Meyer MD, Last Rate: 24.66 mL/hr at 05/31/24 0814, 50 mcg/kg/min at 05/31/24 0814    sodium chloride 0.9 % flush 10 mL, 10 mL, Intravenous, PRN, Jaciel Coleman MD    sodium chloride 0.9 % flush 10 mL, 10 mL, Intravenous, Q12H, Jaciel Coleman MD, 10 mL at 05/31/24 0922    sodium chloride 0.9 % flush 10 mL, 10 mL,  Intravenous, Virginia KING Ervin H., MD    sodium chloride 0.9 % flush 10 mL, 10 mL, Intravenous, Q12H, Chad Wilkinson MD, 10 mL at 05/31/24 0921    sodium chloride 0.9 % flush 10 mL, 10 mL, Intravenous, Q12H, Chad Wilkinson MD, 10 mL at 05/31/24 0921    sodium chloride 0.9 % flush 10 mL, 10 mL, Intravenous, Q12H, Chad Wilkinson MD, 10 mL at 05/31/24 0921    sodium chloride 0.9 % flush 10 mL, 10 mL, Intravenous, Q12H, Chad Wilkinson MD, 10 mL at 05/31/24 0921    sodium chloride 0.9 % flush 10 mL, 10 mL, Intravenous, PRJaja JARRELL Emmett J., MD    sodium chloride 0.9 % flush 20 mL, 20 mL, Intravenous, Jaja KING Emmett J., MD    sodium chloride 0.9 % flush 3 mL, 3 mL, Intravenous, Q12H, Jaciel Coleman MD, 3 mL at 05/31/24 0922    sodium chloride 0.9 % flush 3-10 mL, 3-10 mL, Intravenous, Virginia KING Ervin H., MD    sodium chloride 0.9 % infusion 40 mL, 40 mL, Intravenous, Virginia KING Ervin H., MD    sodium chloride 0.9 % infusion 40 mL, 40 mL, Intravenous, Virginia KING Ervin H., MD    sodium chloride 0.9 % infusion 40 mL, 40 mL, Intravenous, PRJaja JARRELL Emmett J., MD  Review of Systems:    Review of systems could not be obtained due to  patient intubated.    Objective     Vital Signs  Temp:  [97 °F (36.1 °C)-102.6 °F (39.2 °C)] 98.1 °F (36.7 °C)  Heart Rate:  [] 75  Resp:  [18-50] 30  BP: ()/() 105/75  FiO2 (%):  [50 %-100 %] 50 %  Body mass index is 24.82 kg/m².    Intake/Output Summary (Last 24 hours) at 5/31/2024 1025  Last data filed at 5/31/2024 0429  Gross per 24 hour   Intake 5987 ml   Output 420 ml   Net 5567 ml     No intake/output data recorded.     Physical Exam:   General: patient sedated on vent   Eyes: mild scleral icterus   Skin: warm and dry, not jaundiced   Abdomen: soft, nontender, nondistended; normal bowel sounds, no masses palpated, no periumbical lymphadenopathy   Psychiatric: Unable to assess     Results Review:     I reviewed the patient's new  clinical results.    Results from last 7 days   Lab Units 05/31/24  0428 05/30/24  0245 05/29/24  0432   WBC 10*3/mm3 18.29* 8.35 8.39   HEMOGLOBIN g/dL 6.6* 7.5* 8.0*   HEMATOCRIT % 20.7* 23.8* 24.0*   PLATELETS 10*3/mm3 363 393 369     Results from last 7 days   Lab Units 05/31/24  0428 05/30/24  0245 05/29/24  0432   SODIUM mmol/L 151* 151* 146*   POTASSIUM mmol/L 5.1 4.2 3.9   CHLORIDE mmol/L 121* 117* 112*   CO2 mmol/L 20.0* 21.0* 22.0   BUN mg/dL 56* 39* 27*   CREATININE mg/dL 3.98* 2.44* 2.07*   CALCIUM mg/dL 7.8* 8.1* 8.2*   BILIRUBIN mg/dL 3.9* 1.8* 1.3*   ALK PHOS U/L 115 117 122*   ALT (SGPT) U/L 155* 219* 196*   AST (SGOT) U/L 236* 329* 352*   GLUCOSE mg/dL 185* 104* 107*     Results from last 7 days   Lab Units 05/29/24  0432 05/25/24  0743   INR  1.26* 1.15*     Lab Results   Lab Value Date/Time    LIPASE 39 08/11/2023 2030       Radiology:  XR Chest Post CVA Port   Final Result   Tubes and lines as noted above. No pneumothorax identified.       This report was finalized on 5/30/2024 10:14 PM by Dr. Faith Del Rio M.D on Workstation: [id number]          XR Abdomen KUB   Final Result       As described.           This report was finalized on 5/30/2024 7:19 PM by Dr. Papo Selby M.D on Workstation: [id number]          XR Chest 1 View   Final Result   As described.       This report was finalized on 5/30/2024 7:03 PM by Dr. Papo Selby M.D on Workstation: [id number]          XR Chest 1 View   Final Result   As described.               This report was finalized on 5/30/2024 12:12 PM by Dr. Papo Selby M.D on Workstation: [id number]          CT Chest Without Contrast Diagnostic   Final Result       Conspicuous interval worsening of left more than right airspace disease,   clinical correlation and continued follow-up advised.       This report was finalized on 5/29/2024 4:41 PM by Dr. Papo Selby M.D on Workstation: [id number]          NM HIDA SCAN WITH PHARMACOLOGICAL  INTERVENTION   Final Result   Visualization of the gallbladder suggesting cystic duct   patency.           This report was finalized on 5/29/2024 3:32 PM by Dr. Ronny Pérez M.D on Workstation: [id number]          US Renal Bilateral   Final Result   1. No hydronephrosis.   2. Ultrasound findings suggesting medical renal disease.           This report was finalized on 5/29/2024 12:47 PM by Dr. Ronny Pérez M.D on Workstation: [id number]          XR Chest 1 View   Final Result   Interval progression in left lung pneumonia and/or   atelectasis with interval development of mild right lung atelectasis.       This report was finalized on 5/29/2024 12:55 PM by Dr. José Miguel Thomas M.D on Workstation: [id number]          XR Chest 1 View   Final Result      US Abdomen Limited   Final Result   Gallbladder sludge, without evidence of acute cholecystitis.       This report was finalized on 5/28/2024 6:03 AM by Dr. Faith Del Rio M.D on Workstation: [id number]          FL ESOPHAGRAM SINGLE CONTRAST   Final Result   Normal limited esophagram.                This report was finalized on 5/26/2024 8:17 PM by Dr. Ronny Pérez M.D on Workstation: [id number]          XR Chest 1 View   Final Result   Heterogeneous multifocal left greater than right lung opacities,   increased in the interval. Suspect multifocal pneumonia. Hemorrhage in   the appropriate clinical setting.       This report was finalized on 5/25/2024 7:58 AM by Dr. Carlito Chand M.D on Workstation: [id number]          NM Lung Ventilation Perfusion    (Results Pending)   SLP FEES - Fiberoptic Endo Eval Swallow    (Results Pending)       Assessment & Plan     Active Hospital Problems    Diagnosis     **Aspiration pneumonia due to vomitus     Oral phase dysphagia     Dental caries     MARTÍNEZ (acute kidney injury)     Sepsis     Dehydration     Severe malnutrition     Jaw pain     History of prostate cancer        Assessment:  Elevated liver enzymes.   Patient's transaminases have actually started to trend down today.  Bilirubin has risen slightly.  Flex Doppler study of the hepatic and portal vein unremarkable.  HIDA scan negative for obstruction  Acute hypoxic respiratory failure on the vent  Bilateral pneumonia with elevated procalcitonin  Acute kidney injury  History of prostate cancer  Sepsis likely secondary to a pulmonary source      Plan:  Monitor liver enzymes.  His transaminases are improving though the bilirubin has risen a little bit.  Would still favor that this is likely reactive due to systemic inflammation.  Continue antibiotics per ID  Check pro time and ammonia level  I discussed the patients findings and my recommendations with patient and family.    MD Alli Almodovar M.D.  [facility] [place]  [address] [place] [address]  Office: [phone]     Electronically signed by Alli Zhu MD at 05/31/24 1033       Sidney Lee MD at 05/31/24 1001          ID progress note    Chief complaint: Follow-up sepsis due to pneumonia due to aspiration    Subjective: Moved to the ICU yesterday and intubated.  He underwent bronchoscopy where mucopurulent secretions were noted.  Cultures are pending.  He had hypotension overnight.  Discussed with his nurse and daughters at the bedside.          Medications:    Current Facility-Administered Medications:     acetaminophen (TYLENOL) tablet 650 mg, 650 mg, Oral, Q4H PRN, 650 mg at 05/27/24 2353 **OR** acetaminophen (TYLENOL) 160 MG/5ML oral solution 650 mg, 650 mg, Oral, Q4H PRN **OR** acetaminophen (TYLENOL) suppository 650 mg, 650 mg, Rectal, Q4H PRN, Jaciel Coleman MD, 650 mg at 05/30/24 2248    albuterol (PROVENTIL) nebulizer solution 0.083% 2.5 mg/3mL, 2.5 mg, Nebulization, Q6H - RT, Jaciel Coleman MD, 2.5 mg at 05/31/24 0654    albuterol (PROVENTIL) nebulizer solution 0.083% 2.5 mg/3mL, 2.5 mg,  Nebulization, Q6H PRN, Jaciel Coleman MD    artificial tears ophthalmic ointment, , Both Eyes, Q4H, James Rivera Jr., MD, Given at 05/31/24 0921    sennosides-docusate (PERICOLACE) 8.6-50 MG per tablet 2 tablet, 2 tablet, Oral, BID PRN **AND** polyethylene glycol (MIRALAX) packet 17 g, 17 g, Oral, Daily PRN **AND** bisacodyl (DULCOLAX) EC tablet 5 mg, 5 mg, Oral, Daily PRN **AND** bisacodyl (DULCOLAX) suppository 10 mg, 10 mg, Rectal, Daily PRN, Jaciel Coleman MD    Calcium Replacement - Follow Nurse / BPA Driven Protocol, , Does not apply, PRN, Jaciel Coleman MD    cisatracurium besylate (NIMBEX) 200 mg in sodium chloride 0.9 % 100 mL (2 mg/mL) infusion, 0.5-10 mcg/kg/min, Intravenous, Titrated, James Rivera Jr., MD, Last Rate: 16.03 mL/hr at 05/31/24 0358, 6.5 mcg/kg/min at 05/31/24 0358    dexmedetomidine (PRECEDEX) 400 mcg in 100 mL NS infusion, 0.2-1.5 mcg/kg/hr, Intravenous, Titrated, Olivier Meyer MD, Stopped at 05/31/24 0516    dextrose (D5W) 5 % infusion, 100 mL/hr, Intravenous, Continuous, Jaciel Coleman MD, Last Rate: 100 mL/hr at 05/31/24 0656, 100 mL/hr at 05/31/24 0656    epoetin fabiola-epbx (RETACRIT) injection 20,000 Units, 20,000 Units, Subcutaneous, Weekly, Jaciel Colmean MD    famotidine (PEPCID) tablet 10 mg, 10 mg, Oral, BID PRN, Jaciel Coleman MD    fentaNYL (SUBLIMAZE) bolus from bag 10 mcg/mL injection 50 mcg, 50 mcg, Intravenous, Q30 Min PRN, James Rivera Jr., MD    fentaNYL 1000 mcg in 100 mL NS infusion,  mcg/hr, Intravenous, Titrated, James Rivera Jr., MD, Last Rate: 10 mL/hr at 05/31/24 0919, 100 mcg/hr at 05/31/24 0919    heparin (porcine) 5000 UNIT/ML injection 5,000 Units, 5,000 Units, Subcutaneous, Q8H, Jaciel Coleman MD, 5,000 Units at 05/31/24 0519    Magnesium Low Dose Replacement - Follow Nurse / BPA Driven Protocol, , Does not apply, PRN, Jaciel Coleman MD    melatonin tablet 2.5 mg, 2.5 mg, Oral, Nightly PRN, Jaciel Coleman,  MD    nitroglycerin (NITROSTAT) SL tablet 0.4 mg, 0.4 mg, Sublingual, Q5 Min PRN, Jaciel Coleman MD    norepinephrine (LEVOPHED) 8 mg in 250 mL NS infusion (premix), 0.02-0.3 mcg/kg/min, Intravenous, Titrated, Olivier Meyer MD, Last Rate: 7.71 mL/hr at 05/31/24 0630, 0.05 mcg/kg/min at 05/31/24 0630    ondansetron ODT (ZOFRAN-ODT) disintegrating tablet 4 mg, 4 mg, Oral, Q6H PRN **OR** ondansetron (ZOFRAN) injection 4 mg, 4 mg, Intravenous, Q6H PRN, Jaciel Coleman MD, 4 mg at 05/28/24 0848    Phosphorus Replacement - Follow Nurse / BPA Driven Protocol, , Does not apply, Virginia KING Ervin H., MD    piperacillin-tazobactam (ZOSYN) 4.5 g IVPB in 100 mL NS MBP (CD), 4.5 g, Intravenous, Q8H, Jaciel Coleman MD, 4.5 g at 05/31/24 0937    Potassium Replacement - Follow Nurse / BPA Driven Protocol, , Does not apply, Virginia KING Ervin H., MD    prochlorperazine (COMPAZINE) injection 5 mg, 5 mg, Intravenous, Q6H PRN, Jaciel Coleman MD, 5 mg at 05/28/24 1650    propofol (DIPRIVAN) infusion 10 mg/mL 100 mL, 5-50 mcg/kg/min, Intravenous, Titrated, Olivier Meyer MD, Last Rate: 24.66 mL/hr at 05/31/24 0814, 50 mcg/kg/min at 05/31/24 0814    sodium chloride 0.9 % flush 10 mL, 10 mL, Intravenous, PRN, Jaciel Coleman MD    sodium chloride 0.9 % flush 10 mL, 10 mL, Intravenous, Q12H, Jaciel Coleman MD, 10 mL at 05/31/24 0922    sodium chloride 0.9 % flush 10 mL, 10 mL, Intravenous, PRN, Jaciel Coleman MD    sodium chloride 0.9 % flush 10 mL, 10 mL, Intravenous, Q12H, Chad Wilkinson MD, 10 mL at 05/31/24 0921    sodium chloride 0.9 % flush 10 mL, 10 mL, Intravenous, Q12H, Chad Wilkinson MD, 10 mL at 05/31/24 0921    sodium chloride 0.9 % flush 10 mL, 10 mL, Intravenous, Q12H, Chad Wilkinson MD, 10 mL at 05/31/24 0921    sodium chloride 0.9 % flush 10 mL, 10 mL, Intravenous, Q12H, Chad Wilkinson MD, 10 mL at 05/31/24 0921    sodium chloride 0.9 % flush 10 mL, 10 mL, Intravenous, PRN, Jaja,  Chad CRANDALL MD    sodium chloride 0.9 % flush 20 mL, 20 mL, Intravenous, PRN, Chad Wilkinson MD    sodium chloride 0.9 % flush 3 mL, 3 mL, Intravenous, Q12H, Jaciel Coleman MD, 3 mL at 05/31/24 0922    sodium chloride 0.9 % flush 3-10 mL, 3-10 mL, Intravenous, PRN, Jaciel Coleman MD    sodium chloride 0.9 % infusion 40 mL, 40 mL, Intravenous, PRNVirginia Ervin H., MD    sodium chloride 0.9 % infusion 40 mL, 40 mL, Intravenous, PRN, Jaciel Coleman MD    sodium chloride 0.9 % infusion 40 mL, 40 mL, Intravenous, PRN, Chad Wilkinson MD      Objective   Vital Signs   Temp:  [97 °F (36.1 °C)-102.6 °F (39.2 °C)] 98.1 °F (36.7 °C)  Heart Rate:  [] 75  Resp:  [18-50] 30  BP: ()/() 105/75  FiO2 (%):  [50 %-100 %] 50 %    Physical Exam:   General: Sedated, intubated, ill-appearing  Eyes: no scleral icterus  ENT: no thrush  Cardiovascular: Normal rate  Respiratory: Improved rales  GI: Abdomen is soft, not distended  :  + Goncalves catheter  Psychiatric: Sedated  Vasc: Right IJ catheter and PIV's without erythema    Labs:   CBC, CMP, procalcitonin, and blood and BAL cultures reviewed today  Lab Results   Component Value Date    WBC 18.29 (H) 05/31/2024    HGB 6.6 (C) 05/31/2024    HCT 20.7 (C) 05/31/2024    MCV 88.1 05/31/2024     05/31/2024       Lab Results   Component Value Date    GLUCOSE 185 (H) 05/31/2024    BUN 56 (H) 05/31/2024    CREATININE 3.98 (H) 05/31/2024    EGFRIFAFRI >60 11/21/2022    BCR 14.1 05/31/2024    CO2 20.0 (L) 05/31/2024    CALCIUM 7.8 (L) 05/31/2024    ALBUMIN 2.2 (L) 05/31/2024    LABIL2 1.3 02/06/2021     (H) 05/31/2024     (H) 05/31/2024     Procal 2.09----->146  HIV antibody negative  HIV viral load pending  Hepatitis C antibody negative  Hepatitis B surface antigen negative  Hepatitis B core IgM antibody negative  Cryptococcus antigen negative  Urine histoplasma antigen pending  Serum histoplasma antigen pending  Urine Blastomyces antigen  pending  Aspergillus galactomannan pending    Microbiology:  5/14 flu/RSV/COVID PCR: Negative  5/14 BCx: Negative  5/17 RPP negative  5/17 BCx: Negative  5/18 urine Legionella and strep antigen: Negative  5/18 MRSA nares PCR: Negative  5/25 RPP: Negative  5/25 BCx: Negative  5/29 MRSA PCR: Negative  5/30 BAL cultures: Normal dwain thus far  5/30 RPP: Negative    Radiology:  5/30 CXR with bilateral pulmonary infiltrates    ASSESSMENT/PLAN:  Bilateral pneumonia  Acute hypoxic respiratory failure  Acute on chronic jaw pain due to mandibular fracture  Dysphagia  Aspiration  Nausea and vomiting  Acute kidney injury  Elevated liver enzymes  Elevated procalcitonin  History of prostate cancer  Weight loss    He underwent bronchoscopy yesterday which showed significant mucopurulent secretions.  BAL cultures are pending.  He had already been on Zosyn for several days prior to these being obtained so they may not grow anything.  The results closely and adjust antibiotics if needed.  I will follow-up his pending blood cultures. We can repeat procal in 48-72 hours to see if is trending down though may be affected by MARTÍNEZ.     I recommend that he continue Zosyn for at least 7 more days through 6/6/2024.  I sent off a fungal workup but I think this is less likely.  His lung issues are due to aspiration.  A swallow study had been planned but is now on hold due to his clinical decompensation and intubated status.    ID will follow.    Electronically signed by Sidney Lee MD at 05/31/24 1006       Ed Mandujano MD at 05/31/24 0809                                                        LOS: 5 days   Patient Care Team:  Provider, No Known as PCP - General    Chief Complaint:  F/up     Subjective   Interval History  I reviewed the admission note, progress notes, PMH, PSH, Family hx, social history, imagings and prior records on this admission, summarized the finding in my note and formulated a transition of care plan.   "    AC VC 30/570/80%/10.  Peak airway pressure 29.  P plat 226.  No significant secretions from the ET tube.  Had a Tmax of 102.  Sedated with propofol 50 mics/KG/minute and on fentanyl drip.  Levophed 0.08 mics/KG/minute    REVIEW OF SYSTEMS:   Cannot obtain.  Patient is on the ventilator.    Ventilator/Non-Invasive Ventilation Settings (From admission, onward)       Start     Ordered    24  Ventilator - Vent Mode: AC/VC; Rate: Other; Rate: 30; FiO2: 100%; PEEP: 10; Tidal Volume: mL/kg PBW; mL/k  Continuous        Question Answer Comment   Vent Mode AC/VC    Rate Other    Rate 30    FiO2 100%    PEEP 10    Tidal Volume mL/kg PBW    mL/kg 7        24  Ventilator - Vent Mode: AC/PC; Rate: 20; FiO2: 100%; PEEP: 5; Inspiratory Pressure: 25  Continuous,   Status:  Canceled        Comments: Increase peep to 10 when BP stabilizes   Question Answer Comment   Vent Mode AC/PC    Rate 20    FiO2 100%    PEEP 5    Inspiratory Pressure 24  Ventilator - Vent Mode: AC/PC; Rate: 20; FiO2: 100%; PEEP: 5; Inspiratory Pressure: 25  Continuous,   Status:  Canceled        Comments: Increase peep to 5 when BP stabilizes   Question Answer Comment   Vent Mode AC/PC    Rate 20    FiO2 100%    PEEP 5    Inspiratory Pressure 24                          Physical Exam:     Vital Signs  Temp:  [97 °F (36.1 °C)-102.6 °F (39.2 °C)] 97 °F (36.1 °C)  Heart Rate:  [] 77  Resp:  [18-42] 30  BP: ()/() 90/69  FiO2 (%):  [50 %-100 %] 50 %    Intake/Output Summary (Last 24 hours) at 2024 0810  Last data filed at 2024 0429  Gross per 24 hour   Intake 5987 ml   Output 420 ml   Net 5567 ml     Flowsheet Rows      Flowsheet Row First Filed Value   Admission Height 188 cm (74\") Documented at 2024 1332   Admission Weight 82.9 kg (182 lb 12.2 oz) Documented at 2024 0729            PPE used per hospital policy    General " Appearance:  The ventilator.  Paralyzed.  NAD.   ENMT: ET tube 7.5.  Moist lips.   Eyes:  Pupils equal and reactive to light. EOMI   Neck:    Trachea midline. No thyromegaly.   Lungs:   Relatively clear to anterior auscultation.  No use of accessory muscles.    Heart:   Regular rhythm and normal rate, normal S1 and S2, no         murmur   Skin:   No rash or ecchymosis   Abdomen:    Soft. No tenderness. No HSM.   Neuro/psych: Sedated and paralyzed.   Extremities:  No cyanosis, clubbing or edema.  Warm extremities and well-perfused          Results Review:        Results from last 7 days   Lab Units 05/31/24  0428 05/30/24  0245 05/29/24  0432   SODIUM mmol/L 151* 151* 146*   POTASSIUM mmol/L 5.1 4.2 3.9   CHLORIDE mmol/L 121* 117* 112*   CO2 mmol/L 20.0* 21.0* 22.0   BUN mg/dL 56* 39* 27*   CREATININE mg/dL 3.98* 2.44* 2.07*   GLUCOSE mg/dL 185* 104* 107*   CALCIUM mg/dL 7.8* 8.1* 8.2*     Results from last 7 days   Lab Units 05/29/24  0432 05/28/24  2036 05/28/24  1813 05/27/24  0629   CK TOTAL U/L  --   --   --  254*   HSTROP T ng/L 35* 35* 30*  --      Results from last 7 days   Lab Units 05/31/24 0428 05/30/24  0245 05/29/24  0432   WBC 10*3/mm3 18.29* 8.35 8.39   HEMOGLOBIN g/dL 6.6* 7.5* 8.0*   HEMATOCRIT % 20.7* 23.8* 24.0*   PLATELETS 10*3/mm3 363 393 369     Results from last 7 days   Lab Units 05/29/24  0432 05/25/24  0743   INR  1.26* 1.15*     Results from last 7 days   Lab Units 05/28/24  1813   PROBNP pg/mL 375.0       Results from last 7 days   Lab Units 05/28/24  1912   D DIMER QUANT MCGFEU/mL 3.14*             Results from last 7 days   Lab Units 05/31/24  0317 05/30/24  2212 05/30/24  1739 05/30/24  1239 05/29/24  0132 05/28/24  1752   PH, ARTERIAL pH units 7.340* 7.317* 7.411 7.441 7.485* 7.528*   PCO2, ARTERIAL mm Hg 35.8 41.2 33.9* 32.6* 30.5* 28.0*   PO2 ART mm Hg 188.5* 138.7* 90.2 73.5* 57.6* 47.8*   O2 SATURATION ART % 99.6* 98.9* 97.2 95.3 92.0 88.4*   FLOW RATE lpm  --   --   --  12.0000  8.0000 6.0000   MODALITY  Adult Vent Adult Vent BiPap HFNC HFNC Cannula         I reviewed the patient's new clinical results.        Medication Review:   albuterol, 2.5 mg, Nebulization, Q6H - RT  artificial tears, , Both Eyes, Q4H  epoetin fabiola/fabiola-epbx, 20,000 Units, Subcutaneous, Weekly  heparin (porcine), 5,000 Units, Subcutaneous, Q8H  piperacillin-tazobactam, 3.375 g, Intravenous, Q8H  sodium chloride, 10 mL, Intravenous, Q12H  sodium chloride, 3 mL, Intravenous, Q12H        cisatracurium besylate (NIMBEX) 200 mg in sodium chloride 0.9 % 100 mL (2 mg/mL) infusion, 0.5-10 mcg/kg/min, Last Rate: 6.5 mcg/kg/min (05/31/24 0358)  dexmedetomidine, 0.2-1.5 mcg/kg/hr, Last Rate: Stopped (05/31/24 0516)  dextrose, 100 mL/hr, Last Rate: 100 mL/hr (05/31/24 0656)  fentanyl 10 mcg/mL,  mcg/hr, Last Rate: 100 mcg/hr (05/31/24 0405)  norepinephrine, 0.02-0.3 mcg/kg/min, Last Rate: 0.05 mcg/kg/min (05/31/24 0630)  propofol, 5-50 mcg/kg/min, Last Rate: 50 mcg/kg/min (05/31/24 0404)        Diagnostic imaging:  I personally and independently reviewed the following images:  CXR 5/30/2024: Bilateral pulmonary infiltrates.  ET tube and central line are in good position.  CT chest 5/29/2024: Bilateral groundglass pulmonary infiltrates mostly peripheral and pulmonary consolidations mostly in the lower lobe posteriorly.    Assessment     ARDS  Acute hypoxic respiratory failure, on mechanical ventilation since 5/30/2024  Bilateral lower lobe pneumonia/consolidations and peripheral GGO opacities, s/p bronchoscopy 5/30/2024.  Cultures negative so far. Procal 146.  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  Hypernatremia  MARTÍNEZ, worsening  NAG, likely secondary  Metabolic acidosis  Transaminitis, secondary to sepsis    All problems new to me    Plan     Mechanical ventilation management: Settings  reviewed and adjusted. TV currently at 570, reduced to 490 ml (6 ml/Kg/IBW) and increase PEEP from 10 to 12. P plateau= 23 on those settings. Daily ABG and CXR. Vent bundle.  Bronch with BAL to evaluate for CEP due to peripheral GG distrubution of infiltrates.  Check HP and ELIDA/ANCA panels. (Progressive BAL today was not bloody)  Sedation: Precedex and propofol.  Titrate for RASS -1-0.  Analgesia with fentanyl drip.  Paralytics for severe ARDS day 1. Will continue for 1 more day then stop. PF ratio improved from 138 on 5/30 to 235 on 5/31.  Check echocardiogram for completeness due to bilateral pulmonary infiltrates but doubt CHF  Empiric antibiotics with Zosyn awaiting cultures  Levophed IV drip and titrate to keep MAP >65  D5 W 100 ml/h for hypernatremia.   Insert core track and start trickle enteral nutrition as patient unlikely to be able to come off the ventilator.  Check UA, CK, uric acid, FeNa due to MARTÍNEZ.  Avoid nephrotoxins.  Nephrology consulted.  Albuterol 4 times daily  DVT prophylaxis with heparin subcu    Discussed with family and the previous critical care provider Dr. Meyer.    Ed Mandujano MD  05/31/24  08:10 EDT        Time: Critical care 40 min      This note was dictated utilizing Dragon dictation     Electronically signed by Ed Mandujano MD at 05/31/24 8893       Jonathan Montoya MD at 05/31/24 9279           LOS: 5 days   Patient Care Team:  Provider, No Known as PCP - General    Chief Complaint: MARTÍNEZ      Subjective  Chart reviewed  Patient moved back to ICU  Now on the vent, on Levophed  200 cc UOP overnight    Objective     Vital Sign Min/Max for last 24 hours  Temp  Min: 97 °F (36.1 °C)  Max: 102.6 °F (39.2 °C)   BP  Min: 51/41  Max: 182/112   Pulse  Min: 67  Max: 156   Resp  Min: 18  Max: 42   SpO2  Min: 80 %  Max: 100 %   Flow (L/min)  Min: 6  Max: 12   Weight  Min: 87.7 kg (193 lb 5.5 oz)  Max: 87.7 kg (193 lb 5.5 oz)     Flowsheet Rows      Flowsheet Row First Filed Value  "  Admission Height 188 cm (74\") Documented at 05/25/2024 1332   Admission Weight 82.9 kg (182 lb 12.2 oz) Documented at 05/25/2024 0729            No intake/output data recorded.  I/O last 3 completed shifts:  In: 7037 [I.V.:6837; IV Piggyback:200]  Out: 670 [Urine:650; Emesis/NG output:20]    Objective:  Vital signs: (most recent): Blood pressure 108/74, pulse 74, temperature 98.1 °F (36.7 °C), temperature source Bladder, resp. rate (!) 30, height 188 cm (74\"), weight 87.7 kg (193 lb 5.5 oz), SpO2 100%.            Physical Examination: Intubated, sedated..   Chest - coarse  Heart - normal rate, regular rhythm, normal S1, S2, no murmurs, rubs, clicks or gallops  Abdomen - soft, nondistended, no masses or organomegaly  Neurological - sedated   Extremities - peripheral pulses normal, no pedal edema, no clubbing or cyanosis     Results Review:     I reviewed the patient's new clinical results.    WBC WBC   Date Value Ref Range Status   05/31/2024 18.29 (H) 3.40 - 10.80 10*3/mm3 Final   05/30/2024 8.35 3.40 - 10.80 10*3/mm3 Final   05/29/2024 8.39 3.40 - 10.80 10*3/mm3 Final      HGB Hemoglobin   Date Value Ref Range Status   05/31/2024 6.6 (C) 13.0 - 17.7 g/dL Final   05/30/2024 7.5 (L) 13.0 - 17.7 g/dL Final   05/29/2024 8.0 (L) 13.0 - 17.7 g/dL Final      HCT Hematocrit   Date Value Ref Range Status   05/31/2024 20.7 (C) 37.5 - 51.0 % Final   05/30/2024 23.8 (L) 37.5 - 51.0 % Final   05/29/2024 24.0 (L) 37.5 - 51.0 % Final      Platlets No results found for: \"LABPLAT\"   MCV MCV   Date Value Ref Range Status   05/31/2024 88.1 79.0 - 97.0 fL Final   05/30/2024 90.2 79.0 - 97.0 fL Final   05/29/2024 85.1 79.0 - 97.0 fL Final          Sodium Sodium   Date Value Ref Range Status   05/31/2024 151 (H) 136 - 145 mmol/L Final   05/30/2024 151 (H) 136 - 145 mmol/L Final   05/29/2024 146 (H) 136 - 145 mmol/L Final      Potassium Potassium   Date Value Ref Range Status   05/31/2024 5.1 3.5 - 5.2 mmol/L Final   05/30/2024 4.2 3.5 " "- 5.2 mmol/L Final   05/29/2024 3.9 3.5 - 5.2 mmol/L Final      Chloride Chloride   Date Value Ref Range Status   05/31/2024 121 (H) 98 - 107 mmol/L Final   05/30/2024 117 (H) 98 - 107 mmol/L Final   05/29/2024 112 (H) 98 - 107 mmol/L Final      CO2 CO2   Date Value Ref Range Status   05/31/2024 20.0 (L) 22.0 - 29.0 mmol/L Final   05/30/2024 21.0 (L) 22.0 - 29.0 mmol/L Final   05/29/2024 22.0 22.0 - 29.0 mmol/L Final      BUN BUN   Date Value Ref Range Status   05/31/2024 56 (H) 8 - 23 mg/dL Final   05/30/2024 39 (H) 8 - 23 mg/dL Final   05/29/2024 27 (H) 8 - 23 mg/dL Final      Creatinine Creatinine   Date Value Ref Range Status   05/31/2024 3.98 (H) 0.76 - 1.27 mg/dL Final   05/30/2024 2.44 (H) 0.76 - 1.27 mg/dL Final   05/29/2024 2.07 (H) 0.76 - 1.27 mg/dL Final      Calcium Calcium   Date Value Ref Range Status   05/31/2024 7.8 (L) 8.6 - 10.5 mg/dL Final   05/30/2024 8.1 (L) 8.6 - 10.5 mg/dL Final   05/29/2024 8.2 (L) 8.6 - 10.5 mg/dL Final      PO4 No results found for: \"CAPO4\"   Albumin Albumin   Date Value Ref Range Status   05/31/2024 2.2 (L) 3.5 - 5.2 g/dL Final   05/30/2024 2.5 (L) 3.5 - 5.2 g/dL Final   05/29/2024 2.6 (L) 3.5 - 5.2 g/dL Final      Magnesium Magnesium   Date Value Ref Range Status   05/31/2024 2.8 (H) 1.6 - 2.4 mg/dL Final      Uric Acid No results found for: \"URICACID\"     Medication Review:     Current Facility-Administered Medications:     acetaminophen (TYLENOL) tablet 650 mg, 650 mg, Oral, Q4H PRN, 650 mg at 05/27/24 2353 **OR** acetaminophen (TYLENOL) 160 MG/5ML oral solution 650 mg, 650 mg, Oral, Q4H PRN **OR** acetaminophen (TYLENOL) suppository 650 mg, 650 mg, Rectal, Q4H PRN, Jaciel Coleman MD, 650 mg at 05/30/24 2248    albuterol (PROVENTIL) nebulizer solution 0.083% 2.5 mg/3mL, 2.5 mg, Nebulization, Q6H - RT, Jaciel Coleman MD, 2.5 mg at 05/31/24 0654    albuterol (PROVENTIL) nebulizer solution 0.083% 2.5 mg/3mL, 2.5 mg, Nebulization, Q6H PRN, Jaciel Coleman MD    " artificial tears ophthalmic ointment, , Both Eyes, Q4H, James Rivera Jr., MD, Given at 05/31/24 0519    sennosides-docusate (PERICOLACE) 8.6-50 MG per tablet 2 tablet, 2 tablet, Oral, BID PRN **AND** polyethylene glycol (MIRALAX) packet 17 g, 17 g, Oral, Daily PRN **AND** bisacodyl (DULCOLAX) EC tablet 5 mg, 5 mg, Oral, Daily PRN **AND** bisacodyl (DULCOLAX) suppository 10 mg, 10 mg, Rectal, Daily PRN, Jaciel Coleman MD    Calcium Replacement - Follow Nurse / BPA Driven Protocol, , Does not apply, PRN, Jaciel Coleman MD    cisatracurium besylate (NIMBEX) 200 mg in sodium chloride 0.9 % 100 mL (2 mg/mL) infusion, 0.5-10 mcg/kg/min, Intravenous, Titrated, James Rivera Jr., MD, Last Rate: 16.03 mL/hr at 05/31/24 0358, 6.5 mcg/kg/min at 05/31/24 0358    dexmedetomidine (PRECEDEX) 400 mcg in 100 mL NS infusion, 0.2-1.5 mcg/kg/hr, Intravenous, Titrated, Olivier Meyer MD, Stopped at 05/31/24 0516    dextrose (D5W) 5 % infusion, 100 mL/hr, Intravenous, Continuous, Jaciel Coleman MD, Last Rate: 100 mL/hr at 05/31/24 0656, 100 mL/hr at 05/31/24 0656    epoetin fabiola-epbx (RETACRIT) injection 20,000 Units, 20,000 Units, Subcutaneous, Weekly, Jaciel Coleman MD    famotidine (PEPCID) tablet 10 mg, 10 mg, Oral, BID PRN, Jaciel Coleman MD    fentaNYL (SUBLIMAZE) bolus from bag 10 mcg/mL injection 50 mcg, 50 mcg, Intravenous, Q30 Min PRN, James Rivera Jr., MD    fentaNYL 1000 mcg in 100 mL NS infusion,  mcg/hr, Intravenous, Titrated, James Rivera Jr., MD, Last Rate: 10 mL/hr at 05/31/24 0405, 100 mcg/hr at 05/31/24 0405    heparin (porcine) 5000 UNIT/ML injection 5,000 Units, 5,000 Units, Subcutaneous, Q8H, Jaciel Coleman MD, 5,000 Units at 05/31/24 0519    Magnesium Low Dose Replacement - Follow Nurse / BPA Driven Protocol, , Does not apply, PRN, Jaciel Coleman MD    melatonin tablet 2.5 mg, 2.5 mg, Oral, Nightly PRN, Jaciel Coleman MD    nitroglycerin (NITROSTAT) SL tablet 0.4  mg, 0.4 mg, Sublingual, Q5 Min PRN, Jaciel Coleman MD    norepinephrine (LEVOPHED) 8 mg in 250 mL NS infusion (premix), 0.02-0.3 mcg/kg/min, Intravenous, Titrated, Olivier Meyer MD, Last Rate: 7.71 mL/hr at 05/31/24 0630, 0.05 mcg/kg/min at 05/31/24 0630    ondansetron ODT (ZOFRAN-ODT) disintegrating tablet 4 mg, 4 mg, Oral, Q6H PRN **OR** ondansetron (ZOFRAN) injection 4 mg, 4 mg, Intravenous, Q6H PRN, Jaciel Coleman MD, 4 mg at 05/28/24 0848    Phosphorus Replacement - Follow Nurse / BPA Driven Protocol, , Does not apply, Virginia KING Ervin H., MD    piperacillin-tazobactam (ZOSYN) 3.375 g IVPB in 100 mL NS MBP (CD), 3.375 g, Intravenous, Q8H, Jaciel Coleman MD, 3.375 g at 05/30/24 2328    Potassium Replacement - Follow Nurse / BPA Driven Protocol, , Does not apply, Virginia KING Ervin H., MD    prochlorperazine (COMPAZINE) injection 5 mg, 5 mg, Intravenous, Q6H PRN, Jaciel Coleman MD, 5 mg at 05/28/24 1650    propofol (DIPRIVAN) infusion 10 mg/mL 100 mL, 5-50 mcg/kg/min, Intravenous, Titrated, Olivier Meyer MD, Last Rate: 24.7 mL/hr at 05/31/24 0404, 50 mcg/kg/min at 05/31/24 0404    sodium chloride 0.9 % flush 10 mL, 10 mL, Intravenous, PRN, Jaciel Coleman MD    sodium chloride 0.9 % flush 10 mL, 10 mL, Intravenous, Q12H, Jaciel Coleman MD, 10 mL at 05/30/24 2010    sodium chloride 0.9 % flush 10 mL, 10 mL, Intravenous, PRN, Jaciel Coleman MD    sodium chloride 0.9 % flush 3 mL, 3 mL, Intravenous, Q12H, Jaciel Coleman MD, 3 mL at 05/30/24 2010    sodium chloride 0.9 % flush 3-10 mL, 3-10 mL, Intravenous, PRN, Jaciel Coleman MD    sodium chloride 0.9 % infusion 40 mL, 40 mL, Intravenous, PRN, Jaciel Coleman MD    sodium chloride 0.9 % infusion 40 mL, 40 mL, Intravenous, PRN, Jaciel Coleman MD     Assessment & Plan       Assessment & Plan  MARTÍNEZ  Proteinuria   Metabolic acidosis   ARDS  Shock  PNA  Sepsis   Hypernatremia  Hematuria   Shock    Cr continues to trend up - 3.9 today = ATN from  sepsis and hypoxia  Now with likely ARDS  Discussed CRRT with family - will repeat labs at noon with plans to initiate if WP's steadily rising  Na 151 - unchanged, continue D5W but will correct with CRRT if initiated  Continue abx per pulmonary/primary  Pressor and vent support per ICU team   Discussed with family  Will follow     35 min CCT spent in eval/management of this critically ill patient      Jonathan Montoya MD  [facility]  05/31/24  07:26 EDT              Electronically signed by Jonathan Montoya MD at 05/31/24 1141       James Rivera Jr., MD at 05/30/24 2101          Pulmonary/critical care I was called to bedside by nursing I discussed the case with Dr. Meyer earlier in the evening.  Patient respiratory rate not improved still breathing upper 40s to 50 I have tried multiple permutations on the ventilator nothing improved he is heavily sedated with propofol at 50 and dexmedetomidine at 1.5.  We gave him 100 mics of fentanyl he did slow down very shortly for a couple of minutes but his blood pressure dropped into the 93 systolic range I do not think we can continue to push that high dose I do not think he is going to tolerate this rate.  He has breath sounds bilaterally I reviewed his chest x-ray from intubation and his recent scans.  Really looks like he is developing an ARDS type situation.  I have talked at length with his family they understand he can continue at this rate for very long they agree after watching the effects of these medications that we need to do more we talked about paralytic trial and see if we can support him with this.  I will start with him just under 7 mL/kg ideal weight will get blood gases if his pH will allow we will wean his tidal volumes further once he is paralyzed and of course we will keep him heavily sedated.  He is on Levophed at 0.19 mics and looks like we may have to go up I suspect once we take his drive away with the paralytics  he will need more I talk with the family about central venous access and they are agreeable some of his family clearly has a medical background seem to understand the situation.  So far I have spent an additional 70 minutes in critical care time excluding future procedures    Electronically signed by James Rivera Jr., MD at 05/30/24 2106       Olivier Meyer MD at 05/30/24 1949          Patient was already evaluated earlier by my partner Dr. Jaciel Coleman, events were noted and notes were reviewed  I was called by the ICU staff for emergency bedside assistance because the patient was tachypneic, tachycardic and unstable  On the initial assessment the patient was already on the BiPAP, he was breathing at 57 breaths/min  He does have some rhonchi on exam  The patient was told holding a normal blood pressure and was tachycardic.  The biopsy report adjusted, the patient was given IV fentanyl and was started on the Precedex especially after a stat ABG showed no evidence of any acidosis with no significant hypoxemia on the current supplemental oxygen and BiPAP.  Patient seems to have improved some but she was still relatively tachypneic.  Shortly after I was called again to reassess because the patient was having more tachycardia and more tachypnea despite further adjustment on the BiPAP setting  Patient was given IV Versed because of suspected substance abuse with possible alcohol and possible alcohol withdrawal  That has only transient benefit and the patient was spiraling down  I was called to assess again because the patient was having significant tachycardia at the time, the patient with heart rate in the 160s which was very regular and looking more like a supraventricular tachycardia based on the monitor.  After consulting with the family at several interval and after updating the family with the recent events they were agreeable to proceed with further supportive measures including intubation for airway  "protection and in order to control the breathing since patient is likely to crash given the significant tachypnea despite the initial supportive measures  Patient was given propofol and intubated with size 8.5 ET tube  Patient had a drop in the blood pressure as anticipated and he was already loaded up with IV fluid bolus prior to the sedation and was started on the Levophed  Levophed is being titrated and the blood pressure seems to be responding  Patient will have repeat ABG shortly after  Exam after the intubation showed positive crackles and rhonchi but with good breath sounds bilaterally  Tachycardia did improve post intubation and sedation  Sats did drop in the 70s but have been correcting up in the high 80s and continued to improve with supportive measures  The case was discussed with the on-call intensivist for tonight with the plan to consider further measures including bronchoscopy in case of refractory hypoxemia however looking at the x-ray it is better  Nasogastric tube was inserted to decompress the inflated stomach to further help with the oxygenation and the hemodynamics  Total additional critical care time excluding any separate billable procedure time was 76 minutes    Electronically signed by Olivier Meyer MD at 24       Chad Wilkinson MD at 24 1558          DAILY PROGRESS NOTE  [facility]    Patient Identification:  Name: Jimmy Norman  Age: 69 y.o.  Sex: male  :  1955  MRN: [id number]         Primary Care Physician: Provider, No Known      Subjective  Patient verbalizes no complaints but he is on BiPAP now and very fatigued.  He does nod his head yes when asked if he is okay.    Objective:  General Appearance:  In no acute distress and not in pain.    Vital signs: (most recent): Blood pressure 133/85, pulse 101, temperature 98.4 °F (36.9 °C), temperature source Oral, resp. rate 18, height 188 cm (74\"), weight 82.2 kg (181 lb 3.5 oz), SpO2 " (!) 85%.    Lungs:  Normal effort and normal respiratory rate.  He is not in respiratory distress.  No stridor.  There are rales.  No decreased breath sounds, wheezes or rhonchi.  (Scattered rales.  On BiPAP.)  Heart: Tachycardia.  (Heart rate slightly over 100.)  Extremities: There is no dependent edema.    Neurological: (Awake but fatigued.  Cooperative with exam.).    Skin:  Warm and dry.                  Vital signs in last 24 hours:  Temp:  [97.7 °F (36.5 °C)-100.5 °F (38.1 °C)] 98.4 °F (36.9 °C)  Heart Rate:  [] 101  Resp:  [18-22] 18  BP: (131-146)/(85-92) 133/85    Intake/Output:    Intake/Output Summary (Last 24 hours) at 5/30/2024 1558  Last data filed at 5/30/2024 1037  Gross per 24 hour   Intake 1250 ml   Output 250 ml   Net 1000 ml         Results from last 7 days   Lab Units 05/30/24  0245 05/29/24  0432 05/28/24  0517 05/27/24  0630 05/26/24  1624 05/26/24  0506 05/25/24  0743   WBC 10*3/mm3 8.35 8.39 7.61 6.49 6.30 5.38 5.65   HEMOGLOBIN g/dL 7.5* 8.0* 8.0* 9.3* 8.9* 8.4* 10.1*   PLATELETS 10*3/mm3 393 369 362 346 353 334 348     Results from last 7 days   Lab Units 05/30/24  0245 05/29/24  0432 05/28/24  0517 05/27/24  0629 05/26/24  0506 05/25/24  0743   SODIUM mmol/L 151* 146* 141 140 138 140   POTASSIUM mmol/L 4.2 3.9 3.8 4.2 3.7 4.0   CHLORIDE mmol/L 117* 112* 107 107 106 104   CO2 mmol/L 21.0* 22.0 24.0 17.3* 21.8* 24.0   BUN mg/dL 39* 27* 19 16 15 22   CREATININE mg/dL 2.44* 2.07* 1.67* 1.44* 1.24 1.34*   GLUCOSE mg/dL 104* 107* 106* 91 110* 109*   Estimated Creatinine Clearance: 32.8 mL/min (A) (by C-G formula based on SCr of 2.44 mg/dL (H)).  Results from last 7 days   Lab Units 05/30/24 0245 05/29/24 0432 05/28/24  0517 05/27/24  0629 05/26/24  0506 05/25/24  0743   CALCIUM mg/dL 8.1* 8.2* 8.2* 8.6 8.4* 8.8   ALBUMIN g/dL 2.5* 2.6* 2.6* 2.5* 2.8* 3.4*   PHOSPHORUS mg/dL  --   --  2.7  --   --   --      Results from last 7 days   Lab Units 05/30/24 0245 05/29/24 0432 05/28/24  0517  05/27/24  0629 05/26/24  0506 05/25/24  0743   ALBUMIN g/dL 2.5* 2.6* 2.6* 2.5* 2.8* 3.4*   BILIRUBIN mg/dL 1.8* 1.3* 0.9 0.7 0.5 0.7   ALK PHOS U/L 117 122* 92 87 66 70   AST (SGOT) U/L 329* 352* 186* 160* 106* 93*   ALT (SGPT) U/L 219* 196* 102* 89* 59* 60*       Assessment:  Aspiration pneumonia/pneumonitis: Recurrent problem.  See below.  Infectious disease, pulmonary evaluation appreciated.  Acute hypoxic respiratory failure: Recurrent respiratory failure.  Workup so far consistent with recurrence of aspiration.  Pulmonary input greatly appreciated.  Patient now in ICU on BiPAP.  Bronchoscopy report noted.  MARTÍNEZ: Creatinine up again today.  Nephrology input appreciated.    Transaminitis: Continues to trend up with uncertain etiology.  Gastroenterology but appreciated.    Oropharyngeal dysphagia: Serious ongoing problem.  Discussed the possibility of tube feedings with patient and daughters at bedside.  Continue speech evaluation.  History of mandibular fracture with jaw pain: Likely contributing to dysphagia.    Anemia: Labs consistent with iron deficiency as well as anemia of chronic disease.  Iron supplements.  Monitor.  Dental caries: Contributing to severity of aspiration pneumonia.    Malnutrition: Compounding above issues.  May need to consider tube feedings.      Plan:  Please see above.  Discussed with son at bedside.    Chad Wilkinson MD  5/30/2024  15:58 EDT      Electronically signed by Chad Wilkinson MD at 05/30/24 1602       Jaciel Coleman MD at 05/30/24 1152          Dr. ANN Coleman    [address]        Patient ID:  Name:  Jimmy Norman  MRN:  [id number]  1955  69 y.o.  male            CC/Reason for visit: Progressive pneumonia, acute respiratory failure    Interval hx: Patient is suddenly much worse.  I was called stat to the bedside due to respiratory decompensation.  The patient is less responsive.  He has developed tachypnea and is requiring 100% FiO2  with 12 L heated humidified high flow oxygen at this time.  He was downstairs for duplex liver ultrasound and since he returned back to telemetry floor he has been in respiratory distress.  Family at bedside.  They informed me that 1 month ago all of his symptoms started.  This was preceded by working inside of the house of one of his relatives, remodeling, clearing up septic tank, bathrooms and doing contractor remodeling work.  They think he might have been exposed to mold.  Of note, reviewed discharge summary from 10 days ago on May 21.  The patient was in the hospital here from May 17 through 21 and received intravenous Zosyn followed by outpatient Augmentin for pneumonia.  He also has a history of illicit drug use.  Took fentanyl as outpatient which was not prescribed.  Also took Norco which was not prescribed.  Admitted purchasing this off the street.  He himself answered his name and his date of birth but he mumbles, is not very talkative, not very communicative, very difficult to obtain history from him.  I reviewed all medical notes from all medical specialist providing care during this hospital stay  The patient is new to me during this hospital stay    ROS: Unobtainable, patient not very communicative    I reviewed old medical records.  Past medical history, social history and family history: Unchanged from admission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

## 2024-06-04 LAB
BACTERIA SPEC AEROBE CULT: NORMAL
BACTERIA SPEC AEROBE CULT: NORMAL
GALACTOMANNAN AG SPEC IA-ACNC: 0.04 INDEX (ref 0–0.49)
GALACTOMANNAN AG SPEC IA-ACNC: 0.21 INDEX (ref 0–0.49)
REF LAB TEST METHOD: NORMAL

## 2024-06-04 NOTE — DISCHARGE SUMMARY
PHYSICIAN DISCHARGE SUMMARY                                                                        [facility]    Patient Identification:  Patient Name:  Jimmy Norman  MRN:  [id number]   YOB: 1955  Age: 69 y.o.  Sex: male  Primary Care Physician: Provider, No Known    Admit date: 2024  Discharge date and time: 6/3/2024 12:20 AM   Discharged Condition:      Discharge Diagnoses:  Aspiration pneumonia due to vomitus    Jaw pain    History of prostate cancer    Severe malnutrition    MARTÍNEZ (acute kidney injury)    Sepsis    Dehydration    Oral phase dysphagia    Dental caries       Hospital Course: Jimmy Norman presented to [facility] who presented to the hospital on  with weakness and shortness of breath and became progressively more hypoxic necessitating intubation.  Found to be in ARDS complicated by shock.  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  Worsening clinical status with renal failure requiring CRRT.  Worsening shock requiring vasopressor support.  Found to have pneumomediastinum likely in the setting of severe ARDS.  Due to the poor prognosis, family ultimately decided for palliative measures and to pursue comfort care.    Consults:   IP CONSULT TO CASE MANAGEMENT   IP CONSULT TO NEPHROLOGY  IP CONSULT TO INFECTIOUS DISEASES    Significant Diagnostic Studies:     Discharge Exam:       Disposition:   home    Patient Instructions:          Medication Reconciliation: Please see electronically completed Med Rec.    Total time spent discharging patient including evaluation, medication reconciliation, arranging follow up, and post hospitalization instructions and education total time < 30 minutes.    Signed:  Jaylon Kim MD  2024  07:29  EDT

## 2024-06-05 LAB — REF LAB TEST METHOD: NORMAL

## 2024-06-07 LAB — H CAPSUL AG SPEC QL: NORMAL

## 2024-06-10 LAB
BACTERIA FLD CULT: ABNORMAL
FUNGUS WND CULT: ABNORMAL
GRAM STN SPEC: ABNORMAL

## 2024-06-12 LAB
A FUMIGATUS IGG SER QL: NEGATIVE
A PULLULANS IGG SER QL: NEGATIVE
LACEYELLA SACCHARI AB SER QL ID: NEGATIVE
PIGEON SERUM IGG QL: NEGATIVE
S RECTIVIRGULA IGG SER QL ID: NEGATIVE
T VULGARIS IGG SER QL: NEGATIVE

## 2024-06-20 LAB
BACTERIA FLD CULT: ABNORMAL
GRAM STN SPEC: ABNORMAL

## 2024-07-01 LAB — FUNGUS WND CULT: ABNORMAL

## 2024-07-01 NOTE — PROGRESS NOTES
" LOS: 4 days   Patient Care Team:  Provider, No Known as PCP - General    Chief Complaint: MARTÍNZE      Subjective  Chart reviewed  Discussed with patient and family at bedside  BP stable  C/O thirst    Objective     Vital Sign Min/Max for last 24 hours  Temp  Min: 97.5 °F (36.4 °C)  Max: 100.5 °F (38.1 °C)   BP  Min: 119/71  Max: 146/89   Pulse  Min: 96  Max: 120   Resp  Min: 20  Max: 24   SpO2  Min: 86 %  Max: 100 %   Flow (L/min)  Min: 5  Max: 10   Weight  Min: 82.2 kg (181 lb 3.5 oz)  Max: 82.2 kg (181 lb 3.5 oz)     Flowsheet Rows      Flowsheet Row First Filed Value   Admission Height 188 cm (74\") Documented at 05/25/2024 1332   Admission Weight 82.9 kg (182 lb 12.2 oz) Documented at 05/25/2024 0729            No intake/output data recorded.  I/O last 3 completed shifts:  In: 1881 [I.V.:1681; IV Piggyback:200]  Out: 1250 [Urine:1250]    Objective:  Vital signs: (most recent): Blood pressure 131/92, pulse 96, temperature 99 °F (37.2 °C), temperature source Oral, resp. rate 20, height 188 cm (74\"), weight 82.2 kg (181 lb 3.5 oz), SpO2 96%.            Physical Examination: General appearance - ill appearing, uncomfortable.   Mental status - sleeping, but is uncomfortable.   Chest - clear to auscultation, no wheezes, rales or rhonchi, symmetric air entry  Heart - normal rate, regular rhythm, normal S1, S2, no murmurs, rubs, clicks or gallops  Abdomen - soft, nontender, nondistended, no masses or organomegaly  Neurological - no focal deficits, but not following commands.   Extremities - peripheral pulses normal, no pedal edema, no clubbing or cyanosis     Results Review:     I reviewed the patient's new clinical results.    WBC WBC   Date Value Ref Range Status   05/30/2024 8.35 3.40 - 10.80 10*3/mm3 Final   05/29/2024 8.39 3.40 - 10.80 10*3/mm3 Final   05/28/2024 7.61 3.40 - 10.80 10*3/mm3 Final      HGB Hemoglobin   Date Value Ref Range Status   05/30/2024 7.5 (L) 13.0 - 17.7 g/dL Final   05/29/2024 8.0 (L) 13.0 - " Patient's wife, POA returned call.     She states she initially started with cough about a week ago. No other notable symptoms. Patient developed cough three days ago. She describes it as deep and dry, denies phlegm, denies sinus congestion, denies SOB or wheezing, denies fever or chest pain.     Wife was educated on cough, OTC cough medicines, sipping warm beverages (tea, decaf coffee, water w/lemon & honey) and to call back if symptoms worsen, patient develops fever or yellow/green sputum or increased confusion/agitation. She verbalized understanding of recommendation.    "17.7 g/dL Final   05/28/2024 8.0 (L) 13.0 - 17.7 g/dL Final      HCT Hematocrit   Date Value Ref Range Status   05/30/2024 23.8 (L) 37.5 - 51.0 % Final   05/29/2024 24.0 (L) 37.5 - 51.0 % Final   05/28/2024 24.6 (L) 37.5 - 51.0 % Final      Platlets No results found for: \"LABPLAT\"   MCV MCV   Date Value Ref Range Status   05/30/2024 90.2 79.0 - 97.0 fL Final   05/29/2024 85.1 79.0 - 97.0 fL Final   05/28/2024 88.2 79.0 - 97.0 fL Final          Sodium Sodium   Date Value Ref Range Status   05/30/2024 151 (H) 136 - 145 mmol/L Final   05/29/2024 146 (H) 136 - 145 mmol/L Final   05/28/2024 141 136 - 145 mmol/L Final      Potassium Potassium   Date Value Ref Range Status   05/30/2024 4.2 3.5 - 5.2 mmol/L Final   05/29/2024 3.9 3.5 - 5.2 mmol/L Final   05/28/2024 3.8 3.5 - 5.2 mmol/L Final      Chloride Chloride   Date Value Ref Range Status   05/30/2024 117 (H) 98 - 107 mmol/L Final   05/29/2024 112 (H) 98 - 107 mmol/L Final   05/28/2024 107 98 - 107 mmol/L Final      CO2 CO2   Date Value Ref Range Status   05/30/2024 21.0 (L) 22.0 - 29.0 mmol/L Final   05/29/2024 22.0 22.0 - 29.0 mmol/L Final   05/28/2024 24.0 22.0 - 29.0 mmol/L Final      BUN BUN   Date Value Ref Range Status   05/30/2024 39 (H) 8 - 23 mg/dL Final   05/29/2024 27 (H) 8 - 23 mg/dL Final   05/28/2024 19 8 - 23 mg/dL Final      Creatinine Creatinine   Date Value Ref Range Status   05/30/2024 2.44 (H) 0.76 - 1.27 mg/dL Final   05/29/2024 2.07 (H) 0.76 - 1.27 mg/dL Final   05/28/2024 1.67 (H) 0.76 - 1.27 mg/dL Final      Calcium Calcium   Date Value Ref Range Status   05/30/2024 8.1 (L) 8.6 - 10.5 mg/dL Final   05/29/2024 8.2 (L) 8.6 - 10.5 mg/dL Final   05/28/2024 8.2 (L) 8.6 - 10.5 mg/dL Final      PO4 No results found for: \"CAPO4\"   Albumin Albumin   Date Value Ref Range Status   05/30/2024 2.5 (L) 3.5 - 5.2 g/dL Final   05/29/2024 2.6 (L) 3.5 - 5.2 g/dL Final   05/28/2024 2.6 (L) 3.5 - 5.2 g/dL Final      Magnesium No results found for: \"MG\"   Uric Acid " "No results found for: \"URICACID\"     Medication Review:     Current Facility-Administered Medications:     acetaminophen (TYLENOL) tablet 650 mg, 650 mg, Oral, Q4H PRN, 650 mg at 05/27/24 2353 **OR** acetaminophen (TYLENOL) 160 MG/5ML oral solution 650 mg, 650 mg, Oral, Q4H PRN **OR** acetaminophen (TYLENOL) suppository 650 mg, 650 mg, Rectal, Q4H PRN, Venu You MD    albuterol (PROVENTIL) nebulizer solution 0.083% 2.5 mg/3mL, 2.5 mg, Nebulization, Q6H - RT, Venu Yuo MD, 2.5 mg at 05/30/24 0640    albuterol (PROVENTIL) nebulizer solution 0.083% 2.5 mg/3mL, 2.5 mg, Nebulization, Q6H PRN, Venu You MD    ascorbic acid (VITAMIN C) tablet 250 mg, 250 mg, Oral, Daily, Venu You MD, 250 mg at 05/28/24 0850    sennosides-docusate (PERICOLACE) 8.6-50 MG per tablet 2 tablet, 2 tablet, Oral, BID PRN **AND** polyethylene glycol (MIRALAX) packet 17 g, 17 g, Oral, Daily PRN **AND** bisacodyl (DULCOLAX) EC tablet 5 mg, 5 mg, Oral, Daily PRN **AND** bisacodyl (DULCOLAX) suppository 10 mg, 10 mg, Rectal, Daily PRN, Venu You MD    Calcium Replacement - Follow Nurse / BPA Driven Protocol, , Does not apply, PRNAvelino Subin, MD    cetirizine (zyrTEC) tablet 5 mg, 5 mg, Oral, Daily PRN, Venu You MD    cholecalciferol (VITAMIN D3) tablet 1,000 Units, 1,000 Units, Oral, Daily, Venu You MD, 1,000 Units at 05/28/24 0849    docusate sodium (COLACE) capsule 100 mg, 100 mg, Oral, Daily, Venu You MD, 100 mg at 05/28/24 0849    famotidine (PEPCID) tablet 10 mg, 10 mg, Oral, BID PRN, Venu You MD    ferrous sulfate tablet 325 mg, 325 mg, Oral, Daily With Breakfast, Venu You MD, 325 mg at 05/28/24 0849    heparin (porcine) 5000 UNIT/ML injection 5,000 Units, 5,000 Units, Subcutaneous, Q8H, Venu You MD, 5,000 Units at 05/30/24 0556    lactobacillus acidophilus (RISAQUAD) capsule 1 capsule, 1 capsule, Oral, Daily, Venu You MD, 1 capsule at 05/28/24 0849    Magnesium Low Dose Replacement - Follow Nurse / " BPA Driven Protocol, , Does not apply, Avelino KING Subin, MD    melatonin tablet 2.5 mg, 2.5 mg, Oral, Nightly PRN, Venu You MD    multivitamin with minerals 1 tablet, 1 tablet, Oral, Daily, Venu You MD, 1 tablet at 05/28/24 0849    nitroglycerin (NITROSTAT) SL tablet 0.4 mg, 0.4 mg, Sublingual, Q5 Min PRN, Venu You MD    ondansetron ODT (ZOFRAN-ODT) disintegrating tablet 4 mg, 4 mg, Oral, Q6H PRN **OR** ondansetron (ZOFRAN) injection 4 mg, 4 mg, Intravenous, Q6H PRN, Venu You MD, 4 mg at 05/28/24 0848    Phosphorus Replacement - Follow Nurse / BPA Driven Protocol, , Does not apply, Avelino KING Subin, MD    piperacillin-tazobactam (ZOSYN) 3.375 g IVPB in 100 mL NS MBP (CD), 3.375 g, Intravenous, Q8H, Venu You MD, 3.375 g at 05/29/24 2328    Potassium Replacement - Follow Nurse / BPA Driven Protocol, , Does not apply, Avelino KING Subin, MD    prochlorperazine (COMPAZINE) injection 5 mg, 5 mg, Intravenous, Q6H PRNAvelino Subin, MD, 5 mg at 05/28/24 1650    sodium chloride 0.9 % flush 10 mL, 10 mL, Intravenous, PRNAvelino Subin, MD    sodium chloride 0.9 % flush 10 mL, 10 mL, Intravenous, Q12H, Venu You MD, 10 mL at 05/29/24 0904    sodium chloride 0.9 % flush 10 mL, 10 mL, Intravenous, PRNAvelino Subin, MD    sodium chloride 0.9 % flush 3 mL, 3 mL, Intravenous, Q12H, Venu You MD, 3 mL at 05/29/24 2142    sodium chloride 0.9 % flush 3-10 mL, 3-10 mL, Intravenous, PRNAvelino Subin, MD    sodium chloride 0.9 % infusion 40 mL, 40 mL, Intravenous, PRN, Venu You MD    sodium chloride 0.9 % infusion 40 mL, 40 mL, Intravenous, PRNAvelino Subin, MD    sodium chloride 0.9 % infusion, 125 mL/hr, Intravenous, Continuous, Venu You MD, Last Rate: 125 mL/hr at 05/30/24 0600, 125 mL/hr at 05/30/24 0600     Assessment & Plan       Assessment & Plan  MARTÍNEZ  Proteinuria   Metabolic acidosis   Respiratory alkalosis   Elevated liver enzymes   Aspiration PNA with worsening hypoxia   Sepsis   Hypernatremia -  worse  Hematuria     Cr continues to trend up - 2.4 today = likely ATN from sepsis and hypoxia  No indication for acute HD but discussed possibility with patient and family  Serologies pending for hematuria/vasculitis  Na 151 - change IVF to D5W  Continue abx per pulmonary/primary   Discussed with family  Will follow       Jonathan Montoya MD  [facility]  05/30/24  07:25 EDT